# Patient Record
Sex: FEMALE | Race: WHITE | Employment: UNEMPLOYED | URBAN - METROPOLITAN AREA
[De-identification: names, ages, dates, MRNs, and addresses within clinical notes are randomized per-mention and may not be internally consistent; named-entity substitution may affect disease eponyms.]

---

## 2017-01-30 PROBLEM — C73 PRIMARY THYROID PAPILLARY CARCINOMA (HCC): Chronic | Status: RESOLVED | Noted: 2017-01-30 | Resolved: 2017-01-30

## 2018-01-17 PROBLEM — R42 DIZZINESS: Status: ACTIVE | Noted: 2018-01-17

## 2018-01-17 PROBLEM — E03.9 HYPOTHYROIDISM (ACQUIRED): Status: ACTIVE | Noted: 2018-01-17

## 2018-01-17 PROBLEM — R63.5 WEIGHT GAIN: Status: ACTIVE | Noted: 2018-01-17

## 2018-01-17 PROBLEM — C73 THYROID CANCER (HCC): Status: ACTIVE | Noted: 2018-01-17

## 2018-03-13 ENCOUNTER — HOSPITAL ENCOUNTER (OUTPATIENT)
Age: 53
Discharge: HOME | End: 2018-03-13
Payer: COMMERCIAL

## 2018-03-13 ENCOUNTER — OFFICE VISIT (OUTPATIENT)
Dept: ENDOCRINOLOGY | Age: 53
End: 2018-03-13
Payer: COMMERCIAL

## 2018-03-13 VITALS
SYSTOLIC BLOOD PRESSURE: 138 MMHG | OXYGEN SATURATION: 98 % | DIASTOLIC BLOOD PRESSURE: 88 MMHG | TEMPERATURE: 97.9 F | WEIGHT: 252 LBS | BODY MASS INDEX: 40.5 KG/M2 | HEART RATE: 89 BPM | HEIGHT: 66 IN

## 2018-03-13 DIAGNOSIS — C73 THYROID CANCER (HCC): Primary | ICD-10-CM

## 2018-03-13 DIAGNOSIS — C73 THYROID CANCER (HCC): ICD-10-CM

## 2018-03-13 LAB — TSH SERPL DL<=0.05 MIU/L-ACNC: 0.51 UIU/ML (ref 0.27–4.2)

## 2018-03-13 PROCEDURE — 84443 ASSAY THYROID STIM HORMONE: CPT

## 2018-03-13 PROCEDURE — 99213 OFFICE O/P EST LOW 20 MIN: CPT | Performed by: INTERNAL MEDICINE

## 2018-03-13 PROCEDURE — 36415 COLL VENOUS BLD VENIPUNCTURE: CPT

## 2018-03-13 NOTE — PROGRESS NOTES
by mouth daily 30 tablet 3    ALPRAZolam (XANAX) 1 MG tablet Take 1 mg by mouth 2 times daily OK TO TAKE DOS       No current facility-administered medications on file prior to visit. ROS - Gen: no F/C,  Card: no CP,  Pulm: no SOB,  GI: no N/V/D,      PE -  Gen : /88 (Site: Right Arm, Position: Sitting, Cuff Size: Large Adult)   Pulse 89   Temp 97.9 °F (36.6 °C)   Ht 5' 6\" (1.676 m)   Wt 252 lb (114.3 kg)   SpO2 98%   BMI 40.67 kg/m²    WN, WD, NAD  Heart: RRR without MGR   No LE edema  Lung: CTA b/l   Nml resp effort  Psych: Normal mood   Full affect  Neur: Moves all extremities well    Test Results -  Results for Janay Monsalve (MRN [id number]) as of 3/13/2018 16:31   Ref. Range 2/20/2018 07:12   Urine Total Volume Latest Units: mL 800   Cortisol (Ur), Free Latest Ref Range: <=45.0 ug/d 12.1   Cortisol,F,ug/L,U Latest Units: ug/L 15.10   Cortisol,Ur Free Latest Units: ug/g CRT 10.63   Creatinine, 24H Ur Latest Ref Range: 500 - 1400 mg/d 1136   Creatinine, Ur Latest Units: mg/dL 142   Hours Collected Latest Units: hr 24       A/P -   1. Thyroid Cancer - stable, no signs of recurrence       PTC Stage I, [id number],     Excellent response to therapy (total thyroidectomy, 100mCi BERG)       Risk of recurrence is 1-4% for the next 5years. TSH should be maintained at 0.5-2 per the 2015 JOSE guidelines       Pt is doing well. Her TSH needs to be repeated now, b/c her last one was drawn just 3-4 weeks after an LT4 dose adjustment. Therefore, the plan will be as follows: Will check her TSH now       Will need yearly TSH, fT4 , Tg, Tg Ab and Neck US in Aug 2018     2. Fatigue, dizziness and other non-specific symptoms - uncontrolled       Her testing revealed no endocrine cause. 3.  Obesity - uncontrolled       I instructed pt to cont to avoid sweets and to count all calories and limit them to 1500 suman/day     3. HTN - controlled       Cont present medical regimen for now     4.

## 2018-04-16 ENCOUNTER — TELEPHONE (OUTPATIENT)
Dept: ENDOCRINOLOGY | Age: 53
End: 2018-04-16

## 2018-05-04 DIAGNOSIS — C73 THYROID CANCER (HCC): Primary | ICD-10-CM

## 2018-05-15 ENCOUNTER — TELEPHONE (OUTPATIENT)
Dept: ENDOCRINOLOGY | Age: 53
End: 2018-05-15

## 2018-05-16 ENCOUNTER — HOSPITAL ENCOUNTER (OUTPATIENT)
Dept: ULTRASOUND IMAGING | Age: 53
Discharge: HOME | End: 2018-05-18
Payer: COMMERCIAL

## 2018-05-16 ENCOUNTER — HOSPITAL ENCOUNTER (OUTPATIENT)
Age: 53
Discharge: HOME | End: 2018-05-16
Payer: COMMERCIAL

## 2018-05-16 DIAGNOSIS — C73 THYROID CANCER (HCC): ICD-10-CM

## 2018-05-16 LAB
T4 FREE: 1.39 NG/DL (ref 0.93–1.7)
TSH SERPL DL<=0.05 MIU/L-ACNC: 1.17 UIU/ML (ref 0.27–4.2)

## 2018-05-16 PROCEDURE — 86800 THYROGLOBULIN ANTIBODY: CPT

## 2018-05-16 PROCEDURE — 84432 ASSAY OF THYROGLOBULIN: CPT

## 2018-05-16 PROCEDURE — 84443 ASSAY THYROID STIM HORMONE: CPT

## 2018-05-16 PROCEDURE — 76536 US EXAM OF HEAD AND NECK: CPT

## 2018-05-16 PROCEDURE — 84439 ASSAY OF FREE THYROXINE: CPT

## 2018-05-16 PROCEDURE — 36415 COLL VENOUS BLD VENIPUNCTURE: CPT

## 2018-05-19 LAB
THYROGLOBULIN AB: <0.9 IU/ML (ref 0–4)
THYROGLOBULIN BY LC-MS/MS, SERUM/PLASMA: ABNORMAL NG/ML (ref 1.3–31.8)
THYROGLOBULIN, SERUM OR PLASMA: <0.1 NG/ML (ref 1.3–31.8)

## 2018-05-22 ENCOUNTER — TELEPHONE (OUTPATIENT)
Dept: ENDOCRINOLOGY | Age: 53
End: 2018-05-22

## 2018-05-24 ENCOUNTER — APPOINTMENT (OUTPATIENT)
Dept: GENERAL RADIOLOGY | Age: 53
End: 2018-05-24
Payer: COMMERCIAL

## 2018-05-24 ENCOUNTER — HOSPITAL ENCOUNTER (EMERGENCY)
Age: 53
Discharge: HOME | End: 2018-05-24
Attending: FAMILY MEDICINE
Payer: COMMERCIAL

## 2018-05-24 VITALS
HEART RATE: 98 BPM | TEMPERATURE: 98.4 F | OXYGEN SATURATION: 98 % | SYSTOLIC BLOOD PRESSURE: 146 MMHG | RESPIRATION RATE: 18 BRPM | DIASTOLIC BLOOD PRESSURE: 86 MMHG

## 2018-05-24 DIAGNOSIS — M75.52 BURSITIS OF LEFT SHOULDER: ICD-10-CM

## 2018-05-24 DIAGNOSIS — S46.912A STRAIN OF LEFT SHOULDER, INITIAL ENCOUNTER: Primary | ICD-10-CM

## 2018-05-24 PROCEDURE — 73080 X-RAY EXAM OF ELBOW: CPT

## 2018-05-24 PROCEDURE — 99283 EMERGENCY DEPT VISIT LOW MDM: CPT

## 2018-05-24 PROCEDURE — 73030 X-RAY EXAM OF SHOULDER: CPT

## 2018-05-24 RX ORDER — METHYLPREDNISOLONE 4 MG/1
TABLET ORAL
Qty: 1 KIT | Refills: 0 | Status: SHIPPED | OUTPATIENT
Start: 2018-05-24 | End: 2018-05-30

## 2018-05-24 ASSESSMENT — PAIN DESCRIPTION - PROGRESSION: CLINICAL_PROGRESSION: GRADUALLY WORSENING

## 2018-05-24 ASSESSMENT — PAIN DESCRIPTION - FREQUENCY: FREQUENCY: CONTINUOUS

## 2018-05-24 ASSESSMENT — PAIN DESCRIPTION - DESCRIPTORS: DESCRIPTORS: ACHING

## 2018-05-24 ASSESSMENT — PAIN DESCRIPTION - ORIENTATION: ORIENTATION: LEFT

## 2018-05-24 ASSESSMENT — PAIN DESCRIPTION - ONSET: ONSET: ON-GOING

## 2018-05-24 ASSESSMENT — PAIN DESCRIPTION - LOCATION: LOCATION: ARM;SHOULDER

## 2018-05-24 ASSESSMENT — PAIN SCALES - GENERAL: PAINLEVEL_OUTOF10: 8

## 2018-06-04 ENCOUNTER — OFFICE VISIT (OUTPATIENT)
Dept: CARDIOLOGY CLINIC | Age: 53
End: 2018-06-04
Payer: COMMERCIAL

## 2018-06-04 VITALS
DIASTOLIC BLOOD PRESSURE: 70 MMHG | WEIGHT: 253.9 LBS | HEART RATE: 77 BPM | RESPIRATION RATE: 16 BRPM | BODY MASS INDEX: 40.81 KG/M2 | HEIGHT: 66 IN | SYSTOLIC BLOOD PRESSURE: 120 MMHG

## 2018-06-04 DIAGNOSIS — R07.9 CHEST PAIN, UNSPECIFIED TYPE: ICD-10-CM

## 2018-06-04 DIAGNOSIS — R00.2 PALPITATIONS: ICD-10-CM

## 2018-06-04 DIAGNOSIS — M79.602 LEFT ARM PAIN: Primary | ICD-10-CM

## 2018-06-04 PROCEDURE — 93000 ELECTROCARDIOGRAM COMPLETE: CPT | Performed by: INTERNAL MEDICINE

## 2018-06-04 PROCEDURE — 3017F COLORECTAL CA SCREEN DOC REV: CPT | Performed by: INTERNAL MEDICINE

## 2018-06-04 PROCEDURE — 99214 OFFICE O/P EST MOD 30 MIN: CPT | Performed by: INTERNAL MEDICINE

## 2018-06-04 PROCEDURE — G8417 CALC BMI ABV UP PARAM F/U: HCPCS | Performed by: INTERNAL MEDICINE

## 2018-06-04 PROCEDURE — G8427 DOCREV CUR MEDS BY ELIG CLIN: HCPCS | Performed by: INTERNAL MEDICINE

## 2018-06-04 PROCEDURE — 1036F TOBACCO NON-USER: CPT | Performed by: INTERNAL MEDICINE

## 2018-06-04 RX ORDER — METOPROLOL SUCCINATE 50 MG/1
25 TABLET, EXTENDED RELEASE ORAL DAILY
Qty: 90 TABLET | Refills: 3 | Status: SHIPPED | OUTPATIENT
Start: 2018-06-04 | End: 2018-12-11 | Stop reason: SDUPTHER

## 2018-06-06 ENCOUNTER — APPOINTMENT (OUTPATIENT)
Dept: ULTRASOUND IMAGING | Age: 53
End: 2018-06-06
Payer: COMMERCIAL

## 2018-06-06 ENCOUNTER — HOSPITAL ENCOUNTER (EMERGENCY)
Age: 53
Discharge: HOME | End: 2018-06-06
Attending: EMERGENCY MEDICINE
Payer: COMMERCIAL

## 2018-06-06 VITALS
HEIGHT: 66 IN | HEART RATE: 68 BPM | TEMPERATURE: 97.4 F | BODY MASS INDEX: 38.57 KG/M2 | RESPIRATION RATE: 18 BRPM | SYSTOLIC BLOOD PRESSURE: 158 MMHG | WEIGHT: 240 LBS | OXYGEN SATURATION: 97 % | DIASTOLIC BLOOD PRESSURE: 88 MMHG

## 2018-06-06 DIAGNOSIS — M79.602 LEFT ARM PAIN: Primary | ICD-10-CM

## 2018-06-06 PROCEDURE — 6370000000 HC RX 637 (ALT 250 FOR IP): Performed by: EMERGENCY MEDICINE

## 2018-06-06 PROCEDURE — 93971 EXTREMITY STUDY: CPT

## 2018-06-06 PROCEDURE — 99283 EMERGENCY DEPT VISIT LOW MDM: CPT

## 2018-06-06 RX ORDER — IBUPROFEN 800 MG/1
800 TABLET ORAL ONCE
Status: COMPLETED | OUTPATIENT
Start: 2018-06-06 | End: 2018-06-06

## 2018-06-06 RX ADMIN — IBUPROFEN 800 MG: 800 TABLET ORAL at 06:44

## 2018-06-06 ASSESSMENT — PAIN DESCRIPTION - DESCRIPTORS: DESCRIPTORS: ACHING;CONSTANT

## 2018-06-06 ASSESSMENT — PAIN DESCRIPTION - FREQUENCY: FREQUENCY: CONTINUOUS

## 2018-06-06 ASSESSMENT — ENCOUNTER SYMPTOMS
BACK PAIN: 0
NAUSEA: 0
SHORTNESS OF BREATH: 0
VOMITING: 0
COUGH: 0
COLOR CHANGE: 0

## 2018-06-06 ASSESSMENT — PAIN DESCRIPTION - ORIENTATION: ORIENTATION: LEFT;UPPER;INNER

## 2018-06-06 ASSESSMENT — PAIN DESCRIPTION - ONSET: ONSET: ON-GOING

## 2018-06-06 ASSESSMENT — PAIN DESCRIPTION - LOCATION: LOCATION: ARM

## 2018-06-06 ASSESSMENT — PAIN SCALES - GENERAL
PAINLEVEL_OUTOF10: 7
PAINLEVEL_OUTOF10: 8
PAINLEVEL_OUTOF10: 8

## 2018-06-06 ASSESSMENT — PAIN DESCRIPTION - PAIN TYPE: TYPE: ACUTE PAIN

## 2018-06-07 ENCOUNTER — OFFICE VISIT (OUTPATIENT)
Dept: SURGERY | Age: 53
End: 2018-06-07
Payer: COMMERCIAL

## 2018-06-07 VITALS
HEIGHT: 66 IN | DIASTOLIC BLOOD PRESSURE: 94 MMHG | BODY MASS INDEX: 40.5 KG/M2 | HEART RATE: 82 BPM | OXYGEN SATURATION: 98 % | TEMPERATURE: 98.4 F | RESPIRATION RATE: 16 BRPM | SYSTOLIC BLOOD PRESSURE: 138 MMHG | WEIGHT: 252 LBS

## 2018-06-07 DIAGNOSIS — M79.602 PAIN OF LEFT UPPER EXTREMITY: Primary | ICD-10-CM

## 2018-06-07 DIAGNOSIS — R22.32 ARM MASS, LEFT: Primary | ICD-10-CM

## 2018-06-07 PROCEDURE — G8427 DOCREV CUR MEDS BY ELIG CLIN: HCPCS | Performed by: SURGERY

## 2018-06-07 PROCEDURE — 99243 OFF/OP CNSLTJ NEW/EST LOW 30: CPT | Performed by: SURGERY

## 2018-06-07 PROCEDURE — G8417 CALC BMI ABV UP PARAM F/U: HCPCS | Performed by: SURGERY

## 2018-06-07 PROCEDURE — 3017F COLORECTAL CA SCREEN DOC REV: CPT | Performed by: SURGERY

## 2018-06-09 ENCOUNTER — HOSPITAL ENCOUNTER (OUTPATIENT)
Dept: CT IMAGING | Age: 53
Discharge: HOME | End: 2018-06-11
Payer: COMMERCIAL

## 2018-06-09 PROCEDURE — 73200 CT UPPER EXTREMITY W/O DYE: CPT

## 2018-06-13 ENCOUNTER — HOSPITAL ENCOUNTER (OUTPATIENT)
Dept: MRI IMAGING | Age: 53
Discharge: HOME | End: 2018-06-15
Payer: COMMERCIAL

## 2018-06-13 ENCOUNTER — HOSPITAL ENCOUNTER (OUTPATIENT)
Age: 53
Discharge: HOME | End: 2018-06-13
Payer: COMMERCIAL

## 2018-06-13 DIAGNOSIS — M79.602 LEFT ARM PAIN: ICD-10-CM

## 2018-06-13 DIAGNOSIS — R00.2 PALPITATIONS: ICD-10-CM

## 2018-06-13 DIAGNOSIS — R07.9 CHEST PAIN, UNSPECIFIED TYPE: ICD-10-CM

## 2018-06-13 LAB
BUN BLDV-MCNC: 13 MG/DL (ref 6–20)
CREAT SERPL-MCNC: 0.7 MG/DL (ref 0.5–1)
GFR AFRICAN AMERICAN: >60
GFR NON-AFRICAN AMERICAN: >60 ML/MIN/1.73

## 2018-06-13 PROCEDURE — 82565 ASSAY OF CREATININE: CPT

## 2018-06-13 PROCEDURE — 84520 ASSAY OF UREA NITROGEN: CPT

## 2018-06-13 PROCEDURE — 36415 COLL VENOUS BLD VENIPUNCTURE: CPT

## 2018-06-15 RX ORDER — FUROSEMIDE 40 MG/1
TABLET ORAL
Qty: 45 TABLET | Refills: 2 | Status: SHIPPED | OUTPATIENT
Start: 2018-06-15 | End: 2018-12-11

## 2018-07-18 ENCOUNTER — HOSPITAL ENCOUNTER (OUTPATIENT)
Age: 53
Discharge: HOME | End: 2018-07-20

## 2018-07-18 LAB
ABO/RH: NORMAL
ANTIBODY SCREEN: NORMAL
HCT VFR BLD CALC: 41.3 % (ref 34–48)
HEMOGLOBIN: 13.8 G/DL (ref 11.5–15.5)
MCH RBC QN AUTO: 29.9 PG (ref 26–35)
MCHC RBC AUTO-ENTMCNC: 33.4 % (ref 32–34.5)
MCV RBC AUTO: 89.4 FL (ref 80–99.9)
PDW BLD-RTO: 12.3 FL (ref 11.5–15)
PLATELET # BLD: 380 E9/L (ref 130–450)
PMV BLD AUTO: 9.8 FL (ref 7–12)
RBC # BLD: 4.62 E12/L (ref 3.5–5.5)
WBC # BLD: 11.2 E9/L (ref 4.5–11.5)

## 2018-07-18 PROCEDURE — 86850 RBC ANTIBODY SCREEN: CPT

## 2018-07-18 PROCEDURE — 86901 BLOOD TYPING SEROLOGIC RH(D): CPT

## 2018-07-18 PROCEDURE — 85027 COMPLETE CBC AUTOMATED: CPT

## 2018-07-18 PROCEDURE — 86900 BLOOD TYPING SEROLOGIC ABO: CPT

## 2018-07-25 ENCOUNTER — HOSPITAL ENCOUNTER (OUTPATIENT)
Age: 53
Discharge: HOME | End: 2018-07-27

## 2018-07-25 PROCEDURE — 88307 TISSUE EXAM BY PATHOLOGIST: CPT

## 2018-07-26 ENCOUNTER — HOSPITAL ENCOUNTER (OUTPATIENT)
Age: 53
Discharge: HOME | End: 2018-07-28

## 2018-07-26 DIAGNOSIS — I10 ESSENTIAL HYPERTENSION: ICD-10-CM

## 2018-07-26 DIAGNOSIS — C73 THYROID CANCER (HCC): Primary | ICD-10-CM

## 2018-07-26 DIAGNOSIS — R42 DIZZINESS: ICD-10-CM

## 2018-07-26 DIAGNOSIS — E03.9 HYPOTHYROIDISM (ACQUIRED): ICD-10-CM

## 2018-07-26 DIAGNOSIS — R63.5 WEIGHT GAIN: ICD-10-CM

## 2018-07-26 LAB
ANION GAP SERPL CALCULATED.3IONS-SCNC: 14 MMOL/L (ref 7–16)
BUN BLDV-MCNC: 14 MG/DL (ref 6–20)
CALCIUM SERPL-MCNC: 9 MG/DL (ref 8.6–10.2)
CHLORIDE BLD-SCNC: 101 MMOL/L (ref 98–107)
CO2: 26 MMOL/L (ref 22–29)
CREAT SERPL-MCNC: 0.7 MG/DL (ref 0.5–1)
GFR AFRICAN AMERICAN: >60
GFR NON-AFRICAN AMERICAN: >60 ML/MIN/1.73
GLUCOSE BLD-MCNC: 138 MG/DL (ref 74–109)
HCT VFR BLD CALC: 38.2 % (ref 34–48)
HEMOGLOBIN: 12.6 G/DL (ref 11.5–15.5)
MAGNESIUM: 1.9 MG/DL (ref 1.6–2.6)
POTASSIUM SERPL-SCNC: 4.7 MMOL/L (ref 3.5–5)
SODIUM BLD-SCNC: 141 MMOL/L (ref 132–146)

## 2018-07-26 PROCEDURE — 85014 HEMATOCRIT: CPT

## 2018-07-26 PROCEDURE — 85018 HEMOGLOBIN: CPT

## 2018-07-26 PROCEDURE — 80048 BASIC METABOLIC PNL TOTAL CA: CPT

## 2018-07-26 PROCEDURE — 83735 ASSAY OF MAGNESIUM: CPT

## 2018-07-26 RX ORDER — LEVOTHYROXINE SODIUM 112 MCG
112 TABLET ORAL DAILY
Qty: 30 TABLET | Refills: 5 | Status: SHIPPED | OUTPATIENT
Start: 2018-07-26 | End: 2019-01-17 | Stop reason: SDUPTHER

## 2018-09-04 ENCOUNTER — HOSPITAL ENCOUNTER (OUTPATIENT)
Dept: ULTRASOUND IMAGING | Age: 53
Discharge: HOME | End: 2018-09-06
Payer: COMMERCIAL

## 2018-09-04 DIAGNOSIS — R63.5 WEIGHT GAIN: ICD-10-CM

## 2018-09-04 DIAGNOSIS — R42 DIZZINESS: ICD-10-CM

## 2018-09-04 DIAGNOSIS — C73 THYROID CANCER (HCC): ICD-10-CM

## 2018-09-04 DIAGNOSIS — E03.9 HYPOTHYROIDISM (ACQUIRED): ICD-10-CM

## 2018-09-04 DIAGNOSIS — I10 ESSENTIAL HYPERTENSION: ICD-10-CM

## 2018-09-04 PROCEDURE — 76536 US EXAM OF HEAD AND NECK: CPT

## 2018-09-06 ENCOUNTER — HOSPITAL ENCOUNTER (OUTPATIENT)
Dept: SLEEP CENTER | Age: 53
Discharge: HOME | End: 2018-09-06
Payer: COMMERCIAL

## 2018-09-06 PROCEDURE — G0399 HOME SLEEP TEST/TYPE 3 PORTA: HCPCS

## 2018-09-13 ENCOUNTER — TELEPHONE (OUTPATIENT)
Dept: ADMINISTRATIVE | Age: 53
End: 2018-09-13

## 2018-09-13 NOTE — TELEPHONE ENCOUNTER
Having car problems, not able to get there for appt; rescheduled; wants to know if someone can call her back to go over test results.

## 2018-09-14 ENCOUNTER — CLINICAL DOCUMENTATION (OUTPATIENT)
Dept: ENDOCRINOLOGY | Age: 53
End: 2018-09-14

## 2018-09-14 NOTE — PROGRESS NOTES
Spoke with pt by phone. The US showed the LN's to be without significant change compared to the 05/2018 US. Next appt is in two weeks. Will discuss with her at that time a plan for continued surveillance.     Rivera Hidalgo  9/14/18

## 2018-10-21 ENCOUNTER — CLINICAL DOCUMENTATION (OUTPATIENT)
Dept: ENDOCRINOLOGY | Age: 53
End: 2018-10-21

## 2018-10-21 NOTE — PROGRESS NOTES
Pt cancelled her follow up appt on 9/13/18. I asked staff to reschedule her.   Tom Morales  10/21/18

## 2018-12-03 ENCOUNTER — APPOINTMENT (OUTPATIENT)
Dept: CT IMAGING | Age: 53
End: 2018-12-03
Payer: COMMERCIAL

## 2018-12-03 ENCOUNTER — HOSPITAL ENCOUNTER (EMERGENCY)
Age: 53
Discharge: HOME | End: 2018-12-03
Attending: EMERGENCY MEDICINE
Payer: COMMERCIAL

## 2018-12-03 VITALS
WEIGHT: 245 LBS | HEART RATE: 73 BPM | HEIGHT: 66 IN | OXYGEN SATURATION: 97 % | TEMPERATURE: 97.8 F | DIASTOLIC BLOOD PRESSURE: 65 MMHG | SYSTOLIC BLOOD PRESSURE: 156 MMHG | RESPIRATION RATE: 16 BRPM | BODY MASS INDEX: 39.37 KG/M2

## 2018-12-03 DIAGNOSIS — K63.89 APPENDICITIS EPIPLOICA: Primary | ICD-10-CM

## 2018-12-03 DIAGNOSIS — R10.32 LEFT LOWER QUADRANT PAIN: ICD-10-CM

## 2018-12-03 LAB
ALBUMIN SERPL-MCNC: 4.3 G/DL (ref 3.5–5.2)
ALP BLD-CCNC: 68 U/L (ref 35–104)
ALT SERPL-CCNC: 18 U/L (ref 0–32)
ANION GAP SERPL CALCULATED.3IONS-SCNC: 16 MMOL/L (ref 7–16)
AST SERPL-CCNC: 14 U/L (ref 0–31)
BACTERIA: ABNORMAL /HPF
BASOPHILS ABSOLUTE: 0.05 E9/L (ref 0–0.2)
BASOPHILS RELATIVE PERCENT: 0.5 % (ref 0–2)
BILIRUB SERPL-MCNC: 0.9 MG/DL (ref 0–1.2)
BILIRUBIN URINE: NEGATIVE
BLOOD, URINE: ABNORMAL
BUN BLDV-MCNC: 11 MG/DL (ref 6–20)
CALCIUM SERPL-MCNC: 9.3 MG/DL (ref 8.6–10.2)
CHLORIDE BLD-SCNC: 99 MMOL/L (ref 98–107)
CLARITY: CLEAR
CO2: 24 MMOL/L (ref 22–29)
COLOR: YELLOW
CREAT SERPL-MCNC: 0.7 MG/DL (ref 0.5–1)
EOSINOPHILS ABSOLUTE: 0.09 E9/L (ref 0.05–0.5)
EOSINOPHILS RELATIVE PERCENT: 1 % (ref 0–6)
EPITHELIAL CELLS, UA: ABNORMAL /HPF
GFR AFRICAN AMERICAN: >60
GFR NON-AFRICAN AMERICAN: >60 ML/MIN/1.73
GLUCOSE BLD-MCNC: 101 MG/DL (ref 74–99)
GLUCOSE URINE: NEGATIVE MG/DL
HCT VFR BLD CALC: 41 % (ref 34–48)
HEMOGLOBIN: 13.7 G/DL (ref 11.5–15.5)
IMMATURE GRANULOCYTES #: 0.02 E9/L
IMMATURE GRANULOCYTES %: 0.2 % (ref 0–5)
KETONES, URINE: NEGATIVE MG/DL
LACTIC ACID: 1.3 MMOL/L (ref 0.5–2.2)
LEUKOCYTE ESTERASE, URINE: ABNORMAL
LIPASE: 17 U/L (ref 13–60)
LYMPHOCYTES ABSOLUTE: 2.12 E9/L (ref 1.5–4)
LYMPHOCYTES RELATIVE PERCENT: 22.9 % (ref 20–42)
MCH RBC QN AUTO: 30.3 PG (ref 26–35)
MCHC RBC AUTO-ENTMCNC: 33.4 % (ref 32–34.5)
MCV RBC AUTO: 90.7 FL (ref 80–99.9)
MONOCYTES ABSOLUTE: 0.68 E9/L (ref 0.1–0.95)
MONOCYTES RELATIVE PERCENT: 7.3 % (ref 2–12)
NEUTROPHILS ABSOLUTE: 6.31 E9/L (ref 1.8–7.3)
NEUTROPHILS RELATIVE PERCENT: 68.1 % (ref 43–80)
NITRITE, URINE: NEGATIVE
PDW BLD-RTO: 12 FL (ref 11.5–15)
PH UA: 6 (ref 5–9)
PLATELET # BLD: 303 E9/L (ref 130–450)
PMV BLD AUTO: 9.4 FL (ref 7–12)
POTASSIUM SERPL-SCNC: 4 MMOL/L (ref 3.5–5)
PROTEIN UA: NEGATIVE MG/DL
RBC # BLD: 4.52 E12/L (ref 3.5–5.5)
RBC UA: ABNORMAL /HPF (ref 0–2)
SODIUM BLD-SCNC: 139 MMOL/L (ref 132–146)
SPECIFIC GRAVITY UA: 1.01 (ref 1–1.03)
TOTAL PROTEIN: 7.3 G/DL (ref 6.4–8.3)
UROBILINOGEN, URINE: 0.2 E.U./DL
WBC # BLD: 9.3 E9/L (ref 4.5–11.5)
WBC UA: ABNORMAL /HPF (ref 0–5)

## 2018-12-03 PROCEDURE — 74176 CT ABD & PELVIS W/O CONTRAST: CPT

## 2018-12-03 PROCEDURE — 99284 EMERGENCY DEPT VISIT MOD MDM: CPT

## 2018-12-03 PROCEDURE — 6360000002 HC RX W HCPCS: Performed by: EMERGENCY MEDICINE

## 2018-12-03 PROCEDURE — 83690 ASSAY OF LIPASE: CPT

## 2018-12-03 PROCEDURE — 81001 URINALYSIS AUTO W/SCOPE: CPT

## 2018-12-03 PROCEDURE — 2580000003 HC RX 258: Performed by: EMERGENCY MEDICINE

## 2018-12-03 PROCEDURE — 87088 URINE BACTERIA CULTURE: CPT

## 2018-12-03 PROCEDURE — 85025 COMPLETE CBC W/AUTO DIFF WBC: CPT

## 2018-12-03 PROCEDURE — 96374 THER/PROPH/DIAG INJ IV PUSH: CPT

## 2018-12-03 PROCEDURE — 80053 COMPREHEN METABOLIC PANEL: CPT

## 2018-12-03 PROCEDURE — 83605 ASSAY OF LACTIC ACID: CPT

## 2018-12-03 RX ORDER — SODIUM CHLORIDE 9 MG/ML
1000 INJECTION, SOLUTION INTRAVENOUS ONCE
Status: COMPLETED | OUTPATIENT
Start: 2018-12-03 | End: 2018-12-03

## 2018-12-03 RX ORDER — SODIUM CHLORIDE 0.9 % (FLUSH) 0.9 %
10 SYRINGE (ML) INJECTION PRN
Status: DISCONTINUED | OUTPATIENT
Start: 2018-12-03 | End: 2018-12-03 | Stop reason: HOSPADM

## 2018-12-03 RX ORDER — KETOROLAC TROMETHAMINE 30 MG/ML
15 INJECTION, SOLUTION INTRAMUSCULAR; INTRAVENOUS ONCE
Status: COMPLETED | OUTPATIENT
Start: 2018-12-03 | End: 2018-12-03

## 2018-12-03 RX ORDER — NAPROXEN 500 MG/1
500 TABLET ORAL 2 TIMES DAILY
Qty: 28 TABLET | Refills: 0 | Status: SHIPPED | OUTPATIENT
Start: 2018-12-03 | End: 2018-12-11

## 2018-12-03 RX ADMIN — SODIUM CHLORIDE 1000 ML: 9 INJECTION, SOLUTION INTRAVENOUS at 03:26

## 2018-12-03 RX ADMIN — KETOROLAC TROMETHAMINE 15 MG: 30 INJECTION, SOLUTION INTRAMUSCULAR at 03:26

## 2018-12-03 RX ADMIN — Medication 10 ML: at 03:28

## 2018-12-03 ASSESSMENT — ENCOUNTER SYMPTOMS
DIARRHEA: 1
COLOR CHANGE: 0
VOMITING: 0
ABDOMINAL PAIN: 1
CHEST TIGHTNESS: 0
NAUSEA: 0
BLOOD IN STOOL: 0
BACK PAIN: 0
SHORTNESS OF BREATH: 0
COUGH: 0

## 2018-12-03 ASSESSMENT — PAIN DESCRIPTION - ONSET: ONSET: ON-GOING

## 2018-12-03 ASSESSMENT — PAIN DESCRIPTION - PROGRESSION
CLINICAL_PROGRESSION: GRADUALLY IMPROVING
CLINICAL_PROGRESSION: GRADUALLY WORSENING

## 2018-12-03 ASSESSMENT — PAIN SCALES - GENERAL
PAINLEVEL_OUTOF10: 6
PAINLEVEL_OUTOF10: 6
PAINLEVEL_OUTOF10: 5

## 2018-12-03 ASSESSMENT — PAIN DESCRIPTION - FREQUENCY: FREQUENCY: CONTINUOUS

## 2018-12-03 ASSESSMENT — PAIN DESCRIPTION - PAIN TYPE: TYPE: ACUTE PAIN

## 2018-12-03 ASSESSMENT — PAIN DESCRIPTION - LOCATION: LOCATION: ABDOMEN

## 2018-12-03 ASSESSMENT — PAIN DESCRIPTION - DESCRIPTORS: DESCRIPTORS: CONSTANT;ACHING;SORE

## 2018-12-03 ASSESSMENT — PAIN DESCRIPTION - ORIENTATION: ORIENTATION: LEFT;LOWER

## 2018-12-03 NOTE — ED PROVIDER NOTES
---------------------------  Date / Time Roomed:  12/3/2018  3:02 AM  ED Bed Assignment:  26/26    The nursing notes within the ED encounter and vital signs as below have been reviewed. No data found.       Oxygen Saturation Interpretation: Normal    ------------------------------------------ PROGRESS NOTES ------------------------------------------  Re-evaluation(s):  Feeling better          --------------------------------------- ED Clinical Course/MDM --------------------------------------    Impression: Epiploic appendicitis/left lower quadrant pain         Carmine Mejia MD  12/12/18 8870

## 2018-12-04 ENCOUNTER — HOSPITAL ENCOUNTER (EMERGENCY)
Age: 53
Discharge: HOME | End: 2018-12-04
Attending: EMERGENCY MEDICINE
Payer: COMMERCIAL

## 2018-12-04 VITALS
WEIGHT: 245 LBS | BODY MASS INDEX: 39.37 KG/M2 | HEIGHT: 66 IN | OXYGEN SATURATION: 99 % | HEART RATE: 88 BPM | TEMPERATURE: 98.2 F | RESPIRATION RATE: 14 BRPM | DIASTOLIC BLOOD PRESSURE: 66 MMHG | SYSTOLIC BLOOD PRESSURE: 138 MMHG

## 2018-12-04 DIAGNOSIS — I47.1 PAROXYSMAL ATRIAL TACHYCARDIA (HCC): Primary | ICD-10-CM

## 2018-12-04 DIAGNOSIS — K63.89 APPENDICITIS EPIPLOICA: ICD-10-CM

## 2018-12-04 LAB
ALBUMIN SERPL-MCNC: 4.3 G/DL (ref 3.5–5.2)
ALP BLD-CCNC: 66 U/L (ref 35–104)
ALT SERPL-CCNC: 17 U/L (ref 0–32)
ANION GAP SERPL CALCULATED.3IONS-SCNC: 16 MMOL/L (ref 7–16)
AST SERPL-CCNC: 14 U/L (ref 0–31)
BASOPHILS ABSOLUTE: 0.04 E9/L (ref 0–0.2)
BASOPHILS RELATIVE PERCENT: 0.5 % (ref 0–2)
BILIRUB SERPL-MCNC: 0.6 MG/DL (ref 0–1.2)
BUN BLDV-MCNC: 12 MG/DL (ref 6–20)
CALCIUM SERPL-MCNC: 9 MG/DL (ref 8.6–10.2)
CHLORIDE BLD-SCNC: 98 MMOL/L (ref 98–107)
CO2: 25 MMOL/L (ref 22–29)
CREAT SERPL-MCNC: 0.7 MG/DL (ref 0.5–1)
EKG ATRIAL RATE: 120 BPM
EKG P AXIS: 66 DEGREES
EKG P-R INTERVAL: 160 MS
EKG Q-T INTERVAL: 318 MS
EKG QRS DURATION: 78 MS
EKG QTC CALCULATION (BAZETT): 449 MS
EKG R AXIS: 82 DEGREES
EKG T AXIS: 85 DEGREES
EKG VENTRICULAR RATE: 120 BPM
EOSINOPHILS ABSOLUTE: 0.06 E9/L (ref 0.05–0.5)
EOSINOPHILS RELATIVE PERCENT: 0.8 % (ref 0–6)
GFR AFRICAN AMERICAN: >60
GFR NON-AFRICAN AMERICAN: >60 ML/MIN/1.73
GLUCOSE BLD-MCNC: 115 MG/DL (ref 74–99)
HCT VFR BLD CALC: 40 % (ref 34–48)
HEMOGLOBIN: 13.3 G/DL (ref 11.5–15.5)
IMMATURE GRANULOCYTES #: 0.02 E9/L
IMMATURE GRANULOCYTES %: 0.3 % (ref 0–5)
LACTIC ACID: 2.5 MMOL/L (ref 0.5–2.2)
LYMPHOCYTES ABSOLUTE: 1.41 E9/L (ref 1.5–4)
LYMPHOCYTES RELATIVE PERCENT: 19.3 % (ref 20–42)
MAGNESIUM: 1.9 MG/DL (ref 1.6–2.6)
MCH RBC QN AUTO: 30.2 PG (ref 26–35)
MCHC RBC AUTO-ENTMCNC: 33.3 % (ref 32–34.5)
MCV RBC AUTO: 90.7 FL (ref 80–99.9)
MONOCYTES ABSOLUTE: 0.57 E9/L (ref 0.1–0.95)
MONOCYTES RELATIVE PERCENT: 7.8 % (ref 2–12)
NEUTROPHILS ABSOLUTE: 5.21 E9/L (ref 1.8–7.3)
NEUTROPHILS RELATIVE PERCENT: 71.3 % (ref 43–80)
PDW BLD-RTO: 11.9 FL (ref 11.5–15)
PLATELET # BLD: 277 E9/L (ref 130–450)
PMV BLD AUTO: 9.4 FL (ref 7–12)
POTASSIUM SERPL-SCNC: 3.6 MMOL/L (ref 3.5–5)
RBC # BLD: 4.41 E12/L (ref 3.5–5.5)
SODIUM BLD-SCNC: 139 MMOL/L (ref 132–146)
T4 FREE: 1.6 NG/DL (ref 0.93–1.7)
TOTAL PROTEIN: 7.1 G/DL (ref 6.4–8.3)
TROPONIN: <0.01 NG/ML (ref 0–0.03)
TSH SERPL DL<=0.05 MIU/L-ACNC: 1.29 UIU/ML (ref 0.27–4.2)
URINE CULTURE, ROUTINE: NORMAL
WBC # BLD: 7.3 E9/L (ref 4.5–11.5)

## 2018-12-04 PROCEDURE — 93005 ELECTROCARDIOGRAM TRACING: CPT | Performed by: NURSE PRACTITIONER

## 2018-12-04 PROCEDURE — 83735 ASSAY OF MAGNESIUM: CPT

## 2018-12-04 PROCEDURE — 83605 ASSAY OF LACTIC ACID: CPT

## 2018-12-04 PROCEDURE — 84484 ASSAY OF TROPONIN QUANT: CPT

## 2018-12-04 PROCEDURE — 2580000003 HC RX 258: Performed by: NURSE PRACTITIONER

## 2018-12-04 PROCEDURE — 84439 ASSAY OF FREE THYROXINE: CPT

## 2018-12-04 PROCEDURE — 85025 COMPLETE CBC W/AUTO DIFF WBC: CPT

## 2018-12-04 PROCEDURE — 99285 EMERGENCY DEPT VISIT HI MDM: CPT

## 2018-12-04 PROCEDURE — 80053 COMPREHEN METABOLIC PANEL: CPT

## 2018-12-04 PROCEDURE — 84443 ASSAY THYROID STIM HORMONE: CPT

## 2018-12-04 RX ORDER — 0.9 % SODIUM CHLORIDE 0.9 %
1000 INTRAVENOUS SOLUTION INTRAVENOUS ONCE
Status: COMPLETED | OUTPATIENT
Start: 2018-12-04 | End: 2018-12-04

## 2018-12-04 RX ADMIN — SODIUM CHLORIDE 1000 ML: 9 INJECTION, SOLUTION INTRAVENOUS at 20:03

## 2018-12-05 ENCOUNTER — TELEPHONE (OUTPATIENT)
Dept: CARDIOLOGY CLINIC | Age: 53
End: 2018-12-05

## 2018-12-05 DIAGNOSIS — R00.2 PALPITATIONS: ICD-10-CM

## 2018-12-05 NOTE — ED NOTES
Reviewed discharge instructions with patient, discussed medications and addressed all patient and family questions/concerns. Pt verbalizes understanding.        Dieter Amaya RN  12/04/18 2432

## 2018-12-05 NOTE — ED PROVIDER NOTES
masses. · Musculoskeletal: Moves all extremities x 4. Warm and well perfused, no clubbing, cyanosis, or edema. Capillary refill <3 seconds   · Extremities: No calf tenderness or edema, no foot or ankle edema. No cording. No tenderness along the deep venous tract. No clinical findings to suggest DVT. · Integument: skin warm and dry. No rashes.    · Lymphatic: no lymphadenopathy noted  · Neurologic: GCS 15, no focal deficits, symmetric strength 5/5 in the upper and lower extremities bilaterally  · Psychiatric: Normal Affect      Lab / Imaging Results   (All laboratory and radiology results have been personally reviewed by myself)  Labs:  Results for orders placed or performed during the hospital encounter of 12/04/18   CBC Auto Differential   Result Value Ref Range    WBC 7.3 4.5 - 11.5 E9/L    RBC 4.41 3.50 - 5.50 E12/L    Hemoglobin 13.3 11.5 - 15.5 g/dL    Hematocrit 40.0 34.0 - 48.0 %    MCV 90.7 80.0 - 99.9 fL    MCH 30.2 26.0 - 35.0 pg    MCHC 33.3 32.0 - 34.5 %    RDW 11.9 11.5 - 15.0 fL    Platelets 146 377 - 808 E9/L    MPV 9.4 7.0 - 12.0 fL    Neutrophils % 71.3 43.0 - 80.0 %    Immature Granulocytes % 0.3 0.0 - 5.0 %    Lymphocytes % 19.3 (L) 20.0 - 42.0 %    Monocytes % 7.8 2.0 - 12.0 %    Eosinophils % 0.8 0.0 - 6.0 %    Basophils % 0.5 0.0 - 2.0 %    Neutrophils # 5.21 1.80 - 7.30 E9/L    Immature Granulocytes # 0.02 E9/L    Lymphocytes # 1.41 (L) 1.50 - 4.00 E9/L    Monocytes # 0.57 0.10 - 0.95 E9/L    Eosinophils # 0.06 0.05 - 0.50 E9/L    Basophils # 0.04 0.00 - 0.20 E9/L   Comprehensive Metabolic Panel   Result Value Ref Range    Sodium 139 132 - 146 mmol/L    Potassium 3.6 3.5 - 5.0 mmol/L    Chloride 98 98 - 107 mmol/L    CO2 25 22 - 29 mmol/L    Anion Gap 16 7 - 16 mmol/L    Glucose 115 (H) 74 - 99 mg/dL    BUN 12 6 - 20 mg/dL    CREATININE 0.7 0.5 - 1.0 mg/dL    GFR Non-African American >60 >=60 mL/min/1.73    GFR African American >60     Calcium 9.0 8.6 - 10.2 mg/dL    Total Protein 7.1 6.4 - 8.3 g/dL    Alb 4.3 3.5 - 5.2 g/dL    Total Bilirubin 0.6 0.0 - 1.2 mg/dL    Alkaline Phosphatase 66 35 - 104 U/L    ALT 17 0 - 32 U/L    AST 14 0 - 31 U/L   Lactic Acid, Plasma   Result Value Ref Range    Lactic Acid 2.5 (H) 0.5 - 2.2 mmol/L   Magnesium   Result Value Ref Range    Magnesium 1.9 1.6 - 2.6 mg/dL   TSH without Reflex   Result Value Ref Range    TSH 1.290 0.270 - 4.200 uIU/mL   T4, Free   Result Value Ref Range    T4 Free 1.60 0.93 - 1.70 ng/dL   Troponin   Result Value Ref Range    Troponin <0.01 0.00 - 0.03 ng/mL   EKG 12 Lead   Result Value Ref Range    Ventricular Rate 120 BPM    Atrial Rate 120 BPM    P-R Interval 160 ms    QRS Duration 78 ms    Q-T Interval 318 ms    QTc Calculation (Bazett) 449 ms    P Axis 66 degrees    R Axis 82 degrees    T Axis 85 degrees     Imaging: All Radiology results interpreted by Radiologist unless otherwise noted. No orders to display     EKG #1:  Interpreted by emergency department physician unless otherwise noted. Time:  19:26:16    Rate: 120  Rhythm: Sinus. Interpretation: sinus tachycardia, Nonspecific ST/T-wave abnormality without infarction or ischemia. ED Course / Medical Decision Making     Medications   0.9 % sodium chloride bolus (1,000 mLs Intravenous New Bag 12/4/18 2003)        Re-Evaluations:  12/4/18      Time: 9:40 pm    Patient seen and reexamined the bedside. Patients symptoms are improving. Results are explained to patient in plain language, treatment plan reviewed, patient is agreeable with plan for discharge at this point. Understands that will need to call to arrange follow-up. Consultations:             None    Procedures:   none    MDM:  48year Old female presenting to the emergency department for evaluation of palpitations and and a sensation that her heart was beating fast and hard. She has a history of paroxysmal atrial tachycardia, and is on Metoprolol XL, did not miss any doses.  She was recently started on Cipro empirically

## 2018-12-05 NOTE — TELEPHONE ENCOUNTER
Ngajacob No called and states that she was in the ER last night due to HR of 130. She would like to know if you would like to see her or if you can adjust medications. Last time this happened you increased 50 to twice a day.

## 2018-12-11 ENCOUNTER — OFFICE VISIT (OUTPATIENT)
Dept: CARDIOLOGY CLINIC | Age: 53
End: 2018-12-11
Payer: COMMERCIAL

## 2018-12-11 VITALS
WEIGHT: 246.8 LBS | HEART RATE: 74 BPM | DIASTOLIC BLOOD PRESSURE: 78 MMHG | BODY MASS INDEX: 39.66 KG/M2 | SYSTOLIC BLOOD PRESSURE: 112 MMHG | RESPIRATION RATE: 16 BRPM | HEIGHT: 66 IN

## 2018-12-11 DIAGNOSIS — R00.2 PALPITATIONS: ICD-10-CM

## 2018-12-11 DIAGNOSIS — R07.9 CHEST PAIN, UNSPECIFIED TYPE: Primary | ICD-10-CM

## 2018-12-11 PROCEDURE — 3017F COLORECTAL CA SCREEN DOC REV: CPT | Performed by: INTERNAL MEDICINE

## 2018-12-11 PROCEDURE — G8484 FLU IMMUNIZE NO ADMIN: HCPCS | Performed by: INTERNAL MEDICINE

## 2018-12-11 PROCEDURE — 99214 OFFICE O/P EST MOD 30 MIN: CPT | Performed by: INTERNAL MEDICINE

## 2018-12-11 PROCEDURE — G8417 CALC BMI ABV UP PARAM F/U: HCPCS | Performed by: INTERNAL MEDICINE

## 2018-12-11 PROCEDURE — G8427 DOCREV CUR MEDS BY ELIG CLIN: HCPCS | Performed by: INTERNAL MEDICINE

## 2018-12-11 PROCEDURE — 93000 ELECTROCARDIOGRAM COMPLETE: CPT | Performed by: INTERNAL MEDICINE

## 2018-12-11 PROCEDURE — 1036F TOBACCO NON-USER: CPT | Performed by: INTERNAL MEDICINE

## 2018-12-11 RX ORDER — METOPROLOL SUCCINATE 50 MG/1
50 TABLET, EXTENDED RELEASE ORAL 2 TIMES DAILY
Qty: 180 TABLET | Refills: 3 | Status: SHIPPED | OUTPATIENT
Start: 2018-12-11 | End: 2019-01-22 | Stop reason: SDUPTHER

## 2018-12-11 NOTE — PROGRESS NOTES
 homemaker      Social History Main Topics    Smoking status: Never Smoker    Smokeless tobacco: Never Used    Alcohol use No    Drug use: No    Sexual activity: Yes     Partners: Male     Other Topics Concern    Not on file     Social History Narrative    Lives in a house with  who is disabled and one child. Homeschools her daughter. 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       Family History   Problem Relation Age of Onset    High Blood Pressure Mother     High Blood Pressure Father     Cancer Father         leukemia, lung cancer +smoker, brain cancer    Other Sister         nephrectomy due to a benign tumor    Breast Cancer Paternal Grandmother     Ovarian Cancer Paternal Grandmother     Cervical Cancer Paternal Grandmother     Other Daughter         IGG immunodeficiency     Review of Systems:  Heart: as above   Lungs: as above   Eyes: denies changes in vision or discharge. Ears: denies changes in hearing or pain. Nose: denies epistaxis or masses   Throat: denies sore throat or trouble swallowing. Neuro: denies numbness, tingling, tremors. Skin: denies rashes or itching. : denies hematuria, dysuria   GI: denies vomiting, diarrhea   Psych: denies mood changed, anxiety, depression. All other systems negative. Physical Exam   /78   Pulse 74   Resp 16   Ht 5' 6\" (1.676 m)   Wt 246 lb 12.8 oz (111.9 kg)   LMP 01/01/2013   BMI 39.83 kg/m²   Constitutional: Oriented to person, place, and time. Well-developed and well-nourished. No distress. Head: Normocephalic and atraumatic. Eyes: EOM are normal. Pupils are equal, round, and reactive to light. Neck: Normal range of motion. Neck supple. No hepatojugular reflux and no JVD present. Carotid bruit is not present. No tracheal deviation present. No thyromegaly present. Cardiovascular: Normal rate, regular rhythm, normal heart sounds and intact distal pulses.   Exam reveals no gallop and no

## 2018-12-17 ENCOUNTER — TELEPHONE (OUTPATIENT)
Dept: CARDIOLOGY CLINIC | Age: 53
End: 2018-12-17

## 2018-12-21 NOTE — TELEPHONE ENCOUNTER
Increase toprol to 100 in am and 50 mg in evening. Eddie Farah D.O.   Cardiologist  Cardiology, [facility]

## 2018-12-27 ENCOUNTER — TELEPHONE (OUTPATIENT)
Dept: CARDIOLOGY CLINIC | Age: 53
End: 2018-12-27

## 2018-12-27 ENCOUNTER — APPOINTMENT (OUTPATIENT)
Dept: GENERAL RADIOLOGY | Age: 53
End: 2018-12-27
Payer: COMMERCIAL

## 2018-12-27 ENCOUNTER — HOSPITAL ENCOUNTER (EMERGENCY)
Age: 53
Discharge: HOME | End: 2018-12-27
Attending: EMERGENCY MEDICINE
Payer: COMMERCIAL

## 2018-12-27 VITALS
BODY MASS INDEX: 42.52 KG/M2 | WEIGHT: 240 LBS | DIASTOLIC BLOOD PRESSURE: 67 MMHG | HEIGHT: 63 IN | SYSTOLIC BLOOD PRESSURE: 165 MMHG | HEART RATE: 95 BPM | RESPIRATION RATE: 14 BRPM | OXYGEN SATURATION: 96 % | TEMPERATURE: 97.4 F

## 2018-12-27 DIAGNOSIS — R00.2 PALPITATIONS: Primary | ICD-10-CM

## 2018-12-27 LAB
ANION GAP SERPL CALCULATED.3IONS-SCNC: 13 MMOL/L (ref 7–16)
BUN BLDV-MCNC: 18 MG/DL (ref 6–20)
CALCIUM SERPL-MCNC: 8.8 MG/DL (ref 8.6–10.2)
CHLORIDE BLD-SCNC: 102 MMOL/L (ref 98–107)
CO2: 27 MMOL/L (ref 22–29)
CREAT SERPL-MCNC: 0.7 MG/DL (ref 0.5–1)
EKG ATRIAL RATE: 92 BPM
EKG P AXIS: 19 DEGREES
EKG P-R INTERVAL: 190 MS
EKG Q-T INTERVAL: 374 MS
EKG QRS DURATION: 84 MS
EKG QTC CALCULATION (BAZETT): 462 MS
EKG R AXIS: 14 DEGREES
EKG T AXIS: 35 DEGREES
EKG VENTRICULAR RATE: 92 BPM
GFR AFRICAN AMERICAN: >60
GFR NON-AFRICAN AMERICAN: >60 ML/MIN/1.73
GLUCOSE BLD-MCNC: 124 MG/DL (ref 74–99)
HCT VFR BLD CALC: 40.1 % (ref 34–48)
HEMOGLOBIN: 13.4 G/DL (ref 11.5–15.5)
MCH RBC QN AUTO: 30.4 PG (ref 26–35)
MCHC RBC AUTO-ENTMCNC: 33.4 % (ref 32–34.5)
MCV RBC AUTO: 90.9 FL (ref 80–99.9)
PDW BLD-RTO: 12.1 FL (ref 11.5–15)
PLATELET # BLD: 299 E9/L (ref 130–450)
PMV BLD AUTO: 10 FL (ref 7–12)
POTASSIUM SERPL-SCNC: 4.3 MMOL/L (ref 3.5–5)
PRO-BNP: 32 PG/ML (ref 0–125)
RBC # BLD: 4.41 E12/L (ref 3.5–5.5)
SODIUM BLD-SCNC: 142 MMOL/L (ref 132–146)
T4 TOTAL: 8.7 MCG/DL (ref 4.5–11.7)
TROPONIN: <0.01 NG/ML (ref 0–0.03)
TSH SERPL DL<=0.05 MIU/L-ACNC: 0.96 UIU/ML (ref 0.27–4.2)
WBC # BLD: 7.2 E9/L (ref 4.5–11.5)

## 2018-12-27 PROCEDURE — 84443 ASSAY THYROID STIM HORMONE: CPT

## 2018-12-27 PROCEDURE — 85027 COMPLETE CBC AUTOMATED: CPT

## 2018-12-27 PROCEDURE — 99285 EMERGENCY DEPT VISIT HI MDM: CPT

## 2018-12-27 PROCEDURE — 2580000003 HC RX 258: Performed by: PHYSICIAN ASSISTANT

## 2018-12-27 PROCEDURE — 71045 X-RAY EXAM CHEST 1 VIEW: CPT

## 2018-12-27 PROCEDURE — 84484 ASSAY OF TROPONIN QUANT: CPT

## 2018-12-27 PROCEDURE — 84436 ASSAY OF TOTAL THYROXINE: CPT

## 2018-12-27 PROCEDURE — 80048 BASIC METABOLIC PNL TOTAL CA: CPT

## 2018-12-27 PROCEDURE — 83880 ASSAY OF NATRIURETIC PEPTIDE: CPT

## 2018-12-27 RX ORDER — 0.9 % SODIUM CHLORIDE 0.9 %
1000 INTRAVENOUS SOLUTION INTRAVENOUS ONCE
Status: COMPLETED | OUTPATIENT
Start: 2018-12-27 | End: 2018-12-27

## 2018-12-27 RX ADMIN — SODIUM CHLORIDE 1000 ML: 9 INJECTION, SOLUTION INTRAVENOUS at 01:52

## 2018-12-27 NOTE — ED PROVIDER NOTES
Ciprofloxacin    -------------------------------------------------- RESULTS -------------------------------------------------  All laboratory and radiology results have been personally reviewed by myself   LABS:  Results for orders placed or performed during the hospital encounter of 12/27/18   TSH without Reflex   Result Value Ref Range    TSH 0.958 0.270 - 4.200 uIU/mL   T4   Result Value Ref Range    T4, Total 8.7 4.5 - 11.7 mcg/dL   Basic metabolic panel   Result Value Ref Range    Sodium 142 132 - 146 mmol/L    Potassium 4.3 3.5 - 5.0 mmol/L    Chloride 102 98 - 107 mmol/L    CO2 27 22 - 29 mmol/L    Anion Gap 13 7 - 16 mmol/L    Glucose 124 (H) 74 - 99 mg/dL    BUN 18 6 - 20 mg/dL    CREATININE 0.7 0.5 - 1.0 mg/dL    GFR Non-African American >60 >=60 mL/min/1.73    GFR African American >60     Calcium 8.8 8.6 - 10.2 mg/dL   CBC   Result Value Ref Range    WBC 7.2 4.5 - 11.5 E9/L    RBC 4.41 3.50 - 5.50 E12/L    Hemoglobin 13.4 11.5 - 15.5 g/dL    Hematocrit 40.1 34.0 - 48.0 %    MCV 90.9 80.0 - 99.9 fL    MCH 30.4 26.0 - 35.0 pg    MCHC 33.4 32.0 - 34.5 %    RDW 12.1 11.5 - 15.0 fL    Platelets 305 471 - 878 E9/L    MPV 10.0 7.0 - 12.0 fL   Troponin   Result Value Ref Range    Troponin <0.01 0.00 - 0.03 ng/mL   Brain Natriuretic Peptide   Result Value Ref Range    Pro-BNP 32 0 - 125 pg/mL       RADIOLOGY:  Interpreted by Radiologist.  XR CHEST PORTABLE    (Results Pending)       ------------------------- NURSING NOTES AND VITALS REVIEWED ---------------------------   The nursing notes within the ED encounter and vital signs as below have been reviewed.    BP (!) 165/67   Pulse 95   Temp 97.4 °F (36.3 °C) (Oral)   Resp 14   Ht 5' 3\" (1.6 m)   Wt 240 lb (108.9 kg)   LMP 01/01/2013   SpO2 96%   BMI 42.51 kg/m²   Oxygen Saturation Interpretation: Normal      ---------------------------------------------------PHYSICAL EXAM--------------------------------------      Constitutional/General: Alert and oriented

## 2018-12-28 ENCOUNTER — TELEPHONE (OUTPATIENT)
Dept: CARDIOLOGY CLINIC | Age: 53
End: 2018-12-28

## 2018-12-28 DIAGNOSIS — I49.9 IRREGULAR HEART RATE: Primary | ICD-10-CM

## 2019-01-02 ENCOUNTER — NURSE ONLY (OUTPATIENT)
Dept: NON INVASIVE DIAGNOSTICS | Age: 54
End: 2019-01-02

## 2019-01-09 ENCOUNTER — TELEPHONE (OUTPATIENT)
Dept: ENDOCRINOLOGY | Age: 54
End: 2019-01-09

## 2019-01-17 ENCOUNTER — OFFICE VISIT (OUTPATIENT)
Dept: ENDOCRINOLOGY | Age: 54
End: 2019-01-17
Payer: COMMERCIAL

## 2019-01-17 VITALS
WEIGHT: 251.8 LBS | OXYGEN SATURATION: 97 % | HEIGHT: 63 IN | TEMPERATURE: 97.7 F | HEART RATE: 93 BPM | RESPIRATION RATE: 16 BRPM | BODY MASS INDEX: 44.61 KG/M2 | DIASTOLIC BLOOD PRESSURE: 96 MMHG | SYSTOLIC BLOOD PRESSURE: 148 MMHG

## 2019-01-17 DIAGNOSIS — R42 DIZZINESS: ICD-10-CM

## 2019-01-17 DIAGNOSIS — R63.5 WEIGHT GAIN: ICD-10-CM

## 2019-01-17 DIAGNOSIS — I10 ESSENTIAL HYPERTENSION: ICD-10-CM

## 2019-01-17 DIAGNOSIS — E03.9 HYPOTHYROIDISM (ACQUIRED): ICD-10-CM

## 2019-01-17 DIAGNOSIS — C73 THYROID CANCER (HCC): Primary | ICD-10-CM

## 2019-01-17 PROCEDURE — G8484 FLU IMMUNIZE NO ADMIN: HCPCS | Performed by: INTERNAL MEDICINE

## 2019-01-17 PROCEDURE — 99213 OFFICE O/P EST LOW 20 MIN: CPT | Performed by: INTERNAL MEDICINE

## 2019-01-17 PROCEDURE — G8417 CALC BMI ABV UP PARAM F/U: HCPCS | Performed by: INTERNAL MEDICINE

## 2019-01-17 PROCEDURE — 3017F COLORECTAL CA SCREEN DOC REV: CPT | Performed by: INTERNAL MEDICINE

## 2019-01-17 PROCEDURE — 1036F TOBACCO NON-USER: CPT | Performed by: INTERNAL MEDICINE

## 2019-01-17 PROCEDURE — G8427 DOCREV CUR MEDS BY ELIG CLIN: HCPCS | Performed by: INTERNAL MEDICINE

## 2019-01-17 RX ORDER — LEVOTHYROXINE SODIUM 112 MCG
112 TABLET ORAL DAILY
Qty: 30 TABLET | Refills: 5 | Status: SHIPPED | OUTPATIENT
Start: 2019-01-17 | End: 2019-07-16 | Stop reason: SDUPTHER

## 2019-01-22 DIAGNOSIS — R00.2 PALPITATIONS: ICD-10-CM

## 2019-01-22 RX ORDER — METOPROLOL SUCCINATE 50 MG/1
100 TABLET, EXTENDED RELEASE ORAL 2 TIMES DAILY
Qty: 180 TABLET | Refills: 3 | Status: SHIPPED | OUTPATIENT
Start: 2019-01-22 | End: 2019-06-20 | Stop reason: SDUPTHER

## 2019-01-23 ENCOUNTER — HOSPITAL ENCOUNTER (EMERGENCY)
Age: 54
Discharge: HOME | End: 2019-01-23
Attending: EMERGENCY MEDICINE
Payer: COMMERCIAL

## 2019-01-23 ENCOUNTER — APPOINTMENT (OUTPATIENT)
Dept: GENERAL RADIOLOGY | Age: 54
End: 2019-01-23
Payer: COMMERCIAL

## 2019-01-23 VITALS
HEIGHT: 66 IN | TEMPERATURE: 98.2 F | WEIGHT: 242 LBS | BODY MASS INDEX: 38.89 KG/M2 | RESPIRATION RATE: 14 BRPM | OXYGEN SATURATION: 99 % | SYSTOLIC BLOOD PRESSURE: 140 MMHG | HEART RATE: 78 BPM | DIASTOLIC BLOOD PRESSURE: 76 MMHG

## 2019-01-23 DIAGNOSIS — R00.2 PALPITATIONS: Primary | ICD-10-CM

## 2019-01-23 LAB
ANION GAP SERPL CALCULATED.3IONS-SCNC: 12 MMOL/L (ref 7–16)
BASOPHILS ABSOLUTE: 0.05 E9/L (ref 0–0.2)
BASOPHILS RELATIVE PERCENT: 0.8 % (ref 0–2)
BUN BLDV-MCNC: 22 MG/DL (ref 6–20)
CALCIUM SERPL-MCNC: 9 MG/DL (ref 8.6–10.2)
CHLORIDE BLD-SCNC: 104 MMOL/L (ref 98–107)
CO2: 26 MMOL/L (ref 22–29)
CREAT SERPL-MCNC: 0.8 MG/DL (ref 0.5–1)
EKG ATRIAL RATE: 78 BPM
EKG P AXIS: 14 DEGREES
EKG P-R INTERVAL: 184 MS
EKG Q-T INTERVAL: 390 MS
EKG QRS DURATION: 78 MS
EKG QTC CALCULATION (BAZETT): 444 MS
EKG R AXIS: 6 DEGREES
EKG T AXIS: 22 DEGREES
EKG VENTRICULAR RATE: 78 BPM
EOSINOPHILS ABSOLUTE: 0.12 E9/L (ref 0.05–0.5)
EOSINOPHILS RELATIVE PERCENT: 1.8 % (ref 0–6)
GFR AFRICAN AMERICAN: >60
GFR NON-AFRICAN AMERICAN: >60 ML/MIN/1.73
GLUCOSE BLD-MCNC: 108 MG/DL (ref 74–99)
HCT VFR BLD CALC: 39.9 % (ref 34–48)
HEMOGLOBIN: 12.8 G/DL (ref 11.5–15.5)
IMMATURE GRANULOCYTES #: 0.02 E9/L
IMMATURE GRANULOCYTES %: 0.3 % (ref 0–5)
LYMPHOCYTES ABSOLUTE: 1.73 E9/L (ref 1.5–4)
LYMPHOCYTES RELATIVE PERCENT: 26.5 % (ref 20–42)
MCH RBC QN AUTO: 29.6 PG (ref 26–35)
MCHC RBC AUTO-ENTMCNC: 32.1 % (ref 32–34.5)
MCV RBC AUTO: 92.4 FL (ref 80–99.9)
MONOCYTES ABSOLUTE: 0.58 E9/L (ref 0.1–0.95)
MONOCYTES RELATIVE PERCENT: 8.9 % (ref 2–12)
NEUTROPHILS ABSOLUTE: 4.04 E9/L (ref 1.8–7.3)
NEUTROPHILS RELATIVE PERCENT: 61.7 % (ref 43–80)
PDW BLD-RTO: 12.2 FL (ref 11.5–15)
PLATELET # BLD: 290 E9/L (ref 130–450)
PMV BLD AUTO: 9.9 FL (ref 7–12)
POTASSIUM REFLEX MAGNESIUM: 4.1 MMOL/L (ref 3.5–5)
RBC # BLD: 4.32 E12/L (ref 3.5–5.5)
SODIUM BLD-SCNC: 142 MMOL/L (ref 132–146)
TROPONIN: <0.01 NG/ML (ref 0–0.03)
WBC # BLD: 6.5 E9/L (ref 4.5–11.5)

## 2019-01-23 PROCEDURE — 99285 EMERGENCY DEPT VISIT HI MDM: CPT

## 2019-01-23 PROCEDURE — 84484 ASSAY OF TROPONIN QUANT: CPT

## 2019-01-23 PROCEDURE — 80048 BASIC METABOLIC PNL TOTAL CA: CPT

## 2019-01-23 PROCEDURE — 71046 X-RAY EXAM CHEST 2 VIEWS: CPT

## 2019-01-23 PROCEDURE — 36415 COLL VENOUS BLD VENIPUNCTURE: CPT

## 2019-01-23 PROCEDURE — 85025 COMPLETE CBC W/AUTO DIFF WBC: CPT

## 2019-01-23 ASSESSMENT — ENCOUNTER SYMPTOMS
EYE PAIN: 0
SORE THROAT: 0
SINUS PRESSURE: 0
EYE REDNESS: 0
BACK PAIN: 0
EYE DISCHARGE: 0
SHORTNESS OF BREATH: 1
VOMITING: 0
NAUSEA: 0
WHEEZING: 0
DIARRHEA: 0
ABDOMINAL DISTENTION: 0
COUGH: 0

## 2019-02-07 DIAGNOSIS — I49.9 IRREGULAR HEART RATE: ICD-10-CM

## 2019-03-08 ENCOUNTER — TELEPHONE (OUTPATIENT)
Dept: NON INVASIVE DIAGNOSTICS | Age: 54
End: 2019-03-08

## 2019-03-08 RX ORDER — FUROSEMIDE 40 MG/1
TABLET ORAL
Qty: 45 TABLET | Refills: 3 | Status: SHIPPED | OUTPATIENT
Start: 2019-03-08 | End: 2019-03-24

## 2019-03-24 ENCOUNTER — HOSPITAL ENCOUNTER (EMERGENCY)
Age: 54
Discharge: HOME | End: 2019-03-25
Attending: EMERGENCY MEDICINE
Payer: COMMERCIAL

## 2019-03-24 DIAGNOSIS — R00.2 PALPITATIONS: Primary | ICD-10-CM

## 2019-03-24 PROCEDURE — 93005 ELECTROCARDIOGRAM TRACING: CPT

## 2019-03-24 PROCEDURE — 99285 EMERGENCY DEPT VISIT HI MDM: CPT

## 2019-03-25 VITALS
WEIGHT: 240 LBS | RESPIRATION RATE: 16 BRPM | OXYGEN SATURATION: 96 % | TEMPERATURE: 97.6 F | DIASTOLIC BLOOD PRESSURE: 67 MMHG | BODY MASS INDEX: 38.57 KG/M2 | HEIGHT: 66 IN | HEART RATE: 73 BPM | SYSTOLIC BLOOD PRESSURE: 156 MMHG

## 2019-03-25 LAB
ANION GAP SERPL CALCULATED.3IONS-SCNC: 9 MMOL/L (ref 7–16)
BASOPHILS ABSOLUTE: 0.05 E9/L (ref 0–0.2)
BASOPHILS RELATIVE PERCENT: 0.7 % (ref 0–2)
BUN BLDV-MCNC: 19 MG/DL (ref 6–20)
CALCIUM SERPL-MCNC: 9.3 MG/DL (ref 8.6–10.2)
CHLORIDE BLD-SCNC: 105 MMOL/L (ref 98–107)
CO2: 28 MMOL/L (ref 22–29)
CREAT SERPL-MCNC: 0.7 MG/DL (ref 0.5–1)
EOSINOPHILS ABSOLUTE: 0.13 E9/L (ref 0.05–0.5)
EOSINOPHILS RELATIVE PERCENT: 1.9 % (ref 0–6)
GFR AFRICAN AMERICAN: >60
GFR NON-AFRICAN AMERICAN: >60 ML/MIN/1.73
GLUCOSE BLD-MCNC: 114 MG/DL (ref 74–99)
HCT VFR BLD CALC: 39.7 % (ref 34–48)
HEMOGLOBIN: 13.1 G/DL (ref 11.5–15.5)
IMMATURE GRANULOCYTES #: 0.01 E9/L
IMMATURE GRANULOCYTES %: 0.1 % (ref 0–5)
LYMPHOCYTES ABSOLUTE: 1.76 E9/L (ref 1.5–4)
LYMPHOCYTES RELATIVE PERCENT: 25.1 % (ref 20–42)
MAGNESIUM: 2.1 MG/DL (ref 1.6–2.6)
MCH RBC QN AUTO: 29.6 PG (ref 26–35)
MCHC RBC AUTO-ENTMCNC: 33 % (ref 32–34.5)
MCV RBC AUTO: 89.6 FL (ref 80–99.9)
MONOCYTES ABSOLUTE: 0.7 E9/L (ref 0.1–0.95)
MONOCYTES RELATIVE PERCENT: 10 % (ref 2–12)
NEUTROPHILS ABSOLUTE: 4.35 E9/L (ref 1.8–7.3)
NEUTROPHILS RELATIVE PERCENT: 62.2 % (ref 43–80)
PDW BLD-RTO: 12.1 FL (ref 11.5–15)
PLATELET # BLD: 267 E9/L (ref 130–450)
PMV BLD AUTO: 9.8 FL (ref 7–12)
POTASSIUM SERPL-SCNC: 4.8 MMOL/L (ref 3.5–5)
RBC # BLD: 4.43 E12/L (ref 3.5–5.5)
SODIUM BLD-SCNC: 142 MMOL/L (ref 132–146)
WBC # BLD: 7 E9/L (ref 4.5–11.5)

## 2019-03-25 PROCEDURE — 80048 BASIC METABOLIC PNL TOTAL CA: CPT

## 2019-03-25 PROCEDURE — 83735 ASSAY OF MAGNESIUM: CPT

## 2019-03-25 PROCEDURE — 85025 COMPLETE CBC W/AUTO DIFF WBC: CPT

## 2019-03-27 ASSESSMENT — ENCOUNTER SYMPTOMS
WHEEZING: 0
VOMITING: 0
ORTHOPNEA: 0
BACK PAIN: 0
SHORTNESS OF BREATH: 1
ABDOMINAL PAIN: 0
NAUSEA: 0
BLOOD IN STOOL: 0
HEMOPTYSIS: 0
COUGH: 0

## 2019-03-28 LAB
EKG ATRIAL RATE: 83 BPM
EKG P AXIS: 24 DEGREES
EKG P-R INTERVAL: 168 MS
EKG Q-T INTERVAL: 404 MS
EKG QRS DURATION: 82 MS
EKG QTC CALCULATION (BAZETT): 474 MS
EKG R AXIS: 22 DEGREES
EKG T AXIS: 39 DEGREES
EKG VENTRICULAR RATE: 83 BPM

## 2019-05-15 ENCOUNTER — TELEPHONE (OUTPATIENT)
Dept: ENDOCRINOLOGY | Age: 54
End: 2019-05-15

## 2019-05-28 ENCOUNTER — HOSPITAL ENCOUNTER (OUTPATIENT)
Age: 54
Discharge: HOME | End: 2019-05-28
Payer: COMMERCIAL

## 2019-05-28 DIAGNOSIS — C73 THYROID CANCER (HCC): ICD-10-CM

## 2019-05-28 LAB — TSH SERPL DL<=0.05 MIU/L-ACNC: 0.87 UIU/ML (ref 0.27–4.2)

## 2019-05-28 PROCEDURE — 36415 COLL VENOUS BLD VENIPUNCTURE: CPT

## 2019-05-28 PROCEDURE — 86376 MICROSOMAL ANTIBODY EACH: CPT

## 2019-05-28 PROCEDURE — 84432 ASSAY OF THYROGLOBULIN: CPT

## 2019-05-28 PROCEDURE — 84443 ASSAY THYROID STIM HORMONE: CPT

## 2019-05-28 PROCEDURE — 86800 THYROGLOBULIN ANTIBODY: CPT

## 2019-05-30 LAB
THYROGLOBULIN AB: <0.9 IU/ML (ref 0–4)
THYROGLOBULIN BY LC-MS/MS, SERUM/PLASMA: ABNORMAL NG/ML (ref 1.3–31.8)
THYROGLOBULIN, SERUM OR PLASMA: <0.1 NG/ML (ref 1.3–31.8)
THYROID PEROXIDASE (TPO) ABS: 0.4 IU/ML (ref 0–9)

## 2019-06-06 ENCOUNTER — TELEPHONE (OUTPATIENT)
Dept: ENDOCRINOLOGY | Age: 54
End: 2019-06-06

## 2019-06-06 NOTE — TELEPHONE ENCOUNTER
The patient was wondering if you could call her. She had very specific questions about her lab results.

## 2019-06-14 ENCOUNTER — OFFICE VISIT (OUTPATIENT)
Dept: ENDOCRINOLOGY | Age: 54
End: 2019-06-14
Payer: COMMERCIAL

## 2019-06-14 VITALS
BODY MASS INDEX: 40.98 KG/M2 | WEIGHT: 255 LBS | HEART RATE: 72 BPM | HEIGHT: 66 IN | DIASTOLIC BLOOD PRESSURE: 82 MMHG | SYSTOLIC BLOOD PRESSURE: 136 MMHG | RESPIRATION RATE: 16 BRPM | OXYGEN SATURATION: 96 %

## 2019-06-14 DIAGNOSIS — C73 THYROID CANCER (HCC): Primary | ICD-10-CM

## 2019-06-14 DIAGNOSIS — E03.9 HYPOTHYROIDISM (ACQUIRED): ICD-10-CM

## 2019-06-14 PROCEDURE — G8417 CALC BMI ABV UP PARAM F/U: HCPCS | Performed by: INTERNAL MEDICINE

## 2019-06-14 PROCEDURE — 3017F COLORECTAL CA SCREEN DOC REV: CPT | Performed by: INTERNAL MEDICINE

## 2019-06-14 PROCEDURE — 1036F TOBACCO NON-USER: CPT | Performed by: INTERNAL MEDICINE

## 2019-06-14 PROCEDURE — G8427 DOCREV CUR MEDS BY ELIG CLIN: HCPCS | Performed by: INTERNAL MEDICINE

## 2019-06-14 PROCEDURE — 99214 OFFICE O/P EST MOD 30 MIN: CPT | Performed by: INTERNAL MEDICINE

## 2019-06-14 NOTE — PROGRESS NOTES
CC - PTC    HPI -       Pt returns for follow up of last visit with me on 01/17/2019. Instructions from last visit were:  1. Thyroid Cancer - stable, no signs of recurrence       PTC Stage I, [id number],     Excellent response to therapy (total thyroidectomy, 100mCi BERG)       Risk of recurrence is 1-4% for the next 5years. TSH should be maintained at 0.5-2 per the 2015 JOSE guidelines       Pt is doing well. US last performed 9/4/18 and showed no change in the adenopathy that was seen on the 5/17/18 US. Tg <0.1,  Tg Ab <0.9 5/16/18       Therefore, the plan will be as follows:       Cont Synthroid 112 mcg daily       Needs TSH, Tg, and Tg Ab in May 2018     2. Cough - uncontrolled       There is no indication that this is thyroid related. However, pt is concerned that she may be having a recurrence of her thyroid cancer. I instructed her to discuss her symptoms with her oncologist tomorrow and get his recommendations about any imaging that should be done     3. HTN - elevated BP today       Review of past readings revealed that pt's HTN is typically controlled       No intervention required today    4. Follow up       RTC in May 2019          Update from today's encounter:  Cough has resolved. Oncology did not want further work up  Occ constipation, + tachycardia (card workup in progress), no diarrhea, no tremors, + insomnia, vasomotor symptoms are now controlled with zoloft  Taking LT4 112 mcg daily.  Cardio would like to increase the TSH b/c of her arrthymias    PFSH - no changes    Meds -  Current Outpatient Medications on File Prior to Visit   Medication Sig Dispense Refill    sertraline (ZOLOFT) 25 MG tablet Take 1 tablet by mouth daily 30 tablet 3    metoprolol succinate (TOPROL XL) 50 MG extended release tablet Take 2 tablets by mouth 2 times daily 180 tablet 3    SYNTHROID 112 MCG tablet Take 1 tablet by mouth Daily 30 tablet 5    Cholecalciferol (VITAMIN D-3) 1000 units CAPS

## 2019-06-20 DIAGNOSIS — R00.2 PALPITATIONS: ICD-10-CM

## 2019-06-20 RX ORDER — METOPROLOL SUCCINATE 100 MG/1
100 TABLET, EXTENDED RELEASE ORAL 2 TIMES DAILY
Qty: 180 TABLET | Refills: 3 | Status: SHIPPED | OUTPATIENT
Start: 2019-06-20 | End: 2019-10-17 | Stop reason: SDUPTHER

## 2019-07-01 ENCOUNTER — APPOINTMENT (OUTPATIENT)
Dept: GENERAL RADIOLOGY | Age: 54
End: 2019-07-01
Payer: COMMERCIAL

## 2019-07-01 ENCOUNTER — HOSPITAL ENCOUNTER (EMERGENCY)
Age: 54
Discharge: HOME | End: 2019-07-01
Attending: EMERGENCY MEDICINE
Payer: COMMERCIAL

## 2019-07-01 VITALS
HEIGHT: 66 IN | RESPIRATION RATE: 16 BRPM | SYSTOLIC BLOOD PRESSURE: 121 MMHG | HEART RATE: 67 BPM | BODY MASS INDEX: 38.57 KG/M2 | OXYGEN SATURATION: 99 % | WEIGHT: 240 LBS | DIASTOLIC BLOOD PRESSURE: 69 MMHG | TEMPERATURE: 98 F

## 2019-07-01 DIAGNOSIS — J18.9 PNEUMONIA DUE TO ORGANISM: ICD-10-CM

## 2019-07-01 DIAGNOSIS — R07.89 ATYPICAL CHEST PAIN: Primary | ICD-10-CM

## 2019-07-01 DIAGNOSIS — M53.3 COCCYDYNIA: ICD-10-CM

## 2019-07-01 LAB
ALBUMIN SERPL-MCNC: 4.2 G/DL (ref 3.5–5.2)
ALP BLD-CCNC: 72 U/L (ref 35–104)
ALT SERPL-CCNC: 20 U/L (ref 0–32)
ANION GAP SERPL CALCULATED.3IONS-SCNC: 13 MMOL/L (ref 7–16)
AST SERPL-CCNC: 20 U/L (ref 0–31)
BACTERIA: ABNORMAL /HPF
BILIRUB SERPL-MCNC: 0.6 MG/DL (ref 0–1.2)
BILIRUBIN URINE: NEGATIVE
BLOOD, URINE: ABNORMAL
BUN BLDV-MCNC: 14 MG/DL (ref 6–20)
CALCIUM SERPL-MCNC: 9 MG/DL (ref 8.6–10.2)
CHLORIDE BLD-SCNC: 103 MMOL/L (ref 98–107)
CLARITY: CLEAR
CO2: 26 MMOL/L (ref 22–29)
COLOR: YELLOW
CREAT SERPL-MCNC: 0.7 MG/DL (ref 0.5–1)
EKG ATRIAL RATE: 71 BPM
EKG P AXIS: 23 DEGREES
EKG P-R INTERVAL: 184 MS
EKG Q-T INTERVAL: 410 MS
EKG QRS DURATION: 84 MS
EKG QTC CALCULATION (BAZETT): 445 MS
EKG R AXIS: 9 DEGREES
EKG T AXIS: 14 DEGREES
EKG VENTRICULAR RATE: 71 BPM
GFR AFRICAN AMERICAN: >60
GFR NON-AFRICAN AMERICAN: >60 ML/MIN/1.73
GLUCOSE BLD-MCNC: 104 MG/DL (ref 74–99)
GLUCOSE URINE: NEGATIVE MG/DL
HCT VFR BLD CALC: 41 % (ref 34–48)
HEMOGLOBIN: 13.6 G/DL (ref 11.5–15.5)
KETONES, URINE: NEGATIVE MG/DL
LEUKOCYTE ESTERASE, URINE: ABNORMAL
MCH RBC QN AUTO: 29.2 PG (ref 26–35)
MCHC RBC AUTO-ENTMCNC: 33.2 % (ref 32–34.5)
MCV RBC AUTO: 88.2 FL (ref 80–99.9)
NITRITE, URINE: NEGATIVE
PDW BLD-RTO: 12.5 FL (ref 11.5–15)
PH UA: 5.5 (ref 5–9)
PLATELET # BLD: 278 E9/L (ref 130–450)
PMV BLD AUTO: 9.8 FL (ref 7–12)
POTASSIUM SERPL-SCNC: 4.2 MMOL/L (ref 3.5–5)
PROTEIN UA: NEGATIVE MG/DL
RBC # BLD: 4.65 E12/L (ref 3.5–5.5)
RBC UA: ABNORMAL /HPF (ref 0–2)
SODIUM BLD-SCNC: 142 MMOL/L (ref 132–146)
SPECIFIC GRAVITY UA: 1.02 (ref 1–1.03)
TOTAL PROTEIN: 7.2 G/DL (ref 6.4–8.3)
TROPONIN: <0.01 NG/ML (ref 0–0.03)
UROBILINOGEN, URINE: 0.2 E.U./DL
WBC # BLD: 5.3 E9/L (ref 4.5–11.5)
WBC UA: ABNORMAL /HPF (ref 0–5)

## 2019-07-01 PROCEDURE — 6370000000 HC RX 637 (ALT 250 FOR IP): Performed by: EMERGENCY MEDICINE

## 2019-07-01 PROCEDURE — 93005 ELECTROCARDIOGRAM TRACING: CPT | Performed by: EMERGENCY MEDICINE

## 2019-07-01 PROCEDURE — 84484 ASSAY OF TROPONIN QUANT: CPT

## 2019-07-01 PROCEDURE — 72220 X-RAY EXAM SACRUM TAILBONE: CPT

## 2019-07-01 PROCEDURE — 85027 COMPLETE CBC AUTOMATED: CPT

## 2019-07-01 PROCEDURE — 99285 EMERGENCY DEPT VISIT HI MDM: CPT

## 2019-07-01 PROCEDURE — 93010 ELECTROCARDIOGRAM REPORT: CPT | Performed by: INTERNAL MEDICINE

## 2019-07-01 PROCEDURE — 81001 URINALYSIS AUTO W/SCOPE: CPT

## 2019-07-01 PROCEDURE — 80053 COMPREHEN METABOLIC PANEL: CPT

## 2019-07-01 PROCEDURE — 36415 COLL VENOUS BLD VENIPUNCTURE: CPT

## 2019-07-01 PROCEDURE — 71046 X-RAY EXAM CHEST 2 VIEWS: CPT

## 2019-07-01 RX ORDER — METHYLPREDNISOLONE 4 MG/1
TABLET ORAL
Qty: 1 KIT | Refills: 0 | Status: SHIPPED | OUTPATIENT
Start: 2019-07-01 | End: 2020-04-07 | Stop reason: SDUPTHER

## 2019-07-01 RX ORDER — CEFDINIR 300 MG/1
300 CAPSULE ORAL 2 TIMES DAILY
Qty: 20 CAPSULE | Refills: 0 | Status: SHIPPED | OUTPATIENT
Start: 2019-07-01 | End: 2019-07-11

## 2019-07-01 RX ORDER — CEFDINIR 300 MG/1
300 CAPSULE ORAL EVERY 12 HOURS SCHEDULED
Status: DISCONTINUED | OUTPATIENT
Start: 2019-07-01 | End: 2019-07-01 | Stop reason: HOSPADM

## 2019-07-01 RX ADMIN — CEFDINIR 300 MG: 300 CAPSULE ORAL at 11:02

## 2019-07-01 ASSESSMENT — PAIN DESCRIPTION - PROGRESSION: CLINICAL_PROGRESSION: NOT CHANGED

## 2019-07-01 ASSESSMENT — PAIN DESCRIPTION - LOCATION: LOCATION: CHEST

## 2019-07-01 ASSESSMENT — PAIN SCALES - GENERAL: PAINLEVEL_OUTOF10: 7

## 2019-07-01 ASSESSMENT — PAIN DESCRIPTION - FREQUENCY: FREQUENCY: CONTINUOUS

## 2019-07-01 ASSESSMENT — PAIN DESCRIPTION - ONSET: ONSET: SUDDEN

## 2019-07-01 ASSESSMENT — PAIN DESCRIPTION - DESCRIPTORS: DESCRIPTORS: ACHING

## 2019-07-01 ASSESSMENT — PAIN DESCRIPTION - PAIN TYPE: TYPE: ACUTE PAIN

## 2019-07-01 ASSESSMENT — PAIN DESCRIPTION - ORIENTATION: ORIENTATION: RIGHT;LEFT;MID

## 2019-07-01 NOTE — ED PROVIDER NOTES
HPI:  7/1/19,   Time: 9:43 AM         Tony Ugarte is a 48 y.o. female presenting to the ED for right sided chest pressure and discomfort, beginning when she woke up this morning 1.5 hours ago ago. The complaint has been constant, moderate in severity, and worsened by nothing. She has also noticed some shortness of breath but there has been no diaphoresis nausea or vomiting. She has a history of a cardiac arrhythmia and has had an ablation. She has a history of hypertension but no other risk factors for coronary disease. The patient also states that she has had a cough for the last few days and she has also been complaining of pain in her tailbone. She denies any injury or falling    ROS:   Pertinent positives and negatives are stated within HPI, all other systems reviewed and are negative.  --------------------------------------------- PAST HISTORY ---------------------------------------------  Past Medical History:  has a past medical history of Cancer [facility]), Chest pain, Hypertension, Palpitations, Sleep apnea, and Thyroid disease. Past Surgical History:  has a past surgical history that includes ablation of dysrhythmic focus (1990); hysteroscopy (8/15/12); Colonoscopy; Endometrial ablation (2012); Thyroidectomy (Bilateral, 01/19/2017); Hysterectomy (07/25/2018); and Breast surgery. Social History:  reports that she has never smoked. She has never used smokeless tobacco. She reports that she does not drink alcohol or use drugs. Family History: family history includes Breast Cancer in her paternal grandmother; Cancer in her father; Cervical Cancer in her paternal grandmother; High Blood Pressure in her father and mother; Other in her daughter and sister; Ovarian Cancer in her paternal grandmother. The patients home medications have been reviewed. Allergies: Patient has no active allergies.     -------------------------------------------------- RESULTS by me.    Rate: 71  Rhythm: Sinus  Interpretation: non-specific EKG  Comparison: stable as compared to patient's most recent EKG  Chest x-ray read by the radiologist shows bibasilar infiltrates it seems to be worse on the right base    Time: 11:35a  Re-evaluation. Patients symptoms show no change  Repeat physical examination is not changed; the patient is in stable condition in the ER with stable vital signs in no acute distress    Counseling: The emergency provider has spoken with the patient and spouse and discussed todays results, in addition to providing specific details for the plan of care and counseling regarding the diagnosis and prognosis. Questions are answered at this time and they are agreeable with the plan.      --------------------------------- IMPRESSION AND DISPOSITION ---------------------------------    IMPRESSION  1. Atypical chest pain    2. Pneumonia due to organism    3.  Coccydynia        DISPOSITION  Disposition: Discharge to home  Patient condition is stable                  Gokul Delong MD  07/01/19 5629

## 2019-07-13 ENCOUNTER — APPOINTMENT (OUTPATIENT)
Dept: GENERAL RADIOLOGY | Age: 54
End: 2019-07-13
Payer: COMMERCIAL

## 2019-07-13 ENCOUNTER — APPOINTMENT (OUTPATIENT)
Dept: CT IMAGING | Age: 54
End: 2019-07-13
Payer: COMMERCIAL

## 2019-07-13 ENCOUNTER — HOSPITAL ENCOUNTER (EMERGENCY)
Age: 54
Discharge: HOME | End: 2019-07-13
Attending: FAMILY MEDICINE
Payer: COMMERCIAL

## 2019-07-13 VITALS
DIASTOLIC BLOOD PRESSURE: 78 MMHG | SYSTOLIC BLOOD PRESSURE: 130 MMHG | HEART RATE: 72 BPM | HEIGHT: 66 IN | TEMPERATURE: 97.9 F | RESPIRATION RATE: 16 BRPM | OXYGEN SATURATION: 98 % | BODY MASS INDEX: 38.57 KG/M2 | WEIGHT: 240 LBS

## 2019-07-13 DIAGNOSIS — N39.0 URINARY TRACT INFECTION WITH HEMATURIA, SITE UNSPECIFIED: Primary | ICD-10-CM

## 2019-07-13 DIAGNOSIS — R31.9 URINARY TRACT INFECTION WITH HEMATURIA, SITE UNSPECIFIED: Primary | ICD-10-CM

## 2019-07-13 LAB
BACTERIA: ABNORMAL /HPF
BILIRUBIN URINE: NEGATIVE
BLOOD, URINE: ABNORMAL
CLARITY: CLEAR
COLOR: YELLOW
GLUCOSE URINE: NEGATIVE MG/DL
KETONES, URINE: NEGATIVE MG/DL
LEUKOCYTE ESTERASE, URINE: ABNORMAL
NITRITE, URINE: NEGATIVE
PH UA: 5 (ref 5–9)
PROTEIN UA: NEGATIVE MG/DL
RBC UA: ABNORMAL /HPF (ref 0–2)
SPECIFIC GRAVITY UA: 1.02 (ref 1–1.03)
UROBILINOGEN, URINE: 0.2 E.U./DL
WBC UA: ABNORMAL /HPF (ref 0–5)

## 2019-07-13 PROCEDURE — 74176 CT ABD & PELVIS W/O CONTRAST: CPT

## 2019-07-13 PROCEDURE — 71046 X-RAY EXAM CHEST 2 VIEWS: CPT

## 2019-07-13 PROCEDURE — 99284 EMERGENCY DEPT VISIT MOD MDM: CPT

## 2019-07-13 PROCEDURE — 87088 URINE BACTERIA CULTURE: CPT

## 2019-07-13 PROCEDURE — 81001 URINALYSIS AUTO W/SCOPE: CPT

## 2019-07-13 RX ORDER — SULFAMETHOXAZOLE AND TRIMETHOPRIM 800; 160 MG/1; MG/1
1 TABLET ORAL 2 TIMES DAILY
Qty: 14 TABLET | Refills: 0 | Status: SHIPPED | OUTPATIENT
Start: 2019-07-13 | End: 2019-07-20

## 2019-07-13 ASSESSMENT — PAIN DESCRIPTION - LOCATION: LOCATION: FLANK

## 2019-07-13 ASSESSMENT — PAIN DESCRIPTION - ORIENTATION: ORIENTATION: LEFT

## 2019-07-13 ASSESSMENT — PAIN DESCRIPTION - DESCRIPTORS: DESCRIPTORS: SORE

## 2019-07-13 ASSESSMENT — PAIN DESCRIPTION - FREQUENCY: FREQUENCY: CONTINUOUS

## 2019-07-13 ASSESSMENT — PAIN DESCRIPTION - PAIN TYPE: TYPE: ACUTE PAIN

## 2019-07-13 ASSESSMENT — PAIN SCALES - GENERAL: PAINLEVEL_OUTOF10: 3

## 2019-07-13 NOTE — ED PROVIDER NOTES
Urine Negative Negative    Leukocyte Esterase, Urine TRACE (A) Negative   Microscopic Urinalysis   Result Value Ref Range    WBC, UA 5-10 0 - 5 /HPF    RBC, UA 2-5 0 - 2 /HPF    Bacteria, UA FEW (A) /HPF       Imaging: All Radiology results interpreted by Radiologist unless otherwise noted. CT ABDOMEN PELVIS WO CONTRAST Additional Contrast? None   Final Result   No acute process. There is moderate fatty infiltration in the liver         XR CHEST STANDARD (2 VW)   Final Result   1. Stable, enlarged cardiomediastinal silhouette with underlying   pulmonary edema. 2. Nonspecific bibasilar airspace disease, findings can be seen in   infiltrate/pneumonia and/or atelectasis. .        ED Course / Medical Decision Making   Medications - No data to display     Re-examination:  7/13/19       Time: 3:10      Patients symptoms has lower abdominal pain now there is some suprapubic tenderness no guarding or rebound will obtain a CT abd and pelvis possible KS . Consults:   None    Procedures:   none    Medical Decision Making:    Patient is well appearing, non toxic and appropriate for outpatient management. Plan is for symptom management and PCP follow up. Counseling: The emergency provider has spoken with the patient and spouse/SO and discussed todays results, in addition to providing specific details for the plan of care and counseling regarding the diagnosis and prognosis. Questions are answered at this time and they are agreeable with the plan. Assessment      1. Urinary tract infection with hematuria, site unspecified      Plan   Disposition: Discharge to home  Patient condition is good    New Medications     New Prescriptions    SULFAMETHOXAZOLE-TRIMETHOPRIM (BACTRIM DS) 800-160 MG PER TABLET    Take 1 tablet by mouth 2 times daily for 7 days     Electronically signed by Fatou Mcneill MD   DD: 7/13/19  **This report was transcribed using voice recognition software.  Every effort was made to ensure

## 2019-07-15 DIAGNOSIS — C73 THYROID CANCER (HCC): ICD-10-CM

## 2019-07-15 DIAGNOSIS — E03.9 HYPOTHYROIDISM (ACQUIRED): ICD-10-CM

## 2019-07-15 DIAGNOSIS — I10 ESSENTIAL HYPERTENSION: ICD-10-CM

## 2019-07-15 DIAGNOSIS — R63.5 WEIGHT GAIN: ICD-10-CM

## 2019-07-15 DIAGNOSIS — R42 DIZZINESS: ICD-10-CM

## 2019-07-15 LAB — URINE CULTURE, ROUTINE: NORMAL

## 2019-07-16 RX ORDER — LEVOTHYROXINE SODIUM 112 MCG
TABLET ORAL
Qty: 30 TABLET | Refills: 5 | Status: SHIPPED | OUTPATIENT
Start: 2019-07-16 | End: 2020-01-28 | Stop reason: DRUGHIGH

## 2019-08-09 ENCOUNTER — HOSPITAL ENCOUNTER (OUTPATIENT)
Dept: CT IMAGING | Age: 54
Discharge: HOME | End: 2019-08-11
Payer: COMMERCIAL

## 2019-08-09 DIAGNOSIS — M54.5 LOW BACK PAIN, UNSPECIFIED BACK PAIN LATERALITY, UNSPECIFIED CHRONICITY, WITH SCIATICA PRESENCE UNSPECIFIED: ICD-10-CM

## 2019-08-09 DIAGNOSIS — J47.9 BRONCHIECTASIS WITHOUT ACUTE EXACERBATION (HCC): ICD-10-CM

## 2019-08-09 PROCEDURE — 2580000003 HC RX 258: Performed by: RADIOLOGY

## 2019-08-09 PROCEDURE — 6360000004 HC RX CONTRAST MEDICATION: Performed by: RADIOLOGY

## 2019-08-09 PROCEDURE — 72131 CT LUMBAR SPINE W/O DYE: CPT

## 2019-08-09 PROCEDURE — 71270 CT THORAX DX C-/C+: CPT

## 2019-08-09 RX ORDER — SODIUM CHLORIDE 0.9 % (FLUSH) 0.9 %
10 SYRINGE (ML) INJECTION PRN
Status: COMPLETED | OUTPATIENT
Start: 2019-08-09 | End: 2019-08-09

## 2019-08-09 RX ADMIN — IOPAMIDOL 80 ML: 755 INJECTION, SOLUTION INTRAVENOUS at 14:31

## 2019-08-09 RX ADMIN — Medication 10 ML: at 14:23

## 2019-10-17 DIAGNOSIS — R00.2 PALPITATIONS: ICD-10-CM

## 2019-10-17 RX ORDER — METOPROLOL SUCCINATE 50 MG/1
50 TABLET, EXTENDED RELEASE ORAL 2 TIMES DAILY
Qty: 60 TABLET | Refills: 5 | Status: SHIPPED | OUTPATIENT
Start: 2019-10-17 | End: 2019-12-12 | Stop reason: SDUPTHER

## 2019-10-29 ENCOUNTER — HOSPITAL ENCOUNTER (OUTPATIENT)
Age: 54
Discharge: HOME | End: 2019-10-31
Payer: COMMERCIAL

## 2019-10-29 ENCOUNTER — OFFICE VISIT (OUTPATIENT)
Dept: FAMILY MEDICINE CLINIC | Age: 54
End: 2019-10-29
Payer: COMMERCIAL

## 2019-10-29 VITALS
HEIGHT: 66 IN | BODY MASS INDEX: 41.33 KG/M2 | OXYGEN SATURATION: 99 % | RESPIRATION RATE: 16 BRPM | HEART RATE: 75 BPM | TEMPERATURE: 99 F | DIASTOLIC BLOOD PRESSURE: 82 MMHG | SYSTOLIC BLOOD PRESSURE: 136 MMHG | WEIGHT: 257.2 LBS

## 2019-10-29 DIAGNOSIS — Z11.59 NEED FOR HEPATITIS C SCREENING TEST: ICD-10-CM

## 2019-10-29 DIAGNOSIS — Z12.31 ENCOUNTER FOR SCREENING MAMMOGRAM FOR MALIGNANT NEOPLASM OF BREAST: ICD-10-CM

## 2019-10-29 DIAGNOSIS — N60.19 FIBROCYSTIC BREAST CHANGES, UNSPECIFIED LATERALITY: ICD-10-CM

## 2019-10-29 DIAGNOSIS — Z11.4 SCREENING FOR HIV (HUMAN IMMUNODEFICIENCY VIRUS): ICD-10-CM

## 2019-10-29 DIAGNOSIS — E89.0 POSTOPERATIVE HYPOTHYROIDISM: ICD-10-CM

## 2019-10-29 DIAGNOSIS — E66.01 MORBID OBESITY DUE TO EXCESS CALORIES (HCC): ICD-10-CM

## 2019-10-29 DIAGNOSIS — Z00.00 ANNUAL PHYSICAL EXAM: Primary | ICD-10-CM

## 2019-10-29 DIAGNOSIS — Z00.00 ANNUAL PHYSICAL EXAM: ICD-10-CM

## 2019-10-29 LAB
ALBUMIN SERPL-MCNC: 4.4 G/DL (ref 3.5–5.2)
ALP BLD-CCNC: 72 U/L (ref 35–104)
ALT SERPL-CCNC: 27 U/L (ref 0–32)
ANION GAP SERPL CALCULATED.3IONS-SCNC: 12 MMOL/L (ref 7–16)
AST SERPL-CCNC: 30 U/L (ref 0–31)
BASOPHILS ABSOLUTE: 0.05 E9/L (ref 0–0.2)
BASOPHILS RELATIVE PERCENT: 0.9 % (ref 0–2)
BILIRUB SERPL-MCNC: 0.7 MG/DL (ref 0–1.2)
BUN BLDV-MCNC: 17 MG/DL (ref 6–20)
CALCIUM SERPL-MCNC: 9.3 MG/DL (ref 8.6–10.2)
CHLORIDE BLD-SCNC: 106 MMOL/L (ref 98–107)
CHOLESTEROL, TOTAL: 177 MG/DL (ref 0–199)
CO2: 26 MMOL/L (ref 22–29)
CREAT SERPL-MCNC: 0.7 MG/DL (ref 0.5–1)
EOSINOPHILS ABSOLUTE: 0.11 E9/L (ref 0.05–0.5)
EOSINOPHILS RELATIVE PERCENT: 1.9 % (ref 0–6)
GFR AFRICAN AMERICAN: >60
GFR NON-AFRICAN AMERICAN: >60 ML/MIN/1.73
GLUCOSE BLD-MCNC: 99 MG/DL (ref 74–99)
HBA1C MFR BLD: 5.7 % (ref 4–5.6)
HCT VFR BLD CALC: 43 % (ref 34–48)
HDLC SERPL-MCNC: 41 MG/DL
HEMOGLOBIN: 13.2 G/DL (ref 11.5–15.5)
IMMATURE GRANULOCYTES #: 0.02 E9/L
IMMATURE GRANULOCYTES %: 0.3 % (ref 0–5)
LDL CHOLESTEROL CALCULATED: 114 MG/DL (ref 0–99)
LYMPHOCYTES ABSOLUTE: 1.66 E9/L (ref 1.5–4)
LYMPHOCYTES RELATIVE PERCENT: 29 % (ref 20–42)
MCH RBC QN AUTO: 28.7 PG (ref 26–35)
MCHC RBC AUTO-ENTMCNC: 30.7 % (ref 32–34.5)
MCV RBC AUTO: 93.5 FL (ref 80–99.9)
MONOCYTES ABSOLUTE: 0.46 E9/L (ref 0.1–0.95)
MONOCYTES RELATIVE PERCENT: 8 % (ref 2–12)
NEUTROPHILS ABSOLUTE: 3.43 E9/L (ref 1.8–7.3)
NEUTROPHILS RELATIVE PERCENT: 59.9 % (ref 43–80)
PDW BLD-RTO: 13.1 FL (ref 11.5–15)
PLATELET # BLD: 297 E9/L (ref 130–450)
PMV BLD AUTO: 10.4 FL (ref 7–12)
POTASSIUM SERPL-SCNC: 5.4 MMOL/L (ref 3.5–5)
RBC # BLD: 4.6 E12/L (ref 3.5–5.5)
SODIUM BLD-SCNC: 144 MMOL/L (ref 132–146)
T4 FREE: 1.54 NG/DL (ref 0.93–1.7)
TOTAL PROTEIN: 7.7 G/DL (ref 6.4–8.3)
TRIGL SERPL-MCNC: 108 MG/DL (ref 0–149)
TSH SERPL DL<=0.05 MIU/L-ACNC: 0.66 UIU/ML (ref 0.27–4.2)
VITAMIN D 25-HYDROXY: 12 NG/ML (ref 30–100)
VLDLC SERPL CALC-MCNC: 22 MG/DL
WBC # BLD: 5.7 E9/L (ref 4.5–11.5)

## 2019-10-29 PROCEDURE — 82306 VITAMIN D 25 HYDROXY: CPT

## 2019-10-29 PROCEDURE — 84443 ASSAY THYROID STIM HORMONE: CPT

## 2019-10-29 PROCEDURE — 83036 HEMOGLOBIN GLYCOSYLATED A1C: CPT

## 2019-10-29 PROCEDURE — 86803 HEPATITIS C AB TEST: CPT

## 2019-10-29 PROCEDURE — 80053 COMPREHEN METABOLIC PANEL: CPT

## 2019-10-29 PROCEDURE — 80061 LIPID PANEL: CPT

## 2019-10-29 PROCEDURE — 99386 PREV VISIT NEW AGE 40-64: CPT | Performed by: FAMILY MEDICINE

## 2019-10-29 PROCEDURE — 86703 HIV-1/HIV-2 1 RESULT ANTBDY: CPT

## 2019-10-29 PROCEDURE — G8484 FLU IMMUNIZE NO ADMIN: HCPCS | Performed by: FAMILY MEDICINE

## 2019-10-29 PROCEDURE — 85025 COMPLETE CBC W/AUTO DIFF WBC: CPT

## 2019-10-29 PROCEDURE — 84439 ASSAY OF FREE THYROXINE: CPT

## 2019-10-29 ASSESSMENT — PATIENT HEALTH QUESTIONNAIRE - PHQ9
SUM OF ALL RESPONSES TO PHQ QUESTIONS 1-9: 2
SUM OF ALL RESPONSES TO PHQ9 QUESTIONS 1 & 2: 2
SUM OF ALL RESPONSES TO PHQ QUESTIONS 1-9: 2
1. LITTLE INTEREST OR PLEASURE IN DOING THINGS: 1
2. FEELING DOWN, DEPRESSED OR HOPELESS: 1

## 2019-10-30 LAB
HEPATITIS C ANTIBODY INTERPRETATION: NORMAL
HIV-1 AND HIV-2 ANTIBODIES: NORMAL

## 2019-10-31 RX ORDER — ERGOCALCIFEROL (VITAMIN D2) 1250 MCG
50000 CAPSULE ORAL WEEKLY
Qty: 12 CAPSULE | Refills: 3 | Status: SHIPPED | OUTPATIENT
Start: 2019-10-31 | End: 2019-12-23

## 2019-10-31 RX ORDER — ERGOCALCIFEROL (VITAMIN D2) 1250 MCG
50000 CAPSULE ORAL WEEKLY
COMMUNITY
End: 2019-10-31 | Stop reason: SDUPTHER

## 2019-11-04 DIAGNOSIS — N60.19 FIBROCYSTIC BREAST CHANGES, UNSPECIFIED LATERALITY: ICD-10-CM

## 2019-11-05 ENCOUNTER — OFFICE VISIT (OUTPATIENT)
Dept: CARDIOLOGY CLINIC | Age: 54
End: 2019-11-05
Payer: COMMERCIAL

## 2019-11-05 VITALS
BODY MASS INDEX: 41.75 KG/M2 | RESPIRATION RATE: 18 BRPM | DIASTOLIC BLOOD PRESSURE: 80 MMHG | HEART RATE: 70 BPM | HEIGHT: 66 IN | SYSTOLIC BLOOD PRESSURE: 122 MMHG | WEIGHT: 259.8 LBS

## 2019-11-05 DIAGNOSIS — R07.9 CHEST PAIN, UNSPECIFIED TYPE: Primary | ICD-10-CM

## 2019-11-05 DIAGNOSIS — R00.2 PALPITATIONS: ICD-10-CM

## 2019-11-05 DIAGNOSIS — F41.9 ANXIETY: Primary | ICD-10-CM

## 2019-11-05 PROCEDURE — G8427 DOCREV CUR MEDS BY ELIG CLIN: HCPCS | Performed by: INTERNAL MEDICINE

## 2019-11-05 PROCEDURE — 99214 OFFICE O/P EST MOD 30 MIN: CPT | Performed by: INTERNAL MEDICINE

## 2019-11-05 PROCEDURE — 1036F TOBACCO NON-USER: CPT | Performed by: INTERNAL MEDICINE

## 2019-11-05 PROCEDURE — 93000 ELECTROCARDIOGRAM COMPLETE: CPT | Performed by: INTERNAL MEDICINE

## 2019-11-05 PROCEDURE — G8417 CALC BMI ABV UP PARAM F/U: HCPCS | Performed by: INTERNAL MEDICINE

## 2019-11-05 PROCEDURE — 3017F COLORECTAL CA SCREEN DOC REV: CPT | Performed by: INTERNAL MEDICINE

## 2019-11-05 PROCEDURE — G8484 FLU IMMUNIZE NO ADMIN: HCPCS | Performed by: INTERNAL MEDICINE

## 2019-11-05 RX ORDER — ALPRAZOLAM 1 MG/1
1.5 TABLET ORAL 2 TIMES DAILY
Qty: 90 TABLET | Refills: 3 | Status: SHIPPED | OUTPATIENT
Start: 2019-11-05 | End: 2020-01-03 | Stop reason: SDUPTHER

## 2019-12-10 ENCOUNTER — OFFICE VISIT (OUTPATIENT)
Dept: NON INVASIVE DIAGNOSTICS | Age: 54
End: 2019-12-10
Payer: COMMERCIAL

## 2019-12-10 VITALS
HEART RATE: 79 BPM | WEIGHT: 260 LBS | RESPIRATION RATE: 18 BRPM | HEIGHT: 66 IN | BODY MASS INDEX: 41.78 KG/M2 | SYSTOLIC BLOOD PRESSURE: 136 MMHG | DIASTOLIC BLOOD PRESSURE: 88 MMHG

## 2019-12-10 DIAGNOSIS — R00.2 PALPITATIONS: Primary | ICD-10-CM

## 2019-12-10 PROCEDURE — G8417 CALC BMI ABV UP PARAM F/U: HCPCS | Performed by: INTERNAL MEDICINE

## 2019-12-10 PROCEDURE — G8427 DOCREV CUR MEDS BY ELIG CLIN: HCPCS | Performed by: INTERNAL MEDICINE

## 2019-12-10 PROCEDURE — 93000 ELECTROCARDIOGRAM COMPLETE: CPT | Performed by: INTERNAL MEDICINE

## 2019-12-10 PROCEDURE — 99245 OFF/OP CONSLTJ NEW/EST HI 55: CPT | Performed by: INTERNAL MEDICINE

## 2019-12-10 PROCEDURE — G8484 FLU IMMUNIZE NO ADMIN: HCPCS | Performed by: INTERNAL MEDICINE

## 2019-12-12 DIAGNOSIS — R00.2 PALPITATIONS: ICD-10-CM

## 2019-12-12 RX ORDER — METOPROLOL SUCCINATE 50 MG/1
50 TABLET, EXTENDED RELEASE ORAL 3 TIMES DAILY
Qty: 60 TABLET | Refills: 5 | Status: SHIPPED
Start: 2019-12-12 | End: 2020-05-20

## 2019-12-20 ENCOUNTER — TELEPHONE (OUTPATIENT)
Dept: CARDIAC CATH/INVASIVE PROCEDURES | Age: 54
End: 2019-12-20

## 2019-12-23 ENCOUNTER — HOSPITAL ENCOUNTER (OUTPATIENT)
Dept: CARDIAC CATH/INVASIVE PROCEDURES | Age: 54
Discharge: HOME | End: 2019-12-23
Payer: COMMERCIAL

## 2019-12-23 VITALS
TEMPERATURE: 98.8 F | HEART RATE: 58 BPM | HEIGHT: 66 IN | BODY MASS INDEX: 41.78 KG/M2 | SYSTOLIC BLOOD PRESSURE: 132 MMHG | RESPIRATION RATE: 18 BRPM | DIASTOLIC BLOOD PRESSURE: 80 MMHG | WEIGHT: 260 LBS

## 2019-12-23 PROCEDURE — 33285 INSJ SUBQ CAR RHYTHM MNTR: CPT | Performed by: INTERNAL MEDICINE

## 2019-12-23 PROCEDURE — C1764 EVENT RECORDER, CARDIAC: HCPCS

## 2019-12-31 ENCOUNTER — TELEPHONE (OUTPATIENT)
Dept: CARDIOLOGY | Age: 54
End: 2019-12-31

## 2020-01-03 ENCOUNTER — OFFICE VISIT (OUTPATIENT)
Dept: FAMILY MEDICINE CLINIC | Age: 55
End: 2020-01-03
Payer: COMMERCIAL

## 2020-01-03 VITALS
HEART RATE: 82 BPM | BODY MASS INDEX: 41.3 KG/M2 | DIASTOLIC BLOOD PRESSURE: 81 MMHG | TEMPERATURE: 96.9 F | RESPIRATION RATE: 12 BRPM | WEIGHT: 257 LBS | SYSTOLIC BLOOD PRESSURE: 122 MMHG | OXYGEN SATURATION: 97 % | HEIGHT: 66 IN

## 2020-01-03 PROCEDURE — G8417 CALC BMI ABV UP PARAM F/U: HCPCS | Performed by: FAMILY MEDICINE

## 2020-01-03 PROCEDURE — 99214 OFFICE O/P EST MOD 30 MIN: CPT | Performed by: FAMILY MEDICINE

## 2020-01-03 PROCEDURE — G8484 FLU IMMUNIZE NO ADMIN: HCPCS | Performed by: FAMILY MEDICINE

## 2020-01-03 PROCEDURE — 3017F COLORECTAL CA SCREEN DOC REV: CPT | Performed by: FAMILY MEDICINE

## 2020-01-03 PROCEDURE — 1036F TOBACCO NON-USER: CPT | Performed by: FAMILY MEDICINE

## 2020-01-03 PROCEDURE — G8427 DOCREV CUR MEDS BY ELIG CLIN: HCPCS | Performed by: FAMILY MEDICINE

## 2020-01-03 RX ORDER — ALPRAZOLAM 1 MG/1
1.5 TABLET ORAL 2 TIMES DAILY
Qty: 90 TABLET | Refills: 3 | Status: SHIPPED | OUTPATIENT
Start: 2020-01-03 | End: 2020-05-02

## 2020-01-03 RX ORDER — ENALAPRIL MALEATE 5 MG/1
5 TABLET ORAL DAILY
Qty: 90 TABLET | Refills: 3 | Status: SHIPPED
Start: 2020-01-03 | End: 2020-08-17

## 2020-01-03 RX ORDER — LANOLIN ALCOHOL/MO/W.PET/CERES
400 CREAM (GRAM) TOPICAL DAILY
Qty: 90 TABLET | Refills: 3 | Status: ON HOLD
Start: 2020-01-03 | End: 2020-06-28 | Stop reason: HOSPADM

## 2020-01-03 RX ORDER — LISINOPRIL 10 MG/1
TABLET ORAL
COMMUNITY
Start: 2019-12-28 | End: 2020-05-08

## 2020-01-03 RX ORDER — ERGOCALCIFEROL 1.25 MG/1
CAPSULE ORAL
COMMUNITY
Start: 2019-12-26 | End: 2020-01-03

## 2020-01-03 RX ORDER — ENALAPRIL MALEATE 5 MG/1
5 TABLET ORAL DAILY
COMMUNITY
End: 2020-01-03 | Stop reason: SDUPTHER

## 2020-01-03 ASSESSMENT — PATIENT HEALTH QUESTIONNAIRE - PHQ9
SUM OF ALL RESPONSES TO PHQ9 QUESTIONS 1 & 2: 0
2. FEELING DOWN, DEPRESSED OR HOPELESS: 0
1. LITTLE INTEREST OR PLEASURE IN DOING THINGS: 0
SUM OF ALL RESPONSES TO PHQ QUESTIONS 1-9: 0
SUM OF ALL RESPONSES TO PHQ QUESTIONS 1-9: 0

## 2020-01-03 NOTE — PROGRESS NOTES
lymphadenopathy  Lungs:  CTA B  Heart:  RRR, no murmurs, gallops or rubs  Abdomen:  Soft/nt/nd, + bowel sounds  Extremities:  No clubbing, cyanosis or edema  Skin: unremarkable    Assessment/Plan:      Logan Chappell was seen today for medication refill and sinus problem. Diagnoses and all orders for this visit:    Anxiety  -     ALPRAZolam (XANAX) 1 MG tablet; Take 1.5 tablets by mouth 2 times daily for 120 days. Special screening for malignant neoplasms, colon    Hypothyroidism (acquired)    Morbid obesity due to excess calories ([address]    Other orders  -     magnesium oxide (MAG-OX) 400 (240 Mg) MG tablet; Take 1 tablet by mouth daily  -     enalapril (VASOTEC) 5 MG tablet; Take 1 tablet by mouth daily      As above. Call or go to ED immediately if symptoms worsen or persist.  No follow-ups on file. , or sooner if necessary. Educational materials and/or home exercises printed for patient's review and were included in patient instructions on his/her After Visit Summary and given to patient at the end of visit. Counseled regarding above diagnosis, including possible risks and complications,  especially if left uncontrolled. Counseled regarding the possible side effects, risks, benefits and alternatives to treatment; patient and/or guardian verbalizes understanding, agrees, feels comfortable with and wishes to proceed with above treatment plan. Advised patient to call with any new medication issues, and read all Rx info from pharmacy to assure aware of all possible risks and side effects of medication before taking. Reviewed age and gender appropriate health screening exams and vaccinations. Advised patient regarding importance of keeping up with recommended health maintenance and to schedule as soon as possible if overdue, as this is important in assessing for undiagnosed pathology, especially cancer, as well as protecting against potentially harmful/life threatening disease.         Patient and/or guardian verbalizes understanding and agrees with above counseling, assessment and plan. All questions answered. Julian Yadav MD  1/3/2020    I have personally reviewed and updated the chief complaint, HPI, Past Medical, Family and Social History, as well as the above Review of Systems.

## 2020-01-06 ENCOUNTER — NURSE ONLY (OUTPATIENT)
Dept: NON INVASIVE DIAGNOSTICS | Age: 55
End: 2020-01-06
Payer: COMMERCIAL

## 2020-01-06 PROCEDURE — 93285 PRGRMG DEV EVAL SCRMS IP: CPT | Performed by: INTERNAL MEDICINE

## 2020-01-14 ENCOUNTER — HOSPITAL ENCOUNTER (EMERGENCY)
Age: 55
Discharge: HOME | End: 2020-01-14
Attending: EMERGENCY MEDICINE
Payer: COMMERCIAL

## 2020-01-14 ENCOUNTER — APPOINTMENT (OUTPATIENT)
Dept: CT IMAGING | Age: 55
End: 2020-01-14
Payer: COMMERCIAL

## 2020-01-14 VITALS
OXYGEN SATURATION: 98 % | RESPIRATION RATE: 16 BRPM | BODY MASS INDEX: 40.18 KG/M2 | DIASTOLIC BLOOD PRESSURE: 72 MMHG | WEIGHT: 250 LBS | SYSTOLIC BLOOD PRESSURE: 124 MMHG | HEIGHT: 66 IN | TEMPERATURE: 97.9 F | HEART RATE: 73 BPM

## 2020-01-14 LAB
BACTERIA: ABNORMAL /HPF
BILIRUBIN URINE: NEGATIVE
BLOOD, URINE: ABNORMAL
CLARITY: CLEAR
COLOR: YELLOW
EPITHELIAL CELLS, UA: ABNORMAL /HPF
GLUCOSE URINE: NEGATIVE MG/DL
KETONES, URINE: NEGATIVE MG/DL
LEUKOCYTE ESTERASE, URINE: ABNORMAL
NITRITE, URINE: NEGATIVE
PH UA: 5.5 (ref 5–9)
PROTEIN UA: NEGATIVE MG/DL
RBC UA: ABNORMAL /HPF (ref 0–2)
SPECIFIC GRAVITY UA: 1.02 (ref 1–1.03)
UROBILINOGEN, URINE: 0.2 E.U./DL
WBC UA: ABNORMAL /HPF (ref 0–5)

## 2020-01-14 PROCEDURE — 99284 EMERGENCY DEPT VISIT MOD MDM: CPT

## 2020-01-14 PROCEDURE — 6370000000 HC RX 637 (ALT 250 FOR IP)

## 2020-01-14 PROCEDURE — 81001 URINALYSIS AUTO W/SCOPE: CPT

## 2020-01-14 PROCEDURE — 87088 URINE BACTERIA CULTURE: CPT

## 2020-01-14 PROCEDURE — 74176 CT ABD & PELVIS W/O CONTRAST: CPT

## 2020-01-14 RX ORDER — CEFDINIR 300 MG/1
CAPSULE ORAL
Status: COMPLETED
Start: 2020-01-14 | End: 2020-01-14

## 2020-01-14 RX ORDER — CEFDINIR 300 MG/1
300 CAPSULE ORAL 2 TIMES DAILY
Qty: 6 CAPSULE | Refills: 0 | Status: SHIPPED | OUTPATIENT
Start: 2020-01-14 | End: 2020-01-17

## 2020-01-14 RX ORDER — CEFDINIR 300 MG/1
300 CAPSULE ORAL EVERY 12 HOURS SCHEDULED
Status: DISCONTINUED | OUTPATIENT
Start: 2020-01-14 | End: 2020-01-14 | Stop reason: HOSPADM

## 2020-01-14 RX ADMIN — CEFDINIR 300 MG: 300 CAPSULE ORAL at 17:11

## 2020-01-14 ASSESSMENT — PAIN DESCRIPTION - LOCATION: LOCATION: BACK

## 2020-01-14 ASSESSMENT — PAIN DESCRIPTION - DESCRIPTORS: DESCRIPTORS: DISCOMFORT

## 2020-01-14 ASSESSMENT — PAIN SCALES - GENERAL: PAINLEVEL_OUTOF10: 6

## 2020-01-14 NOTE — ED PROVIDER NOTES
been personally reviewed by myself   LABS:  Results for orders placed or performed during the hospital encounter of 01/14/20   Urinalysis with Microscopic   Result Value Ref Range    Color, UA Yellow Straw/Yellow    Clarity, UA Clear Clear    Glucose, Ur Negative Negative mg/dL    Bilirubin Urine Negative Negative    Ketones, Urine Negative Negative mg/dL    Specific Gravity, UA 1.025 1.005 - 1.030    Blood, Urine SMALL (A) Negative    pH, UA 5.5 5.0 - 9.0    Protein, UA Negative Negative mg/dL    Urobilinogen, Urine 0.2 <2.0 E.U./dL    Nitrite, Urine Negative Negative    Leukocyte Esterase, Urine SMALL (A) Negative    WBC, UA 5-10 0 - 5 /HPF    RBC, UA 2-5 0 - 2 /HPF    Epi Cells RARE /HPF    Bacteria, UA NONE /HPF       RADIOLOGY:  Interpreted by Radiologist.  CT ABDOMEN PELVIS WO CONTRAST   Final Result      Calcified posterior disc and osteophyte complex at L1-2 results in   severe central spinal canal stenosis at that level. Hepatic steatosis. Minimal diverticular disease of the distal colon. No evidence of   diverticulitis.                ------------------------- NURSING NOTES AND VITALS REVIEWED ---------------------------   The nursing notes within the ED encounter and vital signs as below have been reviewed. BP (!) 140/94   Pulse 76   Temp 97.9 °F (36.6 °C) (Oral)   Resp 16   Ht 5' 6\" (1.676 m)   Wt 250 lb (113.4 kg)   LMP 01/01/2013   SpO2 98%   BMI 40.35 kg/m²   Oxygen Saturation Interpretation: Normal      ---------------------------------------------------PHYSICAL EXAM--------------------------------------      Constitutional/General: Alert and oriented x3, well appearing, non toxic in NAD  Head: NC/AT  Eyes: PERRL, EOMI    Neck: Supple, full ROM, no meningeal signs  Pulmonary: Lungs clear to auscultation bilaterally, no wheezes, rales, or rhonchi. Not in respiratory distress  Cardiovascular:  Regular rate and rhythm, no murmurs, gallops, or rubs.  2+ distal pulses  Abdomen: Soft, non tender, non distended, there is right CVA tenderness noted  Extremities: Moves all extremities x 4. Warm and well perfused  Skin: warm and dry without rash  Neurologic: GCS 15,  Psych: Normal Affect      ------------------------------ ED COURSE/MEDICAL DECISION MAKING----------------------  Medications   cefdinir (OMNICEF) capsule 300 mg (has no administration in time range)         Medical Decision Making:          Counseling: The emergency provider has spoken with the patient and spouse/SO and discussed todays results, in addition to providing specific details for the plan of care and counseling regarding the diagnosis and prognosis. Questions are answered at this time and they are agreeable with the plan.      --------------------------------- IMPRESSION AND DISPOSITION ---------------------------------    IMPRESSION  1.  Acute cystitis without hematuria        DISPOSITION  Disposition:     Patient condition is stable                  Tae Meyer MD  01/14/20 6842

## 2020-01-15 LAB — URINE CULTURE, ROUTINE: NORMAL

## 2020-01-24 ENCOUNTER — TELEPHONE (OUTPATIENT)
Dept: NON INVASIVE DIAGNOSTICS | Age: 55
End: 2020-01-24

## 2020-01-24 NOTE — TELEPHONE ENCOUNTER
Call from patient complaining of soreness at the incision site of her Confirm. The pain started today. She states when she came in for her wound check on 1.6.2020 the site was flat and no pain. Now the device site is painful and seems to be swollen. She denies any fevers/Chills. She denies any drainage, erosion or redness. Patient is fearful that it can be infected. Of note: Confirm implanted on 12.23.2019. Forwarding to Dr. Matt Melgar for recommendations.      Alfonso Mejia

## 2020-01-27 ENCOUNTER — NURSE ONLY (OUTPATIENT)
Dept: NON INVASIVE DIAGNOSTICS | Age: 55
End: 2020-01-27

## 2020-01-28 ENCOUNTER — OFFICE VISIT (OUTPATIENT)
Dept: ENDOCRINOLOGY | Age: 55
End: 2020-01-28
Payer: COMMERCIAL

## 2020-01-28 VITALS
HEIGHT: 66 IN | DIASTOLIC BLOOD PRESSURE: 82 MMHG | HEART RATE: 68 BPM | RESPIRATION RATE: 16 BRPM | WEIGHT: 257 LBS | BODY MASS INDEX: 41.3 KG/M2 | OXYGEN SATURATION: 96 % | SYSTOLIC BLOOD PRESSURE: 138 MMHG

## 2020-01-28 PROCEDURE — G8484 FLU IMMUNIZE NO ADMIN: HCPCS | Performed by: INTERNAL MEDICINE

## 2020-01-28 PROCEDURE — G8427 DOCREV CUR MEDS BY ELIG CLIN: HCPCS | Performed by: INTERNAL MEDICINE

## 2020-01-28 PROCEDURE — 1036F TOBACCO NON-USER: CPT | Performed by: INTERNAL MEDICINE

## 2020-01-28 PROCEDURE — 99214 OFFICE O/P EST MOD 30 MIN: CPT | Performed by: INTERNAL MEDICINE

## 2020-01-28 PROCEDURE — G8417 CALC BMI ABV UP PARAM F/U: HCPCS | Performed by: INTERNAL MEDICINE

## 2020-01-28 PROCEDURE — 3017F COLORECTAL CA SCREEN DOC REV: CPT | Performed by: INTERNAL MEDICINE

## 2020-01-28 RX ORDER — LEVOTHYROXINE SODIUM 112 MCG
TABLET ORAL
Qty: 30 TABLET | Refills: 5 | Status: CANCELLED | OUTPATIENT
Start: 2020-01-28

## 2020-01-28 RX ORDER — LEVOTHYROXINE SODIUM 100 MCG
TABLET ORAL
Qty: 90 TABLET | Refills: 2 | Status: SHIPPED
Start: 2020-01-28 | End: 2020-03-25 | Stop reason: SDUPTHER

## 2020-01-28 NOTE — PROGRESS NOTES
CC - PTC    HPI -       Pt returns for follow up of last visit with me on 01/17/2019. Instructions from last visit were:  1. Thyroid Cancer - stable, no signs of recurrence       PTC Stage I, [id number],     Excellent response to therapy (total thyroidectomy, 100mCi BERG)       Risk of recurrence is 1-4% for the next 5years. TSH should be maintained at 0.5-2 per the 2015 JOSE guidelines       TSH 0.663 10/29/19       Pt is doing well. US last performed 9/4/18 and showed no change in the adenopathy that was seen on the 5/17/18 US. See HPI for discussion       Therefore, the plan will be as follows:       Decrease Synthroid 112 mcg to one tablet daily Mon-Sat and one-half tablet Sun       Repeat TSH in six weeks       Also needs TSH, Tg, and Tg Ab in November 2019    2. Hypothyroidism - controlled   Card is working up her tachycardic episodes and would like her TSH higher   Will decrease the LT4 according to the above plan     3. HTN - controlled       Cont Metoprolol and Vasotec (recently added - only once daily)     4. Follow up       RTC in Nov 2019          Update from today's encounter:  Cough present at last visit has resolved. Oncology did not want further work up  No longer having constipation  Other symptoms: + tachycardia, no diarrhea, + occasional tremors, + insomnia, + vasomotor symptoms (continues despite being off of Zoloft). TSH 0.633 10/29/19 on LT4 112 mcg Mon-Sat and 1/2 tab on Sunday. Tg <0.1, Tg Ab <0.9 5/28/19. She did not get the repeat Tg and Tg Ab performed yet. Cardio (Dr. Aguila Guy) has inserted a long-term monitor that will last for 3 years. Continues to have tachycardia at times. May need another ablation. Currently on Metoprolol.   (Hx of afib with ablation in 1990)  She is troubled by choking on water and has been referred by her PCP for GI eval    PFS -   Medical problems: Anxiety, Thyroid cancer, hypothyroidism (acquired) HTN, palpitations, BPPV, Afib with hx of

## 2020-01-31 ENCOUNTER — TELEPHONE (OUTPATIENT)
Dept: ENDOCRINOLOGY | Age: 55
End: 2020-01-31

## 2020-02-09 ENCOUNTER — TELEPHONE (OUTPATIENT)
Dept: ENDOCRINOLOGY | Age: 55
End: 2020-02-09

## 2020-02-10 PROCEDURE — 93298 REM INTERROG DEV EVAL SCRMS: CPT | Performed by: INTERNAL MEDICINE

## 2020-02-11 ENCOUNTER — NURSE ONLY (OUTPATIENT)
Dept: NON INVASIVE DIAGNOSTICS | Age: 55
End: 2020-02-11

## 2020-02-11 ENCOUNTER — HOSPITAL ENCOUNTER (OUTPATIENT)
Dept: ULTRASOUND IMAGING | Age: 55
Discharge: HOME | End: 2020-02-13
Payer: COMMERCIAL

## 2020-02-11 PROCEDURE — 76536 US EXAM OF HEAD AND NECK: CPT

## 2020-02-12 ENCOUNTER — OFFICE VISIT (OUTPATIENT)
Dept: FAMILY MEDICINE CLINIC | Age: 55
End: 2020-02-12
Payer: COMMERCIAL

## 2020-02-12 VITALS
WEIGHT: 260.8 LBS | HEIGHT: 66 IN | OXYGEN SATURATION: 99 % | RESPIRATION RATE: 16 BRPM | HEART RATE: 74 BPM | BODY MASS INDEX: 41.91 KG/M2 | DIASTOLIC BLOOD PRESSURE: 85 MMHG | TEMPERATURE: 98.2 F | SYSTOLIC BLOOD PRESSURE: 136 MMHG

## 2020-02-12 PROCEDURE — G8427 DOCREV CUR MEDS BY ELIG CLIN: HCPCS | Performed by: FAMILY MEDICINE

## 2020-02-12 PROCEDURE — 99213 OFFICE O/P EST LOW 20 MIN: CPT | Performed by: FAMILY MEDICINE

## 2020-02-12 PROCEDURE — 1036F TOBACCO NON-USER: CPT | Performed by: FAMILY MEDICINE

## 2020-02-12 PROCEDURE — G8484 FLU IMMUNIZE NO ADMIN: HCPCS | Performed by: FAMILY MEDICINE

## 2020-02-12 PROCEDURE — G8417 CALC BMI ABV UP PARAM F/U: HCPCS | Performed by: FAMILY MEDICINE

## 2020-02-12 PROCEDURE — 3017F COLORECTAL CA SCREEN DOC REV: CPT | Performed by: FAMILY MEDICINE

## 2020-02-12 NOTE — PROGRESS NOTES
Chief Complaint   Patient presents with    Dysphagia       HPI:  Patient is here for complaints of difficulty swallowing and choking while drinking and eating. She also states she feels like there is a lump in her throat. Patient's past medical, surgical, social and/or family history reviewed, updated in chart, and are non-contributory (unless otherwise stated). Medications and allergies also reviewed and updated in chart. Review of Systems:  Constitutional:  No fever, no fatigue, no chills, no headaches, no weight change  Dermatology:  No rash, no mole, no dry or sensitive skin  ENT:  No cough, no sore throat, no sinus pain, no runny nose, no ear pain  Cardiology:  No chest pain, no palpitations, no leg edema, no shortness of breath, no PND  Gastroenterology:  No dysphagia, no abdominal pain, no nausea, no vomiting, no constipation, no diarrhea, no heartburn  Musculoskeletal:  No joint pain, no leg cramps, no back pain, no muscle aches  Respiratory:  No shortness of breath, no orthopnea, no wheezing, no ARAYA, no hemoptysis  Urology:  No blood in the urine, no urinary frequency, no urinary incontinence, no urinary urgency, no nocturia, no dysuria  Vitals:    02/12/20 1042   BP: 136/85   Pulse: 74   Resp: 16   Temp: 98.2 °F (36.8 °C)   TempSrc: Oral   SpO2: 99%   Weight: 260 lb 12.8 oz (118.3 kg)   Height: 5' 6\" (1.676 m)       General:  Patient alert and oriented x 3, NAD, pleasant  HEENT:  Atraumatic, normocephalic, PERRLA, EOMI, clear conjunctiva, TMs clear, nose-clear, throat - no erythema  Neck:  Supple, no goiter, no carotid bruits, no lymphadenopathy  Lungs:  CTA B  Heart:  RRR, no murmurs, gallops or rubs  Abdomen:  Soft/nt/nd, + bowel sounds  Extremities:  No clubbing, cyanosis or edema  Skin: unremarkable    Assessment/Plan:      Tam Hickman was seen today for dysphagia.     Diagnoses and all orders for this visit:    Dysphagia, unspecified type  -     SLP videofluoroscopic swallow study; Future  -     Gwen Rodriguez MD, Gastroenterology, [place] ([facility])      As above. Call or go to ED immediately if symptoms worsen or persist.  No follow-ups on file. , or sooner if necessary. Educational materials and/or home exercises printed for patient's review and were included in patient instructions on his/her After Visit Summary and given to patient at the end of visit. Counseled regarding above diagnosis, including possible risks and complications,  especially if left uncontrolled. Counseled regarding the possible side effects, risks, benefits and alternatives to treatment; patient and/or guardian verbalizes understanding, agrees, feels comfortable with and wishes to proceed with above treatment plan. Advised patient to call with any new medication issues, and read all Rx info from pharmacy to assure aware of all possible risks and side effects of medication before taking. Reviewed age and gender appropriate health screening exams and vaccinations. Advised patient regarding importance of keeping up with recommended health maintenance and to schedule as soon as possible if overdue, as this is important in assessing for undiagnosed pathology, especially cancer, as well as protecting against potentially harmful/life threatening disease. Patient and/or guardian verbalizes understanding and agrees with above counseling, assessment and plan. All questions answered. Ranjan Meyer MD  2/12/2020    I have personally reviewed and updated the chief complaint, HPI, Past Medical, Family and Social History, as well as the above Review of Systems.

## 2020-03-13 ENCOUNTER — NURSE ONLY (OUTPATIENT)
Dept: NON INVASIVE DIAGNOSTICS | Age: 55
End: 2020-03-13
Payer: COMMERCIAL

## 2020-03-13 PROCEDURE — 93298 REM INTERROG DEV EVAL SCRMS: CPT | Performed by: INTERNAL MEDICINE

## 2020-03-13 PROCEDURE — G2066 INTER DEVC REMOTE 30D: HCPCS | Performed by: INTERNAL MEDICINE

## 2020-03-25 NOTE — TELEPHONE ENCOUNTER
Ms Dileep Eastman said she increased her levothyroxine to 112mcq because she didn't feel better when on the 100 mcq. Script pending below.

## 2020-03-29 RX ORDER — LEVOTHYROXINE SODIUM 112 UG/1
TABLET ORAL
Qty: 90 TABLET | Refills: 1 | Status: SHIPPED
Start: 2020-03-29 | End: 2020-03-30 | Stop reason: SDUPTHER

## 2020-03-29 NOTE — TELEPHONE ENCOUNTER
Spoke with pt by phone  I explained again the increased risks of tachyarrhythmias from keeping her TSH so low. She understands and would like to remain at 100 mcg daily. Plan:  Refill sent to pharmacy.   Marbella Fernandes  03/29/20

## 2020-03-30 RX ORDER — LEVOTHYROXINE SODIUM 0.1 MG/1
TABLET ORAL
Qty: 90 TABLET | Refills: 2 | Status: SHIPPED
Start: 2020-03-30 | End: 2020-10-27 | Stop reason: SDUPTHER

## 2020-04-07 ENCOUNTER — TELEPHONE (OUTPATIENT)
Dept: ADMINISTRATIVE | Age: 55
End: 2020-04-07

## 2020-04-07 ENCOUNTER — NURSE TRIAGE (OUTPATIENT)
Dept: OTHER | Facility: CLINIC | Age: 55
End: 2020-04-07

## 2020-04-07 ENCOUNTER — TELEPHONE (OUTPATIENT)
Dept: PRIMARY CARE CLINIC | Age: 55
End: 2020-04-07

## 2020-04-07 RX ORDER — METHYLPREDNISOLONE 4 MG/1
TABLET ORAL
Qty: 1 KIT | Refills: 0 | Status: SHIPPED | OUTPATIENT
Start: 2020-04-07 | End: 2020-04-13

## 2020-04-07 RX ORDER — AMOXICILLIN AND CLAVULANATE POTASSIUM 875; 125 MG/1; MG/1
1 TABLET, FILM COATED ORAL 2 TIMES DAILY
Qty: 20 TABLET | Refills: 0 | Status: SHIPPED | OUTPATIENT
Start: 2020-04-07 | End: 2020-04-17

## 2020-04-07 NOTE — TELEPHONE ENCOUNTER
Pt calling in to say that she wants an appointment for the symptoms that she is experiencing, Allergies, runny nose, really bad cough, dripping into her throat makes her cough. transferring her to nurse triage , Jae RN. Also forwarding encounter to office just to make them aware of their patient status.

## 2020-04-15 ENCOUNTER — NURSE ONLY (OUTPATIENT)
Dept: NON INVASIVE DIAGNOSTICS | Age: 55
End: 2020-04-15
Payer: COMMERCIAL

## 2020-04-15 PROCEDURE — 93298 REM INTERROG DEV EVAL SCRMS: CPT | Performed by: INTERNAL MEDICINE

## 2020-04-15 PROCEDURE — G2066 INTER DEVC REMOTE 30D: HCPCS | Performed by: INTERNAL MEDICINE

## 2020-04-15 NOTE — PROGRESS NOTES
See [facility] [place] report. Remote monitoring reviewed over a 30 day period.   End of 30 day monitoring period date of service 04/15/2020

## 2020-04-20 ENCOUNTER — TELEPHONE (OUTPATIENT)
Dept: ADMINISTRATIVE | Age: 55
End: 2020-04-20

## 2020-04-20 NOTE — TELEPHONE ENCOUNTER
Pt called c/o a cough stating she has had for two weeks, no other upper respiratory symptoms. Unable to schedule a VV with Dr. Maria Teresa Harper in the next couple days. Made Pt aware of the flu clinic if she needed  to be seen face to face.

## 2020-04-21 ENCOUNTER — VIRTUAL VISIT (OUTPATIENT)
Dept: FAMILY MEDICINE CLINIC | Age: 55
End: 2020-04-21
Payer: COMMERCIAL

## 2020-04-21 PROCEDURE — 1036F TOBACCO NON-USER: CPT | Performed by: FAMILY MEDICINE

## 2020-04-21 PROCEDURE — 3017F COLORECTAL CA SCREEN DOC REV: CPT | Performed by: FAMILY MEDICINE

## 2020-04-21 PROCEDURE — G8417 CALC BMI ABV UP PARAM F/U: HCPCS | Performed by: FAMILY MEDICINE

## 2020-04-21 PROCEDURE — G8427 DOCREV CUR MEDS BY ELIG CLIN: HCPCS | Performed by: FAMILY MEDICINE

## 2020-04-21 PROCEDURE — 99214 OFFICE O/P EST MOD 30 MIN: CPT | Performed by: FAMILY MEDICINE

## 2020-04-21 RX ORDER — PANTOPRAZOLE SODIUM 40 MG/1
40 TABLET, DELAYED RELEASE ORAL DAILY
Qty: 30 TABLET | Refills: 2 | Status: SHIPPED
Start: 2020-04-21 | End: 2020-06-06

## 2020-05-07 ENCOUNTER — TELEPHONE (OUTPATIENT)
Dept: FAMILY MEDICINE CLINIC | Age: 55
End: 2020-05-07

## 2020-05-08 ENCOUNTER — HOSPITAL ENCOUNTER (EMERGENCY)
Age: 55
Discharge: HOME | End: 2020-05-08
Attending: EMERGENCY MEDICINE
Payer: COMMERCIAL

## 2020-05-08 VITALS
BODY MASS INDEX: 39.86 KG/M2 | RESPIRATION RATE: 16 BRPM | OXYGEN SATURATION: 97 % | SYSTOLIC BLOOD PRESSURE: 132 MMHG | HEIGHT: 66 IN | DIASTOLIC BLOOD PRESSURE: 84 MMHG | WEIGHT: 248 LBS | TEMPERATURE: 98 F | HEART RATE: 84 BPM

## 2020-05-08 PROCEDURE — 6370000000 HC RX 637 (ALT 250 FOR IP): Performed by: EMERGENCY MEDICINE

## 2020-05-08 PROCEDURE — 99283 EMERGENCY DEPT VISIT LOW MDM: CPT

## 2020-05-08 RX ORDER — SUCRALFATE 1 G/1
1 TABLET ORAL 4 TIMES DAILY
Qty: 120 TABLET | Refills: 0 | Status: ON HOLD | OUTPATIENT
Start: 2020-05-08 | End: 2020-06-27 | Stop reason: ALTCHOICE

## 2020-05-08 RX ORDER — PANTOPRAZOLE SODIUM 40 MG/1
40 TABLET, DELAYED RELEASE ORAL DAILY
Qty: 10 TABLET | Refills: 0 | Status: SHIPPED | OUTPATIENT
Start: 2020-05-08 | End: 2020-05-19 | Stop reason: SDUPTHER

## 2020-05-08 RX ADMIN — LIDOCAINE HYDROCHLORIDE: 20 SOLUTION ORAL; TOPICAL at 13:17

## 2020-05-08 ASSESSMENT — ENCOUNTER SYMPTOMS
SHORTNESS OF BREATH: 0
ABDOMINAL PAIN: 0
CHEST TIGHTNESS: 0

## 2020-05-11 ENCOUNTER — TELEPHONE (OUTPATIENT)
Dept: PRIMARY CARE CLINIC | Age: 55
End: 2020-05-11

## 2020-05-11 ENCOUNTER — TELEPHONE (OUTPATIENT)
Dept: ADMINISTRATIVE | Age: 55
End: 2020-05-11

## 2020-05-15 ENCOUNTER — NURSE ONLY (OUTPATIENT)
Dept: NON INVASIVE DIAGNOSTICS | Age: 55
End: 2020-05-15

## 2020-05-15 PROCEDURE — 93298 REM INTERROG DEV EVAL SCRMS: CPT | Performed by: INTERNAL MEDICINE

## 2020-05-15 PROCEDURE — G2066 INTER DEVC REMOTE 30D: HCPCS | Performed by: INTERNAL MEDICINE

## 2020-05-18 ENCOUNTER — TELEPHONE (OUTPATIENT)
Dept: ADMINISTRATIVE | Age: 55
End: 2020-05-18

## 2020-05-18 NOTE — TELEPHONE ENCOUNTER
Pt states she has a cough, area under neck swollen, drinking a lot of water and having hard time urinating. I was looking for a time to schedule her and didn't see a VV until Wednesday and she asked if I could send a message to the Dr instead. Thank you.

## 2020-05-19 ENCOUNTER — VIRTUAL VISIT (OUTPATIENT)
Dept: FAMILY MEDICINE CLINIC | Age: 55
End: 2020-05-19
Payer: COMMERCIAL

## 2020-05-19 PROCEDURE — 3017F COLORECTAL CA SCREEN DOC REV: CPT | Performed by: FAMILY MEDICINE

## 2020-05-19 PROCEDURE — G8417 CALC BMI ABV UP PARAM F/U: HCPCS | Performed by: FAMILY MEDICINE

## 2020-05-19 PROCEDURE — 1036F TOBACCO NON-USER: CPT | Performed by: FAMILY MEDICINE

## 2020-05-19 PROCEDURE — G8427 DOCREV CUR MEDS BY ELIG CLIN: HCPCS | Performed by: FAMILY MEDICINE

## 2020-05-19 PROCEDURE — 99213 OFFICE O/P EST LOW 20 MIN: CPT | Performed by: FAMILY MEDICINE

## 2020-05-19 RX ORDER — ALPRAZOLAM 1 MG/1
TABLET ORAL
Qty: 90 TABLET | Refills: 3 | Status: ON HOLD
Start: 2020-05-19 | End: 2020-06-28 | Stop reason: HOSPADM

## 2020-05-19 RX ORDER — ALPRAZOLAM 1 MG/1
TABLET ORAL
COMMUNITY
Start: 2020-05-05 | End: 2020-05-19 | Stop reason: SDUPTHER

## 2020-05-19 NOTE — PROGRESS NOTES
 Other Paternal Grandmother         bowel    Other Daughter         IGG immunodeficiency    Cancer Maternal Grandmother         lung, cervical    Heart Disease Maternal Grandfather     Atrial Fibrillation Paternal Grandfather    ,   Immunization History   Administered Date(s) Administered    Influenza Virus Vaccine 10/03/2019   ,   Health Maintenance   Topic Date Due    DTaP/Tdap/Td vaccine (1 - Tdap) 09/17/1984    Shingles Vaccine (1 of 2) 09/17/2015    A1C test (Diabetic or Prediabetic)  10/29/2020    TSH testing  10/29/2020    Potassium monitoring  10/29/2020    Creatinine monitoring  10/29/2020    Breast cancer screen  11/01/2020    Lipid screen  10/29/2024    Colon cancer screen colonoscopy  11/10/2029    Flu vaccine  Completed    Hepatitis C screen  Completed    HIV screen  Completed    Hepatitis A vaccine  Aged Out    Hepatitis B vaccine  Aged Out    Hib vaccine  Aged Out    Meningococcal (ACWY) vaccine  Aged Out    Pneumococcal 0-64 years Vaccine  Aged Out       PHYSICAL EXAMINATION:  [ INSTRUCTIONS:  \"[x]\" Indicates a positive item  \"[]\" Indicates a negative item  -- DELETE ALL ITEMS NOT EXAMINED]  Vital Signs: (As obtained by patient/caregiver or practitioner observation)    Blood pressure-  Heart rate-    Respiratory rate-    Temperature-  Pulse oximetry-     Constitutional: [x] Appears well-developed and well-nourished [] No apparent distress      [x] Abnormal-   Mental status  [x] Alert and awake  [x] Oriented to person/place/time [x]Able to follow commands      Eyes:  EOM    [x]  Normal  [] Abnormal-  Sclera  [x]  Normal  [] Abnormal -         Discharge [x]  None visible  [] Abnormal -    HENT:   [x] Normocephalic, atraumatic.   [] Abnormal   [x] Mouth/Throat: Mucous membranes are moist.     External Ears [x] Normal  [] Abnormal-     Neck: [x] No visualized mass     Pulmonary/Chest: [x] Respiratory effort normal.  [x] No visualized signs of difficulty breathing or respiratory distress        [] Abnormal-      Musculoskeletal:   [] Normal gait with no signs of ataxia         [x] Normal range of motion of neck        [] Abnormal-       Neurological:        [x] No Facial Asymmetry (Cranial nerve 7 motor function) (limited exam to video visit)          [] No gaze palsy        [] Abnormal-         Skin:        [x] No significant exanthematous lesions or discoloration noted on facial skin         [] Abnormal-            Psychiatric:       [x] Normal Affect [x] No Hallucinations        [] Abnormal-       Other pertinent observable physical exam findings-     Due to this being a TeleHealth encounter, evaluation of the following organ systems is limited: Vitals/Constitutional/EENT/Resp/CV/GI//MS/Neuro/Skin/Heme-Lymph-Imm. ASSESSMENT/PLAN:  1. Anxiety    - ALPRAZolam (XANAX) 1 MG tablet; TAKE 1 AND 1/2 TABLETS BY MOUTH TWICE DAILY  Dispense: 90 tablet; Refill: 3    2. Gastroesophageal reflux disease with esophagitis  Pt seeing GI and meds per Dr. Argelia Restrepo. No follow-ups on file. An  electronic signature was used to authenticate this note. --Karyn Plascencia MD on 5/19/2020 at 11:06 }    Pursuant to the emergency declaration under the [facility], [facility] waiver authority and the [employer] Act, this Virtual  Visit was conducted, with patient's consent, to reduce the patient's risk of exposure to COVID-19 and provide continuity of care for an established patient. Services were provided through a video synchronous discussion virtually to substitute for in-person clinic visit.

## 2020-05-21 RX ORDER — METOPROLOL SUCCINATE 50 MG/1
TABLET, EXTENDED RELEASE ORAL
Qty: 90 TABLET | Refills: 5 | Status: SHIPPED
Start: 2020-05-21 | End: 2020-08-10 | Stop reason: ALTCHOICE

## 2020-05-27 ENCOUNTER — TELEPHONE (OUTPATIENT)
Dept: FAMILY MEDICINE CLINIC | Age: 55
End: 2020-05-27

## 2020-05-27 NOTE — TELEPHONE ENCOUNTER
Pt called she stated if she did not feel any better to give you a call she is not feeling any better and wondering if she should be getting any imaging done. Please advise.

## 2020-06-02 ENCOUNTER — TELEPHONE (OUTPATIENT)
Dept: FAMILY MEDICINE CLINIC | Age: 55
End: 2020-06-02

## 2020-06-02 NOTE — TELEPHONE ENCOUNTER
PT WANTING TO KNOW IF YOU CAN ORDER A CT OF HER ESOPHAGUS REGARDING HER CHRONIC COUGH AND THORACIC SPINE PAIN AND PRESSURE. SHE ALSO WOULD FELIPE A CXR ND THORACIC SPINE XRAYS. SEND ORDERS TO [facility] AND CALL PT ONCE ORDERS ARE IN.

## 2020-06-06 ENCOUNTER — HOSPITAL ENCOUNTER (EMERGENCY)
Age: 55
Discharge: HOME | End: 2020-06-06
Attending: EMERGENCY MEDICINE
Payer: COMMERCIAL

## 2020-06-06 ENCOUNTER — APPOINTMENT (OUTPATIENT)
Dept: CT IMAGING | Age: 55
End: 2020-06-06
Payer: COMMERCIAL

## 2020-06-06 VITALS
SYSTOLIC BLOOD PRESSURE: 135 MMHG | TEMPERATURE: 97.1 F | HEIGHT: 66 IN | DIASTOLIC BLOOD PRESSURE: 94 MMHG | WEIGHT: 245 LBS | OXYGEN SATURATION: 99 % | RESPIRATION RATE: 14 BRPM | HEART RATE: 96 BPM | BODY MASS INDEX: 39.37 KG/M2

## 2020-06-06 LAB
ALBUMIN SERPL-MCNC: 4.1 G/DL (ref 3.5–5.2)
ALP BLD-CCNC: 72 U/L (ref 35–104)
ALT SERPL-CCNC: 24 U/L (ref 0–32)
ANION GAP SERPL CALCULATED.3IONS-SCNC: 9 MMOL/L (ref 7–16)
AST SERPL-CCNC: 19 U/L (ref 0–31)
BACTERIA: ABNORMAL /HPF
BASOPHILS ABSOLUTE: 0.03 E9/L (ref 0–0.2)
BASOPHILS RELATIVE PERCENT: 0.4 % (ref 0–2)
BILIRUB SERPL-MCNC: 0.4 MG/DL (ref 0–1.2)
BILIRUBIN URINE: NEGATIVE
BLOOD, URINE: ABNORMAL
BUN BLDV-MCNC: 18 MG/DL (ref 6–20)
CALCIUM SERPL-MCNC: 9 MG/DL (ref 8.6–10.2)
CHLORIDE BLD-SCNC: 107 MMOL/L (ref 98–107)
CLARITY: CLEAR
CO2: 26 MMOL/L (ref 22–29)
COLOR: YELLOW
CREAT SERPL-MCNC: 0.8 MG/DL (ref 0.5–1)
EOSINOPHILS ABSOLUTE: 0.08 E9/L (ref 0.05–0.5)
EOSINOPHILS RELATIVE PERCENT: 1.1 % (ref 0–6)
GFR AFRICAN AMERICAN: >60
GFR NON-AFRICAN AMERICAN: >60 ML/MIN/1.73
GLUCOSE BLD-MCNC: 112 MG/DL (ref 74–99)
GLUCOSE URINE: NEGATIVE MG/DL
HCT VFR BLD CALC: 40.9 % (ref 34–48)
HEMOGLOBIN: 13.3 G/DL (ref 11.5–15.5)
IMMATURE GRANULOCYTES #: 0.03 E9/L
IMMATURE GRANULOCYTES %: 0.4 % (ref 0–5)
KETONES, URINE: NEGATIVE MG/DL
LACTIC ACID: 1.7 MMOL/L (ref 0.5–2.2)
LEUKOCYTE ESTERASE, URINE: ABNORMAL
LYMPHOCYTES ABSOLUTE: 1.5 E9/L (ref 1.5–4)
LYMPHOCYTES RELATIVE PERCENT: 20.4 % (ref 20–42)
MCH RBC QN AUTO: 29.9 PG (ref 26–35)
MCHC RBC AUTO-ENTMCNC: 32.5 % (ref 32–34.5)
MCV RBC AUTO: 91.9 FL (ref 80–99.9)
MONOCYTES ABSOLUTE: 0.59 E9/L (ref 0.1–0.95)
MONOCYTES RELATIVE PERCENT: 8 % (ref 2–12)
NEUTROPHILS ABSOLUTE: 5.12 E9/L (ref 1.8–7.3)
NEUTROPHILS RELATIVE PERCENT: 69.7 % (ref 43–80)
NITRITE, URINE: NEGATIVE
PDW BLD-RTO: 12.7 FL (ref 11.5–15)
PH UA: 5.5 (ref 5–9)
PLATELET # BLD: 286 E9/L (ref 130–450)
PMV BLD AUTO: 9.9 FL (ref 7–12)
POTASSIUM REFLEX MAGNESIUM: 3.7 MMOL/L (ref 3.5–5)
PROTEIN UA: NEGATIVE MG/DL
RBC # BLD: 4.45 E12/L (ref 3.5–5.5)
RBC UA: ABNORMAL /HPF (ref 0–2)
SODIUM BLD-SCNC: 142 MMOL/L (ref 132–146)
SPECIFIC GRAVITY UA: 1.02 (ref 1–1.03)
TOTAL PROTEIN: 7.1 G/DL (ref 6.4–8.3)
UROBILINOGEN, URINE: 0.2 E.U./DL
WBC # BLD: 7.4 E9/L (ref 4.5–11.5)
WBC UA: >20 /HPF (ref 0–5)

## 2020-06-06 PROCEDURE — 80053 COMPREHEN METABOLIC PANEL: CPT

## 2020-06-06 PROCEDURE — 6360000002 HC RX W HCPCS: Performed by: EMERGENCY MEDICINE

## 2020-06-06 PROCEDURE — 2580000003 HC RX 258: Performed by: EMERGENCY MEDICINE

## 2020-06-06 PROCEDURE — 87088 URINE BACTERIA CULTURE: CPT

## 2020-06-06 PROCEDURE — 96375 TX/PRO/DX INJ NEW DRUG ADDON: CPT

## 2020-06-06 PROCEDURE — 85025 COMPLETE CBC W/AUTO DIFF WBC: CPT

## 2020-06-06 PROCEDURE — 74177 CT ABD & PELVIS W/CONTRAST: CPT

## 2020-06-06 PROCEDURE — 81001 URINALYSIS AUTO W/SCOPE: CPT

## 2020-06-06 PROCEDURE — 99284 EMERGENCY DEPT VISIT MOD MDM: CPT

## 2020-06-06 PROCEDURE — 6370000000 HC RX 637 (ALT 250 FOR IP): Performed by: EMERGENCY MEDICINE

## 2020-06-06 PROCEDURE — 83605 ASSAY OF LACTIC ACID: CPT

## 2020-06-06 PROCEDURE — 6360000004 HC RX CONTRAST MEDICATION: Performed by: RADIOLOGY

## 2020-06-06 PROCEDURE — 96374 THER/PROPH/DIAG INJ IV PUSH: CPT

## 2020-06-06 RX ORDER — ONDANSETRON 2 MG/ML
4 INJECTION INTRAMUSCULAR; INTRAVENOUS ONCE
Status: COMPLETED | OUTPATIENT
Start: 2020-06-06 | End: 2020-06-06

## 2020-06-06 RX ORDER — CEFDINIR 300 MG/1
300 CAPSULE ORAL 2 TIMES DAILY
Qty: 14 CAPSULE | Refills: 0 | Status: SHIPPED | OUTPATIENT
Start: 2020-06-06 | End: 2020-06-13

## 2020-06-06 RX ORDER — CEFDINIR 300 MG/1
300 CAPSULE ORAL ONCE
Status: COMPLETED | OUTPATIENT
Start: 2020-06-06 | End: 2020-06-06

## 2020-06-06 RX ORDER — MORPHINE SULFATE 4 MG/ML
6 INJECTION, SOLUTION INTRAMUSCULAR; INTRAVENOUS ONCE
Status: COMPLETED | OUTPATIENT
Start: 2020-06-06 | End: 2020-06-06

## 2020-06-06 RX ORDER — PHENAZOPYRIDINE HYDROCHLORIDE 100 MG/1
200 TABLET, FILM COATED ORAL 3 TIMES DAILY PRN
Qty: 9 TABLET | Refills: 0 | Status: SHIPPED | OUTPATIENT
Start: 2020-06-06 | End: 2020-06-09

## 2020-06-06 RX ORDER — DIPHENHYDRAMINE HYDROCHLORIDE 50 MG/ML
25 INJECTION INTRAMUSCULAR; INTRAVENOUS ONCE
Status: COMPLETED | OUTPATIENT
Start: 2020-06-06 | End: 2020-06-06

## 2020-06-06 RX ORDER — 0.9 % SODIUM CHLORIDE 0.9 %
1000 INTRAVENOUS SOLUTION INTRAVENOUS ONCE
Status: COMPLETED | OUTPATIENT
Start: 2020-06-06 | End: 2020-06-06

## 2020-06-06 RX ADMIN — MORPHINE SULFATE 6 MG: 4 INJECTION, SOLUTION INTRAMUSCULAR; INTRAVENOUS at 09:49

## 2020-06-06 RX ADMIN — IOPAMIDOL 110 ML: 755 INJECTION, SOLUTION INTRAVENOUS at 10:47

## 2020-06-06 RX ADMIN — SODIUM CHLORIDE 1000 ML: 9 INJECTION, SOLUTION INTRAVENOUS at 09:49

## 2020-06-06 RX ADMIN — CEFDINIR 300 MG: 300 CAPSULE ORAL at 11:56

## 2020-06-06 RX ADMIN — ONDANSETRON 4 MG: 2 INJECTION INTRAMUSCULAR; INTRAVENOUS at 09:44

## 2020-06-06 RX ADMIN — DIPHENHYDRAMINE HYDROCHLORIDE 25 MG: 50 INJECTION, SOLUTION INTRAMUSCULAR; INTRAVENOUS at 10:12

## 2020-06-06 ASSESSMENT — ENCOUNTER SYMPTOMS
VOMITING: 0
WHEEZING: 0
ABDOMINAL PAIN: 1
BACK PAIN: 0
ABDOMINAL DISTENTION: 0
SINUS PRESSURE: 0
DIARRHEA: 0
EYE PAIN: 0
NAUSEA: 1
COUGH: 0
EYE DISCHARGE: 0
SORE THROAT: 0
EYE REDNESS: 0
SHORTNESS OF BREATH: 0

## 2020-06-06 ASSESSMENT — PAIN SCALES - GENERAL
PAINLEVEL_OUTOF10: 6
PAINLEVEL_OUTOF10: 3

## 2020-06-06 ASSESSMENT — PAIN DESCRIPTION - ORIENTATION: ORIENTATION: RIGHT;LOWER

## 2020-06-06 ASSESSMENT — PAIN DESCRIPTION - LOCATION
LOCATION: ABDOMEN
LOCATION: ABDOMEN

## 2020-06-06 ASSESSMENT — PAIN DESCRIPTION - PROGRESSION: CLINICAL_PROGRESSION: GRADUALLY WORSENING

## 2020-06-06 ASSESSMENT — PAIN DESCRIPTION - PAIN TYPE
TYPE: ACUTE PAIN
TYPE: ACUTE PAIN

## 2020-06-06 ASSESSMENT — PAIN DESCRIPTION - DESCRIPTORS: DESCRIPTORS: STABBING

## 2020-06-06 ASSESSMENT — PAIN DESCRIPTION - FREQUENCY: FREQUENCY: CONTINUOUS

## 2020-06-06 NOTE — ED PROVIDER NOTES
Breast Cancer in her paternal grandmother; Cancer in her father and maternal grandmother; Heart Disease in her maternal grandfather; High Blood Pressure in her father; High Cholesterol in her mother; Other in her daughter, paternal grandmother, and sister. The patients home medications have been reviewed. Allergies: Patient has no known allergies.     -------------------------------------------------- RESULTS -------------------------------------------------  Labs:  Results for orders placed or performed during the hospital encounter of 06/06/20   CBC Auto Differential   Result Value Ref Range    WBC 7.4 4.5 - 11.5 E9/L    RBC 4.45 3.50 - 5.50 E12/L    Hemoglobin 13.3 11.5 - 15.5 g/dL    Hematocrit 40.9 34.0 - 48.0 %    MCV 91.9 80.0 - 99.9 fL    MCH 29.9 26.0 - 35.0 pg    MCHC 32.5 32.0 - 34.5 %    RDW 12.7 11.5 - 15.0 fL    Platelets 437 970 - 106 E9/L    MPV 9.9 7.0 - 12.0 fL    Neutrophils % 69.7 43.0 - 80.0 %    Immature Granulocytes % 0.4 0.0 - 5.0 %    Lymphocytes % 20.4 20.0 - 42.0 %    Monocytes % 8.0 2.0 - 12.0 %    Eosinophils % 1.1 0.0 - 6.0 %    Basophils % 0.4 0.0 - 2.0 %    Neutrophils Absolute 5.12 1.80 - 7.30 E9/L    Immature Granulocytes # 0.03 E9/L    Lymphocytes Absolute 1.50 1.50 - 4.00 E9/L    Monocytes Absolute 0.59 0.10 - 0.95 E9/L    Eosinophils Absolute 0.08 0.05 - 0.50 E9/L    Basophils Absolute 0.03 0.00 - 0.20 E9/L   Comprehensive Metabolic Panel w/ Reflex to MG   Result Value Ref Range    Sodium 142 132 - 146 mmol/L    Potassium reflex Magnesium 3.7 3.5 - 5.0 mmol/L    Chloride 107 98 - 107 mmol/L    CO2 26 22 - 29 mmol/L    Anion Gap 9 7 - 16 mmol/L    Glucose 112 (H) 74 - 99 mg/dL    BUN 18 6 - 20 mg/dL    CREATININE 0.8 0.5 - 1.0 mg/dL    GFR Non-African American >60 >=60 mL/min/1.73    GFR African American >60     Calcium 9.0 8.6 - 10.2 mg/dL    Total Protein 7.1 6.4 - 8.3 g/dL    Alb 4.1 3.5 - 5.2 g/dL    Total Bilirubin 0.4 0.0 - 1.2 mg/dL    Alkaline Phosphatase 72 35 -

## 2020-06-06 NOTE — ED NOTES
Bed: 25  Expected date:   Expected time:   Means of arrival:   Comments:  [facility] @ [address], [address] [facility]  06/06/20 3838

## 2020-06-07 LAB — URINE CULTURE, ROUTINE: NORMAL

## 2020-06-08 ENCOUNTER — CARE COORDINATION (OUTPATIENT)
Dept: CARE COORDINATION | Age: 55
End: 2020-06-08

## 2020-06-08 NOTE — CARE COORDINATION
condition or if you are sick. For more information on steps you can take to protect yourself, see CDC's How to [id number] [place] [address] for follow-up call in 7-14 days based on severity of symptoms and risk factors.     PLAN    10 day ED f/u call

## 2020-06-09 ENCOUNTER — TELEPHONE (OUTPATIENT)
Dept: FAMILY MEDICINE CLINIC | Age: 55
End: 2020-06-09

## 2020-06-11 ENCOUNTER — HOSPITAL ENCOUNTER (EMERGENCY)
Age: 55
Discharge: HOME | End: 2020-06-11
Attending: EMERGENCY MEDICINE
Payer: COMMERCIAL

## 2020-06-11 ENCOUNTER — APPOINTMENT (OUTPATIENT)
Dept: CT IMAGING | Age: 55
End: 2020-06-11
Payer: COMMERCIAL

## 2020-06-11 ENCOUNTER — TELEPHONE (OUTPATIENT)
Dept: FAMILY MEDICINE CLINIC | Age: 55
End: 2020-06-11

## 2020-06-11 VITALS
BODY MASS INDEX: 39.37 KG/M2 | SYSTOLIC BLOOD PRESSURE: 156 MMHG | RESPIRATION RATE: 14 BRPM | HEART RATE: 67 BPM | WEIGHT: 245 LBS | OXYGEN SATURATION: 97 % | TEMPERATURE: 97.8 F | HEIGHT: 66 IN | DIASTOLIC BLOOD PRESSURE: 63 MMHG

## 2020-06-11 LAB
ALBUMIN SERPL-MCNC: 4.1 G/DL (ref 3.5–5.2)
ALP BLD-CCNC: 62 U/L (ref 35–104)
ALT SERPL-CCNC: 19 U/L (ref 0–32)
ANION GAP SERPL CALCULATED.3IONS-SCNC: 9 MMOL/L (ref 7–16)
AST SERPL-CCNC: 15 U/L (ref 0–31)
BASOPHILS ABSOLUTE: 0.03 E9/L (ref 0–0.2)
BASOPHILS RELATIVE PERCENT: 0.5 % (ref 0–2)
BILIRUB SERPL-MCNC: 0.5 MG/DL (ref 0–1.2)
BILIRUBIN DIRECT: <0.2 MG/DL (ref 0–0.3)
BILIRUBIN, INDIRECT: NORMAL MG/DL (ref 0–1)
BUN BLDV-MCNC: 16 MG/DL (ref 6–20)
CALCIUM SERPL-MCNC: 9.2 MG/DL (ref 8.6–10.2)
CHLORIDE BLD-SCNC: 105 MMOL/L (ref 98–107)
CO2: 28 MMOL/L (ref 22–29)
CREAT SERPL-MCNC: 0.7 MG/DL (ref 0.5–1)
EOSINOPHILS ABSOLUTE: 0.09 E9/L (ref 0.05–0.5)
EOSINOPHILS RELATIVE PERCENT: 1.5 % (ref 0–6)
GFR AFRICAN AMERICAN: >60
GFR NON-AFRICAN AMERICAN: >60 ML/MIN/1.73
GLUCOSE BLD-MCNC: 101 MG/DL (ref 74–99)
HCT VFR BLD CALC: 39.5 % (ref 34–48)
HEMOGLOBIN: 12.9 G/DL (ref 11.5–15.5)
IMMATURE GRANULOCYTES #: 0.02 E9/L
IMMATURE GRANULOCYTES %: 0.3 % (ref 0–5)
LIPASE: 16 U/L (ref 13–60)
LYMPHOCYTES ABSOLUTE: 1.56 E9/L (ref 1.5–4)
LYMPHOCYTES RELATIVE PERCENT: 25.3 % (ref 20–42)
MCH RBC QN AUTO: 29.8 PG (ref 26–35)
MCHC RBC AUTO-ENTMCNC: 32.7 % (ref 32–34.5)
MCV RBC AUTO: 91.2 FL (ref 80–99.9)
MONOCYTES ABSOLUTE: 0.57 E9/L (ref 0.1–0.95)
MONOCYTES RELATIVE PERCENT: 9.2 % (ref 2–12)
NEUTROPHILS ABSOLUTE: 3.9 E9/L (ref 1.8–7.3)
NEUTROPHILS RELATIVE PERCENT: 63.2 % (ref 43–80)
PDW BLD-RTO: 12.8 FL (ref 11.5–15)
PLATELET # BLD: 274 E9/L (ref 130–450)
PMV BLD AUTO: 9.8 FL (ref 7–12)
POTASSIUM REFLEX MAGNESIUM: 4.4 MMOL/L (ref 3.5–5)
RBC # BLD: 4.33 E12/L (ref 3.5–5.5)
SODIUM BLD-SCNC: 142 MMOL/L (ref 132–146)
TOTAL PROTEIN: 7 G/DL (ref 6.4–8.3)
WBC # BLD: 6.2 E9/L (ref 4.5–11.5)

## 2020-06-11 PROCEDURE — 96372 THER/PROPH/DIAG INJ SC/IM: CPT

## 2020-06-11 PROCEDURE — 80048 BASIC METABOLIC PNL TOTAL CA: CPT

## 2020-06-11 PROCEDURE — 74177 CT ABD & PELVIS W/CONTRAST: CPT

## 2020-06-11 PROCEDURE — 6360000004 HC RX CONTRAST MEDICATION: Performed by: RADIOLOGY

## 2020-06-11 PROCEDURE — 96374 THER/PROPH/DIAG INJ IV PUSH: CPT

## 2020-06-11 PROCEDURE — 99284 EMERGENCY DEPT VISIT MOD MDM: CPT

## 2020-06-11 PROCEDURE — 6360000002 HC RX W HCPCS: Performed by: EMERGENCY MEDICINE

## 2020-06-11 PROCEDURE — 83690 ASSAY OF LIPASE: CPT

## 2020-06-11 PROCEDURE — 85025 COMPLETE CBC W/AUTO DIFF WBC: CPT

## 2020-06-11 PROCEDURE — 80076 HEPATIC FUNCTION PANEL: CPT

## 2020-06-11 RX ORDER — ONDANSETRON 2 MG/ML
4 INJECTION INTRAMUSCULAR; INTRAVENOUS ONCE
Status: COMPLETED | OUTPATIENT
Start: 2020-06-11 | End: 2020-06-11

## 2020-06-11 RX ORDER — PROMETHAZINE HYDROCHLORIDE 25 MG/1
25 TABLET ORAL EVERY 6 HOURS PRN
Qty: 30 TABLET | Refills: 1 | Status: SHIPPED | OUTPATIENT
Start: 2020-06-11 | End: 2020-06-26

## 2020-06-11 RX ORDER — DICYCLOMINE HCL 20 MG
20 TABLET ORAL EVERY 6 HOURS
Qty: 40 TABLET | Refills: 0 | Status: SHIPPED | OUTPATIENT
Start: 2020-06-11 | End: 2020-06-22 | Stop reason: SDUPTHER

## 2020-06-11 RX ORDER — PROMETHAZINE HYDROCHLORIDE 25 MG/ML
25 INJECTION, SOLUTION INTRAMUSCULAR; INTRAVENOUS ONCE
Status: COMPLETED | OUTPATIENT
Start: 2020-06-11 | End: 2020-06-11

## 2020-06-11 RX ADMIN — ONDANSETRON 4 MG: 2 INJECTION INTRAMUSCULAR; INTRAVENOUS at 07:06

## 2020-06-11 RX ADMIN — IOPAMIDOL 110 ML: 755 INJECTION, SOLUTION INTRAVENOUS at 07:54

## 2020-06-11 RX ADMIN — PROMETHAZINE HYDROCHLORIDE 25 MG: 25 INJECTION INTRAMUSCULAR; INTRAVENOUS at 08:49

## 2020-06-11 ASSESSMENT — PAIN SCALES - GENERAL: PAINLEVEL_OUTOF10: 6

## 2020-06-11 ASSESSMENT — PAIN DESCRIPTION - PAIN TYPE: TYPE: ACUTE PAIN

## 2020-06-11 ASSESSMENT — PAIN DESCRIPTION - LOCATION: LOCATION: ABDOMEN

## 2020-06-11 NOTE — ED PROVIDER NOTES
Department of Emergency Medicine   ED  Provider Note  Admit Date/RoomTime: 6/11/2020  6:30 AM  ED Room: 22/22          History of Present Illness:  6/11/20, Time: 6:33 AM EDT  Chief Complaint   Patient presents with    Abdominal Pain     goes tot he back    Nausea    Bloated                Tyler Nieves is a 47 y.o. female presenting to the ED for right lower/mid abdominal pain, beginning approximately 5 days ago. The complaint has been persistent, moderate in severity, and worsened by nothing. Patient states that she was evaluated on 6/6/2020 for lower abdominal pain and diagnosed with UTI, CT imaging is negative. Patient states that she continues to have the pain describing a bloated cramping sensation to the right lower and mid abdomen with no exacerbating or remitting factors. Patient states that she is very nauseous and has had little to no oral intake over the past 2 days. She states she is having chills but does not feel that she has a fever. She states that her stools are now loose. She denies any dysuria, had no dysuria at onset of pain either. Review of Systems:   Pertinent positives and negatives are stated within HPI, all other systems reviewed and are negative.        --------------------------------------------- PAST HISTORY ---------------------------------------------  Past Medical History:  has a past medical history of Allergic rhinitis, Anxiety, Atrial fibrillation ([facility] [address] Cancer ([facility] [address] Chest pain, Chronic back pain, Depression, Headache, Hypertension, Osteoarthritis, Palpitations, Palpitations, Sleep apnea, and Thyroid disease. Past Surgical History:  has a past surgical history that includes ablation of dysrhythmic focus (1990); hysteroscopy (8/15/12); Colonoscopy; Endometrial ablation (2012); Thyroidectomy (Bilateral, 01/19/2017); Hysterectomy (07/25/2018); Breast surgery;  Hysterectomy, vaginal; other surgical history (12/23/2019); and Insertable Cardiac Monitor (12/23/2019). Social History:  reports that she has never smoked. She has never used smokeless tobacco. She reports that she does not drink alcohol or use drugs. Family History: family history includes Atrial Fibrillation in her paternal grandfather; Breast Cancer in her paternal grandmother; Cancer in her father and maternal grandmother; Heart Disease in her maternal grandfather; High Blood Pressure in her father; High Cholesterol in her mother; Other in her daughter, paternal grandmother, and sister. . Unless otherwise noted, family history is non contributory    The patients home medications have been reviewed. Allergies: Patient has no known allergies. ---------------------------------------------------PHYSICAL EXAM--------------------------------------    Constitutional/General: Alert and oriented x3  Head: Normocephalic and atraumatic  Eyes: PERRL, EOMI, sclera non icteric  Mouth: Oropharynx clear, handling secretions, no trismus, no asymmetry of the posterior oropharynx or uvular edema  Neck: Supple, full ROM, no stridor, no meningeal signs  Respiratory: Lungs clear to auscultation bilaterally, no wheezes, rales, or rhonchi. Not in respiratory distress  Cardiovascular:  Regular rate. Regular rhythm. No murmurs, no aortic murmurs, no gallops, or rubs. 2+ distal pulses. Equal extremity pulses. Chest: No chest wall tenderness  GI:  Abdomen Soft, tender right lower quadrant at McBurney's point with positive obturator and psoas sign. Also tender in the right upper quadrant, Non distended. No rebound, guarding, or rigidity. No pulsatile masses. Musculoskeletal: Moves all extremities x 4. Warm and well perfused, no clubbing, cyanosis, or edema. Capillary refill <3 seconds  Integument: skin warm and dry. No rashes.    Neurologic: GCS 15, no focal deficits, symmetric strength 5/5 in the upper and lower extremities bilaterally  Psychiatric: Normal suspicion for cholecystitis as on CT scan previously she has had no gallstones, but this is possible. Diverticulitis is also possible. Lower suspicion for obstruction. She arrives hemodynamically stable. Patient was given Zofran for nausea. Metabolic panel is within acceptable limits, LFTs unremarkable, lipase negative. No leukocytosis or anemia. CT imaging shows no acute abnormalities, no changes when compared to previous. Patient continued to have some nausea, given Phenergan with improvement. Unclear cause for patient's abdominal pain but reassuring reevaluation. Patient is comfortable with discharge home, prescription for Phenergan and Bentyl with follow-up to her gastroenterologist.  Instruction to return for any changes in condition or new symptoms. Re-Evaluations: This patient's ED course included: a personal history and physicial examination, re-evaluation prior to disposition and IV medications    This patient has remained hemodynamically stable, remained unchanged and been closely monitored during their ED course. Counseling: The emergency provider has spoken with the patient and spouse/SO and discussed todays results, in addition to providing specific details for the plan of care and counseling regarding the diagnosis and prognosis. Questions are answered at this time and they are agreeable with the plan.       --------------------------------- IMPRESSION AND DISPOSITION ---------------------------------    IMPRESSION  1. Lower abdominal pain        DISPOSITION  Disposition: Discharge to home  Patient condition is stable        NOTE: This report was transcribed using voice recognition software.  Every effort was made to ensure accuracy; however, inadvertent computerized transcription errors may be present        Nirmal Yang DO  06/11/20 [id number]

## 2020-06-12 ENCOUNTER — CARE COORDINATION (OUTPATIENT)
Dept: CARE COORDINATION | Age: 55
End: 2020-06-12

## 2020-06-16 ENCOUNTER — NURSE ONLY (OUTPATIENT)
Dept: NON INVASIVE DIAGNOSTICS | Age: 55
End: 2020-06-16
Payer: COMMERCIAL

## 2020-06-16 ENCOUNTER — TELEPHONE (OUTPATIENT)
Dept: FAMILY MEDICINE CLINIC | Age: 55
End: 2020-06-16

## 2020-06-16 PROCEDURE — G2066 INTER DEVC REMOTE 30D: HCPCS | Performed by: INTERNAL MEDICINE

## 2020-06-16 PROCEDURE — 93298 REM INTERROG DEV EVAL SCRMS: CPT | Performed by: INTERNAL MEDICINE

## 2020-06-17 NOTE — TELEPHONE ENCOUNTER
Patient is having a nuclear test done today, but she is worried that if she feels really bad what should she do?

## 2020-06-19 ENCOUNTER — OFFICE VISIT (OUTPATIENT)
Dept: FAMILY MEDICINE CLINIC | Age: 55
End: 2020-06-19
Payer: COMMERCIAL

## 2020-06-19 VITALS
TEMPERATURE: 97.9 F | HEART RATE: 97 BPM | DIASTOLIC BLOOD PRESSURE: 79 MMHG | WEIGHT: 242 LBS | OXYGEN SATURATION: 97 % | SYSTOLIC BLOOD PRESSURE: 107 MMHG | BODY MASS INDEX: 39.06 KG/M2

## 2020-06-19 PROCEDURE — 1036F TOBACCO NON-USER: CPT | Performed by: FAMILY MEDICINE

## 2020-06-19 PROCEDURE — 99214 OFFICE O/P EST MOD 30 MIN: CPT | Performed by: FAMILY MEDICINE

## 2020-06-19 PROCEDURE — G8427 DOCREV CUR MEDS BY ELIG CLIN: HCPCS | Performed by: FAMILY MEDICINE

## 2020-06-19 PROCEDURE — G8417 CALC BMI ABV UP PARAM F/U: HCPCS | Performed by: FAMILY MEDICINE

## 2020-06-19 PROCEDURE — 3017F COLORECTAL CA SCREEN DOC REV: CPT | Performed by: FAMILY MEDICINE

## 2020-06-19 RX ORDER — CEFDINIR 300 MG/1
300 CAPSULE ORAL 2 TIMES DAILY
COMMUNITY
End: 2020-06-22 | Stop reason: ALTCHOICE

## 2020-06-19 RX ORDER — HYDROCODONE BITARTRATE AND ACETAMINOPHEN 5; 325 MG/1; MG/1
1 TABLET ORAL EVERY 6 HOURS PRN
COMMUNITY
End: 2020-07-13

## 2020-06-19 SDOH — ECONOMIC STABILITY: FOOD INSECURITY: WITHIN THE PAST 12 MONTHS, THE FOOD YOU BOUGHT JUST DIDN'T LAST AND YOU DIDN'T HAVE MONEY TO GET MORE.: NEVER TRUE

## 2020-06-19 SDOH — ECONOMIC STABILITY: INCOME INSECURITY: HOW HARD IS IT FOR YOU TO PAY FOR THE VERY BASICS LIKE FOOD, HOUSING, MEDICAL CARE, AND HEATING?: NOT HARD AT ALL

## 2020-06-19 SDOH — ECONOMIC STABILITY: FOOD INSECURITY: WITHIN THE PAST 12 MONTHS, YOU WORRIED THAT YOUR FOOD WOULD RUN OUT BEFORE YOU GOT MONEY TO BUY MORE.: NEVER TRUE

## 2020-06-19 NOTE — PROGRESS NOTES
lymphadenopathy  Lungs:  CTA B  Heart:  RRR, no murmurs, gallops or rubs  Abdomen:  Soft/nt/nd, + bowel sounds  Extremities:  No clubbing, cyanosis or edema  Skin: unremarkable    Assessment/Plan:      Renu Levin was seen today for other. Diagnoses and all orders for this visit:    Flank pain  -     US Retroperitoneal Limited; Future  -     External Referral To Urology    Nausea and vomiting, intractability of vomiting not specified, unspecified vomiting type    Over 25 mins spent with the patient and > 50% was spent on counseling and care coordination. As above. Call or go to ED immediately if symptoms worsen or persist.  No follow-ups on file. , or sooner if necessary. Educational materials and/or home exercises printed for patient's review and were included in patient instructions on his/her After Visit Summary and given to patient at the end of visit. Counseled regarding above diagnosis, including possible risks and complications,  especially if left uncontrolled. Counseled regarding the possible side effects, risks, benefits and alternatives to treatment; patient and/or guardian verbalizes understanding, agrees, feels comfortable with and wishes to proceed with above treatment plan. Advised patient to call with any new medication issues, and read all Rx info from pharmacy to assure aware of all possible risks and side effects of medication before taking. Reviewed age and gender appropriate health screening exams and vaccinations. Advised patient regarding importance of keeping up with recommended health maintenance and to schedule as soon as possible if overdue, as this is important in assessing for undiagnosed pathology, especially cancer, as well as protecting against potentially harmful/life threatening disease. Patient and/or guardian verbalizes understanding and agrees with above counseling, assessment and plan. All questions answered.     Erasmo Sullivan,

## 2020-06-22 ENCOUNTER — HOSPITAL ENCOUNTER (EMERGENCY)
Age: 55
Discharge: HOME | End: 2020-06-23
Attending: EMERGENCY MEDICINE
Payer: COMMERCIAL

## 2020-06-22 VITALS
OXYGEN SATURATION: 97 % | BODY MASS INDEX: 38.89 KG/M2 | HEIGHT: 66 IN | TEMPERATURE: 98.9 F | SYSTOLIC BLOOD PRESSURE: 153 MMHG | HEART RATE: 73 BPM | DIASTOLIC BLOOD PRESSURE: 78 MMHG | WEIGHT: 242 LBS | RESPIRATION RATE: 19 BRPM

## 2020-06-22 LAB
ALBUMIN SERPL-MCNC: 4.2 G/DL (ref 3.5–5.2)
ALP BLD-CCNC: 61 U/L (ref 35–104)
ALT SERPL-CCNC: 21 U/L (ref 0–32)
ANION GAP SERPL CALCULATED.3IONS-SCNC: 11 MMOL/L (ref 7–16)
AST SERPL-CCNC: 22 U/L (ref 0–31)
BASOPHILS ABSOLUTE: 0.06 E9/L (ref 0–0.2)
BASOPHILS RELATIVE PERCENT: 1 % (ref 0–2)
BILIRUB SERPL-MCNC: 0.6 MG/DL (ref 0–1.2)
BUN BLDV-MCNC: 13 MG/DL (ref 6–20)
CALCIUM SERPL-MCNC: 9.3 MG/DL (ref 8.6–10.2)
CHLORIDE BLD-SCNC: 104 MMOL/L (ref 98–107)
CO2: 26 MMOL/L (ref 22–29)
CREAT SERPL-MCNC: 0.8 MG/DL (ref 0.5–1)
EOSINOPHILS ABSOLUTE: 0.1 E9/L (ref 0.05–0.5)
EOSINOPHILS RELATIVE PERCENT: 1.7 % (ref 0–6)
GFR AFRICAN AMERICAN: >60
GFR NON-AFRICAN AMERICAN: >60 ML/MIN/1.73
GLUCOSE BLD-MCNC: 105 MG/DL (ref 74–99)
HCT VFR BLD CALC: 40.5 % (ref 34–48)
HEMOGLOBIN: 13.9 G/DL (ref 11.5–15.5)
IMMATURE GRANULOCYTES #: 0.02 E9/L
IMMATURE GRANULOCYTES %: 0.3 % (ref 0–5)
LIPASE: 19 U/L (ref 13–60)
LYMPHOCYTES ABSOLUTE: 2.01 E9/L (ref 1.5–4)
LYMPHOCYTES RELATIVE PERCENT: 34.2 % (ref 20–42)
MCH RBC QN AUTO: 30.6 PG (ref 26–35)
MCHC RBC AUTO-ENTMCNC: 34.3 % (ref 32–34.5)
MCV RBC AUTO: 89.2 FL (ref 80–99.9)
MONOCYTES ABSOLUTE: 0.7 E9/L (ref 0.1–0.95)
MONOCYTES RELATIVE PERCENT: 11.9 % (ref 2–12)
NEUTROPHILS ABSOLUTE: 2.98 E9/L (ref 1.8–7.3)
NEUTROPHILS RELATIVE PERCENT: 50.9 % (ref 43–80)
PDW BLD-RTO: 12.4 FL (ref 11.5–15)
PLATELET # BLD: 282 E9/L (ref 130–450)
PMV BLD AUTO: 10 FL (ref 7–12)
POTASSIUM SERPL-SCNC: 4 MMOL/L (ref 3.5–5)
RBC # BLD: 4.54 E12/L (ref 3.5–5.5)
SODIUM BLD-SCNC: 141 MMOL/L (ref 132–146)
TOTAL PROTEIN: 7.1 G/DL (ref 6.4–8.3)
TROPONIN: <0.01 NG/ML (ref 0–0.03)
WBC # BLD: 5.9 E9/L (ref 4.5–11.5)

## 2020-06-22 PROCEDURE — 36415 COLL VENOUS BLD VENIPUNCTURE: CPT

## 2020-06-22 PROCEDURE — 96374 THER/PROPH/DIAG INJ IV PUSH: CPT

## 2020-06-22 PROCEDURE — 6360000002 HC RX W HCPCS: Performed by: EMERGENCY MEDICINE

## 2020-06-22 PROCEDURE — 93005 ELECTROCARDIOGRAM TRACING: CPT | Performed by: EMERGENCY MEDICINE

## 2020-06-22 PROCEDURE — 99284 EMERGENCY DEPT VISIT MOD MDM: CPT

## 2020-06-22 PROCEDURE — 84484 ASSAY OF TROPONIN QUANT: CPT

## 2020-06-22 PROCEDURE — 83690 ASSAY OF LIPASE: CPT

## 2020-06-22 PROCEDURE — 96361 HYDRATE IV INFUSION ADD-ON: CPT

## 2020-06-22 PROCEDURE — 2580000003 HC RX 258: Performed by: EMERGENCY MEDICINE

## 2020-06-22 PROCEDURE — 85025 COMPLETE CBC W/AUTO DIFF WBC: CPT

## 2020-06-22 PROCEDURE — 80053 COMPREHEN METABOLIC PANEL: CPT

## 2020-06-22 RX ORDER — ONDANSETRON 4 MG/1
8 TABLET, ORALLY DISINTEGRATING ORAL EVERY 8 HOURS PRN
Qty: 20 TABLET | Refills: 1 | Status: ON HOLD
Start: 2020-06-22 | End: 2020-06-28 | Stop reason: HOSPADM

## 2020-06-22 RX ORDER — 0.9 % SODIUM CHLORIDE 0.9 %
2000 INTRAVENOUS SOLUTION INTRAVENOUS ONCE
Status: COMPLETED | OUTPATIENT
Start: 2020-06-22 | End: 2020-06-23

## 2020-06-22 RX ORDER — LORAZEPAM 2 MG/ML
1 INJECTION INTRAMUSCULAR ONCE
Status: COMPLETED | OUTPATIENT
Start: 2020-06-22 | End: 2020-06-22

## 2020-06-22 RX ORDER — FAMOTIDINE 20 MG/1
20 TABLET, FILM COATED ORAL 2 TIMES DAILY
Qty: 20 TABLET | Refills: 1 | Status: ON HOLD
Start: 2020-06-22 | End: 2020-06-28 | Stop reason: HOSPADM

## 2020-06-22 RX ORDER — DICYCLOMINE HCL 20 MG
20 TABLET ORAL EVERY 6 HOURS
Qty: 40 TABLET | Refills: 0 | Status: ON HOLD | OUTPATIENT
Start: 2020-06-22 | End: 2020-06-27 | Stop reason: ALTCHOICE

## 2020-06-22 RX ORDER — ONDANSETRON 2 MG/ML
4 INJECTION INTRAMUSCULAR; INTRAVENOUS ONCE
Status: COMPLETED | OUTPATIENT
Start: 2020-06-22 | End: 2020-06-22

## 2020-06-22 RX ADMIN — SODIUM CHLORIDE 2000 ML: 9 INJECTION, SOLUTION INTRAVENOUS at 23:05

## 2020-06-22 RX ADMIN — ONDANSETRON 4 MG: 2 INJECTION INTRAMUSCULAR; INTRAVENOUS at 23:06

## 2020-06-22 RX ADMIN — LORAZEPAM 1 MG: 2 INJECTION INTRAMUSCULAR; INTRAVENOUS at 23:06

## 2020-06-23 LAB
EKG ATRIAL RATE: 73 BPM
EKG P AXIS: 12 DEGREES
EKG P-R INTERVAL: 196 MS
EKG Q-T INTERVAL: 416 MS
EKG QRS DURATION: 92 MS
EKG QTC CALCULATION (BAZETT): 458 MS
EKG R AXIS: 8 DEGREES
EKG T AXIS: -25 DEGREES
EKG VENTRICULAR RATE: 73 BPM

## 2020-06-23 PROCEDURE — 93010 ELECTROCARDIOGRAM REPORT: CPT | Performed by: INTERNAL MEDICINE

## 2020-06-23 NOTE — ED PROVIDER NOTES
HPI:  6/22/20, Time: 10:54 PM EDT        Brian Aguilar is a 47 y.o. female presenting to the ED for palpitations, nausea and vomiting and epigastric abdominal pain, beginning several weeks ago. The complaint has been intermittent, moderate in severity, and worsened by eating. Pt. Has had two successive CT scans over the past several weeks and is scheduled for a HIDA scan to evaluate her Gallbladder tomorrow. She is worried about possible dehydration. No complaints of chest heaviness/pressure/tightness, no fever/chills, no change in bladder habit patterns, no other complaints. Review of Systems:   Pertinent positives and negatives are stated within HPI, all other systems reviewed and are negative.    --------------------------------------------- PAST HISTORY ---------------------------------------------  Past Medical History:  has a past medical history of Allergic rhinitis, Anxiety, Atrial fibrillation ([facility] [address] Cancer ([facility] [address] Chest pain, Chronic back pain, Depression, Headache, Hypertension, Osteoarthritis, Palpitations, Palpitations, Sleep apnea, and Thyroid disease. Past Surgical History:  has a past surgical history that includes ablation of dysrhythmic focus (1990); hysteroscopy (8/15/12); Colonoscopy; Endometrial ablation (2012); Thyroidectomy (Bilateral, 01/19/2017); Hysterectomy (07/25/2018); Breast surgery; Hysterectomy, vaginal; other surgical history (12/23/2019); and Insertable Cardiac Monitor (12/23/2019). Social History:  reports that she has never smoked. She has never used smokeless tobacco. She reports that she does not drink alcohol or use drugs. Family History: family history includes Atrial Fibrillation in her paternal grandfather; Breast Cancer in her paternal grandmother; Cancer in her father and maternal grandmother; Heart Disease in her maternal grandfather; High Blood Pressure in her father; High Cholesterol in her mother;  Other in her daughter, paternal grandmother, and 73  Rhythm: Sinus. Interpretation: nonspecific ST and T waves findings, 1st degree AV block. No ischemic changes vs 12/10/2019 EKG    Counseling: The emergency provider has spoken with the patient and discussed todays results, in addition to providing specific details for the plan of care and counseling regarding the diagnosis and prognosis. Questions are answered at this time and they are agreeable with the plan.    --------------------------------- IMPRESSION AND DISPOSITION ---------------------------------    IMPRESSION  1. Abdominal pain, epigastric    2. Dehydration    3. Palpitations        DISPOSITION  Disposition: Discharge to home  Patient condition is stable      NOTE: This report was transcribed using voice recognition software.  Every effort was made to ensure accuracy; however, inadvertent computerized transcription errors may be present        Radha Rogers MD  06/22/20 [id number]

## 2020-06-24 ENCOUNTER — PATIENT MESSAGE (OUTPATIENT)
Dept: FAMILY MEDICINE CLINIC | Age: 55
End: 2020-06-24

## 2020-06-24 ENCOUNTER — CARE COORDINATION (OUTPATIENT)
Dept: CARE COORDINATION | Age: 55
End: 2020-06-24

## 2020-06-24 NOTE — TELEPHONE ENCOUNTER
Patient called for results and they were given to her. Patient also notified that results were faxed to Dr Ap Frecnh.

## 2020-06-24 NOTE — CARE COORDINATION
Patient contacted regarding recent discharge and COVID-19 risk. Discussed COVID-19 related testing which was not done at this time. Test results were not done. Patient informed of results, if available? N/A     Ambulatory Care Manager contacted the patient by telephone to perform post discharge assessment. Verified name and  with patient as identifiers. Patient has following risk factors of: no known risk factors. ACM reviewed discharge instructions, medical action plan and red flags related to discharge diagnosis. Reviewed and educated them on any new and changed medications related to discharge diagnosis. Advised obtaining a 90-day supply of all daily and as-needed medications. Spoke with Torsten Barreto, stated she is still unable to eat a lot. Continues with abdominal pain and has had multiple appointments with Dr. Wilmer Cohn. Encouraged Torsten Barreto to outreach to Dr. Raghavendra Encinas to consider a second opinion at [facility], verbalized understanding. No other concerns at this time. Encouraged continued social distancing, good handwashing, and mask wearing. Education provided regarding infection prevention, and signs and symptoms of COVID-19 and when to seek medical attention with patient who verbalized understanding. Discussed exposure protocols and quarantine from [address] you at higher risk for severe illness 2019 and given an opportunity for questions and concerns. The patient agrees to contact the COVID-19 hotline [phone] or PCP office for questions related to their healthcare. ACM provided contact information for future reference. From CDC: Are you at higher risk for severe illness?  Wash your hands often.  Avoid close contact (6 feet, which is about two arm lengths) with people who are sick.  Put distance between yourself and other people if COVID-19 is spreading in your community.  Clean and disinfect frequently touched surfaces.    Avoid all cruise travel and non-essential air

## 2020-06-25 ENCOUNTER — APPOINTMENT (OUTPATIENT)
Dept: ULTRASOUND IMAGING | Age: 55
DRG: 284 | End: 2020-06-25
Payer: COMMERCIAL

## 2020-06-25 ENCOUNTER — APPOINTMENT (OUTPATIENT)
Dept: GENERAL RADIOLOGY | Age: 55
DRG: 284 | End: 2020-06-25
Payer: COMMERCIAL

## 2020-06-25 ENCOUNTER — TELEPHONE (OUTPATIENT)
Dept: FAMILY MEDICINE CLINIC | Age: 55
End: 2020-06-25

## 2020-06-25 ENCOUNTER — APPOINTMENT (OUTPATIENT)
Dept: CT IMAGING | Age: 55
DRG: 284 | End: 2020-06-25
Payer: COMMERCIAL

## 2020-06-25 ENCOUNTER — HOSPITAL ENCOUNTER (EMERGENCY)
Age: 55
Discharge: HOME | DRG: 284 | End: 2020-06-25
Attending: EMERGENCY MEDICINE
Payer: COMMERCIAL

## 2020-06-25 VITALS
HEIGHT: 66 IN | OXYGEN SATURATION: 98 % | SYSTOLIC BLOOD PRESSURE: 122 MMHG | DIASTOLIC BLOOD PRESSURE: 68 MMHG | BODY MASS INDEX: 37.77 KG/M2 | WEIGHT: 235 LBS | HEART RATE: 95 BPM | RESPIRATION RATE: 16 BRPM | TEMPERATURE: 98.2 F

## 2020-06-25 LAB
ALBUMIN SERPL-MCNC: 4.3 G/DL (ref 3.5–5.2)
ALP BLD-CCNC: 65 U/L (ref 35–104)
ALT SERPL-CCNC: 26 U/L (ref 0–32)
AMORPHOUS: ABNORMAL
ANION GAP SERPL CALCULATED.3IONS-SCNC: 15 MMOL/L (ref 7–16)
AST SERPL-CCNC: 22 U/L (ref 0–31)
BACTERIA: ABNORMAL /HPF
BASOPHILS ABSOLUTE: 0.06 E9/L (ref 0–0.2)
BASOPHILS RELATIVE PERCENT: 1 % (ref 0–2)
BILIRUB SERPL-MCNC: 0.8 MG/DL (ref 0–1.2)
BILIRUBIN URINE: ABNORMAL
BLOOD, URINE: ABNORMAL
BUN BLDV-MCNC: 12 MG/DL (ref 6–20)
CALCIUM SERPL-MCNC: 9.4 MG/DL (ref 8.6–10.2)
CHLORIDE BLD-SCNC: 101 MMOL/L (ref 98–107)
CLARITY: CLEAR
CO2: 24 MMOL/L (ref 22–29)
COLOR: ABNORMAL
CREAT SERPL-MCNC: 0.7 MG/DL (ref 0.5–1)
EOSINOPHILS ABSOLUTE: 0.06 E9/L (ref 0.05–0.5)
EOSINOPHILS RELATIVE PERCENT: 1 % (ref 0–6)
GFR AFRICAN AMERICAN: >60
GFR NON-AFRICAN AMERICAN: >60 ML/MIN/1.73
GLUCOSE BLD-MCNC: 106 MG/DL (ref 74–99)
GLUCOSE URINE: NEGATIVE MG/DL
HCT VFR BLD CALC: 42 % (ref 34–48)
HEMOGLOBIN: 14.1 G/DL (ref 11.5–15.5)
IMMATURE GRANULOCYTES #: 0.01 E9/L
IMMATURE GRANULOCYTES %: 0.2 % (ref 0–5)
KETONES, URINE: 15 MG/DL
LACTIC ACID, SEPSIS: 1.6 MMOL/L (ref 0.5–1.9)
LEUKOCYTE ESTERASE, URINE: ABNORMAL
LIPASE: 15 U/L (ref 13–60)
LYMPHOCYTES ABSOLUTE: 1.83 E9/L (ref 1.5–4)
LYMPHOCYTES RELATIVE PERCENT: 30.7 % (ref 20–42)
MCH RBC QN AUTO: 29.7 PG (ref 26–35)
MCHC RBC AUTO-ENTMCNC: 33.6 % (ref 32–34.5)
MCV RBC AUTO: 88.4 FL (ref 80–99.9)
MONOCYTES ABSOLUTE: 0.57 E9/L (ref 0.1–0.95)
MONOCYTES RELATIVE PERCENT: 9.5 % (ref 2–12)
MUCUS: PRESENT /LPF
NEUTROPHILS ABSOLUTE: 3.44 E9/L (ref 1.8–7.3)
NEUTROPHILS RELATIVE PERCENT: 57.6 % (ref 43–80)
NITRITE, URINE: NEGATIVE
PDW BLD-RTO: 12.5 FL (ref 11.5–15)
PH UA: 6 (ref 5–9)
PLATELET # BLD: 311 E9/L (ref 130–450)
PMV BLD AUTO: 10.1 FL (ref 7–12)
POTASSIUM SERPL-SCNC: 3.6 MMOL/L (ref 3.5–5)
PROTEIN UA: ABNORMAL MG/DL
RBC # BLD: 4.75 E12/L (ref 3.5–5.5)
RBC UA: ABNORMAL /HPF (ref 0–2)
SODIUM BLD-SCNC: 140 MMOL/L (ref 132–146)
SPECIFIC GRAVITY UA: 1.02 (ref 1–1.03)
TOTAL PROTEIN: 7.5 G/DL (ref 6.4–8.3)
TROPONIN: <0.01 NG/ML (ref 0–0.03)
UROBILINOGEN, URINE: 0.2 E.U./DL
WBC # BLD: 6 E9/L (ref 4.5–11.5)
WBC UA: >20 /HPF (ref 0–5)

## 2020-06-25 PROCEDURE — 71046 X-RAY EXAM CHEST 2 VIEWS: CPT

## 2020-06-25 PROCEDURE — 85025 COMPLETE CBC W/AUTO DIFF WBC: CPT

## 2020-06-25 PROCEDURE — 83605 ASSAY OF LACTIC ACID: CPT

## 2020-06-25 PROCEDURE — 6360000002 HC RX W HCPCS: Performed by: NURSE PRACTITIONER

## 2020-06-25 PROCEDURE — 83690 ASSAY OF LIPASE: CPT

## 2020-06-25 PROCEDURE — 81001 URINALYSIS AUTO W/SCOPE: CPT

## 2020-06-25 PROCEDURE — 76705 ECHO EXAM OF ABDOMEN: CPT

## 2020-06-25 PROCEDURE — 96366 THER/PROPH/DIAG IV INF ADDON: CPT

## 2020-06-25 PROCEDURE — 6370000000 HC RX 637 (ALT 250 FOR IP)

## 2020-06-25 PROCEDURE — 80053 COMPREHEN METABOLIC PANEL: CPT

## 2020-06-25 PROCEDURE — 74176 CT ABD & PELVIS W/O CONTRAST: CPT

## 2020-06-25 PROCEDURE — 96375 TX/PRO/DX INJ NEW DRUG ADDON: CPT

## 2020-06-25 PROCEDURE — 84484 ASSAY OF TROPONIN QUANT: CPT

## 2020-06-25 PROCEDURE — 87088 URINE BACTERIA CULTURE: CPT

## 2020-06-25 PROCEDURE — 96365 THER/PROPH/DIAG IV INF INIT: CPT

## 2020-06-25 PROCEDURE — 99284 EMERGENCY DEPT VISIT MOD MDM: CPT

## 2020-06-25 PROCEDURE — 2580000003 HC RX 258: Performed by: NURSE PRACTITIONER

## 2020-06-25 RX ORDER — MORPHINE SULFATE 4 MG/ML
4 INJECTION, SOLUTION INTRAMUSCULAR; INTRAVENOUS ONCE
Status: DISCONTINUED | OUTPATIENT
Start: 2020-06-25 | End: 2020-06-25 | Stop reason: HOSPADM

## 2020-06-25 RX ORDER — METRONIDAZOLE 500 MG/1
500 TABLET ORAL 2 TIMES DAILY
Qty: 20 TABLET | Refills: 0 | Status: ON HOLD | OUTPATIENT
Start: 2020-06-25 | End: 2020-06-28 | Stop reason: HOSPADM

## 2020-06-25 RX ORDER — KETOROLAC TROMETHAMINE 30 MG/ML
15 INJECTION, SOLUTION INTRAMUSCULAR; INTRAVENOUS ONCE
Status: COMPLETED | OUTPATIENT
Start: 2020-06-25 | End: 2020-06-25

## 2020-06-25 RX ORDER — PROMETHAZINE HYDROCHLORIDE 25 MG/ML
25 INJECTION, SOLUTION INTRAMUSCULAR; INTRAVENOUS ONCE
Status: COMPLETED | OUTPATIENT
Start: 2020-06-25 | End: 2020-06-25

## 2020-06-25 RX ORDER — ONDANSETRON 4 MG/1
TABLET, ORALLY DISINTEGRATING ORAL
Status: COMPLETED
Start: 2020-06-25 | End: 2020-06-25

## 2020-06-25 RX ORDER — 0.9 % SODIUM CHLORIDE 0.9 %
1000 INTRAVENOUS SOLUTION INTRAVENOUS ONCE
Status: COMPLETED | OUTPATIENT
Start: 2020-06-25 | End: 2020-06-25

## 2020-06-25 RX ORDER — CEFDINIR 300 MG/1
300 CAPSULE ORAL 2 TIMES DAILY
Qty: 20 CAPSULE | Refills: 0 | Status: ON HOLD | OUTPATIENT
Start: 2020-06-25 | End: 2020-06-27 | Stop reason: ALTCHOICE

## 2020-06-25 RX ORDER — ONDANSETRON 4 MG/1
4 TABLET, ORALLY DISINTEGRATING ORAL ONCE
Status: COMPLETED | OUTPATIENT
Start: 2020-06-25 | End: 2020-06-25

## 2020-06-25 RX ADMIN — SODIUM CHLORIDE 1000 ML: 9 INJECTION, SOLUTION INTRAVENOUS at 16:38

## 2020-06-25 RX ADMIN — ONDANSETRON 4 MG: 4 TABLET, ORALLY DISINTEGRATING ORAL at 18:48

## 2020-06-25 RX ADMIN — PROMETHAZINE HYDROCHLORIDE 25 MG: 25 INJECTION INTRAMUSCULAR; INTRAVENOUS at 15:26

## 2020-06-25 RX ADMIN — KETOROLAC TROMETHAMINE 15 MG: 30 INJECTION, SOLUTION INTRAMUSCULAR at 18:14

## 2020-06-25 RX ADMIN — WATER 1 G: 1 INJECTION INTRAMUSCULAR; INTRAVENOUS; SUBCUTANEOUS at 18:14

## 2020-06-25 ASSESSMENT — PAIN DESCRIPTION - LOCATION: LOCATION: ABDOMEN

## 2020-06-25 ASSESSMENT — PAIN SCALES - GENERAL
PAINLEVEL_OUTOF10: 7
PAINLEVEL_OUTOF10: 10

## 2020-06-25 ASSESSMENT — PAIN DESCRIPTION - PAIN TYPE: TYPE: ACUTE PAIN

## 2020-06-25 ASSESSMENT — PAIN DESCRIPTION - ORIENTATION: ORIENTATION: RIGHT;UPPER

## 2020-06-25 ASSESSMENT — PAIN DESCRIPTION - PROGRESSION: CLINICAL_PROGRESSION: GRADUALLY WORSENING

## 2020-06-25 NOTE — ED PROVIDER NOTES
ED Attending  CC: Arely        Department of Emergency Medicine   ED  Provider Note  Admit Date/RoomTime: 6/25/2020  2:34 PM  ED Room: 35/35  MRN: [id number]  Chief Complaint       Abdominal Pain (mid to RUQ )    History of Present Illness   Source of history provided by:  patient. History/Exam Limitations: none. Sancho Mendoza is a 47 y.o. 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  Fevers but complains of chills. Reports that he has had similar pain and this has been ongoing since June 5, 2020. Patient states she has not been able to eat she feels abdomen is distended with air if she lays down or on her back the pain is worse and makes it hard to breathe. He denies any chest pain, dyspnea with exertion, palpitations, hemoptysis, leg pain, leg swelling, recent travel or any other symptoms at this time. States she was on [place] on June 6 for UTI and she had ultrasound of the kidneys which were normal. She states that her last BM was loose and tan in color and denies any bloody or tarry stools. Denies any hematemesis or any alcohol use. She did have a HIDA scan 2 weeks ago she was unable to tolerate and her physicians think she has a dysfunctioning gallbladder. Since onset the symptoms have been persistent and worsening.   The pain is associated with nausea and vomiting and states she lost 24 pounds since eating a June. The pain is aggravated by eating and relieved by nothing. There has been NO cloudy urine, constipation, diarrhea, dysuria, headache, hematuria, sweating, urinary frequency, urinary incontinence, urinary urgency, vaginal discharge or vaginal itching. States she has been seen by Dr. Niyah Laurent and she has a colonoscopy scheduled and she is also been seen by Dr. Quinton Crews had an upper endoscopy which was normal.        ROS   Pertinent positives and negatives are stated within HPI, all other systems reviewed and are negative. Past Surgical History:   Procedure Laterality Date    ABLATION OF DYSRHYTHMIC FOCUS  1990    BREAST SURGERY      cysts removed    COLONOSCOPY      ENDOMETRIAL ABLATION  2012    HYSTERECTOMY  07/25/2018    HYSTERECTOMY, VAGINAL      HYSTEROSCOPY  8/15/12    dilatation & curettage, novasure ablation    INSERTABLE CARDIAC MONITOR  12/23/2019    [facility] Confirm Linq     (Dr. Ashtyn Casillas)      OTHER SURGICAL HISTORY  12/23/2019    Dr. Ashtyn Casillas- Loop Recorder implant, Confirm    THYROIDECTOMY Bilateral 01/19/2017    Dr. Luna Lemus   Social History:  reports that she has never smoked. She has never used smokeless tobacco. She reports that she does not drink alcohol or use drugs. Family History: family history includes Atrial Fibrillation in her paternal grandfather; Breast Cancer in her paternal grandmother; Cancer in her father and maternal grandmother; Heart Disease in her maternal grandfather; High Blood Pressure in her father; High Cholesterol in her mother; Other in her daughter, paternal grandmother, and sister. Allergies: Patient has no known allergies.     Physical Exam           ED Triage Vitals   BP Temp Temp Source Pulse Resp SpO2 Height Weight   06/25/20 1405 06/25/20 1356 06/25/20 1405 06/25/20 1356 06/25/20 1356 06/25/20 1356 06/25/20 1405 06/25/20 1405   (!) 142/103 98.2 °F (36.8 °C) Temporal 95 16 98 % 5' 6\" (1.676 m) 235 lb (106.6 kg)      Oxygen Saturation Interpretation: Normal.    · General Appearance/Constitutional:  Alert, development consistent with age. · HEENT:  NC/NT. PERRLA. Airway patent. · Neck:  Supple. No lymphadenopathy. · Respiratory:  No retractions. Lungs Clear to auscultation and breath sounds equal.  · CV:  Regular rate and rhythm. · GI:  General Appearance: normal.         Bowel sounds: normal bowel sounds. Distension:  None. Tenderness: moderate tenderness is present in the RUQ, no rebound tenderness, no guarding, abdominal rigidity is absent. Liver: non-palpable and non-tender. Spleen:  non-palpable and non-tender. Pulsatile Mass: absent. Hernia:  no inguinal or femoral hernias noted. · Back: CVA Tenderness: No.  · Integument:  Normal turgor. Warm, dry, without visible rash, unless noted elsewhere. · Lymphatics: No edema, cap.refill <3sec. · Neurological:  Orientation age-appropriate. Motor functions intact.     Lab / Imaging Results   (All laboratory and radiology results have been personally reviewed by myself)  Labs:  Results for orders placed or performed during the hospital encounter of 06/25/20   CBC auto differential   Result Value Ref Range    WBC 6.0 4.5 - 11.5 E9/L    RBC 4.75 3.50 - 5.50 E12/L    Hemoglobin 14.1 11.5 - 15.5 g/dL    Hematocrit 42.0 34.0 - 48.0 %    MCV 88.4 80.0 - 99.9 fL    MCH 29.7 26.0 - 35.0 pg    MCHC 33.6 32.0 - 34.5 %    RDW 12.5 11.5 - 15.0 fL    Platelets 898 780 - 986 E9/L    MPV 10.1 7.0 - 12.0 fL    Neutrophils % 57.6 43.0 - 80.0 %    Immature Granulocytes % 0.2 0.0 - 5.0 %    Lymphocytes % 30.7 20.0 - 42.0 %    Monocytes % 9.5 2.0 - 12.0 %    Eosinophils % 1.0 0.0 - 6.0 %    Basophils % 1.0 0.0 - 2.0 %    Neutrophils Absolute 3.44 1.80 - 7.30 E9/L    Immature Granulocytes # 0.01 E9/L    Lymphocytes Absolute 1.83 1.50 - 4.00 E9/L    Monocytes Absolute 0.57 0.10 - 0.95 E9/L    Eosinophils Absolute 0.06 0.05 - 0.50 a degree of fatty change of liver. CT ABDOMEN PELVIS WO CONTRAST Additional Contrast? None   Final Result   Findings compatible fatty infiltration of the liver    There are bilateral infiltrates at the lung bases, likely related to   atelectasis                                     XR CHEST STANDARD (2 VW)   Final Result   NO ACUTE CARDIOPULMONARY PROCESS            ED Course / Medical Decision Making     Medications   morphine sulfate (PF) injection 4 mg (4 mg Intravenous Not Given 6/25/20 1638)   0.9 % sodium chloride bolus (1,000 mLs Intravenous New Bag 6/25/20 1638)   promethazine (PHENERGAN) injection 25 mg (25 mg Intravenous Given 6/25/20 1526)   cefTRIAXone (ROCEPHIN) 1 g in sterile water 10 mL IV syringe (1 g Intravenous New Bag 6/25/20 1814)   ketorolac (TORADOL) injection 15 mg (15 mg Intravenous Given 6/25/20 1814)   ondansetron (ZOFRAN-ODT) disintegrating tablet 4 mg (4 mg Oral Given 6/25/20 1848)     ED Course as of Jun 25 2038   Thu Jun 25, 2020 2037 Dr. Edyta Schultz into examine patient and advised to avoid fatty foods. [SE]      ED Course User Index  [SE] Will Ziegler, APRN - CNP       EKG Interpretation  Time: [facility] [facility]  Interpreted by emergency department physician  Rhythm: normal sinus   Rate: 89  Axis: normal  Ectopy: none  Conduction: normal  ST Segments: no acute change  T Waves: non specific changes  Q Waves: none  Clinical Impression: no acute changes; normal sinus rhythm; inferior infarct age undetermined; cannot rule out anterior infarct age undetermined; T wave abnormality consider lateral ischemia; abnormal ECG. Stable when compared to most recent ECG on 6/22/2020. Will Ziegler  Re-examination:  6/25/20 1805 patient continuing to have pain but does not want anything drowsy and will give Toradol and advised of UTI and does not have symptoms at this time. [id number] patient states she is tired of having this pain and not being able to eat since June and will consult with surgeon. Advised patient that most likely her labs were within normal limits and if her pain is controlled she can follow-up as an outpatient with Dr. Talita Triplett  Time: 1900  Patients symptoms are improving. Consults:   IP CONSULT TO GENERAL SURGERY 1913 Discussed patient with Patrick Gomez RN and he will call back when he is done with surgery. 2020 Discussed patient with Dr. Talita Triplett and will follow up with her in the office. .    Procedures:   none    MDM:   Us of GB ordered and will medicated with Phenergan and morphine reassess. WBCs reviewed and normal with no elevation of LFTs. Lipase normal.  Urinalysis reveals small amount of blood with leukocytes and moderate bacteria with greater than 20 WBCs we will give Rocephin and send culture. Will obtain CT of the abdomen without contrast to rule out any obstructing stones. Remedicated with Toradol did not want anything sedating. States she does have Phenergan and Norco's left at home she did not want any further medications. She will be discharged with Omnicef and Flagyl. He was instructed not to drink alcohol with the Flagyl and on a low fat diet. She is afebrile, nontoxic in appearance, hemodynamically stable her pain is proved and she was instructed to follow-up with Dr. Talita Triplett in his office. Given specific signs and symptoms warranting immediate return to the ED such as fever, chills, worsening pain, jaundice. Counseling: The emergency provider has spoken with the patient and spouse/SO and discussed todays results, in addition to providing specific details for the plan of care and counseling regarding the diagnosis and prognosis. Questions are answered at this time and they are agreeable with the plan. Assessment      1. Pain of upper abdomen    2. Calculus of gallbladder without cholecystitis without obstruction    3. Sludge in gallbladder    4. Urinary tract infection without hematuria, site unspecified    5. Liver cyst    6.  Nausea and vomiting,

## 2020-06-26 ENCOUNTER — TELEPHONE (OUTPATIENT)
Dept: FAMILY MEDICINE CLINIC | Age: 55
End: 2020-06-26

## 2020-06-26 ENCOUNTER — CARE COORDINATION (OUTPATIENT)
Dept: CARE COORDINATION | Age: 55
End: 2020-06-26

## 2020-06-26 RX ORDER — SULFAMETHOXAZOLE AND TRIMETHOPRIM 800; 160 MG/1; MG/1
1 TABLET ORAL 2 TIMES DAILY
Qty: 20 TABLET | Refills: 0 | Status: SHIPPED | OUTPATIENT
Start: 2020-06-26 | End: 2020-07-06

## 2020-06-26 NOTE — TELEPHONE ENCOUNTER
Patient wanted to know if she should take the flagyl?  Per doctor,informed patient not to take, patient notified

## 2020-06-27 ENCOUNTER — HOSPITAL ENCOUNTER (INPATIENT)
Age: 55
LOS: 1 days | Discharge: HOME | DRG: 284 | End: 2020-06-28
Attending: EMERGENCY MEDICINE | Admitting: SURGERY
Payer: COMMERCIAL

## 2020-06-27 ENCOUNTER — APPOINTMENT (OUTPATIENT)
Dept: ULTRASOUND IMAGING | Age: 55
DRG: 284 | End: 2020-06-27
Payer: COMMERCIAL

## 2020-06-27 PROBLEM — K80.50 BILIARY COLIC: Status: ACTIVE | Noted: 2020-06-27

## 2020-06-27 LAB
ALBUMIN SERPL-MCNC: 3.8 G/DL (ref 3.5–5.2)
ALP BLD-CCNC: 57 U/L (ref 35–104)
ALT SERPL-CCNC: 27 U/L (ref 0–32)
ANION GAP SERPL CALCULATED.3IONS-SCNC: 11 MMOL/L (ref 7–16)
AST SERPL-CCNC: 30 U/L (ref 0–31)
BACTERIA: ABNORMAL /HPF
BASOPHILS ABSOLUTE: 0.05 E9/L (ref 0–0.2)
BASOPHILS RELATIVE PERCENT: 0.9 % (ref 0–2)
BILIRUB SERPL-MCNC: 0.7 MG/DL (ref 0–1.2)
BILIRUBIN URINE: NEGATIVE
BLOOD, URINE: ABNORMAL
BUN BLDV-MCNC: 10 MG/DL (ref 6–20)
CALCIUM SERPL-MCNC: 8.7 MG/DL (ref 8.6–10.2)
CHLORIDE BLD-SCNC: 107 MMOL/L (ref 98–107)
CLARITY: CLEAR
CO2: 21 MMOL/L (ref 22–29)
COLOR: YELLOW
CREAT SERPL-MCNC: 0.7 MG/DL (ref 0.5–1)
EOSINOPHILS ABSOLUTE: 0.07 E9/L (ref 0.05–0.5)
EOSINOPHILS RELATIVE PERCENT: 1.3 % (ref 0–6)
GFR AFRICAN AMERICAN: >60
GFR NON-AFRICAN AMERICAN: >60 ML/MIN/1.73
GLUCOSE BLD-MCNC: 96 MG/DL (ref 74–99)
GLUCOSE URINE: NEGATIVE MG/DL
HCT VFR BLD CALC: 41.1 % (ref 34–48)
HEMOGLOBIN: 13.5 G/DL (ref 11.5–15.5)
IMMATURE GRANULOCYTES #: 0.01 E9/L
IMMATURE GRANULOCYTES %: 0.2 % (ref 0–5)
KETONES, URINE: NEGATIVE MG/DL
LACTIC ACID, SEPSIS: 1.4 MMOL/L (ref 0.5–1.9)
LEUKOCYTE ESTERASE, URINE: ABNORMAL
LIPASE: 14 U/L (ref 13–60)
LYMPHOCYTES ABSOLUTE: 1.14 E9/L (ref 1.5–4)
LYMPHOCYTES RELATIVE PERCENT: 21.6 % (ref 20–42)
MAGNESIUM: 1.9 MG/DL (ref 1.6–2.6)
MCH RBC QN AUTO: 29.3 PG (ref 26–35)
MCHC RBC AUTO-ENTMCNC: 32.8 % (ref 32–34.5)
MCV RBC AUTO: 89.3 FL (ref 80–99.9)
MONOCYTES ABSOLUTE: 0.45 E9/L (ref 0.1–0.95)
MONOCYTES RELATIVE PERCENT: 8.5 % (ref 2–12)
MUCUS: PRESENT /LPF
NEUTROPHILS ABSOLUTE: 3.55 E9/L (ref 1.8–7.3)
NEUTROPHILS RELATIVE PERCENT: 67.5 % (ref 43–80)
NITRITE, URINE: NEGATIVE
PDW BLD-RTO: 12.8 FL (ref 11.5–15)
PH UA: 6 (ref 5–9)
PLATELET # BLD: 252 E9/L (ref 130–450)
PMV BLD AUTO: 10.2 FL (ref 7–12)
POTASSIUM REFLEX MAGNESIUM: 3.3 MMOL/L (ref 3.5–5)
PROTEIN UA: NEGATIVE MG/DL
RBC # BLD: 4.6 E12/L (ref 3.5–5.5)
RBC UA: ABNORMAL /HPF (ref 0–2)
SODIUM BLD-SCNC: 139 MMOL/L (ref 132–146)
SPECIFIC GRAVITY UA: <=1.005 (ref 1–1.03)
TOTAL PROTEIN: 6.9 G/DL (ref 6.4–8.3)
URINE CULTURE, ROUTINE: NORMAL
UROBILINOGEN, URINE: 0.2 E.U./DL
WBC # BLD: 5.3 E9/L (ref 4.5–11.5)
WBC UA: ABNORMAL /HPF (ref 0–5)

## 2020-06-27 PROCEDURE — 96367 TX/PROPH/DG ADDL SEQ IV INF: CPT

## 2020-06-27 PROCEDURE — 87040 BLOOD CULTURE FOR BACTERIA: CPT

## 2020-06-27 PROCEDURE — 83690 ASSAY OF LIPASE: CPT

## 2020-06-27 PROCEDURE — 96375 TX/PRO/DX INJ NEW DRUG ADDON: CPT

## 2020-06-27 PROCEDURE — 2580000003 HC RX 258: Performed by: STUDENT IN AN ORGANIZED HEALTH CARE EDUCATION/TRAINING PROGRAM

## 2020-06-27 PROCEDURE — 96365 THER/PROPH/DIAG IV INF INIT: CPT

## 2020-06-27 PROCEDURE — 83735 ASSAY OF MAGNESIUM: CPT

## 2020-06-27 PROCEDURE — 2500000003 HC RX 250 WO HCPCS: Performed by: SURGERY

## 2020-06-27 PROCEDURE — 6360000002 HC RX W HCPCS: Performed by: INTERNAL MEDICINE

## 2020-06-27 PROCEDURE — 76705 ECHO EXAM OF ABDOMEN: CPT

## 2020-06-27 PROCEDURE — 99285 EMERGENCY DEPT VISIT HI MDM: CPT

## 2020-06-27 PROCEDURE — 81001 URINALYSIS AUTO W/SCOPE: CPT

## 2020-06-27 PROCEDURE — 87088 URINE BACTERIA CULTURE: CPT

## 2020-06-27 PROCEDURE — 80053 COMPREHEN METABOLIC PANEL: CPT

## 2020-06-27 PROCEDURE — G0378 HOSPITAL OBSERVATION PER HR: HCPCS

## 2020-06-27 PROCEDURE — 85025 COMPLETE CBC W/AUTO DIFF WBC: CPT

## 2020-06-27 PROCEDURE — C9113 INJ PANTOPRAZOLE SODIUM, VIA: HCPCS | Performed by: INTERNAL MEDICINE

## 2020-06-27 PROCEDURE — 6360000002 HC RX W HCPCS: Performed by: STUDENT IN AN ORGANIZED HEALTH CARE EDUCATION/TRAINING PROGRAM

## 2020-06-27 PROCEDURE — 2580000003 HC RX 258: Performed by: INTERNAL MEDICINE

## 2020-06-27 PROCEDURE — 96372 THER/PROPH/DIAG INJ SC/IM: CPT

## 2020-06-27 PROCEDURE — 1200000000 HC SEMI PRIVATE

## 2020-06-27 PROCEDURE — 96366 THER/PROPH/DIAG IV INF ADDON: CPT

## 2020-06-27 PROCEDURE — 83605 ASSAY OF LACTIC ACID: CPT

## 2020-06-27 RX ORDER — LORAZEPAM 2 MG/ML
0.5 INJECTION INTRAMUSCULAR EVERY 6 HOURS PRN
Status: DISCONTINUED | OUTPATIENT
Start: 2020-06-27 | End: 2020-06-28 | Stop reason: HOSPADM

## 2020-06-27 RX ORDER — PROMETHAZINE HYDROCHLORIDE 25 MG/ML
25 INJECTION, SOLUTION INTRAMUSCULAR; INTRAVENOUS ONCE
Status: COMPLETED | OUTPATIENT
Start: 2020-06-27 | End: 2020-06-27

## 2020-06-27 RX ORDER — 0.9 % SODIUM CHLORIDE 0.9 %
1000 INTRAVENOUS SOLUTION INTRAVENOUS ONCE
Status: COMPLETED | OUTPATIENT
Start: 2020-06-27 | End: 2020-06-27

## 2020-06-27 RX ORDER — ONDANSETRON 2 MG/ML
4 INJECTION INTRAMUSCULAR; INTRAVENOUS ONCE
Status: COMPLETED | OUTPATIENT
Start: 2020-06-27 | End: 2020-06-27

## 2020-06-27 RX ORDER — DEXTROSE, SODIUM CHLORIDE, AND POTASSIUM CHLORIDE 5; .45; .15 G/100ML; G/100ML; G/100ML
INJECTION INTRAVENOUS CONTINUOUS
Status: DISCONTINUED | OUTPATIENT
Start: 2020-06-27 | End: 2020-06-28 | Stop reason: HOSPADM

## 2020-06-27 RX ORDER — POTASSIUM CHLORIDE 7.45 MG/ML
10 INJECTION INTRAVENOUS ONCE
Status: COMPLETED | OUTPATIENT
Start: 2020-06-27 | End: 2020-06-27

## 2020-06-27 RX ORDER — PANTOPRAZOLE SODIUM 40 MG/10ML
40 INJECTION, POWDER, LYOPHILIZED, FOR SOLUTION INTRAVENOUS DAILY
Status: DISCONTINUED | OUTPATIENT
Start: 2020-06-27 | End: 2020-06-28

## 2020-06-27 RX ORDER — FENTANYL CITRATE 50 UG/ML
50 INJECTION, SOLUTION INTRAMUSCULAR; INTRAVENOUS ONCE
Status: COMPLETED | OUTPATIENT
Start: 2020-06-27 | End: 2020-06-27

## 2020-06-27 RX ORDER — ONDANSETRON 2 MG/ML
4 INJECTION INTRAMUSCULAR; INTRAVENOUS EVERY 6 HOURS PRN
Status: DISCONTINUED | OUTPATIENT
Start: 2020-06-27 | End: 2020-06-28 | Stop reason: HOSPADM

## 2020-06-27 RX ORDER — METOPROLOL TARTRATE 5 MG/5ML
5 INJECTION INTRAVENOUS EVERY 8 HOURS PRN
Status: DISCONTINUED | OUTPATIENT
Start: 2020-06-27 | End: 2020-06-28

## 2020-06-27 RX ORDER — SODIUM CHLORIDE 9 MG/ML
10 INJECTION INTRAVENOUS DAILY
Status: DISCONTINUED | OUTPATIENT
Start: 2020-06-27 | End: 2020-06-28

## 2020-06-27 RX ADMIN — PANTOPRAZOLE SODIUM 40 MG: 40 INJECTION, POWDER, FOR SOLUTION INTRAVENOUS at 18:41

## 2020-06-27 RX ADMIN — ONDANSETRON 4 MG: 2 INJECTION INTRAMUSCULAR; INTRAVENOUS at 12:01

## 2020-06-27 RX ADMIN — POTASSIUM CHLORIDE, DEXTROSE MONOHYDRATE AND SODIUM CHLORIDE: 150; 5; 450 INJECTION, SOLUTION INTRAVENOUS at 18:41

## 2020-06-27 RX ADMIN — POTASSIUM CHLORIDE 10 MEQ: 7.46 INJECTION, SOLUTION INTRAVENOUS at 13:52

## 2020-06-27 RX ADMIN — PROMETHAZINE HYDROCHLORIDE 25 MG: 25 INJECTION INTRAMUSCULAR; INTRAVENOUS at 15:01

## 2020-06-27 RX ADMIN — WATER 1 G: 1 INJECTION INTRAMUSCULAR; INTRAVENOUS; SUBCUTANEOUS at 15:10

## 2020-06-27 RX ADMIN — FENTANYL CITRATE 50 MCG: 50 INJECTION INTRAMUSCULAR; INTRAVENOUS at 15:05

## 2020-06-27 RX ADMIN — SODIUM CHLORIDE 1000 ML: 9 INJECTION, SOLUTION INTRAVENOUS at 13:52

## 2020-06-27 RX ADMIN — SODIUM CHLORIDE, PRESERVATIVE FREE 10 ML: 5 INJECTION INTRAVENOUS at 18:42

## 2020-06-27 ASSESSMENT — ENCOUNTER SYMPTOMS
VOMITING: 0
COUGH: 0
CONSTIPATION: 0
DIARRHEA: 1
BLOOD IN STOOL: 0
COLOR CHANGE: 0
ABDOMINAL PAIN: 1
SHORTNESS OF BREATH: 0
BACK PAIN: 0
NAUSEA: 1
WHEEZING: 0
CHEST TIGHTNESS: 0

## 2020-06-27 ASSESSMENT — PAIN DESCRIPTION - LOCATION: LOCATION: HEAD

## 2020-06-27 ASSESSMENT — PAIN - FUNCTIONAL ASSESSMENT: PAIN_FUNCTIONAL_ASSESSMENT: ACTIVITIES ARE NOT PREVENTED

## 2020-06-27 ASSESSMENT — PAIN SCALES - GENERAL
PAINLEVEL_OUTOF10: 7
PAINLEVEL_OUTOF10: 0
PAINLEVEL_OUTOF10: 10
PAINLEVEL_OUTOF10: 0

## 2020-06-27 ASSESSMENT — PAIN DESCRIPTION - ORIENTATION: ORIENTATION: RIGHT;LEFT

## 2020-06-27 ASSESSMENT — PAIN DESCRIPTION - FREQUENCY: FREQUENCY: CONTINUOUS

## 2020-06-27 ASSESSMENT — PAIN DESCRIPTION - DESCRIPTORS: DESCRIPTORS: HEADACHE

## 2020-06-27 ASSESSMENT — PAIN DESCRIPTION - PAIN TYPE: TYPE: ACUTE PAIN

## 2020-06-27 NOTE — PROGRESS NOTES
Dr. Yelitza Krishnamurthy returned call. New orders rec'd. Pt states she saw Dr. Laura Chang when she was here before. Will consult him for medical management.

## 2020-06-27 NOTE — PROGRESS NOTES
Dr. Milligan Friday returned call. New orders rec'd to hold all home meds for now. New orders rec'd for IV medications.

## 2020-06-27 NOTE — ED PROVIDER NOTES
Patient is a 80-year-old female presents the ER today with chief complaint of nausea, right upper quadrant pain. Patient states that she was seen here recently and told that she has stones and sludge in her gallbladder and that she was supposed to follow-up with Dr. Samy Knight for further management and eventual cholecystectomy. Patient states that she has not had a chance to follow yet but now she is having right upper quadrant pain once again, becoming nauseous and vomiting approximately 30 minutes after she eats and also she has been lightheaded. She denies any headache, dizziness, fever, constipation, urinary symptoms. She does admit to diarrhea as well. The history is provided by the patient. Review of Systems   Constitutional: Negative for appetite change, fatigue and fever. HENT: Negative for drooling, postnasal drip and tinnitus. Eyes: Negative for visual disturbance. Respiratory: Negative for cough, chest tightness, shortness of breath and wheezing. Cardiovascular: Negative for chest pain and palpitations. Gastrointestinal: Positive for abdominal pain, diarrhea and nausea. Negative for blood in stool, constipation and vomiting. Genitourinary: Negative for dysuria and frequency. Musculoskeletal: Negative for back pain. Skin: Negative for color change and wound. Neurological: Positive for light-headedness. Negative for facial asymmetry and headaches. Psychiatric/Behavioral: Negative for confusion. Physical Exam  Constitutional:       General: She is awake. Appearance: Normal appearance. She is well-developed. She is obese. HENT:      Head: Normocephalic and atraumatic. Right Ear: Hearing and external ear normal.      Left Ear: Hearing and external ear normal.      Nose: Nose normal.      Mouth/Throat:      Lips: Pink. Mouth: Mucous membranes are moist.      Pharynx: Oropharynx is clear. Uvula midline.    Eyes:      General: Lids are normal. Extraocular Movements: Extraocular movements intact. Conjunctiva/sclera: Conjunctivae normal.   Neck:      Musculoskeletal: Normal range of motion and neck supple. Trachea: Phonation normal.   Cardiovascular:      Rate and Rhythm: Normal rate and regular rhythm. Heart sounds: Normal heart sounds. Pulmonary:      Effort: Pulmonary effort is normal.      Breath sounds: Normal breath sounds and air entry. Abdominal:      General: Abdomen is protuberant. There is no distension. Palpations: Abdomen is soft. Tenderness: There is abdominal tenderness in the right upper quadrant. There is no right CVA tenderness, left CVA tenderness, guarding or rebound. Negative signs include Kenyon's sign and Rovsing's sign. Skin:     General: Skin is warm and dry. Neurological:      General: No focal deficit present. Mental Status: She is alert and oriented to person, place, and time. GCS: GCS eye subscore is 4. GCS verbal subscore is 5. GCS motor subscore is 6. Psychiatric:         Attention and Perception: Attention and perception normal.         Mood and Affect: Mood and affect normal.         Speech: Speech normal.         Behavior: Behavior normal. Behavior is cooperative. Thought Content: Thought content normal.         Cognition and Memory: Cognition and memory normal.         Judgment: Judgment normal.          Procedures     MDM  Number of Diagnoses or Management Options  Diagnosis management comments: Infection, hypokalemia with potassium 3.3, ultrasound demonstrates gallbladder sludge and stones once again. Spoke to Dr. Valeriy Joel who states that he will accept the patient to likely take her for cholecystectomy.   Patient is agreeable to this plan.                  --------------------------------------------- PAST HISTORY ---------------------------------------------  Past Medical History:  has a past medical history of Allergic rhinitis, Anxiety, [facility] ([facility] Cancer [facility]), Chest pain, Chronic back pain, Depression, Headache, Hypertension, Osteoarthritis, Palpitations, Palpitations, Sleep apnea, and Thyroid disease. Past Surgical History:  has a past surgical history that includes ablation of dysrhythmic focus (1990); hysteroscopy (8/15/12); Colonoscopy; Endometrial ablation (2012); Thyroidectomy (Bilateral, 01/19/2017); Hysterectomy (07/25/2018); Breast surgery; Hysterectomy, vaginal; other surgical history (12/23/2019); and Insertable Cardiac Monitor (12/23/2019). Social History:  reports that she has never smoked. She has never used smokeless tobacco. She reports that she does not drink alcohol or use drugs. Family History: family history includes Atrial Fibrillation in her paternal grandfather; Breast Cancer in her paternal grandmother; Cancer in her father and maternal grandmother; Heart Disease in her maternal grandfather; High Blood Pressure in her father; High Cholesterol in her mother; Other in her daughter, paternal grandmother, and sister. The patients home medications have been reviewed. Allergies: Patient has no known allergies.     -------------------------------------------------- RESULTS -------------------------------------------------    LABS:  Results for orders placed or performed during the hospital encounter of 06/27/20   CBC auto differential   Result Value Ref Range    WBC 5.3 4.5 - 11.5 E9/L    RBC 4.60 3.50 - 5.50 E12/L    Hemoglobin 13.5 11.5 - 15.5 g/dL    Hematocrit 41.1 34.0 - 48.0 %    MCV 89.3 80.0 - 99.9 fL    MCH 29.3 26.0 - 35.0 pg    MCHC 32.8 32.0 - 34.5 %    RDW 12.8 11.5 - 15.0 fL    Platelets 891 416 - 867 E9/L    MPV 10.2 7.0 - 12.0 fL    Neutrophils % 67.5 43.0 - 80.0 %    Immature Granulocytes % 0.2 0.0 - 5.0 %    Lymphocytes % 21.6 20.0 - 42.0 %    Monocytes % 8.5 2.0 - 12.0 %    Eosinophils % 1.3 0.0 - 6.0 %    Basophils % 0.9 0.0 - 2.0 %    Neutrophils Absolute 3.55 1.80 - 7.30 E9/L    Immature Granulocytes # 0.01 E9/L    Lymphocytes Absolute 1.14 (L) 1.50 - 4.00 E9/L    Monocytes Absolute 0.45 0.10 - 0.95 E9/L    Eosinophils Absolute 0.07 0.05 - 0.50 E9/L    Basophils Absolute 0.05 0.00 - 0.20 E9/L   Comprehensive Metabolic Panel w/ Reflex to MG   Result Value Ref Range    Sodium 139 132 - 146 mmol/L    Potassium reflex Magnesium 3.3 (L) 3.5 - 5.0 mmol/L    Chloride 107 98 - 107 mmol/L    CO2 21 (L) 22 - 29 mmol/L    Anion Gap 11 7 - 16 mmol/L    Glucose 96 74 - 99 mg/dL    BUN 10 6 - 20 mg/dL    CREATININE 0.7 0.5 - 1.0 mg/dL    GFR Non-African American >60 >=60 mL/min/1.73    GFR African American >60     Calcium 8.7 8.6 - 10.2 mg/dL    Total Protein 6.9 6.4 - 8.3 g/dL    Alb 3.8 3.5 - 5.2 g/dL    Total Bilirubin 0.7 0.0 - 1.2 mg/dL    Alkaline Phosphatase 57 35 - 104 U/L    ALT 27 0 - 32 U/L    AST 30 0 - 31 U/L   Lipase   Result Value Ref Range    Lipase 14 13 - 60 U/L   Lactate, Sepsis   Result Value Ref Range    Lactic Acid, Sepsis 1.4 0.5 - 1.9 mmol/L   Urinalysis with Microscopic   Result Value Ref Range    Color, UA Yellow Straw/Yellow    Clarity, UA Clear Clear    Glucose, Ur Negative Negative mg/dL    Bilirubin Urine Negative Negative    Ketones, Urine Negative Negative mg/dL    Specific Gravity, UA <=1.005 1.005 - 1.030    Blood, Urine TRACE-INTACT Negative    pH, UA 6.0 5.0 - 9.0    Protein, UA Negative Negative mg/dL    Urobilinogen, Urine 0.2 <2.0 E.U./dL    Nitrite, Urine Negative Negative    Leukocyte Esterase, Urine MODERATE (A) Negative    Mucus, UA Present (A) None Seen /LPF    WBC, UA 10-20 (A) 0 - 5 /HPF    RBC, UA 0-1 0 - 2 /HPF    Bacteria, UA RARE (A) None Seen /HPF   Magnesium   Result Value Ref Range    Magnesium 1.9 1.6 - 2.6 mg/dL       RADIOLOGY:  US ABDOMEN LIMITED   Final Result      Stones and sludge within the lumen of the otherwise unremarkable   gallbladder. No pathologic dilation of the hepatobiliary tree.       2.2 cm lesion within the liver is likely the same lesion that has been described on prior ultrasounds and CTs dating back to 2015.                      ------------------------- NURSING NOTES AND VITALS REVIEWED ---------------------------  Date / Time Roomed:  6/27/2020 10:56 AM  ED Bed Assignment:  28/28    The nursing notes within the ED encounter and vital signs as below have been reviewed. Patient Vitals for the past 24 hrs:   BP Temp Temp src Pulse Resp SpO2 Height Weight   06/27/20 1118 (!) 147/70 97.9 °F (36.6 °C) Oral 68 16 96 % 5' 6\" (1.676 m) 240 lb (108.9 kg)       Oxygen Saturation Interpretation: Normal    ------------------------------------------ PROGRESS NOTES ------------------------------------------  Re-evaluation(s):  Time: 1500  Patients symptoms are improving  Repeat physical examination is improved    Counseling:  I have spoken with the patient and discussed todays results, in addition to providing specific details for the plan of care and counseling regarding the diagnosis and prognosis. Their questions are answered at this time and they are agreeable with the plan of admission.    --------------------------------- ADDITIONAL PROVIDER NOTES ---------------------------------  Consultations:  Time: 1503. Spoke with Dr. Thai Chandler. Discussed case. They will admit the patient. This patient's ED course included: a personal history and physicial examination    This patient has remained hemodynamically stable during their ED course. Diagnosis:  1. Biliary colic    2. Intractable vomiting with nausea, unspecified vomiting type        Disposition:  Patient's disposition: Admit to telemetry  Patient's condition is stable.          Kavitha Jo DO  Resident  06/27/20 7494

## 2020-06-28 ENCOUNTER — APPOINTMENT (OUTPATIENT)
Dept: NUCLEAR MEDICINE | Age: 55
DRG: 284 | End: 2020-06-28
Payer: COMMERCIAL

## 2020-06-28 VITALS
HEIGHT: 66 IN | HEART RATE: 66 BPM | BODY MASS INDEX: 39.41 KG/M2 | RESPIRATION RATE: 16 BRPM | SYSTOLIC BLOOD PRESSURE: 144 MMHG | WEIGHT: 245.2 LBS | OXYGEN SATURATION: 98 % | DIASTOLIC BLOOD PRESSURE: 91 MMHG | TEMPERATURE: 97.7 F

## 2020-06-28 PROBLEM — M48.061 SPINAL STENOSIS, LUMBAR: Status: ACTIVE | Noted: 2020-01-14

## 2020-06-28 PROBLEM — E66.9 OBESITY WITH BODY MASS INDEX (BMI) OF 30.0 TO 39.9: Status: ACTIVE | Noted: 2020-06-28

## 2020-06-28 LAB
ALBUMIN SERPL-MCNC: 3.6 G/DL (ref 3.5–5.2)
ALP BLD-CCNC: 52 U/L (ref 35–104)
ALT SERPL-CCNC: 22 U/L (ref 0–32)
ANION GAP SERPL CALCULATED.3IONS-SCNC: 7 MMOL/L (ref 7–16)
AST SERPL-CCNC: 22 U/L (ref 0–31)
BASOPHILS ABSOLUTE: 0.04 E9/L (ref 0–0.2)
BASOPHILS RELATIVE PERCENT: 1 % (ref 0–2)
BILIRUB SERPL-MCNC: 0.6 MG/DL (ref 0–1.2)
BILIRUBIN DIRECT: <0.2 MG/DL (ref 0–0.3)
BILIRUBIN, INDIRECT: ABNORMAL MG/DL (ref 0–1)
BUN BLDV-MCNC: 5 MG/DL (ref 6–20)
C-REACTIVE PROTEIN: 0.1 MG/DL (ref 0–0.4)
CALCIUM SERPL-MCNC: 8.1 MG/DL (ref 8.6–10.2)
CHLORIDE BLD-SCNC: 108 MMOL/L (ref 98–107)
CHOLESTEROL, TOTAL: 132 MG/DL (ref 0–199)
CO2: 25 MMOL/L (ref 22–29)
CREAT SERPL-MCNC: 0.8 MG/DL (ref 0.5–1)
EOSINOPHILS ABSOLUTE: 0.06 E9/L (ref 0.05–0.5)
EOSINOPHILS RELATIVE PERCENT: 1.4 % (ref 0–6)
FOLATE: 15.2 NG/ML (ref 4.8–24.2)
GFR AFRICAN AMERICAN: >60
GFR NON-AFRICAN AMERICAN: >60 ML/MIN/1.73
GLUCOSE BLD-MCNC: 101 MG/DL (ref 74–99)
HBA1C MFR BLD: 5.5 % (ref 4–5.6)
HCT VFR BLD CALC: 38.1 % (ref 34–48)
HDLC SERPL-MCNC: 36 MG/DL
HEMOGLOBIN: 12.3 G/DL (ref 11.5–15.5)
IMMATURE GRANULOCYTES #: 0.01 E9/L
IMMATURE GRANULOCYTES %: 0.2 % (ref 0–5)
LDL CHOLESTEROL CALCULATED: 80 MG/DL (ref 0–99)
LYMPHOCYTES ABSOLUTE: 1.43 E9/L (ref 1.5–4)
LYMPHOCYTES RELATIVE PERCENT: 34.3 % (ref 20–42)
MAGNESIUM: 1.7 MG/DL (ref 1.6–2.6)
MCH RBC QN AUTO: 29.2 PG (ref 26–35)
MCHC RBC AUTO-ENTMCNC: 32.3 % (ref 32–34.5)
MCV RBC AUTO: 90.5 FL (ref 80–99.9)
MONOCYTES ABSOLUTE: 0.45 E9/L (ref 0.1–0.95)
MONOCYTES RELATIVE PERCENT: 10.8 % (ref 2–12)
NEUTROPHILS ABSOLUTE: 2.18 E9/L (ref 1.8–7.3)
NEUTROPHILS RELATIVE PERCENT: 52.3 % (ref 43–80)
PDW BLD-RTO: 12.8 FL (ref 11.5–15)
PHOSPHORUS: 2.9 MG/DL (ref 2.5–4.5)
PLATELET # BLD: 249 E9/L (ref 130–450)
PMV BLD AUTO: 10.2 FL (ref 7–12)
POTASSIUM SERPL-SCNC: 3 MMOL/L (ref 3.5–5)
PRO-BNP: 175 PG/ML (ref 0–125)
PROCALCITONIN: 0.05 NG/ML (ref 0–0.08)
RBC # BLD: 4.21 E12/L (ref 3.5–5.5)
SODIUM BLD-SCNC: 140 MMOL/L (ref 132–146)
TOTAL PROTEIN: 6.3 G/DL (ref 6.4–8.3)
TRIGL SERPL-MCNC: 80 MG/DL (ref 0–149)
TSH SERPL DL<=0.05 MIU/L-ACNC: 0.64 UIU/ML (ref 0.27–4.2)
URIC ACID, SERUM: 6.1 MG/DL (ref 2.4–5.7)
VITAMIN B-12: 329 PG/ML (ref 211–946)
VLDLC SERPL CALC-MCNC: 16 MG/DL
WBC # BLD: 4.2 E9/L (ref 4.5–11.5)

## 2020-06-28 PROCEDURE — 84100 ASSAY OF PHOSPHORUS: CPT

## 2020-06-28 PROCEDURE — 78226 HEPATOBILIARY SYSTEM IMAGING: CPT

## 2020-06-28 PROCEDURE — 83880 ASSAY OF NATRIURETIC PEPTIDE: CPT

## 2020-06-28 PROCEDURE — 83036 HEMOGLOBIN GLYCOSYLATED A1C: CPT

## 2020-06-28 PROCEDURE — 83735 ASSAY OF MAGNESIUM: CPT

## 2020-06-28 PROCEDURE — 80076 HEPATIC FUNCTION PANEL: CPT

## 2020-06-28 PROCEDURE — 3430000000 HC RX DIAGNOSTIC RADIOPHARMACEUTICAL: Performed by: RADIOLOGY

## 2020-06-28 PROCEDURE — 2580000003 HC RX 258: Performed by: SURGERY

## 2020-06-28 PROCEDURE — 6360000002 HC RX W HCPCS: Performed by: INTERNAL MEDICINE

## 2020-06-28 PROCEDURE — 96367 TX/PROPH/DG ADDL SEQ IV INF: CPT

## 2020-06-28 PROCEDURE — 96376 TX/PRO/DX INJ SAME DRUG ADON: CPT

## 2020-06-28 PROCEDURE — 85025 COMPLETE CBC W/AUTO DIFF WBC: CPT

## 2020-06-28 PROCEDURE — 82607 VITAMIN B-12: CPT

## 2020-06-28 PROCEDURE — 36415 COLL VENOUS BLD VENIPUNCTURE: CPT

## 2020-06-28 PROCEDURE — 2580000003 HC RX 258: Performed by: INTERNAL MEDICINE

## 2020-06-28 PROCEDURE — G0378 HOSPITAL OBSERVATION PER HR: HCPCS

## 2020-06-28 PROCEDURE — 84550 ASSAY OF BLOOD/URIC ACID: CPT

## 2020-06-28 PROCEDURE — A9537 TC99M MEBROFENIN: HCPCS | Performed by: RADIOLOGY

## 2020-06-28 PROCEDURE — 84443 ASSAY THYROID STIM HORMONE: CPT

## 2020-06-28 PROCEDURE — 2500000003 HC RX 250 WO HCPCS: Performed by: SURGERY

## 2020-06-28 PROCEDURE — 6360000002 HC RX W HCPCS: Performed by: SURGERY

## 2020-06-28 PROCEDURE — 82746 ASSAY OF FOLIC ACID SERUM: CPT

## 2020-06-28 PROCEDURE — 86140 C-REACTIVE PROTEIN: CPT

## 2020-06-28 PROCEDURE — 84145 PROCALCITONIN (PCT): CPT

## 2020-06-28 PROCEDURE — 6370000000 HC RX 637 (ALT 250 FOR IP): Performed by: INTERNAL MEDICINE

## 2020-06-28 PROCEDURE — C9113 INJ PANTOPRAZOLE SODIUM, VIA: HCPCS | Performed by: INTERNAL MEDICINE

## 2020-06-28 PROCEDURE — 80061 LIPID PANEL: CPT

## 2020-06-28 PROCEDURE — 80048 BASIC METABOLIC PNL TOTAL CA: CPT

## 2020-06-28 RX ORDER — PANTOPRAZOLE SODIUM 40 MG/1
40 TABLET, DELAYED RELEASE ORAL
Status: DISCONTINUED | OUTPATIENT
Start: 2020-06-28 | End: 2020-06-28 | Stop reason: HOSPADM

## 2020-06-28 RX ORDER — POTASSIUM CHLORIDE 20 MEQ/1
20 TABLET, EXTENDED RELEASE ORAL 2 TIMES DAILY WITH MEALS
Status: DISCONTINUED | OUTPATIENT
Start: 2020-06-28 | End: 2020-06-28 | Stop reason: HOSPADM

## 2020-06-28 RX ORDER — LEVOTHYROXINE SODIUM 0.1 MG/1
100 TABLET ORAL DAILY
Status: DISCONTINUED | OUTPATIENT
Start: 2020-06-28 | End: 2020-06-28 | Stop reason: HOSPADM

## 2020-06-28 RX ORDER — METOPROLOL TARTRATE 5 MG/5ML
5 INJECTION INTRAVENOUS EVERY 8 HOURS
Status: DISCONTINUED | OUTPATIENT
Start: 2020-06-28 | End: 2020-06-28

## 2020-06-28 RX ORDER — ENALAPRIL MALEATE 5 MG/1
5 TABLET ORAL DAILY
Status: DISCONTINUED | OUTPATIENT
Start: 2020-06-28 | End: 2020-06-28 | Stop reason: HOSPADM

## 2020-06-28 RX ORDER — METOCLOPRAMIDE 10 MG/1
5 TABLET ORAL
Status: DISCONTINUED | OUTPATIENT
Start: 2020-06-28 | End: 2020-06-28 | Stop reason: HOSPADM

## 2020-06-28 RX ORDER — HYDROCODONE BITARTRATE AND ACETAMINOPHEN 5; 325 MG/1; MG/1
1 TABLET ORAL EVERY 6 HOURS PRN
Status: DISCONTINUED | OUTPATIENT
Start: 2020-06-28 | End: 2020-06-28 | Stop reason: HOSPADM

## 2020-06-28 RX ORDER — METOPROLOL SUCCINATE 50 MG/1
50 TABLET, EXTENDED RELEASE ORAL 3 TIMES DAILY
Status: DISCONTINUED | OUTPATIENT
Start: 2020-06-28 | End: 2020-06-28 | Stop reason: HOSPADM

## 2020-06-28 RX ADMIN — POTASSIUM CHLORIDE 20 MEQ: 20 TABLET, EXTENDED RELEASE ORAL at 16:37

## 2020-06-28 RX ADMIN — SODIUM CHLORIDE, PRESERVATIVE FREE 10 ML: 5 INJECTION INTRAVENOUS at 09:41

## 2020-06-28 RX ADMIN — METRONIDAZOLE 500 MG: 500 INJECTION, SOLUTION INTRAVENOUS at 09:41

## 2020-06-28 RX ADMIN — POTASSIUM CHLORIDE, DEXTROSE MONOHYDRATE AND SODIUM CHLORIDE: 150; 5; 450 INJECTION, SOLUTION INTRAVENOUS at 04:46

## 2020-06-28 RX ADMIN — ENALAPRIL MALEATE 5 MG: 5 TABLET ORAL at 14:42

## 2020-06-28 RX ADMIN — METOCLOPRAMIDE 5 MG: 10 TABLET ORAL at 16:37

## 2020-06-28 RX ADMIN — Medication 6 MILLICURIE: at 11:38

## 2020-06-28 RX ADMIN — METOCLOPRAMIDE 5 MG: 10 TABLET ORAL at 13:53

## 2020-06-28 RX ADMIN — PANTOPRAZOLE SODIUM 40 MG: 40 TABLET, DELAYED RELEASE ORAL at 16:37

## 2020-06-28 RX ADMIN — POTASSIUM CHLORIDE, DEXTROSE MONOHYDRATE AND SODIUM CHLORIDE: 150; 5; 450 INJECTION, SOLUTION INTRAVENOUS at 16:39

## 2020-06-28 RX ADMIN — WATER 1 G: 1 INJECTION INTRAMUSCULAR; INTRAVENOUS; SUBCUTANEOUS at 14:51

## 2020-06-28 RX ADMIN — PANTOPRAZOLE SODIUM 40 MG: 40 INJECTION, POWDER, FOR SOLUTION INTRAVENOUS at 09:41

## 2020-06-28 RX ADMIN — METOPROLOL SUCCINATE 50 MG: 50 TABLET, EXTENDED RELEASE ORAL at 13:53

## 2020-06-28 ASSESSMENT — PAIN SCALES - GENERAL: PAINLEVEL_OUTOF10: 0

## 2020-06-28 NOTE — PROGRESS NOTES
New order for low fat diet rec'd from Dr. Alondra Moreno. He would like called after she eats. If she tolerates the diet, she will be discharged.

## 2020-06-28 NOTE — CONSULTS
thyroidectomy and BERG 100 mci           Past Surgical History:   Procedure Laterality Date    ABLATION OF DYSRHYTHMIC FOCUS  1990    BREAST SURGERY      cysts removed    COLONOSCOPY      ENDOMETRIAL ABLATION  2012    HYSTERECTOMY  07/25/2018    HYSTERECTOMY, VAGINAL      HYSTEROSCOPY  8/15/12    dilatation & curettage, novasure ablation    INSERTABLE CARDIAC MONITOR  12/23/2019    [facility] Confirm Linq     (Dr. Levar Velázquez)      OTHER SURGICAL HISTORY  12/23/2019    Dr. Levar Velázquez- Loop Recorder implant, Confirm    THYROIDECTOMY Bilateral 01/19/2017    Dr. Eun Wood       Medications Prior to Admission:    Medications Prior to Admission: sulfamethoxazole-trimethoprim (BACTRIM DS) 800-160 MG per tablet, Take 1 tablet by mouth 2 times daily for 10 days  metroNIDAZOLE (FLAGYL) 500 MG tablet, Take 1 tablet by mouth 2 times daily for 10 days Do not drink alcohol with this medication. famotidine (PEPCID) 20 MG tablet, Take 1 tablet by mouth 2 times daily  ondansetron (ZOFRAN ODT) 4 MG disintegrating tablet, Place 2 tablets under the tongue every 8 hours as needed for Nausea or Vomiting  HYDROcodone-acetaminophen (NORCO) 5-325 MG per tablet, Take 1 tablet by mouth every 6 hours as needed for Pain.  metoprolol succinate (TOPROL XL) 50 MG extended release tablet, TAKE ONE TABLET THREE TIMES DAILY  ALPRAZolam (XANAX) 1 MG tablet, TAKE 1 AND 1/2 TABLETS BY MOUTH TWICE DAILY  levothyroxine (SYNTHROID) 100 MCG tablet, Take one tablet every day  magnesium oxide (MAG-OX) 400 (240 Mg) MG tablet, Take 1 tablet by mouth daily  enalapril (VASOTEC) 5 MG tablet, Take 1 tablet by mouth daily  Cholecalciferol (VITAMIN D-3) 1000 units CAPS, Take 1,000 Units by mouth daily    Allergies:    Patient has no known allergies. Social History:    reports that she has never smoked. She has never used smokeless tobacco. She reports that she does not drink alcohol or use drugs.     Family History:   family history includes Atrial Fibrillation in her paternal grandfather; Breast Cancer in her paternal grandmother; Cancer in her father and maternal grandmother; Heart Disease in her maternal grandfather; High Blood Pressure in her father; High Cholesterol in her mother; Other in her daughter, paternal grandmother, and sister. REVIEW OF SYSTEMS:  As above in the HPI, otherwise negative    PHYSICAL EXAM:    VS: BP (!) 141/67   Pulse 66   Temp 97.7 °F (36.5 °C) (Oral)   Resp 16   Ht 5' 6\" (1.676 m)   Wt 245 lb 3.2 oz (111.2 kg)   LMP 01/01/2013   SpO2 98%   BMI 39.58 kg/m²     General appearance: Alert, Awake, Oriented times 3, no distress  Skin: Warm and dry ; no rashes  Head: Normocephalic. No masses, lesions or tenderness noted  Eyes: Conjunctivae pink, sclera white. PERRL,EOM-I  Ears: External ears normal  Nose/Sinuses: Nares normal. Septum midline. Mucosa normal. No drainage  Oropharynx: Oropharynx clear with no exudate seen  Neck: Supple. No jugular venous distension, lymphadenopathy or thyromegaly Trachea midline  Lungs: Clear to auscultation bilaterally. No rhonchi, crackles or wheezes  Heart: S1 S2  Regular rate and rhythm. No rub, murmur or gallop  Abdomen: Soft, most tenderness is right upper quadrant with palpation; there is also some discomfort palpation right lower quadrant. BS normal. No masses, organomegaly; no rebound or guarding  Extremities: Trace edema, Peripheral pulses palpable  Musculoskeletal: Muscular strength appears intact. Neuro:  No focal motor defects ; II-XII grossly intact .  MENDOZA equally  Breast: deferred  Rectal: deferred  Genitalia:  deferred      LABS:    CBC:   Lab Results   Component Value Date    WBC 4.2 06/28/2020    RBC 4.21 06/28/2020    HGB 12.3 06/28/2020    HCT 38.1 06/28/2020    MCV 90.5 06/28/2020    MCH 29.2 06/28/2020    MCHC 32.3 06/28/2020    RDW 12.8 06/28/2020     06/28/2020    MPV 10.2 06/28/2020     CBC with Differential:    Lab Results   Component Value Date    WBC 4.2 06/28/2020    RBC 4.21 06/28/2020    HGB 12.3 06/28/2020    HCT 38.1 06/28/2020     06/28/2020    MCV 90.5 06/28/2020    MCH 29.2 06/28/2020    MCHC 32.3 06/28/2020    RDW 12.8 06/28/2020    LYMPHOPCT 34.3 06/28/2020    MONOPCT 10.8 06/28/2020    BASOPCT 1.0 06/28/2020    MONOSABS 0.45 06/28/2020    LYMPHSABS 1.43 06/28/2020    EOSABS 0.06 06/28/2020    BASOSABS 0.04 06/28/2020     Hemoglobin/Hematocrit:    Lab Results   Component Value Date    HGB 12.3 06/28/2020    HCT 38.1 06/28/2020     CMP:    Lab Results   Component Value Date     06/28/2020    K 3.0 06/28/2020    K 3.3 06/27/2020     06/28/2020    CO2 25 06/28/2020    BUN 5 06/28/2020    CREATININE 0.8 06/28/2020    GFRAA >60 06/28/2020    LABGLOM >60 06/28/2020    GLUCOSE 101 06/28/2020    GLUCOSE 93 06/13/2011    PROT 6.3 06/28/2020    LABALBU 3.6 06/28/2020    LABALBU 4.6 06/13/2011    CALCIUM 8.1 06/28/2020    BILITOT 0.6 06/28/2020    ALKPHOS 52 06/28/2020    AST 22 06/28/2020    ALT 22 06/28/2020     BMP:    Lab Results   Component Value Date     06/28/2020    K 3.0 06/28/2020    K 3.3 06/27/2020     06/28/2020    CO2 25 06/28/2020    BUN 5 06/28/2020    LABALBU 3.6 06/28/2020    LABALBU 4.6 06/13/2011    CREATININE 0.8 06/28/2020    CALCIUM 8.1 06/28/2020    GFRAA >60 06/28/2020    LABGLOM >60 06/28/2020    GLUCOSE 101 06/28/2020    GLUCOSE 93 06/13/2011     Hepatic Function Panel:    Lab Results   Component Value Date    ALKPHOS 52 06/28/2020    ALT 22 06/28/2020    AST 22 06/28/2020    PROT 6.3 06/28/2020    BILITOT 0.6 06/28/2020    BILIDIR <0.2 06/28/2020    IBILI see below 06/28/2020    LABALBU 3.6 06/28/2020    LABALBU 4.6 06/13/2011     Ionized Calcium:  No results found for: IONCA  Magnesium:    Lab Results   Component Value Date    MG 1.7 06/28/2020     Phosphorus:    Lab Results   Component Value Date    PHOS 2.9 06/28/2020     Uric Acid:    Lab Results   Component Value Date    LABURIC 6.1 06/28/2020     PT/INR:    Lab Results Component Value Date    PROTIME 11.3 10/31/2016    INR 1.0 10/31/2016     Warfarin PT/INR:  No components found for: Vick Blum  PTT:    Lab Results   Component Value Date    APTT 32.4 10/31/2016   [APTT}  Troponin:    Lab Results   Component Value Date    TROPONINI <0.01 06/25/2020     Last 3 Troponin:    Lab Results   Component Value Date    TROPONINI <0.01 06/25/2020    TROPONINI <0.01 06/22/2020    TROPONINI <0.01 07/01/2019     U/A:    Lab Results   Component Value Date    NITRITE neg 06/16/2020    COLORU Yellow 06/27/2020    PROTEINU Negative 06/27/2020    PHUR 6.0 06/27/2020    LABCAST RARE 06/27/2016    WBCUA 10-20 06/27/2020    RBCUA 0-1 06/27/2020    MUCUS Present 06/27/2020    BACTERIA RARE 06/27/2020    CLARITYU Clear 06/27/2020    SPECGRAV <=1.005 06/27/2020    LEUKOCYTESUR MODERATE 06/27/2020    UROBILINOGEN 0.2 06/27/2020    BILIRUBINUR Negative 06/27/2020    BILIRUBINUR neg 06/16/2020    BLOODU TRACE-INTACT 06/27/2020    GLUCOSEU Negative 06/27/2020    AMORPHOUS RARE 06/25/2020     HgBA1c:    Lab Results   Component Value Date    LABA1C 5.5 06/28/2020     FLP:    Lab Results   Component Value Date    TRIG 80 06/28/2020    HDL 36 06/28/2020    [address] 80 06/28/2020    LABVLDL 16 06/28/2020     TSH:    Lab Results   Component Value Date    TSH 0.636 06/28/2020     VITAMIN B12: No components found for: B12  FOLATE:    Lab Results   Component Value Date    FOLATE 15.2 06/28/2020     IRON:    Lab Results   Component Value Date    IRON 69 07/14/2017     Iron Saturation:  No components found for: PERCENTFE  TIBC:    Lab Results   Component Value Date    TIBC 352 07/14/2017     FERRITIN:  No results found for: FERRITIN    ASSESSMENT:      Patient Active Problem List   Diagnosis    BPPV (benign paroxysmal positional vertigo)    SVT (supraventricular tachycardia) (HCC)    Anxiety    Dyspnea on exertion    Intermittent palpitations    Essential hypertension    Morbid obesity due to excess

## 2020-06-28 NOTE — PROGRESS NOTES
Dr. Welton Schilder returned call. Notified that pt able to tolerate diet with no problem. New order to discharge pt & have her call his office tomorrow & follow up with him in the office Tues.

## 2020-06-28 NOTE — H&P
General Surgery Consult    Patient's Name/Date of Birth: Jann Yao / 1965    Date: June 28, 2020     Consulting Surgeon: Dion Otoole M.D.    PCP: Marguerita Landau, MD     Chief Complaint:     HPI:   Jann Yao is a 47 y.o. female who presents for evaluation of ruq pain. Present for some time. Related to food. US shows gallstones and sludge.         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 Ryan Sanabria)      OTHER SURGICAL HISTORY  12/23/2019    Dr. Ryan Sanabria- Loop Recorder implant, Confirm    THYROIDECTOMY Bilateral 01/19/2017    Dr. Scott Velasquez       Current Facility-Administered Medications   Medication Dose Route Frequency Provider Last Rate Last Dose    dextrose 5 % and 0.45 % NaCl with KCl 20 mEq infusion   Intravenous Continuous Mark Ortega  mL/hr at 06/28/20 0446      HYDROmorphone (DILAUDID) injection 1 mg  1 mg Intravenous Q4H PRN Mark Ortega MD        [facility]ron [facility]) injection 4 mg  4 mg Intravenous Q6H PRN Mark Ortega MD        LORazepam (ATIVAN) injection 0.5 mg  0.5 mg Intravenous Q6H PRN Argelia Westbrook DO        metoprolol (LOPRESSOR) injection 5 mg  5 mg Intravenous Q8H PRN file     Emotionally abused: Not on file     Physically abused: Not on file     Forced sexual activity: Not on file   Other Topics Concern    Not on file   Social History Narrative    Lives in a house with  who is disabled and one child. Homeschools her daughter. 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           Review of Systems  Negative times ten except what was stated in HPI and PMH    Physical exam:   BP (!) 146/70   Pulse 78   Temp 98 °F (36.7 °C) (Oral)   Resp 18   Ht 5' 6\" (1.676 m)   Wt 245 lb 3.2 oz (111.2 kg)   LMP 01/01/2013   SpO2 96%   BMI 39.58 kg/m²   General appearance: AAOx3, NAD  Head: NCAT, PERRLA, EOMI, red conjunctiva  Neck: supple, no masses  Lungs: CTAB, equal chest rise bilateral  Heart: Reg rate  Abdomen: soft, minimally tender, non distended, no masses or organomegaly, no palpable hernias or defects, +bowel sounds, surgical scars were not present. Skin; no lesions  Gu: no cva tenderness  Extremities: extremities normal, atraumatic, no cyanosis or edema      Radiology:  CT abdomen/pelvis:     Assessment:  47 y.o. female with symptomatic cholelithiasis vs acute jose    Plan:   Iv ab  HIDA today       Jennifer Devi MD  6/28/2020  7:36 AM

## 2020-06-29 ENCOUNTER — TELEPHONE (OUTPATIENT)
Dept: FAMILY MEDICINE CLINIC | Age: 55
End: 2020-06-29

## 2020-06-29 ENCOUNTER — CARE COORDINATION (OUTPATIENT)
Dept: CARE COORDINATION | Age: 55
End: 2020-06-29

## 2020-06-29 LAB — URINE CULTURE, ROUTINE: NORMAL

## 2020-06-29 RX ORDER — PANTOPRAZOLE SODIUM 40 MG/1
40 TABLET, DELAYED RELEASE ORAL DAILY
Qty: 30 TABLET | Refills: 3 | Status: SHIPPED
Start: 2020-06-29 | End: 2020-09-03

## 2020-06-29 NOTE — TELEPHONE ENCOUNTER
Pt notified but now she wants to know if you can order potassium and vit d.  She wants redrawn before her surgery thurs or fri

## 2020-06-30 ENCOUNTER — HOSPITAL ENCOUNTER (OUTPATIENT)
Age: 55
Discharge: HOME | End: 2020-06-30
Payer: COMMERCIAL

## 2020-06-30 LAB
EKG ATRIAL RATE: 89 BPM
EKG P AXIS: 23 DEGREES
EKG P-R INTERVAL: 186 MS
EKG Q-T INTERVAL: 360 MS
EKG QRS DURATION: 78 MS
EKG QTC CALCULATION (BAZETT): 438 MS
EKG R AXIS: 8 DEGREES
EKG T AXIS: -33 DEGREES
EKG VENTRICULAR RATE: 89 BPM
POTASSIUM SERPL-SCNC: 3.5 MMOL/L (ref 3.5–5)

## 2020-06-30 PROCEDURE — 84132 ASSAY OF SERUM POTASSIUM: CPT

## 2020-06-30 PROCEDURE — 36415 COLL VENOUS BLD VENIPUNCTURE: CPT

## 2020-07-01 ENCOUNTER — TELEPHONE (OUTPATIENT)
Dept: FAMILY MEDICINE CLINIC | Age: 55
End: 2020-07-01

## 2020-07-01 ENCOUNTER — TELEPHONE (OUTPATIENT)
Dept: CARDIOLOGY CLINIC | Age: 55
End: 2020-07-01

## 2020-07-01 NOTE — TELEPHONE ENCOUNTER
Pt. Was notified and appt. Was scheduled for 7/13, clearance note was faxed to [facility] at [phone].

## 2020-07-01 NOTE — TELEPHONE ENCOUNTER
Proceed with surgery. OV week of 7/13 and we will straighten her out. Para JOY Alejandre.   Cardiologist  Cardiology, [address]

## 2020-07-01 NOTE — TELEPHONE ENCOUNTER
Patient states she has had swelling in hands and feet for 4 days with some numbness and tingling in hands and feet. Patient has not been drinking a lot of water. Please advise.

## 2020-07-01 NOTE — TELEPHONE ENCOUNTER
Pt will need a postop follow up and can address at that time. She has been to the ER four times in the last two weeks.

## 2020-07-01 NOTE — TELEPHONE ENCOUNTER
Pt. Is scheduled for a gallbladder removal surgery tomorrow, labs in chart were abnormal, potassium low and bnp elevated, pt. Says she has been having swelling in her face, feet and hands for 4days, she has been on a liquid diet since 6/1 to prep for surgery and she thinks that caused her issues.  She needs a cardiac clearance for tomorrow, please advise

## 2020-07-02 ENCOUNTER — HOSPITAL ENCOUNTER (OUTPATIENT)
Age: 55
Discharge: HOME | End: 2020-07-04

## 2020-07-02 LAB — CULTURE, BLOOD 2: NORMAL

## 2020-07-02 PROCEDURE — 88304 TISSUE EXAM BY PATHOLOGIST: CPT

## 2020-07-13 ENCOUNTER — OFFICE VISIT (OUTPATIENT)
Dept: CARDIOLOGY CLINIC | Age: 55
End: 2020-07-13
Payer: COMMERCIAL

## 2020-07-13 VITALS
HEART RATE: 77 BPM | HEIGHT: 66 IN | BODY MASS INDEX: 37.77 KG/M2 | RESPIRATION RATE: 16 BRPM | DIASTOLIC BLOOD PRESSURE: 96 MMHG | SYSTOLIC BLOOD PRESSURE: 140 MMHG | WEIGHT: 235 LBS

## 2020-07-13 PROCEDURE — G8417 CALC BMI ABV UP PARAM F/U: HCPCS | Performed by: INTERNAL MEDICINE

## 2020-07-13 PROCEDURE — 93000 ELECTROCARDIOGRAM COMPLETE: CPT | Performed by: INTERNAL MEDICINE

## 2020-07-13 PROCEDURE — 1036F TOBACCO NON-USER: CPT | Performed by: INTERNAL MEDICINE

## 2020-07-13 PROCEDURE — G8427 DOCREV CUR MEDS BY ELIG CLIN: HCPCS | Performed by: INTERNAL MEDICINE

## 2020-07-13 PROCEDURE — 1111F DSCHRG MED/CURRENT MED MERGE: CPT | Performed by: INTERNAL MEDICINE

## 2020-07-13 PROCEDURE — 3017F COLORECTAL CA SCREEN DOC REV: CPT | Performed by: INTERNAL MEDICINE

## 2020-07-13 PROCEDURE — 99214 OFFICE O/P EST MOD 30 MIN: CPT | Performed by: INTERNAL MEDICINE

## 2020-07-13 RX ORDER — FUROSEMIDE 20 MG/1
20 TABLET ORAL DAILY PRN
Qty: 90 TABLET | Refills: 3 | Status: SHIPPED
Start: 2020-07-13 | End: 2021-05-07

## 2020-07-13 RX ORDER — ALPRAZOLAM 1 MG/1
TABLET ORAL
COMMUNITY
Start: 2020-06-30 | End: 2020-08-07 | Stop reason: ALTCHOICE

## 2020-07-13 RX ORDER — POTASSIUM CHLORIDE 750 MG/1
10 TABLET, EXTENDED RELEASE ORAL DAILY PRN
Qty: 90 TABLET | Refills: 3 | Status: SHIPPED
Start: 2020-07-13 | End: 2020-08-17

## 2020-07-13 NOTE — PROGRESS NOTES
CHIEF COMPLAINT: Palpitations/HTN    HISTORY OF PRESENT ILLNESS: Patient is a 47 y.o. female seen at the request of Princess Poly MD    Patient seen in follow up. Hx of palpitations. Palpitations stable. No CP or SOB.      Past Medical History:   Diagnosis Date    Allergic rhinitis     Anxiety     Atrial fibrillation (HCC)     Cancer (HCC)     thyroid    Chest pain     Chronic back pain     Depression     Headache     Hypertension     NAFLD (nonalcoholic fatty liver disease)     By CT    Obesity with body mass index (BMI) of 30.0 to 39.9 6/28/2020    Osteoarthritis     Palpitations     Palpitations 12/2019    Sleep apnea     Spinal stenosis, lumbar 01/14/2020    Severe central stenosis by CT of abdomen, L1-L2    Thyroid disease     PTC microcarcinoma (0.5 cm) treated by thyroidectomy and BERG 100 mci       Patient Active Problem List   Diagnosis    BPPV (benign paroxysmal positional vertigo)    SVT (supraventricular tachycardia) (HCC)    Anxiety    Dyspnea on exertion    Intermittent palpitations    Essential hypertension    Morbid obesity due to excess calories (HCC)    Palpitations    Chest pain    Thyroid cancer ([facility] [address]    Hypothyroidism (acquired)    Weight gain    Dizziness    Biliary colic    Obesity with body mass index (BMI) of 30.0 to 39.9    Spinal stenosis, lumbar    NAFLD (nonalcoholic fatty liver disease)     No Known Allergies    Current Outpatient Medications   Medication Sig Dispense Refill    ALPRAZolam (XANAX) 1 MG tablet TAKE 1 AND 1/2 TABLETS BY MOUTH TWICE DAILY      pantoprazole (PROTONIX) 40 MG tablet Take 1 tablet by mouth daily 30 tablet 3    metoprolol succinate (TOPROL XL) 50 MG extended release tablet TAKE ONE TABLET THREE TIMES DAILY 90 tablet 5    levothyroxine (SYNTHROID) 100 MCG tablet Take one tablet every day 90 tablet 2    enalapril (VASOTEC) 5 MG tablet Take 1 tablet by mouth daily 90 tablet 3     No current facility-administered medications for this visit. Social History     Socioeconomic History    Marital status:      Spouse name: Danilo Mayen Number of children: 3    Years of education: Not on file    Highest education level: Not on file   Occupational History    Occupation: homemaker   Social Needs    Financial resource strain: Not hard at all   [employer] insecurity     Worry: Never true     Inability: Never true   [employer] needs     Medical: Not on file     Non-medical: Not on file   Tobacco Use    Smoking status: Never Smoker    Smokeless tobacco: Never Used   Substance and Sexual Activity    Alcohol use: No    Drug use: No    Sexual activity: Yes     Partners: Male   Lifestyle    Physical activity     Days per week: Not on file     Minutes per session: Not on file    Stress: Not on file   Relationships    Social connections     Talks on phone: Not on file     Gets together: Not on file     Attends Hoahaoism service: Not on file     Active member of club or organization: Not on file     Attends meetings of clubs or organizations: Not on file     Relationship status: Not on file    Intimate partner violence     Fear of current or ex partner: Not on file     Emotionally abused: Not on file     Physically abused: Not on file     Forced sexual activity: Not on file   Other Topics Concern    Not on file   Social History Narrative    Lives in a house with  who is disabled and one child. Homeschools her daughter.     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       Family History   Problem Relation Age of Onset    High Cholesterol Mother     High Blood Pressure Father     Cancer Father         leukemia, lung cancer +smoker, brain cancer    Other Sister         nephrectomy due to a benign tumor    Breast Cancer Paternal Grandmother     Other Paternal Grandmother         bowel    Other Daughter         IGG immunodeficiency    Cancer Maternal Grandmother lung, cervical    Heart Disease Maternal Grandfather     Atrial Fibrillation Paternal Grandfather      Review of Systems:  Heart: as above   Lungs: as above   Eyes: denies changes in vision or discharge. Ears: denies changes in hearing or pain. Nose: denies epistaxis or masses   Throat: denies sore throat or trouble swallowing. Neuro: denies numbness, tingling, tremors. Skin: denies rashes or itching. : denies hematuria, dysuria   GI: denies vomiting, diarrhea   Psych: denies mood changed, anxiety, depression. All other systems negative. Physical Exam   BP (!) 140/96   Pulse 77   Resp 16   Ht 5' 6\" (1.676 m)   Wt 235 lb (106.6 kg)   LMP 01/01/2013   BMI 37.93 kg/m²   Constitutional: Oriented to person, place, and time. Well-developed and well-nourished. No distress. Head: Normocephalic and atraumatic. Eyes: EOM are normal. Pupils are equal, round, and reactive to light. Neck: Normal range of motion. Neck supple. No hepatojugular reflux and no JVD present. Carotid bruit is not present. No tracheal deviation present. No thyromegaly present. Cardiovascular: Normal rate, regular rhythm, normal heart sounds and intact distal pulses. Exam reveals no gallop and no friction rub. No murmur heard. Pulmonary/Chest: Effort normal and breath sounds normal. No respiratory distress. No wheezes. No rales. No tenderness. Abdominal: Soft. Bowel sounds are normal. No distension and no mass. No tenderness. No rebound and no guarding. Musculoskeletal: Normal range of motion. Tender lump left lateral arm. Lymphadenopathy:   No cervical adenopathy. No groin adenopathy. Neurological: Alert and oriented to person, place, and time. Skin: Skin is warm and dry. No rash noted. Not diaphoretic. No erythema. Psychiatric: Normal mood and affect. Behavior is normal.     EKG:  normal sinus rhythm, nonspecific ST and T waves changes.     ASSESSMENT AND PLAN:  Patient Active Problem List   Diagnosis

## 2020-07-17 ENCOUNTER — NURSE ONLY (OUTPATIENT)
Dept: NON INVASIVE DIAGNOSTICS | Age: 55
End: 2020-07-17
Payer: COMMERCIAL

## 2020-07-17 PROCEDURE — G2066 INTER DEVC REMOTE 30D: HCPCS | Performed by: INTERNAL MEDICINE

## 2020-07-17 PROCEDURE — 93298 REM INTERROG DEV EVAL SCRMS: CPT | Performed by: INTERNAL MEDICINE

## 2020-07-19 ENCOUNTER — APPOINTMENT (OUTPATIENT)
Dept: CT IMAGING | Age: 55
End: 2020-07-19
Payer: COMMERCIAL

## 2020-07-19 ENCOUNTER — HOSPITAL ENCOUNTER (EMERGENCY)
Age: 55
Discharge: HOME | End: 2020-07-20
Attending: EMERGENCY MEDICINE
Payer: COMMERCIAL

## 2020-07-19 LAB
ALBUMIN SERPL-MCNC: 4.2 G/DL (ref 3.5–5.2)
ALP BLD-CCNC: 73 U/L (ref 35–104)
ALT SERPL-CCNC: 46 U/L (ref 0–32)
ANION GAP SERPL CALCULATED.3IONS-SCNC: 16 MMOL/L (ref 7–16)
AST SERPL-CCNC: 36 U/L (ref 0–31)
BASOPHILS ABSOLUTE: 0.08 E9/L (ref 0–0.2)
BASOPHILS RELATIVE PERCENT: 1.1 % (ref 0–2)
BILIRUB SERPL-MCNC: 0.7 MG/DL (ref 0–1.2)
BUN BLDV-MCNC: 14 MG/DL (ref 6–20)
CALCIUM SERPL-MCNC: 9.4 MG/DL (ref 8.6–10.2)
CHLORIDE BLD-SCNC: 102 MMOL/L (ref 98–107)
CO2: 25 MMOL/L (ref 22–29)
CREAT SERPL-MCNC: 0.7 MG/DL (ref 0.5–1)
EOSINOPHILS ABSOLUTE: 0.25 E9/L (ref 0.05–0.5)
EOSINOPHILS RELATIVE PERCENT: 3.5 % (ref 0–6)
GFR AFRICAN AMERICAN: >60
GFR NON-AFRICAN AMERICAN: >60 ML/MIN/1.73
GLUCOSE BLD-MCNC: 98 MG/DL (ref 74–99)
HCT VFR BLD CALC: 41 % (ref 34–48)
HEMOGLOBIN: 13.1 G/DL (ref 11.5–15.5)
IMMATURE GRANULOCYTES #: 0.02 E9/L
IMMATURE GRANULOCYTES %: 0.3 % (ref 0–5)
LYMPHOCYTES ABSOLUTE: 1.9 E9/L (ref 1.5–4)
LYMPHOCYTES RELATIVE PERCENT: 26.7 % (ref 20–42)
MCH RBC QN AUTO: 28.9 PG (ref 26–35)
MCHC RBC AUTO-ENTMCNC: 32 % (ref 32–34.5)
MCV RBC AUTO: 90.5 FL (ref 80–99.9)
MONOCYTES ABSOLUTE: 0.57 E9/L (ref 0.1–0.95)
MONOCYTES RELATIVE PERCENT: 8 % (ref 2–12)
NEUTROPHILS ABSOLUTE: 4.29 E9/L (ref 1.8–7.3)
NEUTROPHILS RELATIVE PERCENT: 60.4 % (ref 43–80)
PDW BLD-RTO: 12.9 FL (ref 11.5–15)
PLATELET # BLD: 319 E9/L (ref 130–450)
PMV BLD AUTO: 10 FL (ref 7–12)
POTASSIUM REFLEX MAGNESIUM: 3.9 MMOL/L (ref 3.5–5)
PRO-BNP: 39 PG/ML (ref 0–125)
RBC # BLD: 4.53 E12/L (ref 3.5–5.5)
SODIUM BLD-SCNC: 143 MMOL/L (ref 132–146)
TOTAL PROTEIN: 7.1 G/DL (ref 6.4–8.3)
TROPONIN: <0.01 NG/ML (ref 0–0.03)
TSH SERPL DL<=0.05 MIU/L-ACNC: 6.95 UIU/ML (ref 0.27–4.2)
WBC # BLD: 7.1 E9/L (ref 4.5–11.5)

## 2020-07-19 PROCEDURE — 99285 EMERGENCY DEPT VISIT HI MDM: CPT

## 2020-07-19 PROCEDURE — 85025 COMPLETE CBC W/AUTO DIFF WBC: CPT

## 2020-07-19 PROCEDURE — 84484 ASSAY OF TROPONIN QUANT: CPT

## 2020-07-19 PROCEDURE — 84443 ASSAY THYROID STIM HORMONE: CPT

## 2020-07-19 PROCEDURE — 6360000004 HC RX CONTRAST MEDICATION: Performed by: RADIOLOGY

## 2020-07-19 PROCEDURE — 96374 THER/PROPH/DIAG INJ IV PUSH: CPT

## 2020-07-19 PROCEDURE — 6360000002 HC RX W HCPCS: Performed by: STUDENT IN AN ORGANIZED HEALTH CARE EDUCATION/TRAINING PROGRAM

## 2020-07-19 PROCEDURE — 83880 ASSAY OF NATRIURETIC PEPTIDE: CPT

## 2020-07-19 PROCEDURE — 93005 ELECTROCARDIOGRAM TRACING: CPT | Performed by: STUDENT IN AN ORGANIZED HEALTH CARE EDUCATION/TRAINING PROGRAM

## 2020-07-19 PROCEDURE — 71275 CT ANGIOGRAPHY CHEST: CPT

## 2020-07-19 PROCEDURE — 80053 COMPREHEN METABOLIC PANEL: CPT

## 2020-07-19 RX ORDER — LORAZEPAM 2 MG/ML
0.5 INJECTION INTRAMUSCULAR ONCE
Status: COMPLETED | OUTPATIENT
Start: 2020-07-20 | End: 2020-07-20

## 2020-07-19 RX ORDER — ONDANSETRON 2 MG/ML
4 INJECTION INTRAMUSCULAR; INTRAVENOUS ONCE
Status: COMPLETED | OUTPATIENT
Start: 2020-07-19 | End: 2020-07-19

## 2020-07-19 RX ADMIN — IOPAMIDOL 80 ML: 755 INJECTION, SOLUTION INTRAVENOUS at 23:20

## 2020-07-19 RX ADMIN — ONDANSETRON 4 MG: 2 INJECTION INTRAMUSCULAR; INTRAVENOUS at 22:15

## 2020-07-19 ASSESSMENT — ENCOUNTER SYMPTOMS
SORE THROAT: 0
VOMITING: 0
COUGH: 0
WHEEZING: 0
SHORTNESS OF BREATH: 1
NAUSEA: 1
DIARRHEA: 0
ABDOMINAL PAIN: 0

## 2020-07-19 ASSESSMENT — PAIN DESCRIPTION - PAIN TYPE: TYPE: ACUTE PAIN

## 2020-07-19 ASSESSMENT — PAIN DESCRIPTION - DESCRIPTORS: DESCRIPTORS: TIGHTNESS

## 2020-07-19 ASSESSMENT — PAIN DESCRIPTION - FREQUENCY: FREQUENCY: CONTINUOUS

## 2020-07-19 ASSESSMENT — PAIN DESCRIPTION - LOCATION: LOCATION: CHEST

## 2020-07-19 ASSESSMENT — PAIN DESCRIPTION - ORIENTATION: ORIENTATION: MID

## 2020-07-19 ASSESSMENT — PAIN SCALES - GENERAL: PAINLEVEL_OUTOF10: 4

## 2020-07-20 ENCOUNTER — TELEPHONE (OUTPATIENT)
Dept: FAMILY MEDICINE CLINIC | Age: 55
End: 2020-07-20

## 2020-07-20 VITALS
WEIGHT: 235 LBS | BODY MASS INDEX: 37.77 KG/M2 | DIASTOLIC BLOOD PRESSURE: 80 MMHG | OXYGEN SATURATION: 100 % | TEMPERATURE: 98 F | HEIGHT: 66 IN | SYSTOLIC BLOOD PRESSURE: 150 MMHG | RESPIRATION RATE: 18 BRPM | HEART RATE: 70 BPM

## 2020-07-20 LAB
EKG ATRIAL RATE: 78 BPM
EKG P AXIS: 26 DEGREES
EKG P-R INTERVAL: 196 MS
EKG Q-T INTERVAL: 376 MS
EKG QRS DURATION: 82 MS
EKG QTC CALCULATION (BAZETT): 428 MS
EKG R AXIS: 7 DEGREES
EKG T AXIS: -36 DEGREES
EKG VENTRICULAR RATE: 78 BPM

## 2020-07-20 PROCEDURE — U0003 INFECTIOUS AGENT DETECTION BY NUCLEIC ACID (DNA OR RNA); SEVERE ACUTE RESPIRATORY SYNDROME CORONAVIRUS 2 (SARS-COV-2) (CORONAVIRUS DISEASE [COVID-19]), AMPLIFIED PROBE TECHNIQUE, MAKING USE OF HIGH THROUGHPUT TECHNOLOGIES AS DESCRIBED BY CMS-2020-01-R: HCPCS

## 2020-07-20 PROCEDURE — 6360000002 HC RX W HCPCS: Performed by: STUDENT IN AN ORGANIZED HEALTH CARE EDUCATION/TRAINING PROGRAM

## 2020-07-20 PROCEDURE — 93010 ELECTROCARDIOGRAM REPORT: CPT | Performed by: INTERNAL MEDICINE

## 2020-07-20 PROCEDURE — 96375 TX/PRO/DX INJ NEW DRUG ADDON: CPT

## 2020-07-20 RX ADMIN — LORAZEPAM 0.5 MG: 2 INJECTION INTRAMUSCULAR; INTRAVENOUS at 00:10

## 2020-07-20 NOTE — ED PROVIDER NOTES
The patient is a 80-year-old female who presents with chief complaint of shortness of breath, nausea, dizziness. Patient states her shortness of breath has been ongoing for 3 days. She had a lap jose performed on 7/2. Should there were complications from the surgery for which she received Narcan. She states that today her pulse ox was 82% at home on room air at rest.  She is significant past medical history of SVT, anxiety, hypertension, status post thyroid cancer. She has not taken anything for symptoms. She admits to her heart rate being upwards of 130 bpm for which she called her cardiologist and took metoprolol for. She admits to chest pain. She describes as heaviness. Her shortness of breath is worse when she lies flat. Review of Systems   Constitutional: Negative for chills and fever. HENT: Negative for congestion and sore throat. Eyes: Negative for visual disturbance. Respiratory: Positive for shortness of breath. Negative for cough and wheezing. Cardiovascular: Positive for chest pain and palpitations. Negative for leg swelling. Gastrointestinal: Positive for nausea. Negative for abdominal pain, diarrhea and vomiting. Genitourinary: Negative for dysuria, frequency and hematuria. Skin: Negative. Neurological: Positive for dizziness. Negative for weakness, light-headedness and headaches. Hematological: Negative. Psychiatric/Behavioral: Negative. Physical Exam  Vitals signs and nursing note reviewed. Constitutional:       Appearance: Normal appearance. She is well-developed. HENT:      Head: Normocephalic and atraumatic. Nose: Nose normal.      Mouth/Throat:      Pharynx: Oropharynx is clear. Eyes:      Conjunctiva/sclera: Conjunctivae normal.      Pupils: Pupils are equal, round, and reactive to light. Neck:      Musculoskeletal: Normal range of motion. Vascular: No JVD.    Cardiovascular:      Rate and Rhythm: Normal rate and regular rhythm. Pulses: Normal pulses. Radial pulses are 2+ on the right side and 2+ on the left side. Heart sounds: Normal heart sounds. Pulmonary:      Effort: Pulmonary effort is normal. No respiratory distress. Breath sounds: Normal breath sounds. No wheezing, rhonchi or rales. Abdominal:      General: Abdomen is flat. There is no distension. Palpations: Abdomen is soft. There is no mass. Tenderness: There is no abdominal tenderness. There is no guarding or rebound. Musculoskeletal:         General: No swelling or deformity. Right lower leg: No edema. Left lower leg: No edema. Skin:     General: Skin is warm and dry. Neurological:      Mental Status: She is alert. Procedures     MDM     ED Course as of Jul 20 0144   Sun Jul 19, 2020   2342 TSH(!): 6.950 [WL]   8940 1. No pulmonary embolism. 2. Streaky opacities along the lung bases may represent atelectasis  and/or scarring. No consolidation is seen. 3. Hepatic steatosis.      CTA CHEST W CONTRAST [WL]   Mon Jul 20, 2020   1467 Patient was reevaluated at bedside she states she still felt shaky so was given Ativan. After few minutes I reevaluated her and she said she had improvement of her symptoms and felt comfortable to go home.    [WL]   0124 Spoke with patient at bedside, she states she is willing to get COVID swabbed.    [WL]   0141 Patient presents to the ED for evaluation. Work-up was performed with concerns for but not limited to PE, electrolyte abnormality, pneumonia, ACS. Patient was initially high risk for PE due to the recent surgery, tachycardic and hypoxic prior to arrival.  CTA chest was performed and was unremarkable. Her TSH was elevated and this was told to the patient which she will follow-up with her primary care physician. She was given Ativan here with good improvement of her symptoms. Her symptoms may be secondary to stress.   She states she also follow-up with her family doctor tomorrow. Patient continues to be non-toxic on re-evaluation. Findings were discussed with the patient and reasons to immediately return to the ED were articulated to them. They will follow-up with their PMD.        [WL]      ED Course User Index  [WL] Delores Newman, DO            --------------------------------------------- PAST HISTORY ---------------------------------------------  Past Medical History:  has a past medical history of Allergic rhinitis, Anxiety, Atrial fibrillation ([facility] [address] Cancer ([facility] Chest pain, Chronic back pain, Depression, Headache, Hypertension, NAFLD (nonalcoholic fatty liver disease), Obesity with body mass index (BMI) of 30.0 to 39.9, Osteoarthritis, Palpitations, Palpitations, Sleep apnea, Spinal stenosis, lumbar, and Thyroid disease. Past Surgical History:  has a past surgical history that includes ablation of dysrhythmic focus (1990); hysteroscopy (8/15/12); Colonoscopy; Endometrial ablation (2012); Thyroidectomy (Bilateral, 01/19/2017); Hysterectomy (07/25/2018); Breast surgery; Hysterectomy, vaginal; other surgical history (12/23/2019); and Insertable Cardiac Monitor (12/23/2019). Social History:  reports that she has never smoked. She has never used smokeless tobacco. She reports that she does not drink alcohol or use drugs. Family History: family history includes Atrial Fibrillation in her paternal grandfather; Breast Cancer in her paternal grandmother; Cancer in her father and maternal grandmother; Heart Disease in her maternal grandfather; High Blood Pressure in her father; High Cholesterol in her mother; Other in her daughter, paternal grandmother, and sister. The patients home medications have been reviewed.     Allergies: Narcan [naloxone hcl]    -------------------------------------------------- RESULTS -------------------------------------------------  Labs:  Results for orders placed or performed during the hospital encounter of 07/19/20   CBC Auto Differential   Result Value Ref Range    WBC 7.1 4.5 - 11.5 E9/L    RBC 4.53 3.50 - 5.50 E12/L    Hemoglobin 13.1 11.5 - 15.5 g/dL    Hematocrit 41.0 34.0 - 48.0 %    MCV 90.5 80.0 - 99.9 fL    MCH 28.9 26.0 - 35.0 pg    MCHC 32.0 32.0 - 34.5 %    RDW 12.9 11.5 - 15.0 fL    Platelets 222 029 - 845 E9/L    MPV 10.0 7.0 - 12.0 fL    Neutrophils % 60.4 43.0 - 80.0 %    Immature Granulocytes % 0.3 0.0 - 5.0 %    Lymphocytes % 26.7 20.0 - 42.0 %    Monocytes % 8.0 2.0 - 12.0 %    Eosinophils % 3.5 0.0 - 6.0 %    Basophils % 1.1 0.0 - 2.0 %    Neutrophils Absolute 4.29 1.80 - 7.30 E9/L    Immature Granulocytes # 0.02 E9/L    Lymphocytes Absolute 1.90 1.50 - 4.00 E9/L    Monocytes Absolute 0.57 0.10 - 0.95 E9/L    Eosinophils Absolute 0.25 0.05 - 0.50 E9/L    Basophils Absolute 0.08 0.00 - 0.20 E9/L   Troponin   Result Value Ref Range    Troponin <0.01 0.00 - 0.03 ng/mL   Brain Natriuretic Peptide   Result Value Ref Range    Pro-BNP 39 0 - 125 pg/mL   Comprehensive Metabolic Panel w/ Reflex to MG   Result Value Ref Range    Sodium 143 132 - 146 mmol/L    Potassium reflex Magnesium 3.9 3.5 - 5.0 mmol/L    Chloride 102 98 - 107 mmol/L    CO2 25 22 - 29 mmol/L    Anion Gap 16 7 - 16 mmol/L    Glucose 98 74 - 99 mg/dL    BUN 14 6 - 20 mg/dL    CREATININE 0.7 0.5 - 1.0 mg/dL    GFR Non-African American >60 >=60 mL/min/1.73    GFR African American >60     Calcium 9.4 8.6 - 10.2 mg/dL    Total Protein 7.1 6.4 - 8.3 g/dL    Alb 4.2 3.5 - 5.2 g/dL    Total Bilirubin 0.7 0.0 - 1.2 mg/dL    Alkaline Phosphatase 73 35 - 104 U/L    ALT 46 (H) 0 - 32 U/L    AST 36 (H) 0 - 31 U/L   TSH WITHOUT REFLEX   Result Value Ref Range    TSH 6.950 (H) 0.270 - 4.200 uIU/mL       Radiology:  CTA CHEST W CONTRAST   Final Result      1. No pulmonary embolism. 2. Streaky opacities along the lung bases may represent atelectasis   and/or scarring. No consolidation is seen.    3. Hepatic steatosis.             ------------------------- NURSING NOTES AND VITALS REVIEWED ---------------------------  Date / Time Roomed:  7/19/2020  9:27 PM  ED Bed Assignment:  03/03    The nursing notes within the ED encounter and vital signs as below have been reviewed. BP (!) 162/75   Pulse 77   Temp 98.2 °F (36.8 °C) (Oral)   Resp 20   Ht 5' 6\" (1.676 m)   Wt 235 lb (106.6 kg)   LMP 01/01/2013   SpO2 100%   BMI 37.93 kg/m²           --------------------------------- ADDITIONAL PROVIDER NOTES ---------------------------------  At this time the patient is without objective evidence of an acute process requiring hospitalization or inpatient management. They have remained hemodynamically stable throughout their entire ED visit and are stable for discharge with outpatient follow-up. The plan has been discussed in detail and they are aware of the specific conditions for emergent return, as well as the importance of follow-up. New Prescriptions    No medications on file       Diagnosis:  1. Dyspnea, unspecified type    2. Status post cholecystectomy    3. Hepatic steatosis    4. Nausea    5. Anxiety state    6. Elevated TSH        Disposition:  Patient's disposition: Discharge  Patient's condition is stable. NOTE:  This report was transcribed using voice recognition software. Efforts were made to ensure accuracy; however, inadvertent computerized transcription errors may be present.        Aleida Lunsford DO  Resident  07/20/20 [id number]

## 2020-07-21 ENCOUNTER — HOSPITAL ENCOUNTER (EMERGENCY)
Age: 55
Discharge: HOME | End: 2020-07-21
Payer: COMMERCIAL

## 2020-07-21 VITALS
DIASTOLIC BLOOD PRESSURE: 75 MMHG | BODY MASS INDEX: 36.96 KG/M2 | HEIGHT: 66 IN | WEIGHT: 230 LBS | OXYGEN SATURATION: 98 % | SYSTOLIC BLOOD PRESSURE: 172 MMHG | RESPIRATION RATE: 18 BRPM | HEART RATE: 87 BPM | TEMPERATURE: 98 F

## 2020-07-21 LAB
ALBUMIN SERPL-MCNC: 4.1 G/DL (ref 3.5–5.2)
ALP BLD-CCNC: 70 U/L (ref 35–104)
ALT SERPL-CCNC: 48 U/L (ref 0–32)
AMPHETAMINE SCREEN, URINE: NOT DETECTED
ANION GAP SERPL CALCULATED.3IONS-SCNC: 13 MMOL/L (ref 7–16)
AST SERPL-CCNC: 33 U/L (ref 0–31)
BARBITURATE SCREEN URINE: NOT DETECTED
BASOPHILS ABSOLUTE: 0.06 E9/L (ref 0–0.2)
BASOPHILS RELATIVE PERCENT: 0.9 % (ref 0–2)
BENZODIAZEPINE SCREEN, URINE: POSITIVE
BILIRUB SERPL-MCNC: 0.7 MG/DL (ref 0–1.2)
BUN BLDV-MCNC: 11 MG/DL (ref 6–20)
CALCIUM SERPL-MCNC: 9.3 MG/DL (ref 8.6–10.2)
CANNABINOID SCREEN URINE: NOT DETECTED
CHLORIDE BLD-SCNC: 104 MMOL/L (ref 98–107)
CO2: 24 MMOL/L (ref 22–29)
COCAINE METABOLITE SCREEN URINE: NOT DETECTED
CREAT SERPL-MCNC: 0.7 MG/DL (ref 0.5–1)
EOSINOPHILS ABSOLUTE: 0.04 E9/L (ref 0.05–0.5)
EOSINOPHILS RELATIVE PERCENT: 0.6 % (ref 0–6)
FENTANYL SCREEN, URINE: NOT DETECTED
GFR AFRICAN AMERICAN: >60
GFR NON-AFRICAN AMERICAN: >60 ML/MIN/1.73
GLUCOSE BLD-MCNC: 101 MG/DL (ref 74–99)
HCT VFR BLD CALC: 42.1 % (ref 34–48)
HEMOGLOBIN: 13.4 G/DL (ref 11.5–15.5)
IMMATURE GRANULOCYTES #: 0.03 E9/L
IMMATURE GRANULOCYTES %: 0.4 % (ref 0–5)
LYMPHOCYTES ABSOLUTE: 1.03 E9/L (ref 1.5–4)
LYMPHOCYTES RELATIVE PERCENT: 15 % (ref 20–42)
Lab: ABNORMAL
MCH RBC QN AUTO: 29 PG (ref 26–35)
MCHC RBC AUTO-ENTMCNC: 31.8 % (ref 32–34.5)
MCV RBC AUTO: 91.1 FL (ref 80–99.9)
METHADONE SCREEN, URINE: NOT DETECTED
MONOCYTES ABSOLUTE: 0.37 E9/L (ref 0.1–0.95)
MONOCYTES RELATIVE PERCENT: 5.4 % (ref 2–12)
NEUTROPHILS ABSOLUTE: 5.33 E9/L (ref 1.8–7.3)
NEUTROPHILS RELATIVE PERCENT: 77.7 % (ref 43–80)
OPIATE SCREEN URINE: NOT DETECTED
OXYCODONE URINE: NOT DETECTED
PDW BLD-RTO: 13.2 FL (ref 11.5–15)
PHENCYCLIDINE SCREEN URINE: NOT DETECTED
PLATELET # BLD: 323 E9/L (ref 130–450)
PMV BLD AUTO: 10 FL (ref 7–12)
POTASSIUM SERPL-SCNC: 3.9 MMOL/L (ref 3.5–5)
RBC # BLD: 4.62 E12/L (ref 3.5–5.5)
SARS-COV-2: NOT DETECTED
SODIUM BLD-SCNC: 141 MMOL/L (ref 132–146)
SOURCE: NORMAL
TOTAL PROTEIN: 7.3 G/DL (ref 6.4–8.3)
WBC # BLD: 6.9 E9/L (ref 4.5–11.5)

## 2020-07-21 PROCEDURE — 80307 DRUG TEST PRSMV CHEM ANLYZR: CPT

## 2020-07-21 PROCEDURE — 2580000003 HC RX 258: Performed by: PHYSICIAN ASSISTANT

## 2020-07-21 PROCEDURE — 6370000000 HC RX 637 (ALT 250 FOR IP): Performed by: PHYSICIAN ASSISTANT

## 2020-07-21 PROCEDURE — 99284 EMERGENCY DEPT VISIT MOD MDM: CPT

## 2020-07-21 PROCEDURE — 85025 COMPLETE CBC W/AUTO DIFF WBC: CPT

## 2020-07-21 PROCEDURE — 80053 COMPREHEN METABOLIC PANEL: CPT

## 2020-07-21 PROCEDURE — 93005 ELECTROCARDIOGRAM TRACING: CPT | Performed by: PHYSICIAN ASSISTANT

## 2020-07-21 RX ORDER — ONDANSETRON 4 MG/1
4 TABLET, ORALLY DISINTEGRATING ORAL EVERY 8 HOURS PRN
Qty: 10 TABLET | Refills: 0 | Status: SHIPPED | OUTPATIENT
Start: 2020-07-21 | End: 2020-07-29 | Stop reason: SDUPTHER

## 2020-07-21 RX ORDER — ONDANSETRON 4 MG/1
4 TABLET, ORALLY DISINTEGRATING ORAL ONCE
Status: COMPLETED | OUTPATIENT
Start: 2020-07-21 | End: 2020-07-21

## 2020-07-21 RX ORDER — 0.9 % SODIUM CHLORIDE 0.9 %
1000 INTRAVENOUS SOLUTION INTRAVENOUS ONCE
Status: COMPLETED | OUTPATIENT
Start: 2020-07-21 | End: 2020-07-21

## 2020-07-21 RX ADMIN — SODIUM CHLORIDE 1000 ML: 9 INJECTION, SOLUTION INTRAVENOUS at 11:31

## 2020-07-21 RX ADMIN — ONDANSETRON 4 MG: 4 TABLET, ORALLY DISINTEGRATING ORAL at 12:56

## 2020-07-21 NOTE — CARE COORDINATION
Social Work/Transition of Care:    Pt presented to the ED due to Anxiety. Pt currently treats with [facility] with Cale Adkins NP next appointment is scheduled for 7/24/20. Pt reports she has been taking Xanax for twenty years and recently was taken off the medication after a gallbladder surgery on 7/2/20. Pt reported she needs to be placed back on her Xanax and requested assistance from this worker obtaining Prescription. SW explained role and offered a referral for [facility]) [facility]. Pt declined due to wanting immediate assistance.   ED PCP updated    Electronically signed by JORGE LUIS Hilliard on 2/63/1247 at 3:36 PM

## 2020-07-21 NOTE — ED PROVIDER NOTES
[facility]         Department of Emergency Medicine   ED  Provider Note  Admit Date/RoomTime: 7/21/2020  9:54 AM  ED Room: SAIGE/SAIGE  MRN: [id number]  Chief Complaint:   Anxiety       History of Present Illness   Source of history provided by:  patient. History/Exam Limitations: none. Juanpablo Vizcaino is a 47 y.o. female who has a past medical history of:   Past Medical History:   Diagnosis Date    Allergic rhinitis     Anxiety     Atrial fibrillation ([address]     Cancer ([facility] [address]     thyroid    Chest pain     Chronic back pain     Depression     Headache     Hypertension     NAFLD (nonalcoholic fatty liver disease)     By CT    Obesity with body mass index (BMI) of 30.0 to 39.9 6/28/2020    Osteoarthritis     Palpitations     Palpitations 12/2019    Sleep apnea     Spinal stenosis, lumbar 01/14/2020    Severe central stenosis by CT of abdomen, L1-L2    Thyroid disease     PTC 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 Pt states she is very anxious and waits in the parking lot outside until she starts feeling worse and then comes in. Patient states her symptoms are mild in severity. Patient was changed from Xanax to Klonopin and hydroxyzine 2 days ago because of sxs she was having following cholecystectomy. Pt wants to go back to normal dose of Xanax. Denies HI or SI. Denies fever/chills, headache, vision change, cough, hemoptysis, chest pain, dyspnea, abdominal pain, NVD, numbness/weakness. ROS   Pertinent positives and negatives are stated within HPI, all other systems reviewed and are negative.        Past Surgical History:   Procedure Laterality Date    ABLATION OF DYSRHYTHMIC FOCUS  1990    BREAST SURGERY      cysts removed    COLONOSCOPY      ENDOMETRIAL ABLATION  2012    HYSTERECTOMY  07/25/2018    HYSTERECTOMY, VAGINAL      HYSTEROSCOPY  8/15/12    dilatation & curettage, novasure ablation    INSERTABLE CARDIAC MONITOR  12/23/2019    [facility] Confirm Linq     (Dr. Ashtyn Casillas)      OTHER SURGICAL HISTORY  12/23/2019    Dr. Ashtyn Casillas- Loop Recorder implant, Confirm    THYROIDECTOMY Bilateral 01/19/2017    Dr. Luna Lemus   Social History:  reports that she has never smoked. She has never used smokeless tobacco. She reports that she does not drink alcohol or use drugs. Family History: family history includes Atrial Fibrillation in her paternal grandfather; Breast Cancer in her paternal grandmother; Cancer in her father and maternal grandmother; Heart Disease in her maternal grandfather; High Blood Pressure in her father; High Cholesterol in her mother; Other in her daughter, paternal grandmother, and sister. Allergies: Narcan [naloxone hcl]    Physical Exam Section   Oxygen Saturation Interpretation: Normal.   ED Triage Vitals [07/21/20 1015]   BP Temp Temp Source Pulse Resp SpO2 Height Weight   (!) 143/74 98.3 °F (36.8 °C) Oral 81 20 99 % 5' 6\" (1.676 m) 230 lb (104.3 kg)       Physical Exam  · Constitutional/General: Alert and oriented x3, well appearing, non toxic. · HEENT:  NC/NT. PERRLA,  Airway patent. · Neck: Supple, full ROM, non tender to palpation in the midline, no stridor, no crepitus, no meningeal signs  · Respiratory: Lungs clear to auscultation bilaterally, no wheezes, rales, or rhonchi. Not in respiratory distress  · CV:  Regular rate. Regular rhythm. No murmurs, gallops, or rubs. 2+ distal pulses  · Chest: No chest wall tenderness  · GI:  Abdomen Soft, Non tender, Non distended. +BS. No rebound, guarding, or rigidity. No pulsatile masses. · Musculoskeletal: Moves all extremities x 4. Warm and well perfused, no clubbing, cyanosis, or edema. Capillary refill <3 seconds  · Integument: skin warm and dry. No rashes.    · Lymphatic: no lymphadenopathy noted  · Neurologic: GCS 15, no focal deficits, symmetric strength 5/5 in the upper and lower extremities bilaterally  · Psychiatric: anxious, Normal Affect      Lab / Imaging Results   (All laboratory and radiology results have been personally reviewed by myself)  Labs:  Results for orders placed or performed during the hospital encounter of 07/21/20   CBC auto differential   Result Value Ref Range    WBC 6.9 4.5 - 11.5 E9/L    RBC 4.62 3.50 - 5.50 E12/L    Hemoglobin 13.4 11.5 - 15.5 g/dL    Hematocrit 42.1 34.0 - 48.0 %    MCV 91.1 80.0 - 99.9 fL    MCH 29.0 26.0 - 35.0 pg    MCHC 31.8 (L) 32.0 - 34.5 %    RDW 13.2 11.5 - 15.0 fL    Platelets 258 393 - 981 E9/L    MPV 10.0 7.0 - 12.0 fL    Neutrophils % 77.7 43.0 - 80.0 %    Immature Granulocytes % 0.4 0.0 - 5.0 %    Lymphocytes % 15.0 (L) 20.0 - 42.0 %    Monocytes % 5.4 2.0 - 12.0 %    Eosinophils % 0.6 0.0 - 6.0 %    Basophils % 0.9 0.0 - 2.0 %    Neutrophils Absolute 5.33 1.80 - 7.30 E9/L    Immature Granulocytes # 0.03 E9/L    Lymphocytes Absolute 1.03 (L) 1.50 - 4.00 E9/L    Monocytes Absolute 0.37 0.10 - 0.95 E9/L    Eosinophils Absolute 0.04 (L) 0.05 - 0.50 E9/L    Basophils Absolute 0.06 0.00 - 0.20 E9/L   Comprehensive Metabolic Panel   Result Value Ref Range    Sodium 141 132 - 146 mmol/L    Potassium 3.9 3.5 - 5.0 mmol/L    Chloride 104 98 - 107 mmol/L    CO2 24 22 - 29 mmol/L    Anion Gap 13 7 - 16 mmol/L    Glucose 101 (H) 74 - 99 mg/dL    BUN 11 6 - 20 mg/dL    CREATININE 0.7 0.5 - 1.0 mg/dL    GFR Non-African American >60 >=60 mL/min/1.73    GFR African American >60     Calcium 9.3 8.6 - 10.2 mg/dL    Total Protein 7.3 6.4 - 8.3 g/dL    Alb 4.1 3.5 - 5.2 g/dL    Total Bilirubin 0.7 0.0 - 1.2 mg/dL    Alkaline Phosphatase 70 35 - 104 U/L    ALT 48 (H) 0 - 32 U/L    AST 33 (H) 0 - 31 U/L   URINE DRUG SCREEN   Result Value Ref Range    Amphetamine Screen, Urine NOT DETECTED Negative <1000 ng/mL    Barbiturate Screen, Ur NOT DETECTED Negative < 200 ng/mL    Benzodiazepine Screen, Urine POSITIVE (A) Negative < 200 ng/mL    Cannabinoid Scrn, Ur NOT DETECTED Negative < 50ng/mL    Cocaine Metabolite Screen, Urine NOT DETECTED Negative < 300 ng/mL    Opiate Scrn, Ur NOT DETECTED Negative < 300ng/mL    PCP Screen, Urine NOT DETECTED Negative < 25 ng/mL    Methadone Screen, Urine NOT DETECTED Negative <300 ng/mL    Oxycodone Urine NOT DETECTED Negative <100 ng/mL    FENTANYL SCREEN, URINE NOT DETECTED Negative <1 ng/mL    Drug Screen Comment: see below    EKG 12 Lead   Result Value Ref Range    Ventricular Rate 84 BPM    Atrial Rate 84 BPM    P-R Interval 180 ms    QRS Duration 74 ms    Q-T Interval 376 ms    QTc Calculation (Bazett) 444 ms    P Axis 17 degrees    R Axis 3 degrees    T Axis -22 degrees     Imaging: All Radiology results interpreted by Radiologist unless otherwise noted. No orders to display     EKG #1:  Interpreted by emergency department physician unless otherwise noted. Time:  1113    Rate: 84  Rhythm: Sinus. Interpretation: no acute changes      ED Course / Medical Decision Making     Medications   0.9 % sodium chloride bolus (0 mLs Intravenous Stopped 7/21/20 1212)   ondansetron (ZOFRAN-ODT) disintegrating tablet 4 mg (4 mg Oral Given 7/21/20 1256)     ED Course as of Jul 21 1702   Tue Jul 21, 2020   1101 Patient refusing xray     [KL]      ED Course User Index  [KL] Fermín Flanagan PA-C         Consultations:             IP CONSULT TO FAMILY MEDICINE  IP CONSULT TO SOCIAL WORK     Spoke with Dr. Lisa Boyce who suggested patient keep her televideo appt with her at 1500 and call [facility] who rx the klonopin and hydroxyzine. Procedures:   none    MDM: Patient presenting with anxiety and medication side effects x2 days. Patient denies SI or HI. Patient is in no acute distress, afebrile, nontoxic in appearance. Patient's labs and EKG are stable. Patient refused chest x-ray.   Spoke with patient PCP who recommended she follow-up with her at 1500 today via telemedicine call and to call [facility] to let them know about her side effects and possibly starting Xanax again. Discussed this with patient and agreed to discharge. However, when [place] tried discharging her she refused. At this time, I consulted social work. Patient refused inpatient admission for medication change but she did get a hold of [facility] and PCP. Patient's doctor from [facility] will have her stop the Klonopin and hydroxyzine. Patient's PCP will start her on Xanax again. Discussed discharge plans with patient who was agreeable to discharge at this point accompanied by \[facility] [place] [facility]\"". Recommended patient follow-up with [facility] and PCP. Recommended patient return to the ED with any worsening of symptoms. Counseling:   I have spoken with the patient and  and discussed todays results, in addition to providing specific details for the plan of care and counseling regarding the diagnosis and prognosis and are agreeable with the plan. Assessment      1. Anxiety state    2. Adverse effect of drug, initial encounter      This patient's ED course included: a personal history and physicial examination, re-evaluation prior to disposition and multiple bedside re-evaluations  This patient has remained hemodynamically stable during their ED course. Plan   Discharge to home. Patient condition is stable. New Medications     Discharge Medication List as of 7/21/2020  1:21 PM      START taking these medications    Details   ondansetron (ZOFRAN ODT) 4 MG disintegrating tablet Take 1 tablet by mouth every 8 hours as needed for Nausea or Vomiting, Disp-10 tablet,R-0Print           Electronically signed by Renu Finnegan PA-C   DD: 7/21/20  **This report was transcribed using voice recognition software. Every effort was made to ensure accuracy; however, inadvertent computerized transcription errors may be present.   END OF PROVIDER NOTE     Renu Finnegan PA-C  07/21/20 1794

## 2020-07-22 ENCOUNTER — TELEPHONE (OUTPATIENT)
Dept: ADMINISTRATIVE | Age: 55
End: 2020-07-22

## 2020-07-22 ENCOUNTER — HOSPITAL ENCOUNTER (OUTPATIENT)
Age: 55
Discharge: HOME | End: 2020-07-24
Payer: COMMERCIAL

## 2020-07-22 LAB
EKG ATRIAL RATE: 84 BPM
EKG P AXIS: 17 DEGREES
EKG P-R INTERVAL: 180 MS
EKG Q-T INTERVAL: 376 MS
EKG QRS DURATION: 74 MS
EKG QTC CALCULATION (BAZETT): 444 MS
EKG R AXIS: 3 DEGREES
EKG T AXIS: -22 DEGREES
EKG VENTRICULAR RATE: 84 BPM
TSH SERPL DL<=0.05 MIU/L-ACNC: 7.64 UIU/ML (ref 0.27–4.2)

## 2020-07-22 PROCEDURE — 84443 ASSAY THYROID STIM HORMONE: CPT

## 2020-07-22 PROCEDURE — 36415 COLL VENOUS BLD VENIPUNCTURE: CPT

## 2020-07-22 PROCEDURE — 84432 ASSAY OF THYROGLOBULIN: CPT

## 2020-07-22 PROCEDURE — 80048 BASIC METABOLIC PNL TOTAL CA: CPT

## 2020-07-22 PROCEDURE — 93010 ELECTROCARDIOGRAM REPORT: CPT | Performed by: INTERNAL MEDICINE

## 2020-07-22 PROCEDURE — 86800 THYROGLOBULIN ANTIBODY: CPT

## 2020-07-22 PROCEDURE — 82306 VITAMIN D 25 HYDROXY: CPT

## 2020-07-23 ENCOUNTER — TELEPHONE (OUTPATIENT)
Dept: CARDIOLOGY CLINIC | Age: 55
End: 2020-07-23

## 2020-07-23 LAB
ANION GAP SERPL CALCULATED.3IONS-SCNC: 20 MMOL/L (ref 7–16)
BUN BLDV-MCNC: 8 MG/DL (ref 6–20)
CALCIUM SERPL-MCNC: 9.2 MG/DL (ref 8.6–10.2)
CHLORIDE BLD-SCNC: 103 MMOL/L (ref 98–107)
CO2: 20 MMOL/L (ref 22–29)
CREAT SERPL-MCNC: 0.6 MG/DL (ref 0.5–1)
GFR AFRICAN AMERICAN: >60
GFR NON-AFRICAN AMERICAN: >60 ML/MIN/1.73
GLUCOSE BLD-MCNC: 81 MG/DL (ref 74–99)
POTASSIUM SERPL-SCNC: 4.5 MMOL/L (ref 3.5–5)
SODIUM BLD-SCNC: 143 MMOL/L (ref 132–146)
VITAMIN D 25-HYDROXY: 15 NG/ML (ref 30–100)

## 2020-07-23 NOTE — TELEPHONE ENCOUNTER
Pt. Called stating that she has been having SOB whenever she takes the Toprol, she has been on it for a while but the SOB started about a week ago,please advise

## 2020-07-24 NOTE — TELEPHONE ENCOUNTER
Can you clarify how she is taking it? Kevin Jones D.O.   Cardiologist  Cardiology, [address] [facility]

## 2020-07-28 ENCOUNTER — VIRTUAL VISIT (OUTPATIENT)
Dept: ENDOCRINOLOGY | Age: 55
End: 2020-07-28
Payer: COMMERCIAL

## 2020-07-28 PROCEDURE — 99443 PR PHYS/QHP TELEPHONE EVALUATION 21-30 MIN: CPT | Performed by: INTERNAL MEDICINE

## 2020-07-28 NOTE — PATIENT INSTRUCTIONS
Cont Synthroid to 100 mcg, one tablet every day       Repeat TSH with T3, ft4, T4 in two weeks         See me back in two weeks

## 2020-07-28 NOTE — PROGRESS NOTES
Tom Hernández is a 47 y.o. female evaluated via telephone on 7/28/2020. Consent:  She and/or health care decision maker is aware that that she may receive a bill for this telephone service, depending on her insurance coverage, and has provided verbal consent to proceed: Yes      Documentation:  I communicated with the patient and/or health care decision maker about PTC and hypothyroidism. Details of this discussion including any medical advice provided are as follows: Fawn Bailey Thyroid Cancer - stable, no signs of recurrence       PTC Stage I, [id number],     Excellent response to therapy (total thyroidectomy, 100mCi BERG)       Risk of recurrence is 1-4% for the next 5years. 09/04/18 US thyroid: no change in the adenopathy that was seen on the 5/17/18 US.       02/11/20 US thyroid: no change in the appearance of the lymph nodes           05/28/19 Tg <0.1, Tg Ab <0.9       07/22/20 Tg <0.1, Tg Ab <0.9         TSH should be maintained at 0.5-2 per the 2015 JOSE guidelines. B/c of her arrhythmias, the goal is a level just below 2.0.       10/29/19 TSH 0.633 on LT4 112 mcg Mon-Sat and 1/2 tab on Sunday. 06/28/20 TSH 0.636 on LT4 100 mcg daily       07/19/20 TSH 6.950 on LT4 100 mcg daily (had gallbladder out 7/2/20)       07/22/20 TSH 7.640 on LT4 100 mcg daily    PFSH -   Medical problems: Anxiety, Thyroid cancer, hypothyroidism (acquired) HTN, palpitations, BPPV, Afib with hx of ablation  Medications: alprazolam, mag-ox, enalapril, metoprolol succinate, synthroid, Vit D-3 1000, furosemide    ROS -   Gen: no fever  Pulm: no cough    PE -   Gen:    WN, WD, NAD  Lung: Nml resp effort  Psych: Normal mood   Full affect      A/P -   1. Thyroid Cancer - stable, no signs of recurrence       PTC Stage I, [id number] (0.5cm),     Excellent response to therapy (total thyroidectomy, 100mCi BERG)       Risk of recurrence is 1-4% for the next 5years.        TSH should be maintained at 0.5-2 per the 2015 JOSE guidelines; because of her recurrent tachyarrhythmias, need to keep the TSH just below 2        Presently TSH 7.64 7/22/20 on LT4 100 mcg daily. However, it was 0.636 6/28/20 prior to the episode of cholecystitis that she had requiring a jose on 7/2/20        Symptoms: both hot and cold, + diarrhea, + insomnia, + tremors, + fatigue       Therefore the rise in her TSH is may be due a recovery period after non-thyroidal illness syndrome       Also, she has been started on Protonix which may decrease her LT4 absorption       Therefore, the plan will be as follows:       Cont Synthroid to 100 mcg, one tablet every day       Repeat TSH with T3, ft4, T4 in two weeks    2. Hypothyroidism - status unknown        Presently TSH 7.64 7/22/20 on LT4 100 mcg daily. However, it was 0.636 6/28/20 prior to the episode of cholecystitis that she had requiring a jose on 7/2/20        Symptoms: both hot and cold, + diarrhea, + insomnia, + tremors, + fatigue       Therefore the rise in her TSH is may be due a recovery period after non-thyroidal illness syndrome       Also, she has been started on Protonix which may decrease her LT4 absorption      I instructed her to watch for symptoms of hypothyroidism and to have the labs repeated before two weeks if they occur           3. Follow up       See me in two weeks      I affirm this is a Patient Initiated Episode with a Patient who has not had a related appointment within my department in the past 7 days or scheduled within the next 24 hours.     Patient identification was verified at the start of the visit: Yes    Start time: 11:36 am  End time:  12:07 pm    Total Time: minutes: 21-30 minutes (31 min)    Note: not billable if this call serves to triage the patient into an appointment for the relevant concern      Divina Knutson MD  Endocrinology/obesity  7/28/20

## 2020-07-29 ENCOUNTER — HOSPITAL ENCOUNTER (EMERGENCY)
Age: 55
Discharge: HOME | End: 2020-07-29
Attending: EMERGENCY MEDICINE
Payer: COMMERCIAL

## 2020-07-29 ENCOUNTER — APPOINTMENT (OUTPATIENT)
Dept: CT IMAGING | Age: 55
End: 2020-07-29
Payer: COMMERCIAL

## 2020-07-29 VITALS
SYSTOLIC BLOOD PRESSURE: 183 MMHG | TEMPERATURE: 98.3 F | DIASTOLIC BLOOD PRESSURE: 79 MMHG | BODY MASS INDEX: 37.12 KG/M2 | HEART RATE: 84 BPM | RESPIRATION RATE: 16 BRPM | HEIGHT: 66 IN | OXYGEN SATURATION: 99 %

## 2020-07-29 LAB
ALBUMIN SERPL-MCNC: 4.6 G/DL (ref 3.5–5.2)
ALP BLD-CCNC: 59 U/L (ref 35–104)
ALT SERPL-CCNC: 25 U/L (ref 0–32)
ANION GAP SERPL CALCULATED.3IONS-SCNC: 16 MMOL/L (ref 7–16)
AST SERPL-CCNC: 22 U/L (ref 0–31)
BACTERIA: ABNORMAL /HPF
BASOPHILS ABSOLUTE: 0.05 E9/L (ref 0–0.2)
BASOPHILS RELATIVE PERCENT: 0.6 % (ref 0–2)
BILIRUB SERPL-MCNC: 0.9 MG/DL (ref 0–1.2)
BILIRUBIN URINE: ABNORMAL
BLOOD, URINE: ABNORMAL
BUN BLDV-MCNC: 15 MG/DL (ref 6–20)
CALCIUM SERPL-MCNC: 9.5 MG/DL (ref 8.6–10.2)
CHLORIDE BLD-SCNC: 101 MMOL/L (ref 98–107)
CLARITY: ABNORMAL
CO2: 24 MMOL/L (ref 22–29)
COLOR: ABNORMAL
CREAT SERPL-MCNC: 0.8 MG/DL (ref 0.5–1)
EOSINOPHILS ABSOLUTE: 0.03 E9/L (ref 0.05–0.5)
EOSINOPHILS RELATIVE PERCENT: 0.3 % (ref 0–6)
EPITHELIAL CELLS, UA: ABNORMAL /HPF
GFR AFRICAN AMERICAN: >60
GFR NON-AFRICAN AMERICAN: >60 ML/MIN/1.73
GLUCOSE BLD-MCNC: 103 MG/DL (ref 74–99)
GLUCOSE URINE: NEGATIVE MG/DL
HCT VFR BLD CALC: 42.1 % (ref 34–48)
HEMOGLOBIN: 14 G/DL (ref 11.5–15.5)
IMMATURE GRANULOCYTES #: 0.03 E9/L
IMMATURE GRANULOCYTES %: 0.3 % (ref 0–5)
KETONES, URINE: >=80 MG/DL
LEUKOCYTE ESTERASE, URINE: ABNORMAL
LIPASE: 15 U/L (ref 13–60)
LYMPHOCYTES ABSOLUTE: 1.39 E9/L (ref 1.5–4)
LYMPHOCYTES RELATIVE PERCENT: 16.1 % (ref 20–42)
MAGNESIUM: 1.9 MG/DL (ref 1.6–2.6)
MCH RBC QN AUTO: 29.5 PG (ref 26–35)
MCHC RBC AUTO-ENTMCNC: 33.3 % (ref 32–34.5)
MCV RBC AUTO: 88.8 FL (ref 80–99.9)
MONOCYTES ABSOLUTE: 0.61 E9/L (ref 0.1–0.95)
MONOCYTES RELATIVE PERCENT: 7 % (ref 2–12)
NEUTROPHILS ABSOLUTE: 6.55 E9/L (ref 1.8–7.3)
NEUTROPHILS RELATIVE PERCENT: 75.7 % (ref 43–80)
NITRITE, URINE: NEGATIVE
PDW BLD-RTO: 13.1 FL (ref 11.5–15)
PH UA: 6 (ref 5–9)
PLATELET # BLD: 333 E9/L (ref 130–450)
PMV BLD AUTO: 10.5 FL (ref 7–12)
POTASSIUM REFLEX MAGNESIUM: 3.2 MMOL/L (ref 3.5–5)
PROTEIN UA: ABNORMAL MG/DL
RBC # BLD: 4.74 E12/L (ref 3.5–5.5)
RBC UA: ABNORMAL /HPF (ref 0–2)
RENAL EPITHELIAL, UA: ABNORMAL /HPF
SODIUM BLD-SCNC: 141 MMOL/L (ref 132–146)
SPECIFIC GRAVITY UA: 1.02 (ref 1–1.03)
TOTAL PROTEIN: 7.5 G/DL (ref 6.4–8.3)
UROBILINOGEN, URINE: 1 E.U./DL
WBC # BLD: 8.7 E9/L (ref 4.5–11.5)
WBC UA: >20 /HPF (ref 0–5)

## 2020-07-29 PROCEDURE — 85025 COMPLETE CBC W/AUTO DIFF WBC: CPT

## 2020-07-29 PROCEDURE — 81001 URINALYSIS AUTO W/SCOPE: CPT

## 2020-07-29 PROCEDURE — 80053 COMPREHEN METABOLIC PANEL: CPT

## 2020-07-29 PROCEDURE — 96372 THER/PROPH/DIAG INJ SC/IM: CPT

## 2020-07-29 PROCEDURE — 6360000002 HC RX W HCPCS: Performed by: STUDENT IN AN ORGANIZED HEALTH CARE EDUCATION/TRAINING PROGRAM

## 2020-07-29 PROCEDURE — 99284 EMERGENCY DEPT VISIT MOD MDM: CPT

## 2020-07-29 PROCEDURE — 6370000000 HC RX 637 (ALT 250 FOR IP): Performed by: EMERGENCY MEDICINE

## 2020-07-29 PROCEDURE — 83690 ASSAY OF LIPASE: CPT

## 2020-07-29 PROCEDURE — 2580000003 HC RX 258: Performed by: STUDENT IN AN ORGANIZED HEALTH CARE EDUCATION/TRAINING PROGRAM

## 2020-07-29 PROCEDURE — 74176 CT ABD & PELVIS W/O CONTRAST: CPT

## 2020-07-29 PROCEDURE — 83735 ASSAY OF MAGNESIUM: CPT

## 2020-07-29 RX ORDER — ONDANSETRON 4 MG/1
4 TABLET, ORALLY DISINTEGRATING ORAL EVERY 8 HOURS PRN
Qty: 10 TABLET | Refills: 0 | Status: SHIPPED | OUTPATIENT
Start: 2020-07-29 | End: 2020-08-17

## 2020-07-29 RX ORDER — CEFDINIR 300 MG/1
300 CAPSULE ORAL ONCE
Status: COMPLETED | OUTPATIENT
Start: 2020-07-29 | End: 2020-07-29

## 2020-07-29 RX ORDER — KETOROLAC TROMETHAMINE 30 MG/ML
15 INJECTION, SOLUTION INTRAMUSCULAR; INTRAVENOUS ONCE
Status: DISCONTINUED | OUTPATIENT
Start: 2020-07-29 | End: 2020-07-29 | Stop reason: HOSPADM

## 2020-07-29 RX ORDER — PROMETHAZINE HYDROCHLORIDE 25 MG/1
25 TABLET ORAL EVERY 6 HOURS PRN
Qty: 10 TABLET | Refills: 0 | Status: SHIPPED | OUTPATIENT
Start: 2020-07-29 | End: 2020-08-01

## 2020-07-29 RX ORDER — PROMETHAZINE HYDROCHLORIDE 25 MG/ML
12.5 INJECTION, SOLUTION INTRAMUSCULAR; INTRAVENOUS ONCE
Status: COMPLETED | OUTPATIENT
Start: 2020-07-29 | End: 2020-07-29

## 2020-07-29 RX ORDER — CEFDINIR 300 MG/1
300 CAPSULE ORAL 2 TIMES DAILY
Qty: 20 CAPSULE | Refills: 0 | Status: SHIPPED | OUTPATIENT
Start: 2020-07-29 | End: 2020-08-07 | Stop reason: ALTCHOICE

## 2020-07-29 RX ORDER — 0.9 % SODIUM CHLORIDE 0.9 %
1000 INTRAVENOUS SOLUTION INTRAVENOUS ONCE
Status: COMPLETED | OUTPATIENT
Start: 2020-07-29 | End: 2020-07-29

## 2020-07-29 RX ADMIN — PROMETHAZINE HYDROCHLORIDE 12.5 MG: 25 INJECTION INTRAMUSCULAR; INTRAVENOUS at 15:58

## 2020-07-29 RX ADMIN — CEFDINIR 300 MG: 300 CAPSULE ORAL at 16:43

## 2020-07-29 RX ADMIN — SODIUM CHLORIDE 1000 ML: 9 INJECTION, SOLUTION INTRAVENOUS at 15:58

## 2020-07-29 ASSESSMENT — PAIN SCALES - GENERAL: PAINLEVEL_OUTOF10: 3

## 2020-07-29 ASSESSMENT — ENCOUNTER SYMPTOMS
VOMITING: 0
PHOTOPHOBIA: 0
SHORTNESS OF BREATH: 0
ABDOMINAL DISTENTION: 0
ABDOMINAL PAIN: 0
DIARRHEA: 0
CHEST TIGHTNESS: 0
NAUSEA: 0

## 2020-07-29 NOTE — ED PROVIDER NOTES
Conchita Vasquez is a 47year old female who presented to ED with right flank pain that started last night and is worsening. Patient notice hematuria today. Patient called her PCP today and was advised to present to ED. patient denies fever, chills. Had a recent cholecystectomy beginning of July. Patient denies hx of kidney stones. Patient also had recent sacral injections at [facility] two days ago. Patient has had injections previously. Patient denies chest pain or shortness of breath. Patient has not tried anything for symptoms nothing makes symptoms better or worse. Patient arrived from home. Patient has tried zofran without improvement of symptoms. The history is provided by the patient. Abdominal Pain   Pain location:  Generalized  Pain quality: throbbing    Pain radiates to:  RLQ  Pain severity:  Mild  Onset quality:  Gradual  Timing:  Constant  Progression:  Worsening  Context: not recent illness, not sick contacts and not trauma    Relieved by:  Nothing  Worsened by:  Nothing  Ineffective treatments:  None tried  Associated symptoms: no chest pain, no chills, no diarrhea, no dysuria, no fatigue, no fever, no nausea, no shortness of breath and no vomiting    Risk factors: multiple surgeries    Risk factors: no NSAID use         Review of Systems   Constitutional: Negative for chills, diaphoresis, fatigue and fever. Eyes: Negative for photophobia and visual disturbance. Respiratory: Negative for chest tightness and shortness of breath. Cardiovascular: Negative for chest pain. Gastrointestinal: Negative for abdominal distention, abdominal pain, diarrhea, nausea and vomiting. Genitourinary: Negative for difficulty urinating and dysuria. Musculoskeletal: Negative for neck pain and neck stiffness. Skin: Negative for pallor and rash. Allergic/Immunologic: Negative for immunocompromised state. Neurological: Negative for dizziness and headaches.    Psychiatric/Behavioral: Negative for confusion. Physical Exam  Vitals signs and nursing note reviewed. Constitutional:       General: She is not in acute distress. Appearance: Normal appearance. She is not ill-appearing. HENT:      Head: Normocephalic and atraumatic. Nose: Nose normal.      Mouth/Throat:      Mouth: Mucous membranes are moist.      Pharynx: Oropharynx is clear. Eyes:      Conjunctiva/sclera: Conjunctivae normal.      Pupils: Pupils are equal, round, and reactive to light. Neck:      Musculoskeletal: Normal range of motion and neck supple. No neck rigidity or muscular tenderness. Cardiovascular:      Rate and Rhythm: Normal rate and regular rhythm. Pulmonary:      Effort: Pulmonary effort is normal.      Breath sounds: Normal breath sounds. Abdominal:      General: Bowel sounds are normal. There is no distension. Palpations: Abdomen is soft. Tenderness: There is no abdominal tenderness. There is no guarding or rebound. Musculoskeletal:      Right lower leg: No edema. Left lower leg: No edema. Skin:     General: Skin is warm and dry. Capillary Refill: Capillary refill takes less than 2 seconds. Coloration: Skin is not pale. Findings: No erythema or rash. Neurological:      Mental Status: She is alert and oriented to person, place, and time. Psychiatric:         Mood and Affect: Mood normal.          Procedures     MDM  Number of Diagnoses or Management Options  Complicated UTI (urinary tract infection):   Flank pain:   Diagnosis management comments: Elías Warren is a 58-year-old female presented to emergency department with right-sided flank pain and hematuria. Patient was evaluated for kidney stone versus pyelonephritis. Patient had a CT scan that was unremarkable for kidney stones. Patient's UA was remarkable for UTI with patient's flank pain likely that patient does have a complicated urinary tract infection. Patient was discharged home on [address] [facility] neema to call her PCP in 2 days to arrange follow-up. Discussed results with patient indications to return to ED patient is agreeable to plan. Patient's abdomen is soft nontender at time of reevaluation. Patient's nausea resolved with Phenergan and pain controlled with Toradol in the ED. Patient well appearing non-toxic with normal vitals at time of re-evaluation.             --------------------------------------------- PAST HISTORY ---------------------------------------------  Past Medical History:  has a past medical history of Allergic rhinitis, Anxiety, Atrial fibrillation ([facility] [address] Cancer ([facility] [address] Chest pain, Chronic back pain, Depression, Headache, Hypertension, NAFLD (nonalcoholic fatty liver disease), Obesity with body mass index (BMI) of 30.0 to 39.9, Osteoarthritis, Palpitations, Palpitations, Sleep apnea, Spinal stenosis, lumbar, and Thyroid disease. Past Surgical History:  has a past surgical history that includes ablation of dysrhythmic focus (1990); hysteroscopy (8/15/12); Colonoscopy; Endometrial ablation (2012); Thyroidectomy (Bilateral, 01/19/2017); Hysterectomy (07/25/2018); Breast surgery; Hysterectomy, vaginal; other surgical history (12/23/2019); and Insertable Cardiac Monitor (12/23/2019). Social History:  reports that she has never smoked. She has never used smokeless tobacco. She reports that she does not drink alcohol or use drugs. Family History: family history includes Atrial Fibrillation in her paternal grandfather; Breast Cancer in her paternal grandmother; Cancer in her father and maternal grandmother; Heart Disease in her maternal grandfather; High Blood Pressure in her father; High Cholesterol in her mother; Other in her daughter, paternal grandmother, and sister. The patients home medications have been reviewed.     Allergies: Narcan [naloxone hcl] and Fentanyl    -------------------------------------------------- RESULTS -------------------------------------------------  Labs:  Results for orders placed or performed during the hospital encounter of 07/29/20   Urinalysis with Microscopic   Result Value Ref Range    Color, UA DARK YELLOW (A) Straw/Yellow    Clarity, UA SL CLOUDY Clear    Glucose, Ur Negative Negative mg/dL    Bilirubin Urine MODERATE (A) Negative    Ketones, Urine >=80 (A) Negative mg/dL    Specific Gravity, UA 1.020 1.005 - 1.030    Blood, Urine SMALL (A) Negative    pH, UA 6.0 5.0 - 9.0    Protein, UA TRACE Negative mg/dL    Urobilinogen, Urine 1.0 <2.0 E.U./dL    Nitrite, Urine Negative Negative    Leukocyte Esterase, Urine MODERATE (A) Negative    WBC, UA >20 (A) 0 - 5 /HPF    RBC, UA 2-5 0 - 2 /HPF    Epithelial Cells, UA MODERATE /HPF    Renal Epithelial, UA FEW /HPF    Bacteria, UA FEW (A) None Seen /HPF   CBC auto differential   Result Value Ref Range    WBC 8.7 4.5 - 11.5 E9/L    RBC 4.74 3.50 - 5.50 E12/L    Hemoglobin 14.0 11.5 - 15.5 g/dL    Hematocrit 42.1 34.0 - 48.0 %    MCV 88.8 80.0 - 99.9 fL    MCH 29.5 26.0 - 35.0 pg    MCHC 33.3 32.0 - 34.5 %    RDW 13.1 11.5 - 15.0 fL    Platelets 075 197 - 673 E9/L    MPV 10.5 7.0 - 12.0 fL    Neutrophils % 75.7 43.0 - 80.0 %    Immature Granulocytes % 0.3 0.0 - 5.0 %    Lymphocytes % 16.1 (L) 20.0 - 42.0 %    Monocytes % 7.0 2.0 - 12.0 %    Eosinophils % 0.3 0.0 - 6.0 %    Basophils % 0.6 0.0 - 2.0 %    Neutrophils Absolute 6.55 1.80 - 7.30 E9/L    Immature Granulocytes # 0.03 E9/L    Lymphocytes Absolute 1.39 (L) 1.50 - 4.00 E9/L    Monocytes Absolute 0.61 0.10 - 0.95 E9/L    Eosinophils Absolute 0.03 (L) 0.05 - 0.50 E9/L    Basophils Absolute 0.05 0.00 - 0.20 E9/L   Comprehensive Metabolic Panel w/ Reflex to MG   Result Value Ref Range    Sodium 141 132 - 146 mmol/L    Potassium reflex Magnesium 3.2 (L) 3.5 - 5.0 mmol/L    Chloride 101 98 - 107 mmol/L    CO2 24 22 - 29 mmol/L    Anion Gap 16 7 - 16 mmol/L    Glucose 103 (H) 74 - 99 mg/dL    BUN 15 6 - 20 mg/dL CREATININE 0.8 0.5 - 1.0 mg/dL    GFR Non-African American >60 >=60 mL/min/1.73    GFR African American >60     Calcium 9.5 8.6 - 10.2 mg/dL    Total Protein 7.5 6.4 - 8.3 g/dL    Alb 4.6 3.5 - 5.2 g/dL    Total Bilirubin 0.9 0.0 - 1.2 mg/dL    Alkaline Phosphatase 59 35 - 104 U/L    ALT 25 0 - 32 U/L    AST 22 0 - 31 U/L   Lipase   Result Value Ref Range    Lipase 15 13 - 60 U/L   Magnesium   Result Value Ref Range    Magnesium 1.9 1.6 - 2.6 mg/dL       Radiology:  CT ABDOMEN PELVIS WO CONTRAST   Final Result   No acute inflammation, bowel obstruction or obstructing uropathy. Pyelonephritis cannot be evaluated without contrast. If pyelonephritis   is a consideration, consider repeat CT scan with IV contrast if   feasible.                ------------------------- NURSING NOTES AND VITALS REVIEWED ---------------------------  Date / Time Roomed:  7/29/2020  3:04 PM  ED Bed Assignment:  20/20    The nursing notes within the ED encounter and vital signs as below have been reviewed. BP (!) 183/79   Pulse 84   Temp 98.3 °F (36.8 °C)   Resp 16   Ht 5' 6\" (1.676 m)   LMP 01/01/2013   SpO2 99%   BMI 37.12 kg/m²   Oxygen Saturation Interpretation: Normal      ------------------------------------------ PROGRESS NOTES ------------------------------------------  5:10 PM EDT  I have spoken with the patient and discussed todays results, in addition to providing specific details for the plan of care and counseling regarding the diagnosis and prognosis. Their questions are answered at this time and they are agreeable with the plan. I discussed at length with them reasons for immediate return here for re evaluation. They will followup with their primary care physician by calling their office tomorrow.       --------------------------------- ADDITIONAL PROVIDER NOTES ---------------------------------  At this time the patient is without objective evidence of an acute process requiring hospitalization or inpatient

## 2020-07-30 ENCOUNTER — CARE COORDINATION (OUTPATIENT)
Dept: CASE MANAGEMENT | Age: 55
End: 2020-07-30

## 2020-08-02 ENCOUNTER — HOSPITAL ENCOUNTER (EMERGENCY)
Age: 55
Discharge: HOME | End: 2020-08-03
Attending: EMERGENCY MEDICINE
Payer: COMMERCIAL

## 2020-08-02 PROCEDURE — 99284 EMERGENCY DEPT VISIT MOD MDM: CPT

## 2020-08-02 RX ORDER — KETOROLAC TROMETHAMINE 30 MG/ML
15 INJECTION, SOLUTION INTRAMUSCULAR; INTRAVENOUS ONCE
Status: COMPLETED | OUTPATIENT
Start: 2020-08-03 | End: 2020-08-03

## 2020-08-02 RX ORDER — 0.9 % SODIUM CHLORIDE 0.9 %
1000 INTRAVENOUS SOLUTION INTRAVENOUS ONCE
Status: COMPLETED | OUTPATIENT
Start: 2020-08-03 | End: 2020-08-03

## 2020-08-03 ENCOUNTER — TELEPHONE (OUTPATIENT)
Dept: ENDOCRINOLOGY | Age: 55
End: 2020-08-03

## 2020-08-03 ENCOUNTER — TELEPHONE (OUTPATIENT)
Dept: CARDIOLOGY CLINIC | Age: 55
End: 2020-08-03

## 2020-08-03 VITALS
BODY MASS INDEX: 36.15 KG/M2 | HEART RATE: 72 BPM | TEMPERATURE: 98 F | SYSTOLIC BLOOD PRESSURE: 162 MMHG | DIASTOLIC BLOOD PRESSURE: 83 MMHG | OXYGEN SATURATION: 100 % | WEIGHT: 224 LBS | RESPIRATION RATE: 18 BRPM

## 2020-08-03 LAB
ALBUMIN SERPL-MCNC: 4.1 G/DL (ref 3.5–5.2)
ALP BLD-CCNC: 59 U/L (ref 35–104)
ALT SERPL-CCNC: 28 U/L (ref 0–32)
ANION GAP SERPL CALCULATED.3IONS-SCNC: 14 MMOL/L (ref 7–16)
AST SERPL-CCNC: 20 U/L (ref 0–31)
BACTERIA: ABNORMAL /HPF
BASOPHILS ABSOLUTE: 0.04 E9/L (ref 0–0.2)
BASOPHILS RELATIVE PERCENT: 0.5 % (ref 0–2)
BILIRUB SERPL-MCNC: 0.7 MG/DL (ref 0–1.2)
BILIRUBIN URINE: NEGATIVE
BLOOD, URINE: ABNORMAL
BUN BLDV-MCNC: 12 MG/DL (ref 6–20)
CALCIUM SERPL-MCNC: 9.3 MG/DL (ref 8.6–10.2)
CHLORIDE BLD-SCNC: 101 MMOL/L (ref 98–107)
CLARITY: CLEAR
CO2: 26 MMOL/L (ref 22–29)
COLOR: YELLOW
CREAT SERPL-MCNC: 0.8 MG/DL (ref 0.5–1)
EKG ATRIAL RATE: 65 BPM
EKG P AXIS: -11 DEGREES
EKG P-R INTERVAL: 204 MS
EKG Q-T INTERVAL: 430 MS
EKG QRS DURATION: 88 MS
EKG QTC CALCULATION (BAZETT): 447 MS
EKG R AXIS: 9 DEGREES
EKG T AXIS: -28 DEGREES
EKG VENTRICULAR RATE: 65 BPM
EOSINOPHILS ABSOLUTE: 0.08 E9/L (ref 0.05–0.5)
EOSINOPHILS RELATIVE PERCENT: 0.9 % (ref 0–6)
EPITHELIAL CELLS, UA: ABNORMAL /HPF
GFR AFRICAN AMERICAN: >60
GFR NON-AFRICAN AMERICAN: >60 ML/MIN/1.73
GLUCOSE BLD-MCNC: 97 MG/DL (ref 74–99)
GLUCOSE URINE: NEGATIVE MG/DL
HCT VFR BLD CALC: 40.1 % (ref 34–48)
HEMOGLOBIN: 13.3 G/DL (ref 11.5–15.5)
IMMATURE GRANULOCYTES #: 0.04 E9/L
IMMATURE GRANULOCYTES %: 0.5 % (ref 0–5)
KETONES, URINE: 40 MG/DL
LACTIC ACID: 1 MMOL/L (ref 0.5–2.2)
LEUKOCYTE ESTERASE, URINE: ABNORMAL
LYMPHOCYTES ABSOLUTE: 1.59 E9/L (ref 1.5–4)
LYMPHOCYTES RELATIVE PERCENT: 18.6 % (ref 20–42)
MAGNESIUM: 2 MG/DL (ref 1.6–2.6)
MCH RBC QN AUTO: 29.5 PG (ref 26–35)
MCHC RBC AUTO-ENTMCNC: 33.2 % (ref 32–34.5)
MCV RBC AUTO: 88.9 FL (ref 80–99.9)
MONOCYTES ABSOLUTE: 0.73 E9/L (ref 0.1–0.95)
MONOCYTES RELATIVE PERCENT: 8.5 % (ref 2–12)
NEUTROPHILS ABSOLUTE: 6.07 E9/L (ref 1.8–7.3)
NEUTROPHILS RELATIVE PERCENT: 71 % (ref 43–80)
NITRITE, URINE: NEGATIVE
PDW BLD-RTO: 13.1 FL (ref 11.5–15)
PH UA: 6.5 (ref 5–9)
PLATELET # BLD: 286 E9/L (ref 130–450)
PMV BLD AUTO: 10.5 FL (ref 7–12)
POTASSIUM REFLEX MAGNESIUM: 3 MMOL/L (ref 3.5–5)
PROTEIN UA: NEGATIVE MG/DL
RBC # BLD: 4.51 E12/L (ref 3.5–5.5)
RBC UA: ABNORMAL /HPF (ref 0–2)
RENAL EPITHELIAL, UA: ABNORMAL /HPF
SODIUM BLD-SCNC: 141 MMOL/L (ref 132–146)
SPECIFIC GRAVITY UA: <=1.005 (ref 1–1.03)
TOTAL PROTEIN: 7.1 G/DL (ref 6.4–8.3)
UROBILINOGEN, URINE: 0.2 E.U./DL
WBC # BLD: 8.6 E9/L (ref 4.5–11.5)
WBC UA: ABNORMAL /HPF (ref 0–5)

## 2020-08-03 PROCEDURE — 6360000002 HC RX W HCPCS

## 2020-08-03 PROCEDURE — 6370000000 HC RX 637 (ALT 250 FOR IP): Performed by: EMERGENCY MEDICINE

## 2020-08-03 PROCEDURE — 6360000002 HC RX W HCPCS: Performed by: EMERGENCY MEDICINE

## 2020-08-03 PROCEDURE — 96374 THER/PROPH/DIAG INJ IV PUSH: CPT

## 2020-08-03 PROCEDURE — 81001 URINALYSIS AUTO W/SCOPE: CPT

## 2020-08-03 PROCEDURE — 85025 COMPLETE CBC W/AUTO DIFF WBC: CPT

## 2020-08-03 PROCEDURE — 83605 ASSAY OF LACTIC ACID: CPT

## 2020-08-03 PROCEDURE — 87040 BLOOD CULTURE FOR BACTERIA: CPT

## 2020-08-03 PROCEDURE — 93010 ELECTROCARDIOGRAM REPORT: CPT | Performed by: INTERNAL MEDICINE

## 2020-08-03 PROCEDURE — 2580000003 HC RX 258: Performed by: EMERGENCY MEDICINE

## 2020-08-03 PROCEDURE — 80053 COMPREHEN METABOLIC PANEL: CPT

## 2020-08-03 PROCEDURE — 87088 URINE BACTERIA CULTURE: CPT

## 2020-08-03 PROCEDURE — 93005 ELECTROCARDIOGRAM TRACING: CPT | Performed by: EMERGENCY MEDICINE

## 2020-08-03 PROCEDURE — 96375 TX/PRO/DX INJ NEW DRUG ADDON: CPT

## 2020-08-03 PROCEDURE — 96361 HYDRATE IV INFUSION ADD-ON: CPT

## 2020-08-03 PROCEDURE — 83735 ASSAY OF MAGNESIUM: CPT

## 2020-08-03 RX ORDER — ONDANSETRON 2 MG/ML
INJECTION INTRAMUSCULAR; INTRAVENOUS
Status: COMPLETED
Start: 2020-08-03 | End: 2020-08-03

## 2020-08-03 RX ORDER — POTASSIUM CHLORIDE 20 MEQ/1
40 TABLET, EXTENDED RELEASE ORAL DAILY
Qty: 10 TABLET | Refills: 0 | Status: ON HOLD | OUTPATIENT
Start: 2020-08-03 | End: 2020-09-05

## 2020-08-03 RX ORDER — POTASSIUM CHLORIDE 20 MEQ/1
80 TABLET, EXTENDED RELEASE ORAL ONCE
Status: COMPLETED | OUTPATIENT
Start: 2020-08-03 | End: 2020-08-03

## 2020-08-03 RX ORDER — ONDANSETRON 2 MG/ML
4 INJECTION INTRAMUSCULAR; INTRAVENOUS ONCE
Status: COMPLETED | OUTPATIENT
Start: 2020-08-03 | End: 2020-08-03

## 2020-08-03 RX ADMIN — POTASSIUM CHLORIDE 80 MEQ: 20 TABLET, EXTENDED RELEASE ORAL at 03:57

## 2020-08-03 RX ADMIN — ONDANSETRON 4 MG: 2 INJECTION INTRAMUSCULAR; INTRAVENOUS at 02:03

## 2020-08-03 RX ADMIN — KETOROLAC TROMETHAMINE 15 MG: 30 INJECTION, SOLUTION INTRAMUSCULAR at 01:27

## 2020-08-03 RX ADMIN — SODIUM CHLORIDE 1000 ML: 9 INJECTION, SOLUTION INTRAVENOUS at 01:28

## 2020-08-03 ASSESSMENT — PAIN SCALES - GENERAL: PAINLEVEL_OUTOF10: 7

## 2020-08-03 NOTE — ED PROVIDER NOTES
HPI:  8/2/20,   Time: 11:55 PM EDT         Chris Sparks is a 47 y.o. female presenting to the ED for states she is being treated for a kidney infection and was worried because her temperature was 94 °F at home but is 97.5 °F here in the ER also she complained of lateral hand trembling, beginning last few days ago. The complaint has been intermittent, moderate in severity, and worsened by nothing. Also complains of persistent right flank pain and is taking Omnicef but also complains of nausea and has trouble keeping anything down    ROS:   Pertinent positives and negatives are stated within HPI, all other systems reviewed and are negative.  --------------------------------------------- PAST HISTORY ---------------------------------------------  Past Medical History:  has a past medical history of Allergic rhinitis, Anxiety, Atrial fibrillation ([facility] [address] Cancer ([facility] [address] Chest pain, Chronic back pain, Depression, Headache, Hypertension, NAFLD (nonalcoholic fatty liver disease), Obesity with body mass index (BMI) of 30.0 to 39.9, Osteoarthritis, Palpitations, Palpitations, Sleep apnea, Spinal stenosis, lumbar, and Thyroid disease. Past Surgical History:  has a past surgical history that includes ablation of dysrhythmic focus (1990); hysteroscopy (8/15/12); Colonoscopy; Endometrial ablation (2012); Thyroidectomy (Bilateral, 01/19/2017); Hysterectomy (07/25/2018); Breast surgery; Hysterectomy, vaginal; other surgical history (12/23/2019); and Insertable Cardiac Monitor (12/23/2019). Social History:  reports that she has never smoked. She has never used smokeless tobacco. She reports that she does not drink alcohol or use drugs.     Family History: family history includes Atrial Fibrillation in her paternal grandfather; Breast Cancer in her paternal grandmother; Cancer in her father and maternal grandmother; Heart Disease in her maternal grandfather; High Blood Pressure in her father; High Cholesterol in her mother; Other in her daughter, paternal grandmother, and sister. The patients home medications have been reviewed.     Allergies: Narcan [naloxone hcl] and Fentanyl    -------------------------------------------------- RESULTS -------------------------------------------------  All laboratory and radiology results have been personally reviewed by myself   LABS:  Results for orders placed or performed during the hospital encounter of 08/02/20   CBC Auto Differential   Result Value Ref Range    WBC 8.6 4.5 - 11.5 E9/L    RBC 4.51 3.50 - 5.50 E12/L    Hemoglobin 13.3 11.5 - 15.5 g/dL    Hematocrit 40.1 34.0 - 48.0 %    MCV 88.9 80.0 - 99.9 fL    MCH 29.5 26.0 - 35.0 pg    MCHC 33.2 32.0 - 34.5 %    RDW 13.1 11.5 - 15.0 fL    Platelets 929 816 - 975 E9/L    MPV 10.5 7.0 - 12.0 fL    Neutrophils % 71.0 43.0 - 80.0 %    Immature Granulocytes % 0.5 0.0 - 5.0 %    Lymphocytes % 18.6 (L) 20.0 - 42.0 %    Monocytes % 8.5 2.0 - 12.0 %    Eosinophils % 0.9 0.0 - 6.0 %    Basophils % 0.5 0.0 - 2.0 %    Neutrophils Absolute 6.07 1.80 - 7.30 E9/L    Immature Granulocytes # 0.04 E9/L    Lymphocytes Absolute 1.59 1.50 - 4.00 E9/L    Monocytes Absolute 0.73 0.10 - 0.95 E9/L    Eosinophils Absolute 0.08 0.05 - 0.50 E9/L    Basophils Absolute 0.04 0.00 - 0.20 E9/L   Comprehensive Metabolic Panel w/ Reflex to MG   Result Value Ref Range    Sodium 141 132 - 146 mmol/L    Potassium reflex Magnesium 3.0 (L) 3.5 - 5.0 mmol/L    Chloride 101 98 - 107 mmol/L    CO2 26 22 - 29 mmol/L    Anion Gap 14 7 - 16 mmol/L    Glucose 97 74 - 99 mg/dL    BUN 12 6 - 20 mg/dL    CREATININE 0.8 0.5 - 1.0 mg/dL    GFR Non-African American >60 >=60 mL/min/1.73    GFR African American >60     Calcium 9.3 8.6 - 10.2 mg/dL    Total Protein 7.1 6.4 - 8.3 g/dL    Alb 4.1 3.5 - 5.2 g/dL    Total Bilirubin 0.7 0.0 - 1.2 mg/dL    Alkaline Phosphatase 59 35 - 104 U/L    ALT 28 0 - 32 U/L    AST 20 0 - 31 U/L   Urinalysis, reflex to microscopic   Result Value Ref Range    Color, UA Yellow Straw/Yellow    Clarity, UA Clear Clear    Glucose, Ur Negative Negative mg/dL    Bilirubin Urine Negative Negative    Ketones, Urine 40 (A) Negative mg/dL    Specific Gravity, UA <=1.005 1.005 - 1.030    Blood, Urine TRACE-INTACT Negative    pH, UA 6.5 5.0 - 9.0    Protein, UA Negative Negative mg/dL    Urobilinogen, Urine 0.2 <2.0 E.U./dL    Nitrite, Urine Negative Negative    Leukocyte Esterase, Urine SMALL (A) Negative   Lactic Acid, Plasma   Result Value Ref Range    Lactic Acid 1.0 0.5 - 2.2 mmol/L   Magnesium   Result Value Ref Range    Magnesium 2.0 1.6 - 2.6 mg/dL   Microscopic Urinalysis   Result Value Ref Range    WBC, UA 5-10 (A) 0 - 5 /HPF    RBC, UA 1-3 0 - 2 /HPF    Epithelial Cells, UA FEW /HPF    Renal Epithelial, UA FEW /HPF    Bacteria, UA RARE (A) None Seen /HPF   EKG 12 Lead   Result Value Ref Range    Ventricular Rate 65 BPM    Atrial Rate 65 BPM    P-R Interval 204 ms    QRS Duration 88 ms    Q-T Interval 430 ms    QTc Calculation (Bazett) 447 ms    P Axis -11 degrees    R Axis 9 degrees    T Axis -28 degrees       RADIOLOGY:  Interpreted by Radiologist.  No orders to display       ------------------------- NURSING NOTES AND VITALS REVIEWED ---------------------------   The nursing notes within the ED encounter and vital signs as below have been reviewed. BP (!) 163/74   Pulse 81   Temp 97.5 °F (36.4 °C) (Oral)   Resp 18   Wt 224 lb (101.6 kg)   LMP 01/01/2013   SpO2 99%   BMI 36.15 kg/m²   Oxygen Saturation Interpretation: Normal      ---------------------------------------------------PHYSICAL EXAM--------------------------------------      Constitutional/General: Alert and oriented x3, well appearing, non toxic in NAD  Head: NC/AT  Eyes: PERRL, EOMI  Mouth: Oropharynx clear, handling secretions, no trismus  Neck: Supple, full ROM, no meningeal signs  Pulmonary: Lungs clear to auscultation bilaterally, no wheezes, rales, or rhonchi.  Not in respiratory

## 2020-08-03 NOTE — TELEPHONE ENCOUNTER
Okay to take toprol 25 mg BID and okay to do the banana. BMP 1 wk to recheck potassium. Tamara Childers D.O.   Cardiologist  Cardiology, [facility]

## 2020-08-03 NOTE — TELEPHONE ENCOUNTER
Ms Yovanny Mccloud said she was in the ER yesterday. She stated she was having trouble regulating her body temp (94 yesterday) and her heart rate (unsure what issue with the HR). She is saying the ER Dr advised her to notify you. She was asking if the levothyoxine could be increased d/t the issues she is having. Please advise.  thanks

## 2020-08-04 ENCOUNTER — CARE COORDINATION (OUTPATIENT)
Dept: CASE MANAGEMENT | Age: 55
End: 2020-08-04

## 2020-08-04 NOTE — CARE COORDINATION
Patient contacted regarding COVID-19 risk and screening. Care Transition Nurse/ Ambulatory Care Manager contacted the patient by telephone to perform follow-up assessment. Verified name and  with patient as identifiers. Patient has following risk factors of: no known risk factors. Symptoms reviewed with patient who verbalized the following symptoms: pain or aching joints, flank pain, no new symptoms and no worsening symptoms. Due to no new or worsening symptoms encounter was not routed to provider for escalation. Patient was called on 2020. Education provided regarding infection prevention, and signs and symptoms of COVID-19 and when to seek medical attention with patient who verbalized understanding. Discussed exposure protocols and quarantine from [address] you at higher risk for severe illness  and given an opportunity for questions and concerns. The patient agrees to contact the COVID-19 hotline [phone] or PCP office for questions related to their healthcare. CTN/ACM provided contact information for future reference. From CDC: Are you at higher risk for severe illness?  Wash your hands often.  Avoid close contact (6 feet, which is about two arm lengths) with people who are sick.  Put distance between yourself and other people if COVID-19 is spreading in your community.  Clean and disinfect frequently touched surfaces.  Avoid all cruise travel and non-essential air travel.  Call your healthcare professional if you have concerns about COVID-19 and your underlying condition or if you are sick. For more information on steps you can take to protect yourself, see CDC's How to [place] for follow-up call in 5-7 days based on severity of symptoms and risk factors.

## 2020-08-04 NOTE — TELEPHONE ENCOUNTER
Spoke with pt by phone. Had episode of low temperature last night: 94 degrees and pain all over. Temperature slowly increased. By the time she got to the ED it was 97.5 degrees  In the ED, HR slowed to 40's per her report. Cardiology decreased metoprolol from 50 mg bid to 25 mg bid  ED physician suggested the temperature may be due to the thyroid and recommended following up with me.  7/22/20 TSH 7.64. Also the temperature came up on its own.  Therefore, it would not been from the thyroid  However, will repeat the TSH this week  I referred to Dr. Sander Hebert for work up  [facility]  08/03/20

## 2020-08-05 LAB — URINE CULTURE, ROUTINE: NORMAL

## 2020-08-06 ENCOUNTER — TELEPHONE (OUTPATIENT)
Dept: CARDIOLOGY CLINIC | Age: 55
End: 2020-08-06

## 2020-08-06 NOTE — TELEPHONE ENCOUNTER
Either would be fine from our standpoint. Emeterio Avalos D.O.   Cardiologist  Cardiology, [address]

## 2020-08-07 ENCOUNTER — HOSPITAL ENCOUNTER (EMERGENCY)
Age: 55
Discharge: HOME | End: 2020-08-07
Attending: EMERGENCY MEDICINE
Payer: COMMERCIAL

## 2020-08-07 VITALS
HEIGHT: 66 IN | SYSTOLIC BLOOD PRESSURE: 144 MMHG | HEART RATE: 73 BPM | WEIGHT: 224 LBS | OXYGEN SATURATION: 97 % | TEMPERATURE: 98.3 F | RESPIRATION RATE: 20 BRPM | BODY MASS INDEX: 36 KG/M2 | DIASTOLIC BLOOD PRESSURE: 66 MMHG

## 2020-08-07 LAB
ALBUMIN SERPL-MCNC: 3.9 G/DL (ref 3.5–5.2)
ALP BLD-CCNC: 59 U/L (ref 35–104)
ALT SERPL-CCNC: 36 U/L (ref 0–32)
ANION GAP SERPL CALCULATED.3IONS-SCNC: 15 MMOL/L (ref 7–16)
AST SERPL-CCNC: 23 U/L (ref 0–31)
BACTERIA: ABNORMAL /HPF
BASOPHILS ABSOLUTE: 0.05 E9/L (ref 0–0.2)
BASOPHILS RELATIVE PERCENT: 0.7 % (ref 0–2)
BILIRUB SERPL-MCNC: 0.7 MG/DL (ref 0–1.2)
BILIRUBIN URINE: NEGATIVE
BLOOD, URINE: ABNORMAL
BUN BLDV-MCNC: 11 MG/DL (ref 6–20)
CALCIUM SERPL-MCNC: 9 MG/DL (ref 8.6–10.2)
CHLORIDE BLD-SCNC: 103 MMOL/L (ref 98–107)
CLARITY: CLEAR
CO2: 24 MMOL/L (ref 22–29)
COLOR: YELLOW
CREAT SERPL-MCNC: 0.7 MG/DL (ref 0.5–1)
EOSINOPHILS ABSOLUTE: 0.06 E9/L (ref 0.05–0.5)
EOSINOPHILS RELATIVE PERCENT: 0.8 % (ref 0–6)
GFR AFRICAN AMERICAN: >60
GFR NON-AFRICAN AMERICAN: >60 ML/MIN/1.73
GLUCOSE BLD-MCNC: 98 MG/DL (ref 74–99)
GLUCOSE URINE: NEGATIVE MG/DL
HCT VFR BLD CALC: 40.4 % (ref 34–48)
HEMOGLOBIN: 13.4 G/DL (ref 11.5–15.5)
IMMATURE GRANULOCYTES #: 0.04 E9/L
IMMATURE GRANULOCYTES %: 0.5 % (ref 0–5)
KETONES, URINE: 40 MG/DL
LACTIC ACID: 1.2 MMOL/L (ref 0.5–2.2)
LEUKOCYTE ESTERASE, URINE: ABNORMAL
LIPASE: 16 U/L (ref 13–60)
LYMPHOCYTES ABSOLUTE: 1.14 E9/L (ref 1.5–4)
LYMPHOCYTES RELATIVE PERCENT: 15.3 % (ref 20–42)
MAGNESIUM: 2.1 MG/DL (ref 1.6–2.6)
MCH RBC QN AUTO: 29.6 PG (ref 26–35)
MCHC RBC AUTO-ENTMCNC: 33.2 % (ref 32–34.5)
MCV RBC AUTO: 89.4 FL (ref 80–99.9)
MONOCYTES ABSOLUTE: 0.58 E9/L (ref 0.1–0.95)
MONOCYTES RELATIVE PERCENT: 7.8 % (ref 2–12)
NEUTROPHILS ABSOLUTE: 5.57 E9/L (ref 1.8–7.3)
NEUTROPHILS RELATIVE PERCENT: 74.9 % (ref 43–80)
NITRITE, URINE: NEGATIVE
PDW BLD-RTO: 13.2 FL (ref 11.5–15)
PH UA: 6 (ref 5–9)
PLATELET # BLD: 281 E9/L (ref 130–450)
PMV BLD AUTO: 10.7 FL (ref 7–12)
POTASSIUM REFLEX MAGNESIUM: 3.4 MMOL/L (ref 3.5–5)
PROTEIN UA: NEGATIVE MG/DL
RBC # BLD: 4.52 E12/L (ref 3.5–5.5)
RBC UA: ABNORMAL /HPF (ref 0–2)
RENAL EPITHELIAL, UA: ABNORMAL /HPF
SODIUM BLD-SCNC: 142 MMOL/L (ref 132–146)
SPECIFIC GRAVITY UA: 1.01 (ref 1–1.03)
TOTAL PROTEIN: 6.8 G/DL (ref 6.4–8.3)
UROBILINOGEN, URINE: 0.2 E.U./DL
WBC # BLD: 7.4 E9/L (ref 4.5–11.5)
WBC UA: ABNORMAL /HPF (ref 0–5)

## 2020-08-07 PROCEDURE — 81001 URINALYSIS AUTO W/SCOPE: CPT

## 2020-08-07 PROCEDURE — 6370000000 HC RX 637 (ALT 250 FOR IP): Performed by: STUDENT IN AN ORGANIZED HEALTH CARE EDUCATION/TRAINING PROGRAM

## 2020-08-07 PROCEDURE — 83735 ASSAY OF MAGNESIUM: CPT

## 2020-08-07 PROCEDURE — 80053 COMPREHEN METABOLIC PANEL: CPT

## 2020-08-07 PROCEDURE — 83690 ASSAY OF LIPASE: CPT

## 2020-08-07 PROCEDURE — 6360000002 HC RX W HCPCS: Performed by: STUDENT IN AN ORGANIZED HEALTH CARE EDUCATION/TRAINING PROGRAM

## 2020-08-07 PROCEDURE — 2580000003 HC RX 258: Performed by: STUDENT IN AN ORGANIZED HEALTH CARE EDUCATION/TRAINING PROGRAM

## 2020-08-07 PROCEDURE — 96374 THER/PROPH/DIAG INJ IV PUSH: CPT

## 2020-08-07 PROCEDURE — 99284 EMERGENCY DEPT VISIT MOD MDM: CPT

## 2020-08-07 PROCEDURE — 85025 COMPLETE CBC W/AUTO DIFF WBC: CPT

## 2020-08-07 PROCEDURE — 99285 EMERGENCY DEPT VISIT HI MDM: CPT

## 2020-08-07 PROCEDURE — 83605 ASSAY OF LACTIC ACID: CPT

## 2020-08-07 RX ORDER — DIAZEPAM 5 MG/1
5 TABLET ORAL ONCE
Status: COMPLETED | OUTPATIENT
Start: 2020-08-07 | End: 2020-08-07

## 2020-08-07 RX ORDER — 0.9 % SODIUM CHLORIDE 0.9 %
1000 INTRAVENOUS SOLUTION INTRAVENOUS ONCE
Status: COMPLETED | OUTPATIENT
Start: 2020-08-07 | End: 2020-08-07

## 2020-08-07 RX ORDER — ONDANSETRON 2 MG/ML
4 INJECTION INTRAMUSCULAR; INTRAVENOUS ONCE
Status: COMPLETED | OUTPATIENT
Start: 2020-08-07 | End: 2020-08-07

## 2020-08-07 RX ORDER — DIAZEPAM 5 MG/1
5 TABLET ORAL EVERY 6 HOURS PRN
Qty: 20 TABLET | Refills: 0 | Status: SHIPPED | OUTPATIENT
Start: 2020-08-07 | End: 2020-08-12

## 2020-08-07 RX ORDER — CEFDINIR 300 MG/1
300 CAPSULE ORAL 2 TIMES DAILY
Qty: 14 CAPSULE | Refills: 0 | Status: SHIPPED | OUTPATIENT
Start: 2020-08-07 | End: 2020-08-14

## 2020-08-07 RX ADMIN — ONDANSETRON 4 MG: 2 INJECTION INTRAMUSCULAR; INTRAVENOUS at 08:02

## 2020-08-07 RX ADMIN — POTASSIUM BICARBONATE 20 MEQ: 782 TABLET, EFFERVESCENT ORAL at 09:00

## 2020-08-07 RX ADMIN — DIAZEPAM 5 MG: 5 TABLET ORAL at 08:02

## 2020-08-07 RX ADMIN — SODIUM CHLORIDE 1000 ML: 9 INJECTION, SOLUTION INTRAVENOUS at 08:04

## 2020-08-07 ASSESSMENT — ENCOUNTER SYMPTOMS
NAUSEA: 1
CHEST TIGHTNESS: 0
COLOR CHANGE: 0
COUGH: 0
SORE THROAT: 0
VOMITING: 0
ABDOMINAL PAIN: 1
DIARRHEA: 0
SHORTNESS OF BREATH: 0
CONSTIPATION: 0
WHEEZING: 0

## 2020-08-07 NOTE — ED PROVIDER NOTES
Patient is a 70-year-old female with past medical history significant for recent cholecystectomy on July 2, anxiety who presents the ER today with chief complaints of nausea and vertigo. Triage chief complaint states that patient is having allergic reaction and having weak legs with a sensation of her throat closing after taking Xanax. When I questioned patient about this, she states that she is not having those symptoms right now but that the last few weeks she has had symptoms like that when she takes her Xanax. Patient states that she is had intermittent nausea ever since having cholecystectomy. Also states that she thinks that her Xanax is reacting poorly with her body after having cholecystectomy. States that this morning she woke up at approximately 3 AM feeling dizzy. She describes her dizziness as a sensation of the room spinning which worsens whenever she moves her head or tries to ambulate. She states that this is accompanied by severe nausea. Further, patient states that she was recently seen in this ED and diagnosed with pyelonephritis. She states that she was started on Omnicef and that she did take some of but she stopped because it also made her nauseous. Patient states that she is still having some left flank pain. She also endorses vague epigastric abdominal pain associated with her nausea. She denies any headache, lightheadedness, chest pain, cough, shortness of breath, diarrhea, constipation, urinary symptoms. The history is provided by the patient. Review of Systems   Constitutional: Negative for chills and fever. HENT: Negative for drooling, nosebleeds and sore throat. Eyes: Negative for visual disturbance. Respiratory: Negative for cough, chest tightness, shortness of breath and wheezing. Cardiovascular: Negative for chest pain and palpitations. Gastrointestinal: Positive for abdominal pain and nausea. Negative for constipation, diarrhea and vomiting. Genitourinary: Positive for flank pain. Negative for dysuria. Musculoskeletal: Negative for arthralgias. Skin: Negative for color change and wound. Neurological: Negative for headaches. Psychiatric/Behavioral: Negative for confusion. Physical Exam  Constitutional:       General: She is awake. Appearance: Normal appearance. She is well-developed. She is obese. HENT:      Head: Normocephalic and atraumatic. Right Ear: Hearing and external ear normal.      Left Ear: Hearing and external ear normal.      Nose: Nose normal.      Mouth/Throat:      Lips: Pink. Mouth: Mucous membranes are moist.      Pharynx: Oropharynx is clear. Uvula midline. Eyes:      General: Lids are normal.      Extraocular Movements: Extraocular movements intact. Conjunctiva/sclera: Conjunctivae normal.      Pupils: Pupils are equal, round, and reactive to light. Neck:      Musculoskeletal: Normal range of motion and neck supple. Trachea: Phonation normal.   Cardiovascular:      Rate and Rhythm: Normal rate and regular rhythm. Heart sounds: Normal heart sounds. Pulmonary:      Effort: Pulmonary effort is normal.      Breath sounds: Normal breath sounds and air entry. Abdominal:      General: Abdomen is protuberant. There is no distension. Palpations: Abdomen is soft. Tenderness: There is abdominal tenderness in the epigastric area. There is no guarding or rebound. Skin:     General: Skin is warm and dry. Neurological:      General: No focal deficit present. Mental Status: She is alert and oriented to person, place, and time. GCS: GCS eye subscore is 4. GCS verbal subscore is 5. GCS motor subscore is 6. Comments: Patient has reproducible vertiginous symptoms with left-sided extraocular motion, also [place] positive on the left. No nystagmus appreciated.    Psychiatric:         Attention and Perception: Attention and perception normal.         Mood and Affect: Mood and affect normal.         Speech: Speech normal.         Behavior: Behavior normal. Behavior is cooperative. Thought Content: Thought content normal.         Cognition and Memory: Cognition and memory normal.         Judgment: Judgment normal.          Procedures     MDM  Number of Diagnoses or Management Options  Acute cystitis without hematuria:   Benign paroxysmal positional vertigo of left ear:   Diagnosis management comments: Patient presents with concern of reaction to her Xanax and vertiginous symptoms. Patient's blood work all within normal limits with exception of very slightly low potassium at 3.4. Clinically, patient has left-sided benign positional paroxysmal vertigo, it was reproducible to left-sided tilted head, as well as left-sided extraocular motion. Patient given 5 mg of Valium with complete resolution of vertiginous symptoms and nausea. Patient was further complaining of low back pain. Urinalysis was mildly negative, patient was being treated for pyelonephritis but discontinued her antibiotics prematurely. Otherwise labs within normal limits and patient clinically appears well. Will discharge patient home with new prescription for WILDE JOSE and a prescription for Valium. I informed patient that she should stop taking her Xanax as she feels that she is having adverse reaction to it. I further informed her that she is under no circumstances simultaneously take Xanax and the Valium that I am prescribing for her BPPV. Patient verbalized understanding of this plan and states that she will longer take Xanax and will take her Valium and Omnicef as prescribed. Patient also states that she will make a follow-up appointment with both her PCP and her psychiatrist.  Patient to be discharged home at this time. She verbalized understanding of plan and is agreeable discharge at this time.                   --------------------------------------------- PAST HISTORY Neutrophils % 74.9 43.0 - 80.0 %    Immature Granulocytes % 0.5 0.0 - 5.0 %    Lymphocytes % 15.3 (L) 20.0 - 42.0 %    Monocytes % 7.8 2.0 - 12.0 %    Eosinophils % 0.8 0.0 - 6.0 %    Basophils % 0.7 0.0 - 2.0 %    Neutrophils Absolute 5.57 1.80 - 7.30 E9/L    Immature Granulocytes # 0.04 E9/L    Lymphocytes Absolute 1.14 (L) 1.50 - 4.00 E9/L    Monocytes Absolute 0.58 0.10 - 0.95 E9/L    Eosinophils Absolute 0.06 0.05 - 0.50 E9/L    Basophils Absolute 0.05 0.00 - 0.20 E9/L   Comprehensive Metabolic Panel w/ Reflex to MG   Result Value Ref Range    Sodium 142 132 - 146 mmol/L    Potassium reflex Magnesium 3.4 (L) 3.5 - 5.0 mmol/L    Chloride 103 98 - 107 mmol/L    CO2 24 22 - 29 mmol/L    Anion Gap 15 7 - 16 mmol/L    Glucose 98 74 - 99 mg/dL    BUN 11 6 - 20 mg/dL    CREATININE 0.7 0.5 - 1.0 mg/dL    GFR Non-African American >60 >=60 mL/min/1.73    GFR African American >60     Calcium 9.0 8.6 - 10.2 mg/dL    Total Protein 6.8 6.4 - 8.3 g/dL    Alb 3.9 3.5 - 5.2 g/dL    Total Bilirubin 0.7 0.0 - 1.2 mg/dL    Alkaline Phosphatase 59 35 - 104 U/L    ALT 36 (H) 0 - 32 U/L    AST 23 0 - 31 U/L   Lipase   Result Value Ref Range    Lipase 16 13 - 60 U/L   Lactic Acid, Plasma   Result Value Ref Range    Lactic Acid 1.2 0.5 - 2.2 mmol/L   Urinalysis with Microscopic   Result Value Ref Range    Color, UA Yellow Straw/Yellow    Clarity, UA Clear Clear    Glucose, Ur Negative Negative mg/dL    Bilirubin Urine Negative Negative    Ketones, Urine 40 (A) Negative mg/dL    Specific Gravity, UA 1.010 1.005 - 1.030    Blood, Urine TRACE-INTACT Negative    pH, UA 6.0 5.0 - 9.0    Protein, UA Negative Negative mg/dL    Urobilinogen, Urine 0.2 <2.0 E.U./dL    Nitrite, Urine Negative Negative    Leukocyte Esterase, Urine SMALL (A) Negative    WBC, UA 5-10 (A) 0 - 5 /HPF    RBC, UA 0-1 0 - 2 /HPF    Renal Epithelial, UA FEW /HPF    Bacteria, UA FEW (A) None Seen /HPF   Magnesium   Result Value Ref Range    Magnesium 2.1 1.6 - 2.6 mg/dL Radiology:  No orders to display       ------------------------- NURSING NOTES AND VITALS REVIEWED ---------------------------  Date / Time Roomed:  8/7/2020  7:23 AM  ED Bed Assignment:  06/06    The nursing notes within the ED encounter and vital signs as below have been reviewed. BP (!) 144/66   Pulse 73   Temp 98.3 °F (36.8 °C)   Resp 20   Ht 5' 6\" (1.676 m)   Wt 224 lb (101.6 kg)   LMP 01/01/2013   SpO2 97%   BMI 36.15 kg/m²   Oxygen Saturation Interpretation: Normal      ------------------------------------------ PROGRESS NOTES ------------------------------------------  12:42 PM EDT  I have spoken with the patient and discussed todays results, in addition to providing specific details for the plan of care and counseling regarding the diagnosis and prognosis. Their questions are answered at this time and they are agreeable with the plan. I discussed at length with them reasons for immediate return here for re evaluation. They will followup with their Psychiatrist and primary care physician by calling their office tomorrow. --------------------------------- ADDITIONAL PROVIDER NOTES ---------------------------------  At this time the patient is without objective evidence of an acute process requiring hospitalization or inpatient management. They have remained hemodynamically stable throughout their entire ED visit and are stable for discharge with outpatient follow-up. The plan has been discussed in detail and they are aware of the specific conditions for emergent return, as well as the importance of follow-up. Discharge Medication List as of 8/7/2020 10:50 AM      START taking these medications    Details   diazePAM (VALIUM) 5 MG tablet Take 1 tablet by mouth every 6 hours as needed (Vertigo) for up to 5 days. , Disp-20 tablet,R-0Print      cefdinir (OMNICEF) 300 MG capsule Take 1 capsule by mouth 2 times daily for 7 days, Disp-14 capsule,R-0Print             Diagnosis:  1. Benign paroxysmal positional vertigo of left ear    2. Acute cystitis without hematuria        Disposition:  Patient's disposition: Discharge to home  Patient's condition is stable.            Ebb Orf, DO  Resident  08/07/20 [id number]

## 2020-08-08 LAB
BLOOD CULTURE, ROUTINE: NORMAL
CULTURE, BLOOD 2: NORMAL

## 2020-08-10 ENCOUNTER — HOSPITAL ENCOUNTER (EMERGENCY)
Age: 55
Discharge: HOME | End: 2020-08-10
Attending: EMERGENCY MEDICINE
Payer: COMMERCIAL

## 2020-08-10 ENCOUNTER — CARE COORDINATION (OUTPATIENT)
Dept: CASE MANAGEMENT | Age: 55
End: 2020-08-10

## 2020-08-10 ENCOUNTER — APPOINTMENT (OUTPATIENT)
Dept: CT IMAGING | Age: 55
End: 2020-08-10
Payer: COMMERCIAL

## 2020-08-10 ENCOUNTER — TELEPHONE (OUTPATIENT)
Dept: CARDIOLOGY CLINIC | Age: 55
End: 2020-08-10

## 2020-08-10 VITALS
BODY MASS INDEX: 34.55 KG/M2 | RESPIRATION RATE: 16 BRPM | DIASTOLIC BLOOD PRESSURE: 96 MMHG | OXYGEN SATURATION: 98 % | TEMPERATURE: 97.3 F | HEART RATE: 78 BPM | HEIGHT: 66 IN | SYSTOLIC BLOOD PRESSURE: 152 MMHG | WEIGHT: 215 LBS

## 2020-08-10 LAB
ALBUMIN SERPL-MCNC: 4.1 G/DL (ref 3.5–5.2)
ALP BLD-CCNC: 66 U/L (ref 35–104)
ALT SERPL-CCNC: 45 U/L (ref 0–32)
ANION GAP SERPL CALCULATED.3IONS-SCNC: 17 MMOL/L (ref 7–16)
AST SERPL-CCNC: 29 U/L (ref 0–31)
BASOPHILS ABSOLUTE: 0.07 E9/L (ref 0–0.2)
BASOPHILS RELATIVE PERCENT: 0.7 % (ref 0–2)
BILIRUB SERPL-MCNC: 0.7 MG/DL (ref 0–1.2)
BUN BLDV-MCNC: 11 MG/DL (ref 6–20)
CALCIUM SERPL-MCNC: 9.5 MG/DL (ref 8.6–10.2)
CHLORIDE BLD-SCNC: 99 MMOL/L (ref 98–107)
CO2: 24 MMOL/L (ref 22–29)
CREAT SERPL-MCNC: 0.8 MG/DL (ref 0.5–1)
EOSINOPHILS ABSOLUTE: 0.02 E9/L (ref 0.05–0.5)
EOSINOPHILS RELATIVE PERCENT: 0.2 % (ref 0–6)
GFR AFRICAN AMERICAN: >60
GFR NON-AFRICAN AMERICAN: >60 ML/MIN/1.73
GLUCOSE BLD-MCNC: 102 MG/DL (ref 74–99)
HCT VFR BLD CALC: 41.8 % (ref 34–48)
HEMOGLOBIN: 13.9 G/DL (ref 11.5–15.5)
IMMATURE GRANULOCYTES #: 0.12 E9/L
IMMATURE GRANULOCYTES %: 1.1 % (ref 0–5)
LACTIC ACID: 1.3 MMOL/L (ref 0.5–2.2)
LYMPHOCYTES ABSOLUTE: 1.51 E9/L (ref 1.5–4)
LYMPHOCYTES RELATIVE PERCENT: 14.2 % (ref 20–42)
MCH RBC QN AUTO: 29.8 PG (ref 26–35)
MCHC RBC AUTO-ENTMCNC: 33.3 % (ref 32–34.5)
MCV RBC AUTO: 89.5 FL (ref 80–99.9)
MONOCYTES ABSOLUTE: 0.74 E9/L (ref 0.1–0.95)
MONOCYTES RELATIVE PERCENT: 7 % (ref 2–12)
NEUTROPHILS ABSOLUTE: 8.17 E9/L (ref 1.8–7.3)
NEUTROPHILS RELATIVE PERCENT: 76.8 % (ref 43–80)
PDW BLD-RTO: 13.3 FL (ref 11.5–15)
PLATELET # BLD: 292 E9/L (ref 130–450)
PMV BLD AUTO: 10.3 FL (ref 7–12)
POTASSIUM SERPL-SCNC: 3.5 MMOL/L (ref 3.5–5)
RBC # BLD: 4.67 E12/L (ref 3.5–5.5)
SODIUM BLD-SCNC: 140 MMOL/L (ref 132–146)
TOTAL PROTEIN: 7.1 G/DL (ref 6.4–8.3)
WBC # BLD: 10.6 E9/L (ref 4.5–11.5)

## 2020-08-10 PROCEDURE — 83605 ASSAY OF LACTIC ACID: CPT

## 2020-08-10 PROCEDURE — 6360000004 HC RX CONTRAST MEDICATION: Performed by: RADIOLOGY

## 2020-08-10 PROCEDURE — 70491 CT SOFT TISSUE NECK W/DYE: CPT

## 2020-08-10 PROCEDURE — 99283 EMERGENCY DEPT VISIT LOW MDM: CPT

## 2020-08-10 PROCEDURE — 99284 EMERGENCY DEPT VISIT MOD MDM: CPT

## 2020-08-10 PROCEDURE — 80053 COMPREHEN METABOLIC PANEL: CPT

## 2020-08-10 PROCEDURE — 85025 COMPLETE CBC W/AUTO DIFF WBC: CPT

## 2020-08-10 PROCEDURE — 2580000003 HC RX 258: Performed by: PHYSICIAN ASSISTANT

## 2020-08-10 PROCEDURE — 96360 HYDRATION IV INFUSION INIT: CPT

## 2020-08-10 RX ORDER — 0.9 % SODIUM CHLORIDE 0.9 %
500 INTRAVENOUS SOLUTION INTRAVENOUS ONCE
Status: COMPLETED | OUTPATIENT
Start: 2020-08-10 | End: 2020-08-10

## 2020-08-10 RX ORDER — CARVEDILOL 6.25 MG/1
12.5 TABLET ORAL ONCE
Status: DISCONTINUED | OUTPATIENT
Start: 2020-08-10 | End: 2020-08-10

## 2020-08-10 RX ORDER — CARVEDILOL 12.5 MG/1
12.5 TABLET ORAL 2 TIMES DAILY WITH MEALS
Qty: 60 TABLET | Status: CANCELLED | OUTPATIENT
Start: 2020-08-10

## 2020-08-10 RX ORDER — CARVEDILOL 12.5 MG/1
12.5 TABLET ORAL 2 TIMES DAILY WITH MEALS
COMMUNITY
End: 2020-08-17

## 2020-08-10 RX ORDER — CARVEDILOL 12.5 MG
12.5 TABLET ORAL 2 TIMES DAILY WITH MEALS
Qty: 60 TABLET | Refills: 3 | Status: SHIPPED
Start: 2020-08-10 | End: 2020-08-17

## 2020-08-10 RX ORDER — CARVEDILOL 12.5 MG
12.5 TABLET ORAL 2 TIMES DAILY WITH MEALS
COMMUNITY
End: 2020-08-17

## 2020-08-10 RX ORDER — PREDNISONE 10 MG/1
TABLET ORAL
Qty: 20 TABLET | Refills: 0 | Status: SHIPPED | OUTPATIENT
Start: 2020-08-10 | End: 2020-08-17

## 2020-08-10 RX ORDER — CARVEDILOL 12.5 MG
12.5 TABLET ORAL 2 TIMES DAILY WITH MEALS
COMMUNITY
End: 2020-08-10 | Stop reason: SDUPTHER

## 2020-08-10 RX ADMIN — IOPAMIDOL 70 ML: 755 INJECTION, SOLUTION INTRAVENOUS at 15:51

## 2020-08-10 RX ADMIN — SODIUM CHLORIDE 500 ML: 9 INJECTION, SOLUTION INTRAVENOUS at 15:12

## 2020-08-10 ASSESSMENT — PAIN SCALES - GENERAL: PAINLEVEL_OUTOF10: 9

## 2020-08-10 NOTE — CARE COORDINATION
Patient contacted regarding COVID-19 risk and screening. Patient stated she was preparing to go to see her PCP. She was diagnosed with having a reaction to her medication. This wasn't a good time. Plan for follow-up call in 3-5 days based on severity of symptoms and risk factors.

## 2020-08-10 NOTE — TELEPHONE ENCOUNTER
Stop toprol. Start coreg 12.5 mg BID. Eduard Liu D.O.   Cardiologist  Cardiology, [facility]

## 2020-08-10 NOTE — ED NOTES
Patient states that the feeling of something being in her throat returned after taking her medication at 1630. Requesting to stay for 15 minutes before leaving for discharge. Airway is clear.       Sandra Chen RN  08/10/20 [id number]

## 2020-08-10 NOTE — ED PROVIDER NOTES
ED Attending  CC: No                                                                                                                                    Department of Emergency Medicine   ED  Provider Note  Admit Date/RoomTime: 8/10/2020  2:29 PM  ED Room: [facility] [id number]        HPI:  8/10/20,   Time: 2:53 PM EDT         Ernie Haque is a 47 y.o. female presenting to the ED for globus sensation/swelling posterior pharynx, beginning few days ago. The complaint has been persistent, moderate in severity, and worsened by talking and swallowing. The patient states that she has a strange sensation in the back of her throat. She reports that it feels like the back of her tongue and throat is swollen. She states that it is definitely not normal for her. She has been thinking that she was having some sort of allergic reaction to 1 of her medications. Patient's been seen here several times for the same complaint. She states that they gave her Valium on one occasion. She actually spoke with her cardiologist just yesterday and was switched from metoprolol to Coreg because of a concern for an allergy. The patient denies any new medications. She reports a rash across both cheeks. The rash is not located in any other body part. Specifically she has no rash on her arms legs trunk or back. She is never had anything like this before. Again there are no new medications. She has not had any rounds of steroid and is not taking Benadryl. She does have a history of thyroid cancer and is status post thyroidectomy. She has had radiation treatments to the neck as well.    Pt reports she is eating and drinking normally        ROS:     Constitutional: Negative for fever and chills  HENT: Negative for ear pain, sore throat and sinus pressure  Eyes: Negative for pain, discharge and redness  Respiratory:  Negative for shortness of breath, cough and wheezing  Cardiovascular: Negative for CP, edema or palpitations  Gastrointestinal:  See HPI  Genitourinary: Negative for dysuria and frequency  Musculoskeletal: Negative for back pain and arthralgia  Skin: Negative for rash and wound  Neurological: Negative for weakness and headaches  Hematological: Negative for adenopathy    All other systems reviewed and are negative      -------------------------------- PAST HISTORY ----------------------------------  Past Medical History:  has a past medical history of Allergic rhinitis, Anxiety, Atrial fibrillation ([facility] [address] Cancer ([facility] Chest pain, Chronic back pain, Depression, Headache, Hypertension, NAFLD (nonalcoholic fatty liver disease), Obesity with body mass index (BMI) of 30.0 to 39.9, Osteoarthritis, Palpitations, Palpitations, Sleep apnea, Spinal stenosis, lumbar, and Thyroid disease. Past Surgical History:  has a past surgical history that includes ablation of dysrhythmic focus (1990); hysteroscopy (8/15/12); Colonoscopy; Endometrial ablation (2012); Thyroidectomy (Bilateral, 01/19/2017); Hysterectomy (07/25/2018); Breast surgery; Hysterectomy, vaginal; other surgical history (12/23/2019); and Insertable Cardiac Monitor (12/23/2019). Social History:  reports that she has never smoked. She has never used smokeless tobacco. She reports that she does not drink alcohol or use drugs. Family History: family history includes Atrial Fibrillation in her paternal grandfather; Breast Cancer in her paternal grandmother; Cancer in her father and maternal grandmother; Heart Disease in her maternal grandfather; High Blood Pressure in her father; High Cholesterol in her mother; Other in her daughter, paternal grandmother, and sister. The patients home medications have been reviewed.     Allergies: Narcan [naloxone hcl] and Fentanyl    --------------------------------- RESULTS ------------------------------------------  All laboratory and radiology results have been personally reviewed by myself   LABS:  Results for orders placed or performed during the hospital encounter of 08/10/20   CBC Auto Differential   Result Value Ref Range    WBC 10.6 4.5 - 11.5 E9/L    RBC 4.67 3.50 - 5.50 E12/L    Hemoglobin 13.9 11.5 - 15.5 g/dL    Hematocrit 41.8 34.0 - 48.0 %    MCV 89.5 80.0 - 99.9 fL    MCH 29.8 26.0 - 35.0 pg    MCHC 33.3 32.0 - 34.5 %    RDW 13.3 11.5 - 15.0 fL    Platelets 582 530 - 626 E9/L    MPV 10.3 7.0 - 12.0 fL    Neutrophils % 76.8 43.0 - 80.0 %    Immature Granulocytes % 1.1 0.0 - 5.0 %    Lymphocytes % 14.2 (L) 20.0 - 42.0 %    Monocytes % 7.0 2.0 - 12.0 %    Eosinophils % 0.2 0.0 - 6.0 %    Basophils % 0.7 0.0 - 2.0 %    Neutrophils Absolute 8.17 (H) 1.80 - 7.30 E9/L    Immature Granulocytes # 0.12 E9/L    Lymphocytes Absolute 1.51 1.50 - 4.00 E9/L    Monocytes Absolute 0.74 0.10 - 0.95 E9/L    Eosinophils Absolute 0.02 (L) 0.05 - 0.50 E9/L    Basophils Absolute 0.07 0.00 - 0.20 E9/L   Comprehensive Metabolic Panel   Result Value Ref Range    Sodium 140 132 - 146 mmol/L    Potassium 3.5 3.5 - 5.0 mmol/L    Chloride 99 98 - 107 mmol/L    CO2 24 22 - 29 mmol/L    Anion Gap 17 (H) 7 - 16 mmol/L    Glucose 102 (H) 74 - 99 mg/dL    BUN 11 6 - 20 mg/dL    CREATININE 0.8 0.5 - 1.0 mg/dL    GFR Non-African American >60 >=60 mL/min/1.73    GFR African American >60     Calcium 9.5 8.6 - 10.2 mg/dL    Total Protein 7.1 6.4 - 8.3 g/dL    Alb 4.1 3.5 - 5.2 g/dL    Total Bilirubin 0.7 0.0 - 1.2 mg/dL    Alkaline Phosphatase 66 35 - 104 U/L    ALT 45 (H) 0 - 32 U/L    AST 29 0 - 31 U/L   Lactic Acid, Plasma   Result Value Ref Range    Lactic Acid 1.3 0.5 - 2.2 mmol/L       RADIOLOGY:  Interpreted by Radiologist.  CT SOFT TISSUE NECK W CONTRAST   Final Result      1. No neck mass, edema or lymphadenopathy. 2. Status post thyroidectomy. ----------------- NURSING NOTES AND VITALS REVIEWED ---------------   The nursing notes within the ED encounter and vital signs as below have been reviewed.    BP (!) 152/96   Pulse 78 Temp 97.3 °F (36.3 °C) (Temporal)   Resp 16   Ht 5' 6\" (1.676 m)   Wt 215 lb (97.5 kg)   LMP 01/01/2013   SpO2 98%   BMI 34.70 kg/m²   Oxygen Saturation Interpretation: Normal      --------------------------------PHYSICAL EXAM------------------------------------      Constitutional/General: Alert and oriented x3, well appearing, non toxic in NAD  Head: NC/AT  Eyes: PERRL, EOMI  Clear PND. Mild cobblestoning. NO visible erythema or exudates. No visible mass. Mouth: Oropharynx clear, handling secretions, no trismus  Neck: Supple, full ROM, no meningeal signs  Pulmonary: Lungs clear to auscultation bilaterally, no wheezes, rales, or rhonchi. Not in respiratory distress  Cardiovascular:  Regular rate and rhythm, no murmurs, gallops, or rubs. 2+ distal pulses  Abdomen: Soft, + BS. No distension. Nontender. No palpable rigidity, rebound or guarding  Extremities: Moves all extremities x 4. Warm and well perfused  Skin: warm and dry without rash  Neurologic: GCS 15,  Intact. No focal deficits  Psych: Normal Affect      ------------------------ ED COURSE/MEDICAL DECISION MAKING----------------------  Medications   0.9 % sodium chloride bolus (0 mLs Intravenous Stopped 8/10/20 1623)   iopamidol (ISOVUE-370) 76 % injection 70 mL (70 mLs Intravenous Given 8/10/20 1551)         Medical Decision Making:    Seen and evaluated with Dr. Hipolito Youngblood. The patient's complaints were all over the place. It was very difficult to piece together the history. She seems convinced that 1 of the medications is making her throat swell even now the new ones after she been switched off of other medications. She is off the antibiotics. The Xanax was switched to Valium. She called Dr. Hollie Hernandez yesterday and he took her off of the metoprolol for Coreg. She is convinced that this is a medication reaction.   Her labs look great and the CAT scan soft tissue neck negative for any acute swelling lymphadenopathy or other acute changes. The patient wanted to take a dose of Valium to see if she had any reaction here in the ED. We will going to watch her here for a little bit longer but otherwise she can need to follow-up with Dr. Sanchez. I am not convinced that this all is not secondary to anxiety. I do see a little bit of clear postnasal drip but otherwise her exam is within normal limits. She was completely comfortable and in no distress at all throughout her visit. ED Course as of Aug 10 1704   Mon Aug 10, 2020   1628 PHYSICAL EXAM:  Vitals Reviewed  Constitutional/General: Alert and oriented x3, well appearing, non toxic in NAD  Head: Normocephalic and atraumatic  Eyes: PERRL, EOMI  Mouth: Oropharynx clear, handling secretions, no trismus  Neck: Supple, full ROM, No pharyngeal swelling or erythema. Uvula midline. No tonsillar enlargement or exudate. No evidence of peritonsillar abscess. No tongue swelling or lip swelling. No soft palate elevation or evidence of dianelys's. No stridor or trismus. No nuchal rigidity. Pulmonary: Lungs clear to auscultation bilaterally, no wheezes, rales, or rhonchi. Not in respiratory distress  Cardiovascular:  Regular rate and rhythm, no murmurs, gallops, or rubs. 2+ distal pulses  Abdomen: Soft, non tender, non distended,   Extremities: Moves all extremities x 4. Warm and well perfused  Skin: warm and dry without rash  Neurologic: GCS 15, no focal motor or sensory deficits  Psych: Normal Affect        [JA]      ED Course User Index  [JA] Stacey Raman MD       Counseling: The emergency provider has spoken with the patient and spouse/SO and discussed todays results, in addition to providing specific details for the plan of care and counseling regarding the diagnosis and prognosis. Questions are answered at this time and they are agreeable with the plan.      ------------------------ IMPRESSION AND DISPOSITION -------------------------------    IMPRESSION  1.  Globus sensation DISPOSITION  Disposition: Discharge to home  Patient condition is stable                   Jyoti March PA-C  08/10/20 1654       Jyoti March PA-C  08/10/20 1704       Jyoti March PA-C  08/10/20 1707

## 2020-08-10 NOTE — TELEPHONE ENCOUNTER
Pt. Says she thinks she is allergic to metoprolol and that she had discussed changing meds with you yesterday, please advise

## 2020-08-10 NOTE — TELEPHONE ENCOUNTER
Pt. Wants brand not generic, pharmacy will not have it until tomorrow at 3pm, she is having anxiety and wants to know if she is ok without meds until then,please advise

## 2020-08-11 ENCOUNTER — TELEPHONE (OUTPATIENT)
Dept: CARDIOLOGY CLINIC | Age: 55
End: 2020-08-11

## 2020-08-11 ENCOUNTER — VIRTUAL VISIT (OUTPATIENT)
Dept: ENDOCRINOLOGY | Age: 55
End: 2020-08-11
Payer: COMMERCIAL

## 2020-08-11 PROCEDURE — 99443 PR PHYS/QHP TELEPHONE EVALUATION 21-30 MIN: CPT | Performed by: INTERNAL MEDICINE

## 2020-08-11 RX ORDER — ATENOLOL 50 MG/1
50 TABLET ORAL 2 TIMES DAILY
Qty: 30 TABLET | Refills: 5 | Status: SHIPPED
Start: 2020-08-11 | End: 2020-08-13 | Stop reason: DRUGHIGH

## 2020-08-11 RX ORDER — ATENOLOL 50 MG/1
50 TABLET ORAL 2 TIMES DAILY
COMMUNITY
End: 2020-08-11 | Stop reason: SDUPTHER

## 2020-08-11 NOTE — PROGRESS NOTES
Ismael Lyon is a 47 y.o. female evaluated via telephone on 8/11/2020. Consent:  She and/or health care decision maker is aware that that she may receive a bill for this telephone service, depending on her insurance coverage, and has provided verbal consent to proceed: Yes    Documentation:  I communicated with the patient and/or health care decision maker about PTC and hypothyroidism. Details of this discussion including any medical advice provided are as follows: Aamir Smith Thyroid Cancer - stable, no signs of recurrence       PTC Stage I, [id number],     Excellent response to therapy (total thyroidectomy, 100mCi BERG)       Risk of recurrence is 1-4% for the next 5years. 09/04/18 US thyroid: no change in the adenopathy that was seen on the 5/17/18 US.       02/11/20 US thyroid: no change in the appearance of the lymph nodes           05/28/19 Tg <0.1, Tg Ab <0.9       07/22/20 Tg <0.1, Tg Ab <0.9         TSH should be maintained at 0.5-2 per the 2015 JOSE guidelines. B/c of her arrhythmias, the goal is a level just below 2.0.       10/29/19 TSH 0.633 on LT4 112 mcg Mon-Sat and 1/2 tab on Sunday. 06/28/20 TSH 0.636 on LT4 100 mcg daily       07/19/20 TSH 6.950 on LT4 100 mcg daily (had gallbladder out 7/2/20)       07/22/20 TSH 7.640 on LT4 100 mcg daily       08/10/20 TSH 5.080 on LT4 100 mcg daily    PFSH -   Medical problems: Anxiety, Thyroid cancer, hypothyroidism (acquired) HTN, palpitations, BPPV, Afib with hx of ablation  Medications: Diazepam, mag-ox, metoprolol succinate, synthroid, Vit D-3 1000, furosemide prn    ROS -   Gen: no fever  Pulm: no cough    PE -   Gen:    WN, WD, NAD  Lung: Nml resp effort  Psych: Normal mood   Full affect    A/P -   1. Thyroid Cancer - stable, no signs of recurrence       PTC Stage I, [id number] (0.5cm),     Excellent response to therapy (total thyroidectomy, 100mCi BERG)       Risk of recurrence is 1-4% for the next 5years.        TSH should be maintained at

## 2020-08-11 NOTE — TELEPHONE ENCOUNTER
Pt. Started atenolol 50mg bid today, she says after taking the first dose she feels tired, lightheaded and dizzy,her pulse is 61. she is asking if she should try 1/2 pill twice daily

## 2020-08-11 NOTE — TELEPHONE ENCOUNTER
Just do the atenolol for now until we figure out what medication the reaction is coming from. Rivera Toscano D.O.   Cardiologist  Cardiology, [facility]

## 2020-08-11 NOTE — TELEPHONE ENCOUNTER
Prescribe atenolol 50 mg BID. Tell her even if she has to pay cash it is cheap and to please get it and start today. Andres Dominguez D.O.   Cardiologist  Cardiology, [facility]

## 2020-08-12 NOTE — TELEPHONE ENCOUNTER
Yes reduce to 1/2 pill BID. Andres Dominguez D.O.   Cardiologist  Cardiology, [facility]

## 2020-08-13 ENCOUNTER — TELEPHONE (OUTPATIENT)
Dept: CARDIOLOGY CLINIC | Age: 55
End: 2020-08-13

## 2020-08-13 RX ORDER — ATENOLOL 25 MG/1
12.5 TABLET ORAL 2 TIMES DAILY
COMMUNITY
End: 2020-08-13 | Stop reason: SDUPTHER

## 2020-08-13 RX ORDER — ATENOLOL 25 MG/1
12.5 TABLET ORAL 2 TIMES DAILY
Qty: 30 TABLET | Refills: 5 | Status: SHIPPED
Start: 2020-08-13 | End: 2020-08-17

## 2020-08-13 NOTE — PROGRESS NOTES
Dr Shane Toro I did not see the TSH order to be repeated in 6 weeks per last note.  Please advise If I should mail this out with her AVS

## 2020-08-17 ENCOUNTER — OFFICE VISIT (OUTPATIENT)
Dept: CARDIOLOGY CLINIC | Age: 55
End: 2020-08-17
Payer: COMMERCIAL

## 2020-08-17 ENCOUNTER — NURSE ONLY (OUTPATIENT)
Dept: NON INVASIVE DIAGNOSTICS | Age: 55
End: 2020-08-17
Payer: COMMERCIAL

## 2020-08-17 VITALS
HEIGHT: 66 IN | BODY MASS INDEX: 35.03 KG/M2 | DIASTOLIC BLOOD PRESSURE: 86 MMHG | WEIGHT: 218 LBS | HEART RATE: 101 BPM | SYSTOLIC BLOOD PRESSURE: 134 MMHG

## 2020-08-17 PROCEDURE — G8427 DOCREV CUR MEDS BY ELIG CLIN: HCPCS | Performed by: INTERNAL MEDICINE

## 2020-08-17 PROCEDURE — 3017F COLORECTAL CA SCREEN DOC REV: CPT | Performed by: INTERNAL MEDICINE

## 2020-08-17 PROCEDURE — G8417 CALC BMI ABV UP PARAM F/U: HCPCS | Performed by: INTERNAL MEDICINE

## 2020-08-17 PROCEDURE — 93298 REM INTERROG DEV EVAL SCRMS: CPT | Performed by: INTERNAL MEDICINE

## 2020-08-17 PROCEDURE — 99214 OFFICE O/P EST MOD 30 MIN: CPT | Performed by: INTERNAL MEDICINE

## 2020-08-17 PROCEDURE — 1036F TOBACCO NON-USER: CPT | Performed by: INTERNAL MEDICINE

## 2020-08-17 PROCEDURE — G2066 INTER DEVC REMOTE 30D: HCPCS | Performed by: INTERNAL MEDICINE

## 2020-08-17 PROCEDURE — 93000 ELECTROCARDIOGRAM COMPLETE: CPT | Performed by: INTERNAL MEDICINE

## 2020-08-17 RX ORDER — METOPROLOL SUCCINATE 50 MG/1
50 TABLET, EXTENDED RELEASE ORAL DAILY
Qty: 90 TABLET | Refills: 3 | Status: SHIPPED
Start: 2020-08-17 | End: 2020-09-03

## 2020-08-17 RX ORDER — PREDNISONE 20 MG/1
20 TABLET ORAL 2 TIMES DAILY
COMMUNITY
End: 2020-09-02

## 2020-08-17 RX ORDER — METOPROLOL SUCCINATE 25 MG/1
25 TABLET, EXTENDED RELEASE ORAL DAILY
COMMUNITY
End: 2020-08-17

## 2020-08-17 NOTE — PROGRESS NOTES
CHIEF COMPLAINT: Palpitations/HTN    HISTORY OF PRESENT ILLNESS: Patient is a 47 y.o. female seen at the request of Nila Trinidad MD    Patient seen in follow up. Hx of palpitations. Increased palpitations and tachycardia. No CP. Some SOB.      Past Medical History:   Diagnosis Date    Allergic rhinitis     Anxiety     Atrial fibrillation (HCC)     Cancer (HCC)     thyroid    Chest pain     Chronic back pain     Depression     Headache     Hypertension     NAFLD (nonalcoholic fatty liver disease)     By CT    Obesity with body mass index (BMI) of 30.0 to 39.9 6/28/2020    Osteoarthritis     Palpitations     Palpitations 12/2019    Sleep apnea     Spinal stenosis, lumbar 01/14/2020    Severe central stenosis by CT of abdomen, L1-L2    Thyroid disease     PTC microcarcinoma (0.5 cm) treated by thyroidectomy and BERG 100 mci       Patient Active Problem List   Diagnosis    BPPV (benign paroxysmal positional vertigo)    SVT (supraventricular tachycardia) (HCC)    Anxiety    Dyspnea on exertion    Intermittent palpitations    Essential hypertension    Morbid obesity due to excess calories (HCC)    Palpitations    Chest pain    Thyroid cancer ([facility] [address]    Hypothyroidism (acquired)    Weight gain    Dizziness    Biliary colic    Obesity with body mass index (BMI) of 30.0 to 39.9    Spinal stenosis, lumbar    NAFLD (nonalcoholic fatty liver disease)     Allergies   Allergen Reactions    Narcan [Naloxone Hcl]     Fentanyl        Current Outpatient Medications   Medication Sig Dispense Refill    metoprolol succinate (TOPROL XL) 25 MG extended release tablet Take 25 mg by mouth daily      predniSONE (DELTASONE) 20 MG tablet Take 20 mg by mouth 2 times daily      potassium chloride (KLOR-CON M) 20 MEQ extended release tablet Take 2 tablets by mouth daily 10 tablet 0    senna (SENOKOT) 8.6 MG TABS tablet TAKE 1 OR 2 TABLETS BY MOUTH EVERY EVENING      furosemide (LASIX) 20 MG tablet brain cancer    Other Sister         nephrectomy due to a benign tumor    Breast Cancer Paternal Grandmother     Other Paternal Grandmother         bowel    Other Daughter         IGG immunodeficiency    Cancer Maternal Grandmother         lung, cervical    Heart Disease Maternal Grandfather     Atrial Fibrillation Paternal Grandfather      Review of Systems:  Heart: as above   Lungs: as above   Eyes: denies changes in vision or discharge. Ears: denies changes in hearing or pain. Nose: denies epistaxis or masses   Throat: denies sore throat or trouble swallowing. Neuro: denies numbness, tingling, tremors. Skin: denies rashes or itching. : denies hematuria, dysuria   GI: denies vomiting, diarrhea   Psych: denies mood changed, anxiety, depression. All other systems negative. Physical Exam   /86   Pulse 101   Ht 5' 6\" (1.676 m)   Wt 218 lb (98.9 kg)   LMP 01/01/2013   BMI 35.19 kg/m²   Constitutional: Oriented to person, place, and time. Well-developed and well-nourished. No distress. Head: Normocephalic and atraumatic. Eyes: EOM are normal. Pupils are equal, round, and reactive to light. Neck: Normal range of motion. Neck supple. No hepatojugular reflux and no JVD present. Carotid bruit is not present. No tracheal deviation present. No thyromegaly present. Cardiovascular: Normal rate, regular rhythm, normal heart sounds and intact distal pulses. Exam reveals no gallop and no friction rub. No murmur heard. Pulmonary/Chest: Effort normal and breath sounds normal. No respiratory distress. No wheezes. No rales. No tenderness. Abdominal: Soft. Bowel sounds are normal. No distension and no mass. No tenderness. No rebound and no guarding. Musculoskeletal: Normal range of motion. Tender lump left lateral arm. Lymphadenopathy:   No cervical adenopathy. No groin adenopathy. Neurological: Alert and oriented to person, place, and time. Skin: Skin is warm and dry.  No rash noted. Not diaphoretic. No erythema. Psychiatric: Normal mood and affect. Behavior is normal.     EKG:  normal sinus rhythm, nonspecific ST and T waves changes. ASSESSMENT AND PLAN:  Patient Active Problem List   Diagnosis    BPPV (benign paroxysmal positional vertigo)    SVT (supraventricular tachycardia) (HCC)    Anxiety    Dyspnea on exertion    Intermittent palpitations    Essential hypertension    Morbid obesity due to excess calories (HCC)    Palpitations    Chest pain    Thyroid cancer ([facility] [address]    Hypothyroidism (acquired)    Weight gain    Dizziness    Biliary colic    Obesity with body mass index (BMI) of 30.0 to 39.9    Spinal stenosis, lumbar    NAFLD (nonalcoholic fatty liver disease)     1. Palpitations/Hx of SVT ablation:  Echo normal 7/16.      30 day monitor unremarkable aside from PVC's and PAC's 9/16.      Pharm stress normal 11/1/16. Still with intermittent episodes with severe symptoms.      Continue titrated Toprol. Following with EP. Has LINQ. 2. Hypothyroid: Following with Endo. 3. HTN: Observe. 4. Edema: Lasix PRN. 5. OLU    6. Thrust?: Swish and swallow. Catalina Soto D.O.   Cardiologist  Cardiology, [facility]

## 2020-08-17 NOTE — PROGRESS NOTES
See [employer] report. Remote monitoring reviewed over a 30 day period.   End of 30 day monitoring period date of service 08/17/2020

## 2020-08-18 ENCOUNTER — CARE COORDINATION (OUTPATIENT)
Dept: CASE MANAGEMENT | Age: 55
End: 2020-08-18

## 2020-08-18 NOTE — CARE COORDINATION
You Patient resolved from the Care Transitions episode on 8/18/2020  Discussed COVID-19 related testing which was available at this time. Test results were negative. Patient informed of results, if available? Yes    Patient/family has been provided the following resources and education related to COVID-19:                         Signs, symptoms and red flags related to COVID-19            CDC exposure and quarantine guidelines            [employer] exposure contact - [phone]            Contact for their local Department of Health                 Patient currently reports that the following symptoms have improved: pain or aching joints    No further outreach scheduled with this CTN/ACM. Episode of Care resolved. Patient has this CTN/ACM contact information if future needs arise.

## 2020-08-23 ENCOUNTER — HOSPITAL ENCOUNTER (EMERGENCY)
Age: 55
Discharge: HOME | End: 2020-08-23
Attending: EMERGENCY MEDICINE
Payer: COMMERCIAL

## 2020-08-23 ENCOUNTER — APPOINTMENT (OUTPATIENT)
Dept: GENERAL RADIOLOGY | Age: 55
End: 2020-08-23
Payer: COMMERCIAL

## 2020-08-23 VITALS
HEIGHT: 66 IN | SYSTOLIC BLOOD PRESSURE: 126 MMHG | RESPIRATION RATE: 16 BRPM | DIASTOLIC BLOOD PRESSURE: 82 MMHG | OXYGEN SATURATION: 100 % | BODY MASS INDEX: 33.75 KG/M2 | TEMPERATURE: 98.3 F | WEIGHT: 210 LBS | HEART RATE: 91 BPM

## 2020-08-23 LAB
ALBUMIN SERPL-MCNC: 3.6 G/DL (ref 3.5–5.2)
ALP BLD-CCNC: 58 U/L (ref 35–104)
ALT SERPL-CCNC: 39 U/L (ref 0–32)
ANION GAP SERPL CALCULATED.3IONS-SCNC: 12 MMOL/L (ref 7–16)
AST SERPL-CCNC: 23 U/L (ref 0–31)
BASOPHILS ABSOLUTE: 0.02 E9/L (ref 0–0.2)
BASOPHILS RELATIVE PERCENT: 0.2 % (ref 0–2)
BILIRUB SERPL-MCNC: 0.7 MG/DL (ref 0–1.2)
BUN BLDV-MCNC: 12 MG/DL (ref 6–20)
CALCIUM SERPL-MCNC: 9.2 MG/DL (ref 8.6–10.2)
CHLORIDE BLD-SCNC: 101 MMOL/L (ref 98–107)
CO2: 25 MMOL/L (ref 22–29)
CREAT SERPL-MCNC: 0.7 MG/DL (ref 0.5–1)
EKG ATRIAL RATE: 78 BPM
EKG P AXIS: 5 DEGREES
EKG P-R INTERVAL: 184 MS
EKG Q-T INTERVAL: 384 MS
EKG QRS DURATION: 82 MS
EKG QTC CALCULATION (BAZETT): 437 MS
EKG R AXIS: 2 DEGREES
EKG T AXIS: -31 DEGREES
EKG VENTRICULAR RATE: 78 BPM
EOSINOPHILS ABSOLUTE: 0.07 E9/L (ref 0.05–0.5)
EOSINOPHILS RELATIVE PERCENT: 0.9 % (ref 0–6)
GFR AFRICAN AMERICAN: >60
GFR NON-AFRICAN AMERICAN: >60 ML/MIN/1.73
GLUCOSE BLD-MCNC: 102 MG/DL (ref 74–99)
HCT VFR BLD CALC: 42.6 % (ref 34–48)
HEMOGLOBIN: 14.2 G/DL (ref 11.5–15.5)
IMMATURE GRANULOCYTES #: 0.03 E9/L
IMMATURE GRANULOCYTES %: 0.4 % (ref 0–5)
LYMPHOCYTES ABSOLUTE: 1.36 E9/L (ref 1.5–4)
LYMPHOCYTES RELATIVE PERCENT: 17 % (ref 20–42)
MCH RBC QN AUTO: 30.3 PG (ref 26–35)
MCHC RBC AUTO-ENTMCNC: 33.3 % (ref 32–34.5)
MCV RBC AUTO: 91 FL (ref 80–99.9)
MONOCYTES ABSOLUTE: 0.51 E9/L (ref 0.1–0.95)
MONOCYTES RELATIVE PERCENT: 6.4 % (ref 2–12)
NEUTROPHILS ABSOLUTE: 6.02 E9/L (ref 1.8–7.3)
NEUTROPHILS RELATIVE PERCENT: 75.1 % (ref 43–80)
PDW BLD-RTO: 13.2 FL (ref 11.5–15)
PLATELET # BLD: 273 E9/L (ref 130–450)
PMV BLD AUTO: 9.8 FL (ref 7–12)
POTASSIUM SERPL-SCNC: 4.1 MMOL/L (ref 3.5–5)
RBC # BLD: 4.68 E12/L (ref 3.5–5.5)
SODIUM BLD-SCNC: 138 MMOL/L (ref 132–146)
TOTAL PROTEIN: 6.8 G/DL (ref 6.4–8.3)
TROPONIN: <0.01 NG/ML (ref 0–0.03)
WBC # BLD: 8 E9/L (ref 4.5–11.5)

## 2020-08-23 PROCEDURE — 99283 EMERGENCY DEPT VISIT LOW MDM: CPT

## 2020-08-23 PROCEDURE — 36415 COLL VENOUS BLD VENIPUNCTURE: CPT

## 2020-08-23 PROCEDURE — 6370000000 HC RX 637 (ALT 250 FOR IP): Performed by: EMERGENCY MEDICINE

## 2020-08-23 PROCEDURE — 93005 ELECTROCARDIOGRAM TRACING: CPT | Performed by: EMERGENCY MEDICINE

## 2020-08-23 PROCEDURE — 74018 RADEX ABDOMEN 1 VIEW: CPT

## 2020-08-23 PROCEDURE — 96360 HYDRATION IV INFUSION INIT: CPT

## 2020-08-23 PROCEDURE — 93010 ELECTROCARDIOGRAM REPORT: CPT | Performed by: INTERNAL MEDICINE

## 2020-08-23 PROCEDURE — 85025 COMPLETE CBC W/AUTO DIFF WBC: CPT

## 2020-08-23 PROCEDURE — 2580000003 HC RX 258: Performed by: EMERGENCY MEDICINE

## 2020-08-23 PROCEDURE — 80053 COMPREHEN METABOLIC PANEL: CPT

## 2020-08-23 PROCEDURE — 84484 ASSAY OF TROPONIN QUANT: CPT

## 2020-08-23 RX ORDER — 0.9 % SODIUM CHLORIDE 0.9 %
1000 INTRAVENOUS SOLUTION INTRAVENOUS ONCE
Status: COMPLETED | OUTPATIENT
Start: 2020-08-23 | End: 2020-08-23

## 2020-08-23 RX ADMIN — SODIUM CHLORIDE 1000 ML: 9 INJECTION, SOLUTION INTRAVENOUS at 12:00

## 2020-08-23 RX ADMIN — MAGNESIUM CITRATE 296 ML: 1.75 LIQUID ORAL at 12:37

## 2020-08-23 ASSESSMENT — PAIN DESCRIPTION - DESCRIPTORS: DESCRIPTORS: SHARP

## 2020-08-23 ASSESSMENT — PAIN DESCRIPTION - LOCATION: LOCATION: ABDOMEN

## 2020-08-23 ASSESSMENT — PAIN DESCRIPTION - PAIN TYPE: TYPE: ACUTE PAIN

## 2020-08-23 ASSESSMENT — PAIN SCALES - GENERAL: PAINLEVEL_OUTOF10: 5

## 2020-08-23 ASSESSMENT — PAIN DESCRIPTION - ORIENTATION: ORIENTATION: RIGHT;LOWER

## 2020-08-23 NOTE — ED PROVIDER NOTES
HPI:  8/23/20, Time: 11:14 AM EDT         Leonel Barrientos is a 47 y.o. female presenting to the ED for constipation for 10 days. Patient states she hasn't had a BM for at least 10 days. She called her bowel doctor, Dr Apple Clark, who recommended Miralax and a Dulcolax suppository and it didn't work. She has not tried anything else. She reports no previous bowel issues but then reports histoyr of irritable bowel syndrome and has had colonoscopy in the past. She denies nausea, emesis or abdominal pain at this time. The complaint has been persistent, moderate in severity, and worsened by nothing. Hasn't been eating her regular diet with Raisin Bran daily or drinking plenty of water daily since her gallbladder removal one month ago. Patient denies fever/chills, cough, congestion, chest pain, shortness of breath, edema, headache, visual disturbances, focal paresthesias, focal weakness, abdominal pain, nausea, vomiting, diarrhea, dysuria, hematuria, trauma, neck or back pain or other complaints. ROS:   Pertinent positives and negatives are stated within HPI, all other systems reviewed and are negative.      --------------------------------------------- PAST HISTORY ---------------------------------------------  Past Medical History:  has a past medical history of Allergic rhinitis, Anxiety, Atrial fibrillation ([facility] [address] Cancer ([facility] [address] Chest pain, Chronic back pain, Depression, Headache, Hypertension, NAFLD (nonalcoholic fatty liver disease), Obesity with body mass index (BMI) of 30.0 to 39.9, Osteoarthritis, Palpitations, Palpitations, Sleep apnea, Spinal stenosis, lumbar, and Thyroid disease. Past Surgical History:  has a past surgical history that includes ablation of dysrhythmic focus (1990); hysteroscopy (8/15/12); Colonoscopy; Endometrial ablation (2012); Thyroidectomy (Bilateral, 01/19/2017); Hysterectomy (07/25/2018); Breast surgery; Hysterectomy, vaginal; other surgical history (12/23/2019);  Insertable Cardiac Monitor (12/23/2019); Gallbladder surgery (07/2020); and Cholecystectomy. Social History:  reports that she has never smoked. She has never used smokeless tobacco. She reports that she does not drink alcohol or use drugs. Family History: family history includes Atrial Fibrillation in her paternal grandfather; Breast Cancer in her paternal grandmother; Cancer in her father and maternal grandmother; Heart Disease in her maternal grandfather; High Blood Pressure in her father; High Cholesterol in her mother; Other in her daughter, paternal grandmother, and sister. The patients home medications have been reviewed. Allergies: Narcan [naloxone hcl] and Fentanyl        ---------------------------------------------------PHYSICAL EXAM--------------------------------------    Constitutional:  Well developed, well nourished, obese, no acute distress, non-toxic appearance   Eyes:  PERRL, conjunctiva normal, EOMI  HENT:  Atraumatic, external ears normal, nose normal, oropharynx moist. Neck- normal range of motion, no nuchal rigidity   Respiratory:  No respiratory distress, normal breath sounds, no rales, no wheezing   Cardiovascular:  Normal rate, normal rhythm, no murmurs, no gallops, no rubs. Radialpulses 2+ bilaterally. GI:  Soft, nondistended, normal bowel sounds, nontender, no rebound, no guarding   :  No costovertebral angle tenderness   Musculoskeletal:  No edema,  no deformities. Back- no tenderness  Integument:  Well hydrated. Adequate perfusion. Neurologic:  Alert & oriented x 3, CN 2-12 normal, normal gait, no focal deficits noted. Psychiatric:  Speech and behavior appropriate     EKG:  Time: 1217pm  This EKG is signed and interpreted by me.     Rate: 78  Rhythm: Sinus  Interpretation: non-specific eKG  Comparison: stable as compared to patient's most recent EKG    -------------------------------------------------- RESULTS -------------------------------------------------  I have personally reviewed all laboratory and imaging results for this patient. Results are listed below.      LABS:  Results for orders placed or performed during the hospital encounter of 08/23/20   CBC auto differential   Result Value Ref Range    WBC 8.0 4.5 - 11.5 E9/L    RBC 4.68 3.50 - 5.50 E12/L    Hemoglobin 14.2 11.5 - 15.5 g/dL    Hematocrit 42.6 34.0 - 48.0 %    MCV 91.0 80.0 - 99.9 fL    MCH 30.3 26.0 - 35.0 pg    MCHC 33.3 32.0 - 34.5 %    RDW 13.2 11.5 - 15.0 fL    Platelets 953 841 - 313 E9/L    MPV 9.8 7.0 - 12.0 fL    Neutrophils % 75.1 43.0 - 80.0 %    Immature Granulocytes % 0.4 0.0 - 5.0 %    Lymphocytes % 17.0 (L) 20.0 - 42.0 %    Monocytes % 6.4 2.0 - 12.0 %    Eosinophils % 0.9 0.0 - 6.0 %    Basophils % 0.2 0.0 - 2.0 %    Neutrophils Absolute 6.02 1.80 - 7.30 E9/L    Immature Granulocytes # 0.03 E9/L    Lymphocytes Absolute 1.36 (L) 1.50 - 4.00 E9/L    Monocytes Absolute 0.51 0.10 - 0.95 E9/L    Eosinophils Absolute 0.07 0.05 - 0.50 E9/L    Basophils Absolute 0.02 0.00 - 0.20 E9/L   Comprehensive Metabolic Panel   Result Value Ref Range    Sodium 138 132 - 146 mmol/L    Potassium 4.1 3.5 - 5.0 mmol/L    Chloride 101 98 - 107 mmol/L    CO2 25 22 - 29 mmol/L    Anion Gap 12 7 - 16 mmol/L    Glucose 102 (H) 74 - 99 mg/dL    BUN 12 6 - 20 mg/dL    CREATININE 0.7 0.5 - 1.0 mg/dL    GFR Non-African American >60 >=60 mL/min/1.73    GFR African American >60     Calcium 9.2 8.6 - 10.2 mg/dL    Total Protein 6.8 6.4 - 8.3 g/dL    Alb 3.6 3.5 - 5.2 g/dL    Total Bilirubin 0.7 0.0 - 1.2 mg/dL    Alkaline Phosphatase 58 35 - 104 U/L    ALT 39 (H) 0 - 32 U/L    AST 23 0 - 31 U/L   Troponin   Result Value Ref Range    Troponin <0.01 0.00 - 0.03 ng/mL   EKG 12 Lead   Result Value Ref Range    Ventricular Rate 78 BPM    Atrial Rate 78 BPM    P-R Interval 184 ms    QRS Duration 82 ms    Q-T Interval 384 ms    QTc Calculation (Bazett) 437 ms    P Axis 5 degrees    R Axis 2 degrees    T Axis -31 degrees RADIOLOGY:  Interpreted by Radiologist.  XR ABDOMEN (KUB) (SINGLE AP VIEW)   Final Result      NON-SPECIFIC GAS PATTERN.              ------------------------- NURSING NOTES AND VITALS REVIEWED ---------------------------   The nursing notes within the ED encounter and vital signs as below have been reviewed by myself. /82   Pulse 91   Temp 98.3 °F (36.8 °C) (Infrared)   Resp 16   Ht 5' 6\" (1.676 m)   Wt 210 lb (95.3 kg)   LMP 2013   SpO2 100%   BMI 33.89 kg/m²   Oxygen Saturation Interpretation: Normal    The patients available past medical records and past encounters were reviewed. ------------------------------ ED COURSE/MEDICAL DECISION MAKING----------------------  Medications   0.9 % sodium chloride bolus (0 mLs Intravenous Stopped 20 1315)   magnesium citrate solution 296 mL (296 mLs Oral Given 20 1237)           Procedures:  none      Medical Decision Makin:50 AM EDT  After results of xray given, she states she's very weak and dizzy and wants blood work and IV fluids. Neg acute   Feels better   Magnesium citrate to drink at home for bowels. F/u with Dr Manpreet Gipson   Patient was explicitly instructed on specific signs and symptoms on which to return to the emergency room for. Patient was instructed to return to the ER for any new or worsening symptoms. Additional discharge instructions were given verbally. All questions were answered. Patient is comfortable and agreeable with discharge plan. Patient in no acute distress and non-toxic in appearance. This patient's ED course included: re-evaluation prior to disposition and a personal history and physicial eaxmination    This patient has remained hemodynamically stable, improved and been closely monitored during their ED course. Re-Evaluations:             Time: 1:17 PM EDT  Re-evaluation.   Patients symptoms show improvement (no dizziness)  Repeat physical examination is not

## 2020-08-24 ENCOUNTER — CARE COORDINATION (OUTPATIENT)
Dept: CARE COORDINATION | Age: 55
End: 2020-08-24

## 2020-09-02 ENCOUNTER — HOSPITAL ENCOUNTER (EMERGENCY)
Age: 55
Discharge: HOME | End: 2020-09-02
Payer: COMMERCIAL

## 2020-09-02 ENCOUNTER — APPOINTMENT (OUTPATIENT)
Dept: CT IMAGING | Age: 55
End: 2020-09-02
Payer: COMMERCIAL

## 2020-09-02 VITALS
RESPIRATION RATE: 16 BRPM | OXYGEN SATURATION: 99 % | HEART RATE: 106 BPM | WEIGHT: 210 LBS | BODY MASS INDEX: 33.75 KG/M2 | TEMPERATURE: 98.2 F | HEIGHT: 66 IN | SYSTOLIC BLOOD PRESSURE: 147 MMHG | DIASTOLIC BLOOD PRESSURE: 86 MMHG

## 2020-09-02 LAB
ALBUMIN SERPL-MCNC: 4 G/DL (ref 3.5–5.2)
ALP BLD-CCNC: 63 U/L (ref 35–104)
ALT SERPL-CCNC: 53 U/L (ref 0–32)
ANION GAP SERPL CALCULATED.3IONS-SCNC: 15 MMOL/L (ref 7–16)
AST SERPL-CCNC: 34 U/L (ref 0–31)
BACTERIA: ABNORMAL /HPF
BASOPHILS ABSOLUTE: 0.03 E9/L (ref 0–0.2)
BASOPHILS RELATIVE PERCENT: 0.5 % (ref 0–2)
BILIRUB SERPL-MCNC: 0.8 MG/DL (ref 0–1.2)
BILIRUBIN URINE: ABNORMAL
BLOOD, URINE: ABNORMAL
BUN BLDV-MCNC: 10 MG/DL (ref 6–20)
CALCIUM SERPL-MCNC: 9.2 MG/DL (ref 8.6–10.2)
CHLORIDE BLD-SCNC: 103 MMOL/L (ref 98–107)
CLARITY: CLEAR
CO2: 23 MMOL/L (ref 22–29)
COLOR: YELLOW
CREAT SERPL-MCNC: 0.7 MG/DL (ref 0.5–1)
EOSINOPHILS ABSOLUTE: 0.05 E9/L (ref 0.05–0.5)
EOSINOPHILS RELATIVE PERCENT: 0.9 % (ref 0–6)
GFR AFRICAN AMERICAN: >60
GFR NON-AFRICAN AMERICAN: >60 ML/MIN/1.73
GLUCOSE BLD-MCNC: 101 MG/DL (ref 74–99)
GLUCOSE URINE: NEGATIVE MG/DL
HCT VFR BLD CALC: 39.3 % (ref 34–48)
HEMOGLOBIN: 13.4 G/DL (ref 11.5–15.5)
IMMATURE GRANULOCYTES #: 0.02 E9/L
IMMATURE GRANULOCYTES %: 0.4 % (ref 0–5)
KETONES, URINE: >=80 MG/DL
LACTIC ACID: 1.2 MMOL/L (ref 0.5–2.2)
LEUKOCYTE ESTERASE, URINE: ABNORMAL
LYMPHOCYTES ABSOLUTE: 1.4 E9/L (ref 1.5–4)
LYMPHOCYTES RELATIVE PERCENT: 25.1 % (ref 20–42)
MCH RBC QN AUTO: 30.7 PG (ref 26–35)
MCHC RBC AUTO-ENTMCNC: 34.1 % (ref 32–34.5)
MCV RBC AUTO: 90.1 FL (ref 80–99.9)
MONOCYTES ABSOLUTE: 0.5 E9/L (ref 0.1–0.95)
MONOCYTES RELATIVE PERCENT: 9 % (ref 2–12)
NEUTROPHILS ABSOLUTE: 3.58 E9/L (ref 1.8–7.3)
NEUTROPHILS RELATIVE PERCENT: 64.1 % (ref 43–80)
NITRITE, URINE: NEGATIVE
PDW BLD-RTO: 13.3 FL (ref 11.5–15)
PH UA: 6 (ref 5–9)
PLATELET # BLD: 227 E9/L (ref 130–450)
PMV BLD AUTO: 9.8 FL (ref 7–12)
POTASSIUM SERPL-SCNC: 3.5 MMOL/L (ref 3.5–5)
PROTEIN UA: ABNORMAL MG/DL
RBC # BLD: 4.36 E12/L (ref 3.5–5.5)
RBC UA: ABNORMAL /HPF (ref 0–2)
SODIUM BLD-SCNC: 141 MMOL/L (ref 132–146)
SPECIFIC GRAVITY UA: 1.02 (ref 1–1.03)
TOTAL PROTEIN: 7.1 G/DL (ref 6.4–8.3)
UROBILINOGEN, URINE: 0.2 E.U./DL
WBC # BLD: 5.6 E9/L (ref 4.5–11.5)
WBC UA: ABNORMAL /HPF (ref 0–5)

## 2020-09-02 PROCEDURE — 2580000003 HC RX 258: Performed by: PHYSICIAN ASSISTANT

## 2020-09-02 PROCEDURE — 6370000000 HC RX 637 (ALT 250 FOR IP): Performed by: PHYSICIAN ASSISTANT

## 2020-09-02 PROCEDURE — 74177 CT ABD & PELVIS W/CONTRAST: CPT

## 2020-09-02 PROCEDURE — 2580000003 HC RX 258: Performed by: RADIOLOGY

## 2020-09-02 PROCEDURE — 93005 ELECTROCARDIOGRAM TRACING: CPT | Performed by: PHYSICIAN ASSISTANT

## 2020-09-02 PROCEDURE — 80053 COMPREHEN METABOLIC PANEL: CPT

## 2020-09-02 PROCEDURE — 83605 ASSAY OF LACTIC ACID: CPT

## 2020-09-02 PROCEDURE — 36415 COLL VENOUS BLD VENIPUNCTURE: CPT

## 2020-09-02 PROCEDURE — 6360000004 HC RX CONTRAST MEDICATION: Performed by: RADIOLOGY

## 2020-09-02 PROCEDURE — 99283 EMERGENCY DEPT VISIT LOW MDM: CPT

## 2020-09-02 PROCEDURE — 81001 URINALYSIS AUTO W/SCOPE: CPT

## 2020-09-02 PROCEDURE — 85025 COMPLETE CBC W/AUTO DIFF WBC: CPT

## 2020-09-02 PROCEDURE — 99282 EMERGENCY DEPT VISIT SF MDM: CPT

## 2020-09-02 RX ORDER — MAGNESIUM CARB/ALUMINUM HYDROX 105-160MG
150 TABLET,CHEWABLE ORAL DAILY
Qty: 300 ML | Refills: 0 | Status: SHIPPED | OUTPATIENT
Start: 2020-09-02 | End: 2020-09-04

## 2020-09-02 RX ORDER — SODIUM CHLORIDE 0.9 % (FLUSH) 0.9 %
10 SYRINGE (ML) INJECTION PRN
Status: DISCONTINUED | OUTPATIENT
Start: 2020-09-02 | End: 2020-09-02 | Stop reason: HOSPADM

## 2020-09-02 RX ORDER — 0.9 % SODIUM CHLORIDE 0.9 %
1000 INTRAVENOUS SOLUTION INTRAVENOUS ONCE
Status: COMPLETED | OUTPATIENT
Start: 2020-09-02 | End: 2020-09-02

## 2020-09-02 RX ORDER — METOCLOPRAMIDE 10 MG/1
10 TABLET ORAL 4 TIMES DAILY
Qty: 20 TABLET | Refills: 0 | Status: ON HOLD | OUTPATIENT
Start: 2020-09-02 | End: 2020-09-06 | Stop reason: HOSPADM

## 2020-09-02 RX ORDER — ALPRAZOLAM 3 MG/1
1.5 TABLET, EXTENDED RELEASE ORAL 3 TIMES DAILY
Status: ON HOLD | COMMUNITY
End: 2020-09-06

## 2020-09-02 RX ORDER — METOPROLOL SUCCINATE 25 MG/1
50 TABLET, EXTENDED RELEASE ORAL ONCE
Status: COMPLETED | OUTPATIENT
Start: 2020-09-02 | End: 2020-09-02

## 2020-09-02 RX ADMIN — Medication 10 ML: at 16:26

## 2020-09-02 RX ADMIN — SODIUM CHLORIDE 1000 ML: 9 INJECTION, SOLUTION INTRAVENOUS at 15:28

## 2020-09-02 RX ADMIN — METOPROLOL SUCCINATE 50 MG: 25 TABLET, EXTENDED RELEASE ORAL at 17:45

## 2020-09-02 RX ADMIN — IOPAMIDOL 100 ML: 755 INJECTION, SOLUTION INTRAVENOUS at 16:26

## 2020-09-02 ASSESSMENT — PAIN DESCRIPTION - LOCATION: LOCATION: ABDOMEN

## 2020-09-02 ASSESSMENT — PAIN DESCRIPTION - PAIN TYPE: TYPE: ACUTE PAIN

## 2020-09-02 ASSESSMENT — PAIN DESCRIPTION - FREQUENCY: FREQUENCY: CONTINUOUS

## 2020-09-02 ASSESSMENT — PAIN DESCRIPTION - PROGRESSION: CLINICAL_PROGRESSION: GRADUALLY WORSENING

## 2020-09-02 ASSESSMENT — PAIN SCALES - GENERAL: PAINLEVEL_OUTOF10: 6

## 2020-09-02 ASSESSMENT — PAIN DESCRIPTION - ONSET: ONSET: SUDDEN

## 2020-09-02 ASSESSMENT — PAIN DESCRIPTION - DESCRIPTORS: DESCRIPTORS: CRAMPING

## 2020-09-02 ASSESSMENT — PAIN DESCRIPTION - ORIENTATION: ORIENTATION: RIGHT;UPPER

## 2020-09-02 NOTE — ED NOTES
Patient states that she was feeling fluttering in her chest. Dr. Jamshid Castro notified and metoprolol and EKG ordered. Per Dr. Jamshid Castro, patient is able to be discharged. Patient instructed to return with any new or worsening symptoms. Patient educated on palpitations and different techniques to try to make them less frequent. Patient and family verbalized understanding. Patient states that she has an appointment with pcp tomorrow.       Kari Leal RN  09/02/20 [id number]

## 2020-09-03 ENCOUNTER — OFFICE VISIT (OUTPATIENT)
Dept: CARDIOLOGY CLINIC | Age: 55
End: 2020-09-03
Payer: COMMERCIAL

## 2020-09-03 VITALS
WEIGHT: 215 LBS | RESPIRATION RATE: 16 BRPM | SYSTOLIC BLOOD PRESSURE: 146 MMHG | DIASTOLIC BLOOD PRESSURE: 88 MMHG | BODY MASS INDEX: 35.82 KG/M2 | HEART RATE: 85 BPM | HEIGHT: 65 IN

## 2020-09-03 LAB
EKG ATRIAL RATE: 92 BPM
EKG P AXIS: 29 DEGREES
EKG P-R INTERVAL: 220 MS
EKG Q-T INTERVAL: 374 MS
EKG QRS DURATION: 72 MS
EKG QTC CALCULATION (BAZETT): 462 MS
EKG R AXIS: 34 DEGREES
EKG T AXIS: -57 DEGREES
EKG VENTRICULAR RATE: 92 BPM

## 2020-09-03 PROCEDURE — G8427 DOCREV CUR MEDS BY ELIG CLIN: HCPCS | Performed by: INTERNAL MEDICINE

## 2020-09-03 PROCEDURE — 3017F COLORECTAL CA SCREEN DOC REV: CPT | Performed by: INTERNAL MEDICINE

## 2020-09-03 PROCEDURE — 93010 ELECTROCARDIOGRAM REPORT: CPT | Performed by: INTERNAL MEDICINE

## 2020-09-03 PROCEDURE — 93000 ELECTROCARDIOGRAM COMPLETE: CPT | Performed by: INTERNAL MEDICINE

## 2020-09-03 PROCEDURE — 99214 OFFICE O/P EST MOD 30 MIN: CPT | Performed by: INTERNAL MEDICINE

## 2020-09-03 PROCEDURE — G8417 CALC BMI ABV UP PARAM F/U: HCPCS | Performed by: INTERNAL MEDICINE

## 2020-09-03 PROCEDURE — 1036F TOBACCO NON-USER: CPT | Performed by: INTERNAL MEDICINE

## 2020-09-03 RX ORDER — METOPROLOL SUCCINATE 50 MG/1
50 TABLET, EXTENDED RELEASE ORAL 2 TIMES DAILY
Qty: 180 TABLET | Refills: 3 | Status: SHIPPED
Start: 2020-09-03 | End: 2020-10-30

## 2020-09-03 RX ORDER — METOPROLOL SUCCINATE 50 MG/1
50 TABLET, EXTENDED RELEASE ORAL DAILY
Qty: 180 TABLET | Refills: 3 | Status: SHIPPED
Start: 2020-09-03 | End: 2020-09-03 | Stop reason: SDUPTHER

## 2020-09-03 NOTE — ED PROVIDER NOTES
[facility]      Department of Emergency Medicine   ED  Provider Note  Admit Date/RoomTime: 2020  2:53 PM  ED Room:   MRN: [id number]  Chief Complaint       Constipation (tried suppository, no results. Gets nausea when she eats. Wants to make sure there is no blockage. )    History of Present Illness   Source of history provided by:  patient. History/Exam Limitations: none. Telma Valdez is a 47 y.o. old female who has a past medical history of:   Past Medical History:   Diagnosis Date    Allergic rhinitis     Anxiety     Atrial fibrillation (HCC)     Cancer ([facility] [address]     thyroid    Chest pain     Chronic back pain     Depression     Headache     Hypertension     NAFLD (nonalcoholic fatty liver disease)     By CT    Obesity with body mass index (BMI) of 30.0 to 39.9 2020    Osteoarthritis     Palpitations     Palpitations 2019    Sleep apnea     Spinal stenosis, lumbar 2020    Severe central stenosis by CT of abdomen, L1-L2    Thyroid disease     PTC microcarcinoma (0.5 cm) treated by thyroidectomy and BERG 100 mci    presents to the emergency department by private vehicle, for complaints of persistent constipation and nausea x1 week. Patient states she is a suppository with a small amount of stool after. Patient states her symptoms are mild in severity and describes it as an aching. Patient denies anything making it better or worse. Patient states she is still passing gas. Patient is still able to tolerate p.o. Denies fever/chills, headache, vision change, dizziness, cough, hemoptysis, chest pain, dyspnea, vomiting, diarrhea, melena, bloody stool, urinary symptoms, numbness/weakness. GYN History: NA  STD History: NA  Patient's last menstrual period was 2013. Current Pregnancy: No.     Birth Control: Status post hysterectomy.     Gravid Status:       ROS   Pertinent positives and negatives are stated within HPI, all other systems reviewed and are negative. Past Surgical History:   Procedure Laterality Date    ABLATION OF DYSRHYTHMIC FOCUS  1990    BREAST SURGERY      cysts removed    CHOLECYSTECTOMY      COLONOSCOPY      ENDOMETRIAL ABLATION  2012    GALLBLADDER SURGERY  07/2020    removal     HYSTERECTOMY  07/25/2018    HYSTERECTOMY, VAGINAL      HYSTEROSCOPY  8/15/12    dilatation & curettage, novasure ablation    INSERTABLE CARDIAC MONITOR  12/23/2019    [facility] Confirm Linq     (Dr. Mari Hammond)      OTHER SURGICAL HISTORY  12/23/2019    Dr. Mari Hammond- Loop Recorder implant, Confirm    THYROIDECTOMY Bilateral 01/19/2017    Dr. Denise Mcgrath   Social History:  reports that she has never smoked. She has never used smokeless tobacco. She reports that she does not drink alcohol or use drugs. Family History: family history includes Atrial Fibrillation in her paternal grandfather; Breast Cancer in her paternal grandmother; Cancer in her father and maternal grandmother; Heart Disease in her maternal grandfather; High Blood Pressure in her father; High Cholesterol in her mother; Other in her daughter, paternal grandmother, and sister. Allergies: Narcan [naloxone hcl] and Fentanyl    Physical Exam           ED Triage Vitals   BP Temp Temp Source Pulse Resp SpO2 Height Weight   09/02/20 1454 09/02/20 1454 09/02/20 1454 09/02/20 1454 09/02/20 1454 09/02/20 1454 09/02/20 1510 09/02/20 1510   (!) 150/94 98.2 °F (36.8 °C) Infrared 116 16 97 % 5' 6\" (1.676 m) 210 lb (95.3 kg)      Oxygen Saturation Interpretation: Normal.    · General Appearance/Constitutional:  Alert, development consistent with age. · HEENT:  NC/NT. PERRLA. Airway patent. · Neck:  Supple. No lymphadenopathy. · Respiratory:  No retractions. Lungs Clear to auscultation and breath sounds equal.  · CV:  Regular rate and rhythm. · GI:  General Appearance: normal.         Bowel sounds: normal bowel sounds. Distension:  None.             Tenderness: mild tenderness is present epigastric, left lower           Liver: non-tender. Spleen:  non-tender. Pulsatile Mass: absent. Hernia:  no inguinal or femoral hernias noted. · Back: CVA Tenderness: No.  · Integument:  Normal turgor. Warm, dry, without visible rash, unless noted elsewhere. · Lymphatics: No edema, cap.refill <3sec. · Neurological:  Orientation age-appropriate. Motor functions intact.     Lab / Imaging Results   (All laboratory and radiology results have been personally reviewed by myself)  Labs:  Results for orders placed or performed during the hospital encounter of 09/02/20   CBC Auto Differential   Result Value Ref Range    WBC 5.6 4.5 - 11.5 E9/L    RBC 4.36 3.50 - 5.50 E12/L    Hemoglobin 13.4 11.5 - 15.5 g/dL    Hematocrit 39.3 34.0 - 48.0 %    MCV 90.1 80.0 - 99.9 fL    MCH 30.7 26.0 - 35.0 pg    MCHC 34.1 32.0 - 34.5 %    RDW 13.3 11.5 - 15.0 fL    Platelets 238 931 - 867 E9/L    MPV 9.8 7.0 - 12.0 fL    Neutrophils % 64.1 43.0 - 80.0 %    Immature Granulocytes % 0.4 0.0 - 5.0 %    Lymphocytes % 25.1 20.0 - 42.0 %    Monocytes % 9.0 2.0 - 12.0 %    Eosinophils % 0.9 0.0 - 6.0 %    Basophils % 0.5 0.0 - 2.0 %    Neutrophils Absolute 3.58 1.80 - 7.30 E9/L    Immature Granulocytes # 0.02 E9/L    Lymphocytes Absolute 1.40 (L) 1.50 - 4.00 E9/L    Monocytes Absolute 0.50 0.10 - 0.95 E9/L    Eosinophils Absolute 0.05 0.05 - 0.50 E9/L    Basophils Absolute 0.03 0.00 - 0.20 E9/L   Comprehensive Metabolic Panel   Result Value Ref Range    Sodium 141 132 - 146 mmol/L    Potassium 3.5 3.5 - 5.0 mmol/L    Chloride 103 98 - 107 mmol/L    CO2 23 22 - 29 mmol/L    Anion Gap 15 7 - 16 mmol/L    Glucose 101 (H) 74 - 99 mg/dL    BUN 10 6 - 20 mg/dL    CREATININE 0.7 0.5 - 1.0 mg/dL    GFR Non-African American >60 >=60 mL/min/1.73    GFR African American >60     Calcium 9.2 8.6 - 10.2 mg/dL    Total Protein 7.1 6.4 - 8.3 g/dL    Alb 4.0 3.5 - 5.2 g/dL    Total Bilirubin 0.8 0.0 - 1.2 mg/dL    Alkaline Phosphatase 63 35 - 104 U/L    ALT 53 (H) 0 - 32 U/L    AST 34 (H) 0 - 31 U/L   Urinalysis with Microscopic   Result Value Ref Range    Color, UA Yellow Straw/Yellow    Clarity, UA Clear Clear    Glucose, Ur Negative Negative mg/dL    Bilirubin Urine MODERATE (A) Negative    Ketones, Urine >=80 (A) Negative mg/dL    Specific Gravity, UA 1.020 1.005 - 1.030    Blood, Urine SMALL (A) Negative    pH, UA 6.0 5.0 - 9.0    Protein, UA TRACE Negative mg/dL    Urobilinogen, Urine 0.2 <2.0 E.U./dL    Nitrite, Urine Negative Negative    Leukocyte Esterase, Urine MODERATE (A) Negative    WBC, UA 5-10 (A) 0 - 5 /HPF    RBC, UA 2-5 0 - 2 /HPF    Bacteria, UA FEW (A) None Seen /HPF   Lactic Acid, Plasma   Result Value Ref Range    Lactic Acid 1.2 0.5 - 2.2 mmol/L   EKG 12 Lead   Result Value Ref Range    Ventricular Rate 92 BPM    Atrial Rate 92 BPM    P-R Interval 220 ms    QRS Duration 72 ms    Q-T Interval 374 ms    QTc Calculation (Bazett) 462 ms    P Axis 29 degrees    R Axis 34 degrees    T Axis -57 degrees     Imaging: All Radiology results interpreted by Radiologist unless otherwise noted. CT ABDOMEN PELVIS W IV CONTRAST Additional Contrast? None   Final Result      1. Findings of colitis and proctitis. 2. Small bowel ileus. 3. Mild splenomegaly. 4. There are several nodular enhancing lesions in the liver with   interval slight increase in size since 2015. The largest of these is   at the inferior medial segment left hepatic lobe. I suspect multiple   hemangiomas. 5. Severe fatty change in the liver and old cholecystectomy. 6. Normal appendix. EKG #1:  Interpreted by emergency department physician unless otherwise noted. Time:  1748    Rate: 92  Rhythm: Sinus.   Interpretation: no acute changes  ED Course / Medical Decision Making     Medications   0.9 % sodium chloride bolus (0 mLs Intravenous Stopped 9/2/20 1609)   iopamidol (ISOVUE-370) 76 % injection 100 mL (100 mLs Intravenous Given 9/2/20

## 2020-09-03 NOTE — PROGRESS NOTES
tablet by mouth daily 90 tablet 3    potassium chloride (KLOR-CON M) 20 MEQ extended release tablet Take 2 tablets by mouth daily (Patient taking differently: Take 40 mEq by mouth as needed ) 10 tablet 0    furosemide (LASIX) 20 MG tablet Take 1 tablet by mouth daily as needed (edema) 90 tablet 3    levothyroxine (SYNTHROID) 100 MCG tablet Take one tablet every day 90 tablet 2    pantoprazole (PROTONIX) 40 MG tablet Take 1 tablet by mouth daily (Patient not taking: Reported on 9/3/2020) 30 tablet 3     No current facility-administered medications for this visit.         Social History     Socioeconomic History    Marital status:      Spouse name: Emperatriz Gonzalez Number of children: 3    Years of education: Not on file    Highest education level: Not on file   Occupational History    Occupation: homemaker   Social Needs    Financial resource strain: Not hard at all   [place] insecurity     Worry: Never true     Inability: Never true   Protek-dor needs     Medical: Not on file     Non-medical: Not on file   Tobacco Use    Smoking status: Never Smoker    Smokeless tobacco: Never Used   Substance and Sexual Activity    Alcohol use: No    Drug use: No    Sexual activity: Yes     Partners: Male   Lifestyle    Physical activity     Days per week: Not on file     Minutes per session: Not on file    Stress: Not on file   Relationships    Social connections     Talks on phone: Not on file     Gets together: Not on file     Attends Jain service: Not on file     Active member of club or organization: Not on file     Attends meetings of clubs or organizations: Not on file     Relationship status: Not on file    Intimate partner violence     Fear of current or ex partner: Not on file     Emotionally abused: Not on file     Physically abused: Not on file     Forced sexual activity: Not on file   Other Topics Concern    Not on file   Social History Narrative    Lives in a house with  who is disabled and one child. Homeschools her daughter. 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       Family History   Problem Relation Age of Onset    High Cholesterol Mother     High Blood Pressure Father     Cancer Father         leukemia, lung cancer +smoker, brain cancer    Other Sister         nephrectomy due to a benign tumor    Breast Cancer Paternal Grandmother     Other Paternal Grandmother         bowel    Other Daughter         IGG immunodeficiency    Cancer Maternal Grandmother         lung, cervical    Heart Disease Maternal Grandfather     Atrial Fibrillation Paternal Grandfather      Review of Systems:  Heart: as above   Lungs: as above   Eyes: denies changes in vision or discharge. Ears: denies changes in hearing or pain. Nose: denies epistaxis or masses   Throat: denies sore throat or trouble swallowing. Neuro: denies numbness, tingling, tremors. Skin: denies rashes or itching. : denies hematuria, dysuria   GI: denies vomiting, diarrhea   Psych: denies mood changed, anxiety, depression. All other systems negative. Physical Exam   BP (!) 146/88   Pulse 85   Resp 16   Ht 5' 5\" (1.651 m)   Wt 215 lb (97.5 kg)   LMP 01/01/2013   BMI 35.78 kg/m²   Constitutional: Oriented to person, place, and time. Well-developed and well-nourished. No distress. Head: Normocephalic and atraumatic. Eyes: EOM are normal. Pupils are equal, round, and reactive to light. Neck: Normal range of motion. Neck supple. No hepatojugular reflux and no JVD present. Carotid bruit is not present. No tracheal deviation present. No thyromegaly present. Cardiovascular: Normal rate, regular rhythm, normal heart sounds and intact distal pulses. Exam reveals no gallop and no friction rub. No murmur heard. Pulmonary/Chest: Effort normal and breath sounds normal. No respiratory distress. No wheezes. No rales. No tenderness. Abdominal: Soft.  Bowel sounds are normal. No distension and no mass. No tenderness. No rebound and no guarding. Musculoskeletal: Normal range of motion. Tender lump left lateral arm. Lymphadenopathy:   No cervical adenopathy. No groin adenopathy. Neurological: Alert and oriented to person, place, and time. Skin: Skin is warm and dry. No rash noted. Not diaphoretic. No erythema. Psychiatric: Normal mood and affect. Behavior is normal.     EKG:  normal sinus rhythm, nonspecific ST and T waves changes. ASSESSMENT AND PLAN:  Patient Active Problem List   Diagnosis    BPPV (benign paroxysmal positional vertigo)    SVT (supraventricular tachycardia) (HCC)    Anxiety    Dyspnea on exertion    Intermittent palpitations    Essential hypertension    Morbid obesity due to excess calories (HCC)    Palpitations    Chest pain    Thyroid cancer ([facility] [address]    Hypothyroidism (acquired)    Weight gain    Dizziness    Biliary colic    Obesity with body mass index (BMI) of 30.0 to 39.9    Spinal stenosis, lumbar    NAFLD (nonalcoholic fatty liver disease)     1. Palpitations/Hx of SVT ablation:  Echo normal 7/16.      30 day monitor unremarkable aside from PVC's and PAC's 9/16.      Pharm stress normal 11/1/16. Still with intermittent episodes with severe symptoms.      Continue titrated Toprol. Following with EP. Has LINQ. 2. Hypothyroid: Following with Endo. 3. HTN: Observe. 4. Edema: Lasix PRN. 5. OLU    Mary Ann Craft D.O.   Cardiologist  Cardiology, [facility]

## 2020-09-04 ENCOUNTER — CARE COORDINATION (OUTPATIENT)
Dept: CARE COORDINATION | Age: 55
End: 2020-09-04

## 2020-09-04 ENCOUNTER — HOSPITAL ENCOUNTER (EMERGENCY)
Age: 55
Discharge: HOME | End: 2020-09-04
Attending: EMERGENCY MEDICINE
Payer: COMMERCIAL

## 2020-09-04 ENCOUNTER — APPOINTMENT (OUTPATIENT)
Dept: GENERAL RADIOLOGY | Age: 55
End: 2020-09-04
Payer: COMMERCIAL

## 2020-09-04 ENCOUNTER — HOSPITAL ENCOUNTER (OUTPATIENT)
Age: 55
Discharge: HOME | End: 2020-09-04
Payer: COMMERCIAL

## 2020-09-04 VITALS
TEMPERATURE: 97.4 F | SYSTOLIC BLOOD PRESSURE: 155 MMHG | BODY MASS INDEX: 34.99 KG/M2 | OXYGEN SATURATION: 94 % | RESPIRATION RATE: 18 BRPM | DIASTOLIC BLOOD PRESSURE: 69 MMHG | HEART RATE: 60 BPM | WEIGHT: 210 LBS | HEIGHT: 65 IN

## 2020-09-04 LAB
ALBUMIN SERPL-MCNC: 4 G/DL (ref 3.5–5.2)
ALP BLD-CCNC: 65 U/L (ref 35–104)
ALT SERPL-CCNC: 42 U/L (ref 0–32)
AMYLASE: 35 U/L (ref 20–100)
ANION GAP SERPL CALCULATED.3IONS-SCNC: 15 MMOL/L (ref 7–16)
AST SERPL-CCNC: 24 U/L (ref 0–31)
BACTERIA: NORMAL /HPF
BASOPHILS ABSOLUTE: 0.03 E9/L (ref 0–0.2)
BASOPHILS RELATIVE PERCENT: 0.5 % (ref 0–2)
BILIRUB SERPL-MCNC: 0.8 MG/DL (ref 0–1.2)
BILIRUBIN URINE: ABNORMAL
BLOOD, URINE: ABNORMAL
BUN BLDV-MCNC: 8 MG/DL (ref 6–20)
CALCIUM SERPL-MCNC: 9.3 MG/DL (ref 8.6–10.2)
CHLORIDE BLD-SCNC: 106 MMOL/L (ref 98–107)
CLARITY: CLEAR
CO2: 22 MMOL/L (ref 22–29)
COLOR: YELLOW
CREAT SERPL-MCNC: 0.7 MG/DL (ref 0.5–1)
EOSINOPHILS ABSOLUTE: 0.08 E9/L (ref 0.05–0.5)
EOSINOPHILS RELATIVE PERCENT: 1.4 % (ref 0–6)
EPITHELIAL CELLS, UA: NORMAL /HPF
GFR AFRICAN AMERICAN: >60
GFR NON-AFRICAN AMERICAN: >60 ML/MIN/1.73
GLUCOSE BLD-MCNC: 81 MG/DL (ref 74–99)
GLUCOSE URINE: NEGATIVE MG/DL
HCT VFR BLD CALC: 39.6 % (ref 34–48)
HEMOGLOBIN: 13.3 G/DL (ref 11.5–15.5)
IMMATURE GRANULOCYTES #: 0.02 E9/L
IMMATURE GRANULOCYTES %: 0.3 % (ref 0–5)
KETONES, URINE: >=80 MG/DL
LACTIC ACID: 1.2 MMOL/L (ref 0.5–2.2)
LEUKOCYTE ESTERASE, URINE: ABNORMAL
LIPASE: 13 U/L (ref 13–60)
LYMPHOCYTES ABSOLUTE: 1.59 E9/L (ref 1.5–4)
LYMPHOCYTES RELATIVE PERCENT: 27 % (ref 20–42)
MAGNESIUM: 2 MG/DL (ref 1.6–2.6)
MCH RBC QN AUTO: 30 PG (ref 26–35)
MCHC RBC AUTO-ENTMCNC: 33.6 % (ref 32–34.5)
MCV RBC AUTO: 89.4 FL (ref 80–99.9)
MONOCYTES ABSOLUTE: 0.51 E9/L (ref 0.1–0.95)
MONOCYTES RELATIVE PERCENT: 8.7 % (ref 2–12)
NEUTROPHILS ABSOLUTE: 3.66 E9/L (ref 1.8–7.3)
NEUTROPHILS RELATIVE PERCENT: 62.1 % (ref 43–80)
NITRITE, URINE: NEGATIVE
PDW BLD-RTO: 13.3 FL (ref 11.5–15)
PH UA: 6 (ref 5–9)
PLATELET # BLD: 260 E9/L (ref 130–450)
PMV BLD AUTO: 10.4 FL (ref 7–12)
POTASSIUM REFLEX MAGNESIUM: 3.1 MMOL/L (ref 3.5–5)
PROTEIN UA: NEGATIVE MG/DL
RBC # BLD: 4.43 E12/L (ref 3.5–5.5)
RBC UA: NORMAL /HPF (ref 0–2)
SODIUM BLD-SCNC: 143 MMOL/L (ref 132–146)
SPECIFIC GRAVITY UA: 1.01 (ref 1–1.03)
TOTAL PROTEIN: 6.7 G/DL (ref 6.4–8.3)
UROBILINOGEN, URINE: 0.2 E.U./DL
WBC # BLD: 5.9 E9/L (ref 4.5–11.5)
WBC UA: NORMAL /HPF (ref 0–5)

## 2020-09-04 PROCEDURE — 83993 ASSAY FOR CALPROTECTIN FECAL: CPT

## 2020-09-04 PROCEDURE — 87338 HPYLORI STOOL AG IA: CPT

## 2020-09-04 PROCEDURE — 87324 CLOSTRIDIUM AG IA: CPT

## 2020-09-04 PROCEDURE — 87045 FECES CULTURE AEROBIC BACT: CPT

## 2020-09-04 PROCEDURE — 6370000000 HC RX 637 (ALT 250 FOR IP): Performed by: STUDENT IN AN ORGANIZED HEALTH CARE EDUCATION/TRAINING PROGRAM

## 2020-09-04 PROCEDURE — 6370000000 HC RX 637 (ALT 250 FOR IP): Performed by: EMERGENCY MEDICINE

## 2020-09-04 PROCEDURE — 83735 ASSAY OF MAGNESIUM: CPT

## 2020-09-04 PROCEDURE — 81001 URINALYSIS AUTO W/SCOPE: CPT

## 2020-09-04 PROCEDURE — 74018 RADEX ABDOMEN 1 VIEW: CPT

## 2020-09-04 PROCEDURE — 6360000002 HC RX W HCPCS: Performed by: STUDENT IN AN ORGANIZED HEALTH CARE EDUCATION/TRAINING PROGRAM

## 2020-09-04 PROCEDURE — 83690 ASSAY OF LIPASE: CPT

## 2020-09-04 PROCEDURE — 99284 EMERGENCY DEPT VISIT MOD MDM: CPT

## 2020-09-04 PROCEDURE — 89055 LEUKOCYTE ASSESSMENT FECAL: CPT

## 2020-09-04 PROCEDURE — 83605 ASSAY OF LACTIC ACID: CPT

## 2020-09-04 PROCEDURE — 87329 GIARDIA AG IA: CPT

## 2020-09-04 PROCEDURE — 96375 TX/PRO/DX INJ NEW DRUG ADDON: CPT

## 2020-09-04 PROCEDURE — 82150 ASSAY OF AMYLASE: CPT

## 2020-09-04 PROCEDURE — 96374 THER/PROPH/DIAG INJ IV PUSH: CPT

## 2020-09-04 PROCEDURE — 83520 IMMUNOASSAY QUANT NOS NONAB: CPT

## 2020-09-04 PROCEDURE — 85025 COMPLETE CBC W/AUTO DIFF WBC: CPT

## 2020-09-04 PROCEDURE — 36415 COLL VENOUS BLD VENIPUNCTURE: CPT

## 2020-09-04 PROCEDURE — 2580000003 HC RX 258: Performed by: STUDENT IN AN ORGANIZED HEALTH CARE EDUCATION/TRAINING PROGRAM

## 2020-09-04 PROCEDURE — 87088 URINE BACTERIA CULTURE: CPT

## 2020-09-04 PROCEDURE — 87449 NOS EACH ORGANISM AG IA: CPT

## 2020-09-04 PROCEDURE — 80053 COMPREHEN METABOLIC PANEL: CPT

## 2020-09-04 PROCEDURE — 99285 EMERGENCY DEPT VISIT HI MDM: CPT

## 2020-09-04 PROCEDURE — 87328 CRYPTOSPORIDIUM AG IA: CPT

## 2020-09-04 RX ORDER — ONDANSETRON 2 MG/ML
4 INJECTION INTRAMUSCULAR; INTRAVENOUS ONCE
Status: COMPLETED | OUTPATIENT
Start: 2020-09-04 | End: 2020-09-04

## 2020-09-04 RX ORDER — SUCRALFATE 1 G/1
1 TABLET ORAL ONCE
Status: COMPLETED | OUTPATIENT
Start: 2020-09-04 | End: 2020-09-04

## 2020-09-04 RX ORDER — 0.9 % SODIUM CHLORIDE 0.9 %
1000 INTRAVENOUS SOLUTION INTRAVENOUS ONCE
Status: COMPLETED | OUTPATIENT
Start: 2020-09-04 | End: 2020-09-04

## 2020-09-04 RX ORDER — KETOROLAC TROMETHAMINE 30 MG/ML
30 INJECTION, SOLUTION INTRAMUSCULAR; INTRAVENOUS ONCE
Status: COMPLETED | OUTPATIENT
Start: 2020-09-04 | End: 2020-09-04

## 2020-09-04 RX ORDER — SUCRALFATE 1 G/1
TABLET ORAL
Status: DISCONTINUED
Start: 2020-09-04 | End: 2020-09-05 | Stop reason: HOSPADM

## 2020-09-04 RX ORDER — SUCRALFATE 1 G/1
1 TABLET ORAL 4 TIMES DAILY
Qty: 120 TABLET | Refills: 0 | Status: SHIPPED | OUTPATIENT
Start: 2020-09-04 | End: 2020-10-20

## 2020-09-04 RX ORDER — POTASSIUM CHLORIDE 20 MEQ/1
40 TABLET, EXTENDED RELEASE ORAL ONCE
Status: DISCONTINUED | OUTPATIENT
Start: 2020-09-04 | End: 2020-09-04

## 2020-09-04 RX ADMIN — ONDANSETRON 4 MG: 2 INJECTION INTRAMUSCULAR; INTRAVENOUS at 20:18

## 2020-09-04 RX ADMIN — SODIUM CHLORIDE 1000 ML: 9 INJECTION, SOLUTION INTRAVENOUS at 20:18

## 2020-09-04 RX ADMIN — LIDOCAINE HYDROCHLORIDE: 20 SOLUTION ORAL; TOPICAL at 22:11

## 2020-09-04 RX ADMIN — SUCRALFATE 1 G: 1 TABLET ORAL at 23:12

## 2020-09-04 RX ADMIN — POTASSIUM BICARBONATE 20 MEQ: 782 TABLET, EFFERVESCENT ORAL at 22:11

## 2020-09-04 RX ADMIN — KETOROLAC TROMETHAMINE 30 MG: 30 INJECTION, SOLUTION INTRAMUSCULAR at 20:18

## 2020-09-04 ASSESSMENT — ENCOUNTER SYMPTOMS
EYE DISCHARGE: 0
BACK PAIN: 0
COUGH: 0
EYE REDNESS: 0
VOMITING: 1
EYE PAIN: 0
SINUS PRESSURE: 0
BLOOD IN STOOL: 0
SINUS PAIN: 0
WHEEZING: 0
HEMATOCHEZIA: 0
ABDOMINAL DISTENTION: 0
CONSTIPATION: 0
SHORTNESS OF BREATH: 0
CHEST TIGHTNESS: 0
DIARRHEA: 1
ABDOMINAL PAIN: 1
NAUSEA: 1
SORE THROAT: 0
RHINORRHEA: 0

## 2020-09-04 ASSESSMENT — PAIN DESCRIPTION - PROGRESSION: CLINICAL_PROGRESSION: GRADUALLY IMPROVING

## 2020-09-04 ASSESSMENT — PAIN DESCRIPTION - ORIENTATION: ORIENTATION: RIGHT;UPPER

## 2020-09-04 ASSESSMENT — PAIN DESCRIPTION - FREQUENCY
FREQUENCY: CONTINUOUS
FREQUENCY: CONTINUOUS

## 2020-09-04 ASSESSMENT — PAIN SCALES - GENERAL
PAINLEVEL_OUTOF10: 9
PAINLEVEL_OUTOF10: 6

## 2020-09-04 ASSESSMENT — PAIN DESCRIPTION - PAIN TYPE
TYPE: ACUTE PAIN
TYPE: ACUTE PAIN

## 2020-09-04 ASSESSMENT — PAIN DESCRIPTION - LOCATION: LOCATION: ABDOMEN

## 2020-09-04 NOTE — ED NOTES
Bed: 07  Expected date:   Expected time:   Means of arrival:   Comments:  EMS     Romi Hoffman RN  09/04/20 2086

## 2020-09-05 ENCOUNTER — HOSPITAL ENCOUNTER (OUTPATIENT)
Age: 55
Setting detail: OBSERVATION
Discharge: HOME | End: 2020-09-06
Attending: EMERGENCY MEDICINE | Admitting: INTERNAL MEDICINE
Payer: COMMERCIAL

## 2020-09-05 PROBLEM — R10.9 ABDOMINAL PAIN: Status: ACTIVE | Noted: 2020-09-05

## 2020-09-05 LAB
ALBUMIN SERPL-MCNC: 4 G/DL (ref 3.5–5.2)
ALP BLD-CCNC: 64 U/L (ref 35–104)
ALT SERPL-CCNC: 39 U/L (ref 0–32)
ANION GAP SERPL CALCULATED.3IONS-SCNC: 15 MMOL/L (ref 7–16)
AST SERPL-CCNC: 24 U/L (ref 0–31)
BACTERIA: ABNORMAL /HPF
BASOPHILS ABSOLUTE: 0.03 E9/L (ref 0–0.2)
BASOPHILS RELATIVE PERCENT: 0.6 % (ref 0–2)
BILIRUB SERPL-MCNC: 0.6 MG/DL (ref 0–1.2)
BILIRUBIN URINE: ABNORMAL
BLOOD, URINE: ABNORMAL
BUN BLDV-MCNC: 5 MG/DL (ref 6–20)
C DIFF TOXIN/ANTIGEN: NORMAL
CALCIUM SERPL-MCNC: 9.3 MG/DL (ref 8.6–10.2)
CHLORIDE BLD-SCNC: 107 MMOL/L (ref 98–107)
CLARITY: CLEAR
CO2: 23 MMOL/L (ref 22–29)
COLOR: YELLOW
CREAT SERPL-MCNC: 0.7 MG/DL (ref 0.5–1)
EOSINOPHILS ABSOLUTE: 0.08 E9/L (ref 0.05–0.5)
EOSINOPHILS RELATIVE PERCENT: 1.6 % (ref 0–6)
EPITHELIAL CELLS, UA: ABNORMAL /HPF
GFR AFRICAN AMERICAN: >60
GFR NON-AFRICAN AMERICAN: >60 ML/MIN/1.73
GLUCOSE BLD-MCNC: 82 MG/DL (ref 74–99)
GLUCOSE URINE: NEGATIVE MG/DL
HCT VFR BLD CALC: 39.9 % (ref 34–48)
HEMOGLOBIN: 13.2 G/DL (ref 11.5–15.5)
IMMATURE GRANULOCYTES #: 0.01 E9/L
IMMATURE GRANULOCYTES %: 0.2 % (ref 0–5)
KETONES, URINE: >=80 MG/DL
LACTIC ACID: 1 MMOL/L (ref 0.5–2.2)
LEUKOCYTE ESTERASE, URINE: ABNORMAL
LIPASE: 13 U/L (ref 13–60)
LYMPHOCYTES ABSOLUTE: 1.44 E9/L (ref 1.5–4)
LYMPHOCYTES RELATIVE PERCENT: 29.4 % (ref 20–42)
MCH RBC QN AUTO: 29.9 PG (ref 26–35)
MCHC RBC AUTO-ENTMCNC: 33.1 % (ref 32–34.5)
MCV RBC AUTO: 90.3 FL (ref 80–99.9)
MONOCYTES ABSOLUTE: 0.42 E9/L (ref 0.1–0.95)
MONOCYTES RELATIVE PERCENT: 8.6 % (ref 2–12)
NEUTROPHILS ABSOLUTE: 2.91 E9/L (ref 1.8–7.3)
NEUTROPHILS RELATIVE PERCENT: 59.6 % (ref 43–80)
NITRITE, URINE: NEGATIVE
PDW BLD-RTO: 13.2 FL (ref 11.5–15)
PH UA: 6 (ref 5–9)
PLATELET # BLD: 249 E9/L (ref 130–450)
PMV BLD AUTO: 10.2 FL (ref 7–12)
POTASSIUM REFLEX MAGNESIUM: 3.7 MMOL/L (ref 3.5–5)
PROTEIN UA: ABNORMAL MG/DL
RBC # BLD: 4.42 E12/L (ref 3.5–5.5)
RBC UA: ABNORMAL /HPF (ref 0–2)
SODIUM BLD-SCNC: 145 MMOL/L (ref 132–146)
SPECIFIC GRAVITY UA: 1.02 (ref 1–1.03)
TOTAL PROTEIN: 6.7 G/DL (ref 6.4–8.3)
UROBILINOGEN, URINE: 0.2 E.U./DL
WBC # BLD: 4.9 E9/L (ref 4.5–11.5)
WBC UA: >20 /HPF (ref 0–5)
WHITE BLOOD CELLS (WBC), STOOL: NORMAL

## 2020-09-05 PROCEDURE — 83605 ASSAY OF LACTIC ACID: CPT

## 2020-09-05 PROCEDURE — 85025 COMPLETE CBC W/AUTO DIFF WBC: CPT

## 2020-09-05 PROCEDURE — 2580000003 HC RX 258: Performed by: STUDENT IN AN ORGANIZED HEALTH CARE EDUCATION/TRAINING PROGRAM

## 2020-09-05 PROCEDURE — 96367 TX/PROPH/DG ADDL SEQ IV INF: CPT

## 2020-09-05 PROCEDURE — 83690 ASSAY OF LIPASE: CPT

## 2020-09-05 PROCEDURE — 96365 THER/PROPH/DIAG IV INF INIT: CPT

## 2020-09-05 PROCEDURE — 87040 BLOOD CULTURE FOR BACTERIA: CPT

## 2020-09-05 PROCEDURE — 96375 TX/PRO/DX INJ NEW DRUG ADDON: CPT

## 2020-09-05 PROCEDURE — G0378 HOSPITAL OBSERVATION PER HR: HCPCS

## 2020-09-05 PROCEDURE — 81001 URINALYSIS AUTO W/SCOPE: CPT

## 2020-09-05 PROCEDURE — 2500000003 HC RX 250 WO HCPCS: Performed by: STUDENT IN AN ORGANIZED HEALTH CARE EDUCATION/TRAINING PROGRAM

## 2020-09-05 PROCEDURE — 99284 EMERGENCY DEPT VISIT MOD MDM: CPT

## 2020-09-05 PROCEDURE — 80053 COMPREHEN METABOLIC PANEL: CPT

## 2020-09-05 PROCEDURE — 6360000002 HC RX W HCPCS: Performed by: STUDENT IN AN ORGANIZED HEALTH CARE EDUCATION/TRAINING PROGRAM

## 2020-09-05 RX ORDER — ALPRAZOLAM 3 MG/1
3 TABLET, EXTENDED RELEASE ORAL 3 TIMES DAILY PRN
Status: DISCONTINUED | OUTPATIENT
Start: 2020-09-05 | End: 2020-09-05 | Stop reason: CLARIF

## 2020-09-05 RX ORDER — ALPRAZOLAM 0.25 MG/1
0.75 TABLET ORAL EVERY 6 HOURS
Status: DISCONTINUED | OUTPATIENT
Start: 2020-09-06 | End: 2020-09-06 | Stop reason: HOSPADM

## 2020-09-05 RX ORDER — 0.9 % SODIUM CHLORIDE 0.9 %
1000 INTRAVENOUS SOLUTION INTRAVENOUS ONCE
Status: COMPLETED | OUTPATIENT
Start: 2020-09-05 | End: 2020-09-05

## 2020-09-05 RX ORDER — SUCRALFATE 1 G/1
1 TABLET ORAL 4 TIMES DAILY
Status: DISCONTINUED | OUTPATIENT
Start: 2020-09-06 | End: 2020-09-06 | Stop reason: HOSPADM

## 2020-09-05 RX ORDER — METOPROLOL SUCCINATE 50 MG/1
50 TABLET, EXTENDED RELEASE ORAL 2 TIMES DAILY
Status: DISCONTINUED | OUTPATIENT
Start: 2020-09-06 | End: 2020-09-06 | Stop reason: HOSPADM

## 2020-09-05 RX ORDER — METOCLOPRAMIDE 10 MG/1
10 TABLET ORAL 4 TIMES DAILY
Status: DISCONTINUED | OUTPATIENT
Start: 2020-09-06 | End: 2020-09-06

## 2020-09-05 RX ORDER — METOCLOPRAMIDE HYDROCHLORIDE 5 MG/ML
10 INJECTION INTRAMUSCULAR; INTRAVENOUS ONCE
Status: COMPLETED | OUTPATIENT
Start: 2020-09-05 | End: 2020-09-05

## 2020-09-05 RX ORDER — FUROSEMIDE 20 MG/1
20 TABLET ORAL DAILY PRN
Status: DISCONTINUED | OUTPATIENT
Start: 2020-09-05 | End: 2020-09-06 | Stop reason: HOSPADM

## 2020-09-05 RX ORDER — LEVOTHYROXINE SODIUM 0.1 MG/1
100 TABLET ORAL DAILY
Status: DISCONTINUED | OUTPATIENT
Start: 2020-09-06 | End: 2020-09-06 | Stop reason: HOSPADM

## 2020-09-05 RX ORDER — CIPROFLOXACIN 2 MG/ML
400 INJECTION, SOLUTION INTRAVENOUS ONCE
Status: COMPLETED | OUTPATIENT
Start: 2020-09-05 | End: 2020-09-05

## 2020-09-05 RX ADMIN — METOCLOPRAMIDE 10 MG: 5 INJECTION, SOLUTION INTRAMUSCULAR; INTRAVENOUS at 20:35

## 2020-09-05 RX ADMIN — METRONIDAZOLE 500 MG: 500 INJECTION, SOLUTION INTRAVENOUS at 20:35

## 2020-09-05 RX ADMIN — CIPROFLOXACIN 400 MG: 2 INJECTION INTRAVENOUS at 21:35

## 2020-09-05 RX ADMIN — SODIUM CHLORIDE 1000 ML: 9 INJECTION, SOLUTION INTRAVENOUS at 20:30

## 2020-09-05 ASSESSMENT — PAIN SCALES - GENERAL
PAINLEVEL_OUTOF10: 2
PAINLEVEL_OUTOF10: 7

## 2020-09-05 ASSESSMENT — ENCOUNTER SYMPTOMS
ABDOMINAL PAIN: 1
EYE DISCHARGE: 0
EYE REDNESS: 0
VOMITING: 1
CONSTIPATION: 1
SORE THROAT: 0
EYE PAIN: 0
ABDOMINAL DISTENTION: 0
WHEEZING: 0
SINUS PRESSURE: 0
COUGH: 0
BACK PAIN: 0
DIARRHEA: 0
SHORTNESS OF BREATH: 0
NAUSEA: 1

## 2020-09-05 ASSESSMENT — PAIN DESCRIPTION - PAIN TYPE: TYPE: ACUTE PAIN

## 2020-09-05 ASSESSMENT — PAIN DESCRIPTION - LOCATION: LOCATION: ABDOMEN

## 2020-09-05 NOTE — ED PROVIDER NOTES
[facility]  Department of Emergency Medicine     Written by: Cassius Cushing, DO  Patient Name: Hang Hebert  Attending Provider: Luis Cline DO  Admit Date: 2020  7:16 PM  MRN: [id number]                   : 1965        Chief Complaint   Patient presents with    Abdominal Pain     RUQ, had jose x6 weeks ago pain has been increasing since. has CT done x2 days ago showed inflammation of bowel    Nausea     took phenergan at 1730    - Chief complaint    Ms. Sujit Valadez, is a 59-year-old female past medical history of A. fib, and nonalcoholic fatty liver disease. Patient presents with chronic abdominal pain for the last month. Patient states that pain initially began when she had her gallbladder removed at the end of July. Patient states the pain is been intermittent in nature however has increased over the last week. Patient describes the pain as sharp and generalized in nature. She was recently seen here yesterday and underwent a CT scan with contrast which demonstrated colitis and proctitis. Patient states that she was seen at the GI office earlier this morning who recommended a stool culture possible antibiotics. Patient notes that throughout the day today the pain worsened and she was unable to tolerate p.o. intake which prompted her visit. Patient states pain is currently 9 out of 10. In addition she has noted nausea and she has tried Phenergan with no relief. In addition patient does note some mild dysuria and states that she has had chronic kidney infections in the past.  Patient does admit to some chills but denies any fevers, cough, headaches or shortness of breath. The history is provided by the patient. No  was used.    Abdominal Pain   Pain location:  Generalized  Pain quality: sharp and stabbing    Pain radiates to:  Does not radiate  Pain severity:  Severe  Onset quality:  Gradual  Timing:  Intermittent  Progression: Worsening  Chronicity:  Chronic  Context: previous surgery    Relieved by:  Nothing  Worsened by: Movement, palpation, position changes and eating  Ineffective treatments:  NSAIDs  Associated symptoms: anorexia, chills, diarrhea, dysuria, nausea and vomiting    Associated symptoms: no chest pain, no constipation, no cough, no fever, no hematochezia, no hematuria, no melena, no shortness of breath, no sore throat and no vaginal bleeding         Review of Systems   Constitutional: Positive for chills. Negative for fever. HENT: Negative for congestion, ear pain, postnasal drip, rhinorrhea, sinus pressure, sinus pain, sneezing and sore throat. Eyes: Negative for pain, discharge and redness. Respiratory: Negative for cough, chest tightness, shortness of breath and wheezing. Cardiovascular: Negative for chest pain. Gastrointestinal: Positive for abdominal pain, anorexia, diarrhea, nausea and vomiting. Negative for abdominal distention, blood in stool, constipation, hematochezia and melena. Genitourinary: Positive for dysuria. Negative for frequency, hematuria, pelvic pain and vaginal bleeding. Musculoskeletal: Negative for arthralgias and back pain. Skin: Negative for rash and wound. Neurological: Negative for weakness and headaches. Hematological: Negative for adenopathy. All other systems reviewed and are negative. Physical Exam  Vitals signs and nursing note reviewed. Constitutional:       General: She is not in acute distress. Appearance: Normal appearance. HENT:      Head: Normocephalic and atraumatic. Nose: No congestion or rhinorrhea. Mouth/Throat:      Mouth: Mucous membranes are moist.      Pharynx: Oropharynx is clear. Eyes:      Extraocular Movements: Extraocular movements intact. Pupils: Pupils are equal, round, and reactive to light. Neck:      Musculoskeletal: Normal range of motion. No neck rigidity or muscular tenderness.    Cardiovascular:      Rate and Rhythm: Normal rate and regular rhythm. Heart sounds: No murmur. No gallop. Pulmonary:      Effort: Pulmonary effort is normal. No respiratory distress. Breath sounds: No wheezing, rhonchi or rales. Abdominal:      General: Abdomen is flat. Palpations: Abdomen is soft. There is no mass. Tenderness: There is generalized abdominal tenderness. There is no right CVA tenderness, left CVA tenderness, guarding or rebound. Negative signs include Kenyon's sign and McBurney's sign. Hernia: No hernia is present. Musculoskeletal: Normal range of motion. General: No swelling, tenderness or signs of injury. Skin:     General: Skin is warm and dry. Capillary Refill: Capillary refill takes less than 2 seconds. Neurological:      General: No focal deficit present. Mental Status: She is alert and oriented to person, place, and time. Mental status is at baseline. Psychiatric:         Mood and Affect: Mood normal.          Procedures       MDM  Number of Diagnoses or Management Options  Generalized abdominal pain:   Nausea:   Diagnosis management comments: Patient is a 59-year-old female presents with chronic abdominal pain. Vital signs stable on presentation. Physical exam demonstrate diffuse abdominal tenderness without rigidity, rebound or guarding. Patient has extensive imaging performed in the past this was reviewed and KUB was ordered which was unremarkable. In addition laboratory work was obtained which demonstrated potassium of 3.1. Laboratory work was unremarkable otherwise. Patient was given potassium repletion in the emergency department. Patient continued to complain of abdominal pain. She was treated with GI cocktail with some improvement. Findings consistent with possible gastritis versus chronic abdominal pain. Patient is instructed to follow-up with her primary care provider in 3 days if no improvement.   Patient was also instructed to follow-up with a gastroenterologist as soon as possible. Patient was given prescription for Carafate as instructed continue Protonix. Plan of care discussed with patient patient is in agreement. Patient discharged home in stable condition. Amount and/or Complexity of Data Reviewed  Clinical lab tests: ordered and reviewed  Tests in the radiology section of CPT®: ordered and reviewed  Tests in the medicine section of CPT®: ordered and reviewed  Review and summarize past medical records: yes  Independent visualization of images, tracings, or specimens: yes    Risk of Complications, Morbidity, and/or Mortality  Presenting problems: moderate  Diagnostic procedures: moderate  Management options: moderate    Patient Progress  Patient progress: stable             --------------------------------------------- PAST HISTORY ---------------------------------------------  Past Medical History:  has a past medical history of Allergic rhinitis, Anxiety, Atrial fibrillation ([facility] [address] Cancer ([facility] [address] Chest pain, Chronic back pain, Depression, Headache, Hypertension, NAFLD (nonalcoholic fatty liver disease), Obesity with body mass index (BMI) of 30.0 to 39.9, Osteoarthritis, Palpitations, Palpitations, Sleep apnea, Spinal stenosis, lumbar, and Thyroid disease. Past Surgical History:  has a past surgical history that includes ablation of dysrhythmic focus (1990); hysteroscopy (8/15/12); Colonoscopy; Endometrial ablation (2012); Thyroidectomy (Bilateral, 01/19/2017); Hysterectomy (07/25/2018); Breast surgery; Hysterectomy, vaginal; other surgical history (12/23/2019); Insertable Cardiac Monitor (12/23/2019); Gallbladder surgery (07/2020); and Cholecystectomy. Social History:  reports that she has never smoked. She has never used smokeless tobacco. She reports that she does not drink alcohol or use drugs.     Family History: family history includes Atrial Fibrillation in her paternal grandfather; Breast Cancer in her paternal grandmother; Cancer in her father and maternal grandmother; Heart Disease in her maternal grandfather; High Blood Pressure in her father; High Cholesterol in her mother; Other in her daughter, paternal grandmother, and sister. The patients home medications have been reviewed.     Allergies: Narcan [naloxone hcl] and Fentanyl    -------------------------------------------------- RESULTS -------------------------------------------------  Labs:  Results for orders placed or performed during the hospital encounter of 09/04/20   CBC Auto Differential   Result Value Ref Range    WBC 5.9 4.5 - 11.5 E9/L    RBC 4.43 3.50 - 5.50 E12/L    Hemoglobin 13.3 11.5 - 15.5 g/dL    Hematocrit 39.6 34.0 - 48.0 %    MCV 89.4 80.0 - 99.9 fL    MCH 30.0 26.0 - 35.0 pg    MCHC 33.6 32.0 - 34.5 %    RDW 13.3 11.5 - 15.0 fL    Platelets 513 691 - 087 E9/L    MPV 10.4 7.0 - 12.0 fL    Neutrophils % 62.1 43.0 - 80.0 %    Immature Granulocytes % 0.3 0.0 - 5.0 %    Lymphocytes % 27.0 20.0 - 42.0 %    Monocytes % 8.7 2.0 - 12.0 %    Eosinophils % 1.4 0.0 - 6.0 %    Basophils % 0.5 0.0 - 2.0 %    Neutrophils Absolute 3.66 1.80 - 7.30 E9/L    Immature Granulocytes # 0.02 E9/L    Lymphocytes Absolute 1.59 1.50 - 4.00 E9/L    Monocytes Absolute 0.51 0.10 - 0.95 E9/L    Eosinophils Absolute 0.08 0.05 - 0.50 E9/L    Basophils Absolute 0.03 0.00 - 0.20 E9/L   Comprehensive Metabolic Panel w/ Reflex to MG   Result Value Ref Range    Sodium 143 132 - 146 mmol/L    Potassium reflex Magnesium 3.1 (L) 3.5 - 5.0 mmol/L    Chloride 106 98 - 107 mmol/L    CO2 22 22 - 29 mmol/L    Anion Gap 15 7 - 16 mmol/L    Glucose 81 74 - 99 mg/dL    BUN 8 6 - 20 mg/dL    CREATININE 0.7 0.5 - 1.0 mg/dL    GFR Non-African American >60 >=60 mL/min/1.73    GFR African American >60     Calcium 9.3 8.6 - 10.2 mg/dL    Total Protein 6.7 6.4 - 8.3 g/dL    Alb 4.0 3.5 - 5.2 g/dL    Total Bilirubin 0.8 0.0 - 1.2 mg/dL    Alkaline Phosphatase 65 35 - 104 U/L    ALT 42 (H) 0 - 32 U/L    AST 24 0 - 31 U/L   Lipase   Result Value Ref Range    Lipase 13 13 - 60 U/L   Amylase   Result Value Ref Range    Amylase 35 20 - 100 U/L   Urinalysis, reflex to microscopic   Result Value Ref Range    Color, UA Yellow Straw/Yellow    Clarity, UA Clear Clear    Glucose, Ur Negative Negative mg/dL    Bilirubin Urine SMALL (A) Negative    Ketones, Urine >=80 (A) Negative mg/dL    Specific Gravity, UA 1.010 1.005 - 1.030    Blood, Urine SMALL (A) Negative    pH, UA 6.0 5.0 - 9.0    Protein, UA Negative Negative mg/dL    Urobilinogen, Urine 0.2 <2.0 E.U./dL    Nitrite, Urine Negative Negative    Leukocyte Esterase, Urine MODERATE (A) Negative   Lactic Acid, Plasma   Result Value Ref Range    Lactic Acid 1.2 0.5 - 2.2 mmol/L   Microscopic Urinalysis   Result Value Ref Range    WBC, UA 1-3 0 - 5 /HPF    RBC, UA 0-1 0 - 2 /HPF    Epithelial Cells, UA RARE /HPF    Bacteria, UA NONE SEEN None Seen /HPF   Magnesium   Result Value Ref Range    Magnesium 2.0 1.6 - 2.6 mg/dL       Radiology:  XR ABDOMEN (KUB) (SINGLE AP VIEW)   Final Result      NON-SPECIFIC GAS PATTERN.              ------------------------- NURSING NOTES AND VITALS REVIEWED ---------------------------  Date / Time Roomed:  9/4/2020  7:16 PM  ED Bed Assignment:  SAIGE/SAIGE    The nursing notes within the ED encounter and vital signs as below have been reviewed. BP (!) 155/69   Pulse 60   Temp 97.4 °F (36.3 °C) (Oral)   Resp 18   Ht 5' 5\" (1.651 m)   Wt 210 lb (95.3 kg)   LMP 01/01/2013   SpO2 94%   BMI 34.95 kg/m²   Oxygen Saturation Interpretation: Normal      ------------------------------------------ PROGRESS NOTES ------------------------------------------  5:01 AM EDT  I have spoken with the patient and discussed todays results, in addition to providing specific details for the plan of care and counseling regarding the diagnosis and prognosis. Their questions are answered at this time and they are agreeable with the plan.  I discussed at length with them reasons for immediate return here for re evaluation. They will followup with their Gastroenterologist and primary care physician by calling their office on Monday.      --------------------------------- ADDITIONAL PROVIDER NOTES ---------------------------------  At this time the patient is without objective evidence of an acute process requiring hospitalization or inpatient management. They have remained hemodynamically stable throughout their entire ED visit and are stable for discharge with outpatient follow-up. The plan has been discussed in detail and they are aware of the specific conditions for emergent return, as well as the importance of follow-up. Discharge Medication List as of 9/4/2020 10:53 PM      START taking these medications    Details   sucralfate (CARAFATE) 1 GM tablet Take 1 tablet by mouth 4 times daily, Disp-120 tablet,R-0Print             Diagnosis:  1. Generalized abdominal pain    2. Nausea        Disposition:  Patient's disposition: Discharge to home  Patient's condition is stable. Patient was seen and evaluated by myself and my attending Leni Turk DO. Assessment and Plan discussed with attending provider, please see attestation for final plan of care. DO Kimberlyn Morales DO  Resident  09/05/20 [id number]    ATTENDING PROVIDER ATTESTATION:     I have personally performed and/or participated in the history, exam, medical decision making, and procedures and agree with all pertinent clinical information unless otherwise noted. I have also reviewed and agree with the past medical, family and social history unless otherwise noted. I have discussed this patient in detail with the resident and provided the instruction and education regarding the evidence-based evaluation and treatment of Abdominal Pain (RUQ, had jose x6 weeks ago pain has been increasing since.  has CT done x2 days ago showed inflammation of bowel) and Nausea (took phenergan at 9342)    Ny plan: Symptomatic and supportive care. Patient seen and examined patient has been to the emergency department multiple times with multiple CT scans of her abdomen last CT scan did show mild colitis. Patient jumps on the bed anticipating abdominal pain despite when pushing her abdomen is soft. She is easily distracted and shows no tenderness on abdominal auscultation. Labs were found to be reassuring belly x-ray revealed no blockages. At this point the patient is felt safe for discharge after improvement of symptoms with medications here and she will follow-up with her primary care physician.     Electronically signed by Lyric Curiel DO on 9/8/20 at 3:59 PM EDT             Lyric Curiel DO  09/08/20 1600

## 2020-09-06 VITALS
TEMPERATURE: 98.2 F | RESPIRATION RATE: 16 BRPM | HEIGHT: 65 IN | WEIGHT: 210 LBS | HEART RATE: 75 BPM | SYSTOLIC BLOOD PRESSURE: 140 MMHG | BODY MASS INDEX: 34.99 KG/M2 | DIASTOLIC BLOOD PRESSURE: 82 MMHG | OXYGEN SATURATION: 98 %

## 2020-09-06 LAB
ALBUMIN SERPL-MCNC: 3.5 G/DL (ref 3.5–5.2)
ALP BLD-CCNC: 59 U/L (ref 35–104)
ALT SERPL-CCNC: 33 U/L (ref 0–32)
ANION GAP SERPL CALCULATED.3IONS-SCNC: 12 MMOL/L (ref 7–16)
AST SERPL-CCNC: 22 U/L (ref 0–31)
BILIRUB SERPL-MCNC: 0.5 MG/DL (ref 0–1.2)
BUN BLDV-MCNC: 4 MG/DL (ref 6–20)
C-REACTIVE PROTEIN: 0.2 MG/DL (ref 0–0.4)
CALCIUM SERPL-MCNC: 8.7 MG/DL (ref 8.6–10.2)
CHLORIDE BLD-SCNC: 108 MMOL/L (ref 98–107)
CO2: 24 MMOL/L (ref 22–29)
CREAT SERPL-MCNC: 0.6 MG/DL (ref 0.5–1)
CULTURE, STOOL: NORMAL
GFR AFRICAN AMERICAN: >60
GFR NON-AFRICAN AMERICAN: >60 ML/MIN/1.73
GLUCOSE BLD-MCNC: 89 MG/DL (ref 74–99)
HCT VFR BLD CALC: 37.3 % (ref 34–48)
HEMOGLOBIN: 12.2 G/DL (ref 11.5–15.5)
MCH RBC QN AUTO: 29.5 PG (ref 26–35)
MCHC RBC AUTO-ENTMCNC: 32.7 % (ref 32–34.5)
MCV RBC AUTO: 90.1 FL (ref 80–99.9)
PDW BLD-RTO: 13.2 FL (ref 11.5–15)
PLATELET # BLD: 226 E9/L (ref 130–450)
PMV BLD AUTO: 10.1 FL (ref 7–12)
POTASSIUM SERPL-SCNC: 3.2 MMOL/L (ref 3.5–5)
RBC # BLD: 4.14 E12/L (ref 3.5–5.5)
SEDIMENTATION RATE, ERYTHROCYTE: 16 MM/HR (ref 0–20)
SODIUM BLD-SCNC: 144 MMOL/L (ref 132–146)
T4 FREE: 1.56 NG/DL (ref 0.93–1.7)
TOTAL PROTEIN: 6.2 G/DL (ref 6.4–8.3)
TSH SERPL DL<=0.05 MIU/L-ACNC: 4.81 UIU/ML (ref 0.27–4.2)
URINE CULTURE, ROUTINE: NORMAL
WBC # BLD: 4 E9/L (ref 4.5–11.5)

## 2020-09-06 PROCEDURE — 86706 HEP B SURFACE ANTIBODY: CPT

## 2020-09-06 PROCEDURE — G0378 HOSPITAL OBSERVATION PER HR: HCPCS

## 2020-09-06 PROCEDURE — 85651 RBC SED RATE NONAUTOMATED: CPT

## 2020-09-06 PROCEDURE — 82378 CARCINOEMBRYONIC ANTIGEN: CPT

## 2020-09-06 PROCEDURE — 83516 IMMUNOASSAY NONANTIBODY: CPT

## 2020-09-06 PROCEDURE — 83550 IRON BINDING TEST: CPT

## 2020-09-06 PROCEDURE — 83540 ASSAY OF IRON: CPT

## 2020-09-06 PROCEDURE — 6370000000 HC RX 637 (ALT 250 FOR IP): Performed by: INTERNAL MEDICINE

## 2020-09-06 PROCEDURE — 80074 ACUTE HEPATITIS PANEL: CPT

## 2020-09-06 PROCEDURE — 84439 ASSAY OF FREE THYROXINE: CPT

## 2020-09-06 PROCEDURE — 96376 TX/PRO/DX INJ SAME DRUG ADON: CPT

## 2020-09-06 PROCEDURE — 85027 COMPLETE CBC AUTOMATED: CPT

## 2020-09-06 PROCEDURE — 96372 THER/PROPH/DIAG INJ SC/IM: CPT

## 2020-09-06 PROCEDURE — 36415 COLL VENOUS BLD VENIPUNCTURE: CPT

## 2020-09-06 PROCEDURE — 2500000003 HC RX 250 WO HCPCS: Performed by: NURSE PRACTITIONER

## 2020-09-06 PROCEDURE — 86316 IMMUNOASSAY TUMOR OTHER: CPT

## 2020-09-06 PROCEDURE — 6360000002 HC RX W HCPCS: Performed by: NURSE PRACTITIONER

## 2020-09-06 PROCEDURE — 82784 ASSAY IGA/IGD/IGG/IGM EACH: CPT

## 2020-09-06 PROCEDURE — 96366 THER/PROPH/DIAG IV INF ADDON: CPT

## 2020-09-06 PROCEDURE — 82105 ALPHA-FETOPROTEIN SERUM: CPT

## 2020-09-06 PROCEDURE — 84443 ASSAY THYROID STIM HORMONE: CPT

## 2020-09-06 PROCEDURE — 80053 COMPREHEN METABOLIC PANEL: CPT

## 2020-09-06 PROCEDURE — 82232 ASSAY OF BETA-2 PROTEIN: CPT

## 2020-09-06 PROCEDURE — 6360000002 HC RX W HCPCS: Performed by: INTERNAL MEDICINE

## 2020-09-06 PROCEDURE — 82728 ASSAY OF FERRITIN: CPT

## 2020-09-06 PROCEDURE — 2580000003 HC RX 258: Performed by: INTERNAL MEDICINE

## 2020-09-06 PROCEDURE — 86140 C-REACTIVE PROTEIN: CPT

## 2020-09-06 RX ORDER — CIPROFLOXACIN 500 MG/1
500 TABLET, FILM COATED ORAL 2 TIMES DAILY
Qty: 20 TABLET | Refills: 0 | Status: SHIPPED | OUTPATIENT
Start: 2020-09-06 | End: 2020-09-16

## 2020-09-06 RX ORDER — ALPRAZOLAM 1 MG/1
1.5 TABLET ORAL 2 TIMES DAILY
Status: ON HOLD | COMMUNITY
End: 2020-09-06 | Stop reason: HOSPADM

## 2020-09-06 RX ORDER — CIPROFLOXACIN 2 MG/ML
400 INJECTION, SOLUTION INTRAVENOUS EVERY 12 HOURS
Status: DISCONTINUED | OUTPATIENT
Start: 2020-09-06 | End: 2020-09-06 | Stop reason: HOSPADM

## 2020-09-06 RX ORDER — MONTELUKAST SODIUM 4 MG/1
1 TABLET, CHEWABLE ORAL 2 TIMES DAILY
Qty: 60 TABLET | Refills: 1 | Status: SHIPPED | OUTPATIENT
Start: 2020-09-06 | End: 2020-10-20

## 2020-09-06 RX ORDER — FAMOTIDINE 40 MG/1
40 TABLET, FILM COATED ORAL EVERY EVENING
Qty: 30 TABLET | Refills: 1 | Status: SHIPPED | OUTPATIENT
Start: 2020-09-06 | End: 2020-10-20

## 2020-09-06 RX ORDER — METRONIDAZOLE 500 MG/1
500 TABLET ORAL 3 TIMES DAILY
Qty: 30 TABLET | Refills: 0 | Status: SHIPPED | OUTPATIENT
Start: 2020-09-06 | End: 2020-09-16

## 2020-09-06 RX ORDER — CIPROFLOXACIN 2 MG/ML
200 INJECTION, SOLUTION INTRAVENOUS EVERY 12 HOURS
Status: DISCONTINUED | OUTPATIENT
Start: 2020-09-06 | End: 2020-09-06 | Stop reason: SDUPTHER

## 2020-09-06 RX ORDER — ALPRAZOLAM 0.25 MG/1
0.75 TABLET ORAL EVERY 6 HOURS
Qty: 360 TABLET | Refills: 0 | Status: SHIPPED | OUTPATIENT
Start: 2020-09-06 | End: 2020-10-06

## 2020-09-06 RX ORDER — METOCLOPRAMIDE HYDROCHLORIDE 5 MG/ML
10 INJECTION INTRAMUSCULAR; INTRAVENOUS ONCE
Status: COMPLETED | OUTPATIENT
Start: 2020-09-06 | End: 2020-09-06

## 2020-09-06 RX ORDER — ONDANSETRON 4 MG/1
4 TABLET, FILM COATED ORAL EVERY 12 HOURS PRN
Qty: 60 TABLET | Refills: 0 | Status: CANCELLED | OUTPATIENT
Start: 2020-09-06

## 2020-09-06 RX ORDER — ONDANSETRON 4 MG/1
4 TABLET, FILM COATED ORAL EVERY 6 HOURS PRN
Status: DISCONTINUED | OUTPATIENT
Start: 2020-09-06 | End: 2020-09-06 | Stop reason: HOSPADM

## 2020-09-06 RX ORDER — SODIUM CHLORIDE 9 MG/ML
INJECTION, SOLUTION INTRAVENOUS CONTINUOUS
Status: DISCONTINUED | OUTPATIENT
Start: 2020-09-06 | End: 2020-09-06 | Stop reason: HOSPADM

## 2020-09-06 RX ORDER — PROMETHAZINE HYDROCHLORIDE 25 MG/ML
25 INJECTION, SOLUTION INTRAMUSCULAR; INTRAVENOUS EVERY 6 HOURS PRN
Status: DISCONTINUED | OUTPATIENT
Start: 2020-09-06 | End: 2020-09-06 | Stop reason: HOSPADM

## 2020-09-06 RX ADMIN — ONDANSETRON HYDROCHLORIDE 4 MG: 4 TABLET, FILM COATED ORAL at 10:49

## 2020-09-06 RX ADMIN — POTASSIUM BICARBONATE 20 MEQ: 782 TABLET, EFFERVESCENT ORAL at 09:06

## 2020-09-06 RX ADMIN — ALPRAZOLAM 0.75 MG: 0.25 TABLET ORAL at 14:00

## 2020-09-06 RX ADMIN — METOPROLOL SUCCINATE 50 MG: 50 TABLET, EXTENDED RELEASE ORAL at 00:14

## 2020-09-06 RX ADMIN — METRONIDAZOLE 500 MG: 500 INJECTION, SOLUTION INTRAVENOUS at 12:59

## 2020-09-06 RX ADMIN — ENOXAPARIN SODIUM 40 MG: 40 INJECTION SUBCUTANEOUS at 09:06

## 2020-09-06 RX ADMIN — METOCLOPRAMIDE 10 MG: 10 TABLET ORAL at 00:14

## 2020-09-06 RX ADMIN — SODIUM CHLORIDE: 9 INJECTION, SOLUTION INTRAVENOUS at 09:07

## 2020-09-06 RX ADMIN — CIPROFLOXACIN 400 MG: 2 INJECTION, SOLUTION INTRAVENOUS at 14:52

## 2020-09-06 RX ADMIN — METRONIDAZOLE 500 MG: 500 INJECTION, SOLUTION INTRAVENOUS at 04:56

## 2020-09-06 RX ADMIN — METOCLOPRAMIDE 10 MG: 5 INJECTION, SOLUTION INTRAMUSCULAR; INTRAVENOUS at 18:14

## 2020-09-06 RX ADMIN — METOPROLOL SUCCINATE 50 MG: 50 TABLET, EXTENDED RELEASE ORAL at 12:58

## 2020-09-06 ASSESSMENT — PAIN SCALES - GENERAL: PAINLEVEL_OUTOF10: 0

## 2020-09-06 NOTE — CONSULTS
extended release tablet Take 1 tablet by mouth 2 times daily 9/3/20  Yes Mary Ann Wernerr, DO   levothyroxine (SYNTHROID) 100 MCG tablet Take one tablet every day 3/30/20  Yes Jj Vides MD   furosemide (LASIX) 20 MG tablet Take 1 tablet by mouth daily as needed (edema) 7/13/20   Mary Ann Lover, DO       Allergies  Narcan [naloxone hcl] and Fentanyl    Social Hx  Social History     Socioeconomic History    Marital status:      Spouse name: Bonny Braswell Number of children: 3    Years of education: Not on file    Highest education level: Not on file   Occupational History    Occupation: homemaker   Social Needs    Financial resource strain: Not hard at all   [place] insecurity     Worry: Never true     Inability: Never true   Break30 needs     Medical: Not on file     Non-medical: Not on file   Tobacco Use    Smoking status: Never Smoker    Smokeless tobacco: Never Used   Substance and Sexual Activity    Alcohol use: No    Drug use: No    Sexual activity: Yes     Partners: Male   Lifestyle    Physical activity     Days per week: Not on file     Minutes per session: Not on file    Stress: Not on file   Relationships    Social connections     Talks on phone: Not on file     Gets together: Not on file     Attends Quaker service: Not on file     Active member of club or organization: Not on file     Attends meetings of clubs or organizations: Not on file     Relationship status: Not on file    Intimate partner violence     Fear of current or ex partner: Not on file     Emotionally abused: Not on file     Physically abused: Not on file     Forced sexual activity: Not on file   Other Topics Concern    Not on file   Social History Narrative    Lives in a house with  who is disabled and one child. Homeschools her daughter.     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       Review of Systems  All bolded are positive; please see HPI  General:  Fever, chills, diaphoresis, fatigue, malaise, night sweats, weight loss  Psychological:  Anxiety, disorientation, hallucinations. ENT:  Epistaxis, headaches, vertigo, visual changes. Cardiovascular:  Chest pain, irregular heartbeats, palpitations, paroxysmal nocturnal dyspnea. Respiratory:  Shortness of breath, coughing, sputum production, hemoptysis, wheezing, orthopnea. Gastrointestinal:  Nausea, vomiting, diarrhea, heartburn, constipation, abdominal pain, hematemesis, hematochezia, melena, acholic stools  Genito-Urinary:  Dysuria, urgency, frequency, hematuria  Musculoskeletal:  Joint pain, joint stiffness, joint swelling, muscle pain  Neurology:  Headache, focal neurological deficits, weakness, numbness, paresthesia  Derm:  Rashes, ulcers, excoriations, bruising  Extremities:  Decreased ROM, peripheral edema, mottling    Physical Examination  Vitals:  BP (!) 140/82   Pulse 75   Temp 98.2 °F (36.8 °C) (Oral)   Resp 16   Ht 5' 5\" (1.651 m)   Wt 210 lb (95.3 kg)   LMP 01/01/2013   SpO2 98%   BMI 34.95 kg/m²   General Appearance:  awake, alert, and oriented to person, place, time, and purpose; appears stated age and cooperative; no apparent distress no labored breathing  HEENT:  NCAT; PERRL; conjunctiva pink, sclera clear  Neck:  no adenopathy, bruit, JVD, tenderness, masses, or nodules; supple, symmetrical, trachea midline, thyroid not enlarged  Lung:  clear to auscultation bilaterally; no use of accessory muscles; no rhonchi, rales, or crackles  Heart:  regular rate and regular rhythm without murmur, rub, or gallop  Abdomen:  soft, nontender, nondistended; normoactive bowel sounds; no organomegaly  Extremities:  extremities normal, atraumatic, no cyanosis or edema  Musculokeletal:  no joint swelling, no muscle tenderness. ROM normal in all joints of extremities.    Neurologic:  mental status A&Ox3, thought content appropriate; CN II-XII grossly intact; sensation intact, motor strength 5/5 globally; no slurred speech    Laboratory Data  Recent Results (from the past 24 hour(s))   CBC Auto Differential    Collection Time: 09/05/20  8:17 PM   Result Value Ref Range    WBC 4.9 4.5 - 11.5 E9/L    RBC 4.42 3.50 - 5.50 E12/L    Hemoglobin 13.2 11.5 - 15.5 g/dL    Hematocrit 39.9 34.0 - 48.0 %    MCV 90.3 80.0 - 99.9 fL    MCH 29.9 26.0 - 35.0 pg    MCHC 33.1 32.0 - 34.5 %    RDW 13.2 11.5 - 15.0 fL    Platelets 528 676 - 096 E9/L    MPV 10.2 7.0 - 12.0 fL    Neutrophils % 59.6 43.0 - 80.0 %    Immature Granulocytes % 0.2 0.0 - 5.0 %    Lymphocytes % 29.4 20.0 - 42.0 %    Monocytes % 8.6 2.0 - 12.0 %    Eosinophils % 1.6 0.0 - 6.0 %    Basophils % 0.6 0.0 - 2.0 %    Neutrophils Absolute 2.91 1.80 - 7.30 E9/L    Immature Granulocytes # 0.01 E9/L    Lymphocytes Absolute 1.44 (L) 1.50 - 4.00 E9/L    Monocytes Absolute 0.42 0.10 - 0.95 E9/L    Eosinophils Absolute 0.08 0.05 - 0.50 E9/L    Basophils Absolute 0.03 0.00 - 0.20 E9/L   Comprehensive Metabolic Panel w/ Reflex to MG    Collection Time: 09/05/20  8:17 PM   Result Value Ref Range    Sodium 145 132 - 146 mmol/L    Potassium reflex Magnesium 3.7 3.5 - 5.0 mmol/L    Chloride 107 98 - 107 mmol/L    CO2 23 22 - 29 mmol/L    Anion Gap 15 7 - 16 mmol/L    Glucose 82 74 - 99 mg/dL    BUN 5 (L) 6 - 20 mg/dL    CREATININE 0.7 0.5 - 1.0 mg/dL    GFR Non-African American >60 >=60 mL/min/1.73    GFR African American >60     Calcium 9.3 8.6 - 10.2 mg/dL    Total Protein 6.7 6.4 - 8.3 g/dL    Alb 4.0 3.5 - 5.2 g/dL    Total Bilirubin 0.6 0.0 - 1.2 mg/dL    Alkaline Phosphatase 64 35 - 104 U/L    ALT 39 (H) 0 - 32 U/L    AST 24 0 - 31 U/L   Lipase    Collection Time: 09/05/20  8:17 PM   Result Value Ref Range    Lipase 13 13 - 60 U/L   Urinalysis, reflex to microscopic    Collection Time: 09/05/20  8:17 PM   Result Value Ref Range    Color, UA Yellow Straw/Yellow    Clarity, UA Clear Clear    Glucose, Ur Negative Negative mg/dL    Bilirubin Urine MODERATE (A) Negative    Ketones, Urine >=80 (A) Negative mg/dL    Specific Gravity, UA 1.025 1.005 - 1.030    Blood, Urine SMALL (A) Negative    pH, UA 6.0 5.0 - 9.0    Protein, UA TRACE Negative mg/dL    Urobilinogen, Urine 0.2 <2.0 E.U./dL    Nitrite, Urine Negative Negative    Leukocyte Esterase, Urine MODERATE (A) Negative   Lactic Acid, Plasma    Collection Time: 09/05/20  8:17 PM   Result Value Ref Range    Lactic Acid 1.0 0.5 - 2.2 mmol/L   Microscopic Urinalysis    Collection Time: 09/05/20  8:17 PM   Result Value Ref Range    WBC, UA >20 (A) 0 - 5 /HPF    RBC, UA 2-5 0 - 2 /HPF    Epithelial Cells, UA FEW /HPF    Bacteria, UA MANY (A) None Seen /HPF   Comprehensive metabolic panel    Collection Time: 09/06/20  5:00 AM   Result Value Ref Range    Sodium 144 132 - 146 mmol/L    Potassium 3.2 (L) 3.5 - 5.0 mmol/L    Chloride 108 (H) 98 - 107 mmol/L    CO2 24 22 - 29 mmol/L    Anion Gap 12 7 - 16 mmol/L    Glucose 89 74 - 99 mg/dL    BUN 4 (L) 6 - 20 mg/dL    CREATININE 0.6 0.5 - 1.0 mg/dL    GFR Non-African American >60 >=60 mL/min/1.73    GFR African American >60     Calcium 8.7 8.6 - 10.2 mg/dL    Total Protein 6.2 (L) 6.4 - 8.3 g/dL    Alb 3.5 3.5 - 5.2 g/dL    Total Bilirubin 0.5 0.0 - 1.2 mg/dL    Alkaline Phosphatase 59 35 - 104 U/L    ALT 33 (H) 0 - 32 U/L    AST 22 0 - 31 U/L   CBC    Collection Time: 09/06/20  5:00 AM   Result Value Ref Range    WBC 4.0 (L) 4.5 - 11.5 E9/L    RBC 4.14 3.50 - 5.50 E12/L    Hemoglobin 12.2 11.5 - 15.5 g/dL    Hematocrit 37.3 34.0 - 48.0 %    MCV 90.1 80.0 - 99.9 fL    MCH 29.5 26.0 - 35.0 pg    MCHC 32.7 32.0 - 34.5 %    RDW 13.2 11.5 - 15.0 fL    Platelets 967 415 - 562 E9/L    MPV 10.1 7.0 - 12.0 fL   Sedimentation Rate    Collection Time: 09/06/20  5:00 AM   Result Value Ref Range    Sed Rate 16 0 - 20 mm/Hr   C-REACTIVE PROTEIN    Collection Time: 09/06/20  5:00 AM   Result Value Ref Range    CRP 0.2 0.0 - 0.4 mg/dL       Imaging  Xr Abdomen (kub) (single Ap View)    Result Date: 9/4/2020  Patient MRN: The upper mediastinum included in the field-of-view of this study is unremarkable. Evaluation of the bones reveals no fracture or destructive lesion. 1. No neck mass, edema or lymphadenopathy. 2. Status post thyroidectomy. Ct Abdomen Pelvis W Iv Contrast Additional Contrast? None    Result Date: 2020  Patient MRN:  [id number] : 1965 Age: 47 years Gender: Female Order Date:  2020 4:39 PM EXAM: CT ABDOMEN PELVIS W IV CONTRAST INDICATION:  pain, constipation . TECHNIQUE: Axial images from the lung bases to the pubic symphysis with IV contrast, without oral contrast. Axial sagittal and coronal 2-D reconstructions with soft tissue windows. Axial lung windows. Dose reduction techniques with automated exposure control. Contrast is 100 mL Isovue-370 IV. Dose is total DLP 1234.33 MG Y CM. COMPARISON: CT abdomen and pelvis 2020, CT abdomen without contrast 12/3/2018, CT abdomen with contrast 2015. Ilya Croft FINDINGS:  Lung bases: A chronic small focus of atelectasis in the inferior lingula near the cardiac apex. Tiny linear band of atelectasis in the lateral left lower lobe. No acute pneumonia or effusion. Heart size normal. No hiatal hernia. ABDOMEN: Chronic severe diffuse fatty change in the liver. There is a subtle mass in the inferior margin of the medial segment left hepatic lobe at the junction with the right lobe, measuring approximately 2.1 cm x 1.7 cm x 1.7 cm. It has a 1 cm area of intense peripheral nodular enhancement and a thin enhancing rim. Most of the patient's prior exams were done without contrast and doesn't show up. On today's exam it is bulging slightly below the liver margin. On the  exam there was a 1 cm area of nodular enhancement in that region. On the noncontrast studies there is a vague 1.8 cm rounded mass in that region. Possible hemangioma.  In addition, there is a chronic subcapsular enhancing nodule in the lateral right hepatic lobe on axial image 18 measuring 8mm, it was previously 4 mm. A 7 mm x 3 mm subcapsular enhancing nodule in the posterior right hepatic lobe on image 26 was previously 2 mm. No cystic changes. Bile ducts are nondilated. Liver length 16 cm with chronic enlargement of the lateral segment left hepatic lobe. No ascites. Borderline splenomegaly with the spleen measuring 13 cm x 10 cm x 7.7 cm, the spleen was previously 12.6 cm. A minimal increase in size without any obvious splenic masses. Has been previous cholecystectomy. The common bile duct measures 2 mm. Intrahepatic bile ducts are not dilated. Normal appearance of the pancreas. A few small portacaval lymph nodes are demonstrated all in the 1 cm size range. Normal adrenal glands. Both kidneys have normal size and are excreting contrast without hydronephrosis. No perinephric stranding. No obvious renal cyst. Normal aorta and IVC. No retroperitoneal adenopathy. No hiatal hernia. Normal stomach. The very proximal jejunum is nondilated. The mid to distal jejunum shows scattered mild amounts of fluid retention. The mid and distal small bowel show mild fluid distention with appearance suggesting ileus. The largest loop has diameter 1.5 cm. A gradual transition in the right lower quadrant. The terminal ileum is not dilated. Pelvis: A normal appendix in the right lower quadrant. Minimal stool in the very proximal colon. Otherwise the colon is empty. Mild diffuse wall thickening of the sigmoid colon with increased vascularity in the adjacent pelvic fat. Mild wall thickening of the rectum with moderate enhancement suggesting inflammation. Prominent perirectal vessels. The rectum is mildly distended with fluid. No evidence for a diverticulitis. No free fluid, free air or abscess. The urinary bladder was mildly distended at the time of exam with some excreted contrast. Ureters are not dilated. No inguinal hernia. Absent uterus. Ovaries are not seen and appear to be absent. Bones: No compression fracture.  At L1/L2, there is a large posterior bridging bone spur and possibly a tiny chronic disc herniation. Moderate facet degeneration. A very severe central stenosis caused by the bone spur. Moderate loss of disc height and mild anterior and left lateral spurring. At L2/3, there is flattening of the disc with anterior spurring and sclerosis, mild kyphosis, facet degeneration and a moderate stenosis. At L3/4, there is a disc bulge eccentric to the left with a mild left neural foraminal stenosis and minimal facet degeneration. At L4/5, there is a mild disc bulge and a tiny posterior midline bone spur off the L5 margin. Moderate right and mild left facet degeneration with mild stenosis. At L5/S1 there is flattening of the disc with mild spurring and mild facet degeneration, mild left neural foraminal stenosis. 1. Findings of colitis and proctitis. 2. Small bowel ileus. 3. Mild splenomegaly. 4. There are several nodular enhancing lesions in the liver with interval slight increase in size since 2015. The largest of these is at the inferior medial segment left hepatic lobe. I suspect multiple hemangiomas. 5. Severe fatty change in the liver and old cholecystectomy. 6. Normal appendix. Assessment and Plan  Patient is a 47 y.o. female on consult for colitis, abdominal pain, and nausea. 1.  Abnormal CT colon  -CT showing inflammation of the sigmoid and rectum  -Continue Cipro and Flagyl  -Patient will likely require colonoscopy as outpatient  -Infectious versus inflammatory etiology  -Further work-up as outpatient    2. Diarrhea  -Possible component of bile acid diarrhea  -Stool studies negative 9/4/2020  -Consider bile acid diarrhea given recent cholecystectomy  -Trial colestipol 1-2 times daily as outpatient - advised to take apart from other medications  -Elevated TSH 7/2/2020 - repeat now  -Check chromogranin A    3.   Abdominal pain  -Recent EGD unremarkable  -CT with findings of colitis in sigmoid and rectum  -Continue

## 2020-09-06 NOTE — PROGRESS NOTES
This nurse spoke with patient to verify for the third time xanax dosage. Patient presented to this nurse a pill bottle for xanax. Instructions reading to take 1.5 tabs of 1mg twice daily. Patient requests to keep pill bottle in her possession. Patient instructed not to take any home medications and to only take medications provided by [facility] ([facility] Patient voiced understanding and stated she would not take home medications. Will notify Dr. Ja Mendosa of information.

## 2020-09-06 NOTE — PROGRESS NOTES
Called Dr. Kim Luna to report patient c/o recurring nausea/vomitting.     Electronically signed by Tabitha Casillas RN on 9/6/2020 at 10:44 AM

## 2020-09-06 NOTE — H&P
CHIEF COMPLAINT: abdominal pain      HISTORY OF PRESENT ILLNESS:      The patient is a 47 y.o. female patient presents with increased abdominal pain. She has been to ER several times for same pain. She has seen GI as outpatient and out office as outpatient. has had abnormal CT can (which I haven't seen), stool cultured which has been negative to this point  She has outpatient UTI showing entercoccus. 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 Narda Martel)      OTHER SURGICAL HISTORY  12/23/2019    Dr. Narda Martel- Loop Recorder implant, Confirm    THYROIDECTOMY Bilateral 01/19/2017    Dr. Yanely Paiz       Medications Prior to Admission:    Medications Prior to Admission: ALPRAZolam (XANAX) 1 MG tablet, Take 1.5 mg by mouth 2 times daily.   sucralfate (CARAFATE) 1 GM tablet, Take 1 tablet by mouth 4 times daily  metoprolol succinate (TOPROL XL) 50 MG extended release tablet, Take 1 tablet by mouth 2 times daily  levothyroxine (SYNTHROID) 100 MCG tablet, Take one tablet every day  metoclopramide (REGLAN) 10 MG tablet, Take 1 tablet by mouth 4 times daily for 5 days  [DISCONTINUED] ALPRAZolam (XANAX XR) 3 MG extended release tablet, Take 1.5 mg by mouth 3 times daily. [DISCONTINUED] potassium chloride (KLOR-CON M) 20 MEQ extended release tablet, Take 2 tablets by mouth daily (Patient taking differently: Take 40 mEq by mouth as needed )  furosemide (LASIX) 20 MG tablet, Take 1 tablet by mouth daily as needed (edema)    Allergies:    Narcan [naloxone hcl] and Fentanyl    Social History:    reports that she has never smoked. She has never used smokeless tobacco. She reports that she does not drink alcohol or use drugs. Family History:   family history includes Atrial Fibrillation in her paternal grandfather; Breast Cancer in her paternal grandmother; Cancer in her father and maternal grandmother; Heart Disease in her maternal grandfather; High Blood Pressure in her father; High Cholesterol in her mother; Other in her daughter, paternal grandmother, and sister. REVIEW OF SYSTEMS:  As above in the HPI, otherwise negative    PHYSICAL EXAM:    Vitals:  BP (!) 156/88 Comment: manual  Pulse 76   Temp 97.7 °F (36.5 °C) (Oral)   Resp 18   Ht 5' 5\" (1.651 m)   Wt 210 lb (95.3 kg)   LMP 01/01/2013   SpO2 99%   BMI 34.95 kg/m²     General:  Awake, alert, oriented X 3. Well developed, well nourished, well groomed. No apparent distress. HEENT:  Normocephalic, atraumatic. Pupils equal, round, reactive to light. No scleral icterus. No conjunctival injection. Normal lips, teeth, and gums. No nasal discharge. Neck:  Supple  Heart:  RRR, no murmurs, gallops, rubs  Lungs:  CTA bilaterally, bilat symmetrical expansion, no wheeze, rales, or rhonchi  Abdomen:   Bowel sounds present, soft, nontender, no masses, no organomegaly, no peritoneal signs  Extremities:  No clubbing, cyanosis, or edema  Skin:  Warm and dry, no open lesions or rash  Neuro:  Cranial nerves 2-12 intact, no focal deficits  Breast: deferred  Rectal: deferred  Genitalia:  deferred    LABS:  CBC with Differential:    Lab Results   Component Value Date    WBC 4.0 09/06/2020    RBC 4.14 09/06/2020    HGB 12.2 09/06/2020    HCT 37.3 09/06/2020     09/06/2020    MCV 90.1 09/06/2020    MCH 29.5 09/06/2020    MCHC 32.7 09/06/2020    RDW 13.2 09/06/2020    LYMPHOPCT 29.4 09/05/2020    MONOPCT 8.6 09/05/2020    BASOPCT 0.6 09/05/2020    MONOSABS 0.42 09/05/2020    LYMPHSABS 1.44 09/05/2020    EOSABS 0.08 09/05/2020    BASOSABS 0.03 09/05/2020     CMP:    Lab Results   Component Value Date     09/06/2020    K 3.2 09/06/2020    K 3.7 09/05/2020     09/06/2020    CO2 24 09/06/2020    BUN 4 09/06/2020    CREATININE 0.6 09/06/2020    GFRAA >60 09/06/2020    LABGLOM >60 09/06/2020    GLUCOSE 89 09/06/2020    GLUCOSE 93 06/13/2011    PROT 6.2 09/06/2020    LABALBU 3.5 09/06/2020    LABALBU 4.6 06/13/2011    CALCIUM 8.7 09/06/2020    BILITOT 0.5 09/06/2020    ALKPHOS 59 09/06/2020    AST 22 09/06/2020    ALT 33 09/06/2020     PT/INR:    Lab Results   Component Value Date    PROTIME 11.3 10/31/2016    INR 1.0 10/31/2016     @cktotal:3,ckmb:3,ckmbindex:3,troponini:3@  U/A:    Lab Results   Component Value Date    NITRU Negative 09/05/2020    COLORU Yellow 09/05/2020    PHUR 6.0 09/05/2020    LABCAST RARE 06/27/2016    WBCUA >20 09/05/2020    RBCUA 2-5 09/05/2020    MUCUS Present 06/27/2020    BACTERIA MANY 09/05/2020    CLARITYU Clear 09/05/2020    SPECGRAV 1.025 09/05/2020    UROBILINOGEN 0.2 09/05/2020    BILIRUBINUR MODERATE 09/05/2020    BILIRUBINUR neg 06/16/2020    BLOODU SMALL 09/05/2020    GLUCOSEU Negative 09/05/2020    KETUA >=80 09/05/2020    AMORPHOUS RARE 06/25/2020          ASSESSMENT:      Active Problems:    Abdominal pain  Resolved Problems:    * No resolved hospital problems.  *          PLAN:    Abdominal pain-  seems as this is ongoing  Apparently had abnormal ct scan  I will order ABT pending GI consult  We will need to cover for the enterococcus in urine  Maybe home today if no further work sup anticiapted    Entercoccus UTI-  Iv apple [place] DO  6:56 AM  9/6/2020

## 2020-09-06 NOTE — PROGRESS NOTES
[facility] Quality Flow/Interdisciplinary Rounds Progress Note        Quality Flow Rounds held on September 6, 2020    Disciplines Attending:  Bedside Nurse, ,  and Nursing Unit [address] [place] was admitted on 9/5/2020  7:46 PM    Anticipated Discharge Date:  Expected Discharge Date: 09/07/20    Disposition:    Jr Score:  Jr Scale Score: 20    Readmission Risk              Risk of Unplanned Readmission:        0           Discussed patient goal for the day, patient clinical progression, and barriers to discharge.   The following Goal(s) of the Day/Commitment(s) have been identified:  Await GI Consult       Kimberley Donovan  September 6, 2020

## 2020-09-06 NOTE — ED NOTES
Bed assigned, SBAR faxed to floor, rec'd, pt ready to go up     Rito Alvarenga RN  09/05/20 [id number] English

## 2020-09-06 NOTE — ED PROVIDER NOTES
headaches. Hematological: Negative for adenopathy. All other systems reviewed and are negative. Physical Exam  Vitals signs and nursing note reviewed. Constitutional:       General: She is not in acute distress. Appearance: Normal appearance. HENT:      Head: Normocephalic and atraumatic. Nose: No congestion or rhinorrhea. Mouth/Throat:      Mouth: Mucous membranes are moist.      Pharynx: Oropharynx is clear. Eyes:      Extraocular Movements: Extraocular movements intact. Pupils: Pupils are equal, round, and reactive to light. Neck:      Musculoskeletal: Normal range of motion. No neck rigidity or muscular tenderness. Cardiovascular:      Rate and Rhythm: Normal rate and regular rhythm. Heart sounds: No murmur. No gallop. Pulmonary:      Effort: Pulmonary effort is normal. No respiratory distress. Breath sounds: No wheezing, rhonchi or rales. Abdominal:      General: Abdomen is flat. Palpations: Abdomen is soft. There is no mass. Tenderness: There is generalized abdominal tenderness. There is no right CVA tenderness, left CVA tenderness, guarding or rebound. Hernia: No hernia is present. Musculoskeletal: Normal range of motion. General: No swelling, tenderness or signs of injury. Skin:     General: Skin is warm and dry. Capillary Refill: Capillary refill takes less than 2 seconds. Neurological:      General: No focal deficit present. Mental Status: She is alert and oriented to person, place, and time. Mental status is at baseline. Psychiatric:         Mood and Affect: Mood normal.          Procedures       MDM  Number of Diagnoses or Management Options  Generalized abdominal pain:   Non-intractable vomiting with nausea, unspecified vomiting type:   Diagnosis management comments: Patient 70-year-old female presenting with chronic abdominal pain. Patient has had multiple ED visits in the past for similar episodes.   Patient is undergone multiple CT scans including one this past week which demonstrated mild colitis. Vital signs stable on presentation. Physical exam demonstrated diffuse generalized abdominal tenderness no focal findings. Spoke with Dr. Dotty Méndez gastroenterology who recommended admission and IV antibiotic therapy. Patient started on Cipro and Flagyl along with Reglan for nausea. Spoke with patient's PCP Dr. Jessica Hutton who will admit patient. Plan of care discussed with patient who is in agreement. Patient was admitted to [place] [address] in stable condition. Amount and/or Complexity of Data Reviewed  Clinical lab tests: ordered and reviewed  Tests in the medicine section of CPT®: ordered and reviewed  Decide to obtain previous medical records or to obtain history from someone other than the patient: yes  Review and summarize past medical records: yes  Discuss the patient with other providers: yes    Risk of Complications, Morbidity, and/or Mortality  Presenting problems: moderate  Diagnostic procedures: moderate  Management options: moderate    Patient Progress  Patient progress: stable       ED Course as of Sep 05 2114   Sat Sep 05, 2020   2109 Spoke with Dr. Dotty Méndez recommended IV antibiotics and admission for intractable abdominal pain. [BP]   2111 Spoke with Dr. Jessica Hutton he will admit patient. [BP]      ED Course User Index  [BP] Selin Hyde, DO        --------------------------------------------- PAST HISTORY ---------------------------------------------  Past Medical History:  has a past medical history of Allergic rhinitis, Anxiety, Atrial fibrillation ([facility] [address] Cancer ([facility] [address] Chest pain, Chronic back pain, Depression, Headache, Hypertension, NAFLD (nonalcoholic fatty liver disease), Obesity with body mass index (BMI) of 30.0 to 39.9, Osteoarthritis, Palpitations, Palpitations, Sleep apnea, Spinal stenosis, lumbar, and Thyroid disease.     Past Surgical History:  has a past surgical history that includes ablation of dysrhythmic focus (1990); hysteroscopy (8/15/12); Colonoscopy; Endometrial ablation (2012); Thyroidectomy (Bilateral, 01/19/2017); Hysterectomy (07/25/2018); Breast surgery; Hysterectomy, vaginal; other surgical history (12/23/2019); Insertable Cardiac Monitor (12/23/2019); Gallbladder surgery (07/2020); and Cholecystectomy. Social History:  reports that she has never smoked. She has never used smokeless tobacco. She reports that she does not drink alcohol or use drugs. Family History: family history includes Atrial Fibrillation in her paternal grandfather; Breast Cancer in her paternal grandmother; Cancer in her father and maternal grandmother; Heart Disease in her maternal grandfather; High Blood Pressure in her father; High Cholesterol in her mother; Other in her daughter, paternal grandmother, and sister. The patients home medications have been reviewed.     Allergies: Narcan [naloxone hcl] and Fentanyl    -------------------------------------------------- RESULTS -------------------------------------------------    LABS:  Results for orders placed or performed during the hospital encounter of 09/05/20   CBC Auto Differential   Result Value Ref Range    WBC 4.9 4.5 - 11.5 E9/L    RBC 4.42 3.50 - 5.50 E12/L    Hemoglobin 13.2 11.5 - 15.5 g/dL    Hematocrit 39.9 34.0 - 48.0 %    MCV 90.3 80.0 - 99.9 fL    MCH 29.9 26.0 - 35.0 pg    MCHC 33.1 32.0 - 34.5 %    RDW 13.2 11.5 - 15.0 fL    Platelets 239 549 - 317 E9/L    MPV 10.2 7.0 - 12.0 fL    Neutrophils % 59.6 43.0 - 80.0 %    Immature Granulocytes % 0.2 0.0 - 5.0 %    Lymphocytes % 29.4 20.0 - 42.0 %    Monocytes % 8.6 2.0 - 12.0 %    Eosinophils % 1.6 0.0 - 6.0 %    Basophils % 0.6 0.0 - 2.0 %    Neutrophils Absolute 2.91 1.80 - 7.30 E9/L    Immature Granulocytes # 0.01 E9/L    Lymphocytes Absolute 1.44 (L) 1.50 - 4.00 E9/L    Monocytes Absolute 0.42 0.10 - 0.95 E9/L    Eosinophils Absolute 0.08 0.05 - 0.50 E9/L    Basophils Absolute 0.03 0.00 - 0.20 E9/L   Comprehensive Metabolic Panel w/ Reflex to MG   Result Value Ref Range    Sodium 145 132 - 146 mmol/L    Potassium reflex Magnesium 3.7 3.5 - 5.0 mmol/L    Chloride 107 98 - 107 mmol/L    CO2 23 22 - 29 mmol/L    Anion Gap 15 7 - 16 mmol/L    Glucose 82 74 - 99 mg/dL    BUN 5 (L) 6 - 20 mg/dL    CREATININE 0.7 0.5 - 1.0 mg/dL    GFR Non-African American >60 >=60 mL/min/1.73    GFR African American >60     Calcium 9.3 8.6 - 10.2 mg/dL    Total Protein 6.7 6.4 - 8.3 g/dL    Alb 4.0 3.5 - 5.2 g/dL    Total Bilirubin 0.6 0.0 - 1.2 mg/dL    Alkaline Phosphatase 64 35 - 104 U/L    ALT 39 (H) 0 - 32 U/L    AST 24 0 - 31 U/L   Lipase   Result Value Ref Range    Lipase 13 13 - 60 U/L   Urinalysis, reflex to microscopic   Result Value Ref Range    Color, UA Yellow Straw/Yellow    Clarity, UA Clear Clear    Glucose, Ur Negative Negative mg/dL    Bilirubin Urine MODERATE (A) Negative    Ketones, Urine >=80 (A) Negative mg/dL    Specific Gravity, UA 1.025 1.005 - 1.030    Blood, Urine SMALL (A) Negative    pH, UA 6.0 5.0 - 9.0    Protein, UA TRACE Negative mg/dL    Urobilinogen, Urine 0.2 <2.0 E.U./dL    Nitrite, Urine Negative Negative    Leukocyte Esterase, Urine MODERATE (A) Negative   Lactic Acid, Plasma   Result Value Ref Range    Lactic Acid 1.0 0.5 - 2.2 mmol/L       RADIOLOGY:  No orders to display         ------------------------- NURSING NOTES AND VITALS REVIEWED ---------------------------  Date / Time Roomed:  9/5/2020  7:46 PM  ED Bed Assignment:  08/08    The nursing notes within the ED encounter and vital signs as below have been reviewed.      Patient Vitals for the past 24 hrs:   BP Temp Temp src Pulse Resp SpO2 Height Weight   09/05/20 1950 (!) 132/59 -- -- -- -- -- -- --   09/05/20 1948 -- -- -- 88 16 98 % 5' 5\" (1.651 m) 210 lb (95.3 kg)   09/05/20 1931 -- 97.1 °F (36.2 °C) Infrared 102 -- 98 % -- --       Oxygen Saturation Interpretation: Normal    ------------------------------------------ PROGRESS

## 2020-09-06 NOTE — PROGRESS NOTES
Spoke to Dr. Sue Harris. Resident is rounding and will be in to see patient this evening. Patient is not to be discharged unless seen by GI unless patient wants to sign out AMA.

## 2020-09-07 LAB
CEA: 1 NG/ML (ref 0–5.2)
FERRITIN: 202 NG/ML
H PYLORI ANTIGEN STOOL: NEGATIVE
IGA: 222 MG/DL (ref 70–400)
IRON SATURATION: 16 % (ref 15–50)
IRON: 50 MCG/DL (ref 37–145)
TOTAL IRON BINDING CAPACITY: 308 MCG/DL (ref 250–450)

## 2020-09-08 LAB
CRYPTOSPORIDIUM ANTIGEN STOOL: NORMAL
GIARDIA ANTIGEN STOOL: NORMAL
HAV IGM SER IA-ACNC: ABNORMAL
HBV SURFACE AB TITR SER: NORMAL {TITER}
HEPATITIS B CORE IGM ANTIBODY: REACTIVE
HEPATITIS B SURFACE ANTIGEN INTERPRETATION: ABNORMAL
HEPATITIS C ANTIBODY INTERPRETATION: ABNORMAL

## 2020-09-09 LAB
AFP-TUMOR MARKER: 2 NG/ML (ref 0–9)
BETA-2 MICROGLOBULIN: 1.6 MG/L (ref 0.6–2.4)

## 2020-09-10 LAB
CALPROTECTIN, FECAL: 115 UG/G
GLIADIN ANTIBODIES IGA: 5 UNITS (ref 0–19)
GLIADIN ANTIBODIES IGG: 6 UNITS (ref 0–19)
TISSUE TRANSGLUTAMINASE ANTIBODY: <2 U/ML (ref 0–5)
TISSUE TRANSGLUTAMINASE IGA: <2 U/ML (ref 0–3)

## 2020-09-11 LAB
BLOOD CULTURE, ROUTINE: NORMAL
CULTURE, BLOOD 2: NORMAL

## 2020-09-17 ENCOUNTER — NURSE ONLY (OUTPATIENT)
Dept: NON INVASIVE DIAGNOSTICS | Age: 55
End: 2020-09-17
Payer: COMMERCIAL

## 2020-09-17 PROCEDURE — 93298 REM INTERROG DEV EVAL SCRMS: CPT | Performed by: INTERNAL MEDICINE

## 2020-09-17 PROCEDURE — G2066 INTER DEVC REMOTE 30D: HCPCS | Performed by: INTERNAL MEDICINE

## 2020-09-23 ENCOUNTER — TELEPHONE (OUTPATIENT)
Dept: CARDIOLOGY CLINIC | Age: 55
End: 2020-09-23

## 2020-09-23 NOTE — PROGRESS NOTES
See [employer] [place] report. Remote monitoring reviewed over a 30 day period.   End of 30 day monitoring period date of service 09/17/2020

## 2020-09-23 NOTE — TELEPHONE ENCOUNTER
Pt. States that she has been having SOB and her heart rate has been elevated up to 130 on exertion, please advise

## 2020-09-24 NOTE — TELEPHONE ENCOUNTER
Increase toprol to 75 mg twice a day. Update us in 1 week. Martir Reza D.O.   Cardiologist  Cardiology, [facility]

## 2020-09-25 LAB
ALBUMIN SERPL-MCNC: NORMAL G/DL
ALP BLD-CCNC: NORMAL U/L
ALT SERPL-CCNC: NORMAL U/L
ANION GAP SERPL CALCULATED.3IONS-SCNC: NORMAL MMOL/L
AST SERPL-CCNC: NORMAL U/L
BASOPHILS ABSOLUTE: NORMAL
BASOPHILS RELATIVE PERCENT: NORMAL
BILIRUB SERPL-MCNC: NORMAL MG/DL
BUN BLDV-MCNC: 7 MG/DL
CALCIUM SERPL-MCNC: NORMAL MG/DL
CHLORIDE BLD-SCNC: NORMAL MMOL/L
CO2: NORMAL
CREAT SERPL-MCNC: 0.61 MG/DL
EOSINOPHILS ABSOLUTE: NORMAL
EOSINOPHILS RELATIVE PERCENT: NORMAL
GFR CALCULATED: NORMAL
GLUCOSE BLD-MCNC: 101 MG/DL
HCT VFR BLD CALC: NORMAL %
HEMOGLOBIN: NORMAL
LYMPHOCYTES ABSOLUTE: NORMAL
LYMPHOCYTES RELATIVE PERCENT: NORMAL
MCH RBC QN AUTO: NORMAL PG
MCHC RBC AUTO-ENTMCNC: NORMAL G/DL
MCV RBC AUTO: NORMAL FL
MONOCYTES ABSOLUTE: NORMAL
MONOCYTES RELATIVE PERCENT: NORMAL
NEUTROPHILS ABSOLUTE: NORMAL
NEUTROPHILS RELATIVE PERCENT: NORMAL
PLATELET # BLD: NORMAL 10*3/UL
PMV BLD AUTO: NORMAL FL
POTASSIUM SERPL-SCNC: 3.5 MMOL/L
RBC # BLD: NORMAL 10*6/UL
SODIUM BLD-SCNC: NORMAL MMOL/L
TOTAL PROTEIN: NORMAL
WBC # BLD: NORMAL 10*3/UL

## 2020-09-28 ENCOUNTER — TELEPHONE (OUTPATIENT)
Dept: NON INVASIVE DIAGNOSTICS | Age: 55
End: 2020-09-28

## 2020-09-28 NOTE — TELEPHONE ENCOUNTER
Message left from patient questioning if her device is MRI compatible. Called and spoke with patient that her Confirm is MRI comparable.      Alfonso Mejia

## 2020-09-29 ENCOUNTER — HOSPITAL ENCOUNTER (OUTPATIENT)
Age: 55
Discharge: HOME | End: 2020-10-01
Payer: COMMERCIAL

## 2020-09-29 PROCEDURE — 87088 URINE BACTERIA CULTURE: CPT

## 2020-10-01 LAB — URINE CULTURE, ROUTINE: NORMAL

## 2020-10-08 ENCOUNTER — OFFICE VISIT (OUTPATIENT)
Dept: CARDIOLOGY CLINIC | Age: 55
End: 2020-10-08
Payer: COMMERCIAL

## 2020-10-08 VITALS
HEART RATE: 81 BPM | WEIGHT: 203 LBS | BODY MASS INDEX: 33.82 KG/M2 | RESPIRATION RATE: 18 BRPM | SYSTOLIC BLOOD PRESSURE: 136 MMHG | HEIGHT: 65 IN | DIASTOLIC BLOOD PRESSURE: 70 MMHG

## 2020-10-08 PROCEDURE — 93000 ELECTROCARDIOGRAM COMPLETE: CPT | Performed by: INTERNAL MEDICINE

## 2020-10-08 PROCEDURE — 99214 OFFICE O/P EST MOD 30 MIN: CPT | Performed by: INTERNAL MEDICINE

## 2020-10-08 PROCEDURE — G8484 FLU IMMUNIZE NO ADMIN: HCPCS | Performed by: INTERNAL MEDICINE

## 2020-10-08 PROCEDURE — 1036F TOBACCO NON-USER: CPT | Performed by: INTERNAL MEDICINE

## 2020-10-08 PROCEDURE — 3017F COLORECTAL CA SCREEN DOC REV: CPT | Performed by: INTERNAL MEDICINE

## 2020-10-08 PROCEDURE — G8417 CALC BMI ABV UP PARAM F/U: HCPCS | Performed by: INTERNAL MEDICINE

## 2020-10-08 PROCEDURE — G8427 DOCREV CUR MEDS BY ELIG CLIN: HCPCS | Performed by: INTERNAL MEDICINE

## 2020-10-08 NOTE — PROGRESS NOTES
CHIEF COMPLAINT: Palpitations/HTN    HISTORY OF PRESENT ILLNESS: Patient is a 54 y.o. female seen at the request of Tabby Valdez DO    Patient seen in follow up. Hx of palpitations. Increased palpitations and tachycardia. No CP. Some SOB.      Past Medical History:   Diagnosis Date    Allergic rhinitis     Anxiety     Atrial fibrillation (HCC)     Cancer (HCC)     thyroid    Chest pain     Chronic back pain     Depression     Headache     Hypertension     NAFLD (nonalcoholic fatty liver disease)     By CT    Obesity with body mass index (BMI) of 30.0 to 39.9 6/28/2020    Osteoarthritis     Palpitations     Palpitations 12/2019    Sleep apnea     Spinal stenosis, lumbar 01/14/2020    Severe central stenosis by CT of abdomen, L1-L2    Thyroid disease     PTC microcarcinoma (0.5 cm) treated by thyroidectomy and BERG 100 mci       Patient Active Problem List   Diagnosis    BPPV (benign paroxysmal positional vertigo)    SVT (supraventricular tachycardia) (HCC)    Anxiety    Dyspnea on exertion    Intermittent palpitations    Essential hypertension    Morbid obesity due to excess calories (HCC)    Palpitations    Chest pain    Thyroid cancer ([facility] [address]    Hypothyroidism (acquired)    Weight gain    Dizziness    Biliary colic    Obesity with body mass index (BMI) of 30.0 to 39.9    Spinal stenosis, lumbar    NAFLD (nonalcoholic fatty liver disease)    Abdominal pain     Allergies   Allergen Reactions    Narcan [Naloxone Hcl]     Fentanyl        Current Outpatient Medications   Medication Sig Dispense Refill    metoprolol succinate (TOPROL XL) 50 MG extended release tablet Take 1 tablet by mouth 2 times daily 180 tablet 3    furosemide (LASIX) 20 MG tablet Take 1 tablet by mouth daily as needed (edema) 90 tablet 3    levothyroxine (SYNTHROID) 100 MCG tablet Take one tablet every day 90 tablet 2    colestipol (COLESTID) 1 g tablet Take 1 tablet by mouth 2 times daily (Patient not taking: Reported on 10/8/2020) 60 tablet 1    famotidine (PEPCID) 40 MG tablet Take 1 tablet by mouth every evening (Patient not taking: Reported on 10/8/2020) 30 tablet 1    sucralfate (CARAFATE) 1 GM tablet Take 1 tablet by mouth 4 times daily (Patient not taking: Reported on 10/8/2020) 120 tablet 0     No current facility-administered medications for this visit. Social History     Socioeconomic History    Marital status:      Spouse name: Rico Alvarez Number of children: 3    Years of education: Not on file    Highest education level: Not on file   Occupational History    Occupation: homemaker   Social Needs    Financial resource strain: Not hard at all   [employer] insecurity     Worry: Never true     Inability: Never true   [employer] needs     Medical: Not on file     Non-medical: Not on file   Tobacco Use    Smoking status: Never Smoker    Smokeless tobacco: Never Used   Substance and Sexual Activity    Alcohol use: No    Drug use: No    Sexual activity: Yes     Partners: Male   Lifestyle    Physical activity     Days per week: Not on file     Minutes per session: Not on file    Stress: Not on file   Relationships    Social connections     Talks on phone: Not on file     Gets together: Not on file     Attends Ten[employer] service: Not on file     Active member of club or organization: Not on file     Attends meetings of clubs or organizations: Not on file     Relationship status: Not on file    Intimate partner violence     Fear of current or ex partner: Not on file     Emotionally abused: Not on file     Physically abused: Not on file     Forced sexual activity: Not on file   Other Topics Concern    Not on file   Social History Narrative    Lives in a house with  who is disabled and one child. Homeschools her daughter.     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       Family History   Problem Relation Age of Onset    High Cholesterol Mother     High Blood Pressure Father     Cancer Father         leukemia, lung cancer +smoker, brain cancer    Other Sister         nephrectomy due to a benign tumor    Breast Cancer Paternal Grandmother     Other Paternal Grandmother         bowel    Other Daughter         IGG immunodeficiency    Cancer Maternal Grandmother         lung, cervical    Heart Disease Maternal Grandfather     Atrial Fibrillation Paternal Grandfather      Review of Systems:  Heart: as above   Lungs: as above   Eyes: denies changes in vision or discharge. Ears: denies changes in hearing or pain. Nose: denies epistaxis or masses   Throat: denies sore throat or trouble swallowing. Neuro: denies numbness, tingling, tremors. Skin: denies rashes or itching. : denies hematuria, dysuria   GI: denies vomiting, diarrhea   Psych: denies mood changed, anxiety, depression. All other systems negative. Physical Exam   /70   Pulse 81   Resp 18   Ht 5' 5\" (1.651 m)   Wt 203 lb (92.1 kg)   LMP 01/01/2013   BMI 33.78 kg/m²   Constitutional: Oriented to person, place, and time. Well-developed and well-nourished. No distress. Head: Normocephalic and atraumatic. Eyes: EOM are normal. Pupils are equal, round, and reactive to light. Neck: Normal range of motion. Neck supple. No hepatojugular reflux and no JVD present. Carotid bruit is not present. No tracheal deviation present. No thyromegaly present. Cardiovascular: Normal rate, regular rhythm, normal heart sounds and intact distal pulses. Exam reveals no gallop and no friction rub. No murmur heard. Pulmonary/Chest: Effort normal and breath sounds normal. No respiratory distress. No wheezes. No rales. No tenderness. Abdominal: Soft. Bowel sounds are normal. No distension and no mass. No tenderness. No rebound and no guarding. Musculoskeletal: Normal range of motion. Tender lump left lateral arm. Lymphadenopathy:   No cervical adenopathy.  No groin adenopathy. Neurological: Alert and oriented to person, place, and time. Skin: Skin is warm and dry. No rash noted. Not diaphoretic. No erythema. Psychiatric: Normal mood and affect. Behavior is normal.     EKG:  normal sinus rhythm, nonspecific ST and T waves changes. ASSESSMENT AND PLAN:  Patient Active Problem List   Diagnosis    BPPV (benign paroxysmal positional vertigo)    SVT (supraventricular tachycardia) (HCC)    Anxiety    Dyspnea on exertion    Intermittent palpitations    Essential hypertension    Morbid obesity due to excess calories (HCC)    Palpitations    Chest pain    Thyroid cancer ([facility] [address]    Hypothyroidism (acquired)    Weight gain    Dizziness    Biliary colic    Obesity with body mass index (BMI) of 30.0 to 39.9    Spinal stenosis, lumbar    NAFLD (nonalcoholic fatty liver disease)    Abdominal pain     1. Palpitations/Hx of SVT ablation:  Echo normal 7/16.      30 day monitor unremarkable aside from PVC's and PAC's 9/16.      Pharm stress normal 11/1/16. Still with intermittent episodes with severe symptoms.      Continue Toprol. Following with EP. Has LINQ. 2. Hypothyroid: Following with Endo. 3. HTN: Observe. 4. OLU    5. Orthostatic symptoms: Hose and hydration. Wolfgang Paul D.O.   Cardiologist  Cardiology, [facility]

## 2020-10-09 ENCOUNTER — TELEPHONE (OUTPATIENT)
Dept: ENDOCRINOLOGY | Age: 55
End: 2020-10-09

## 2020-10-09 NOTE — TELEPHONE ENCOUNTER
Pt called and left message regarding results, attempted to return pt's call, no answer, left message.

## 2020-10-16 ENCOUNTER — TELEPHONE (OUTPATIENT)
Dept: CARDIOLOGY CLINIC | Age: 55
End: 2020-10-16

## 2020-10-19 ENCOUNTER — NURSE ONLY (OUTPATIENT)
Dept: NON INVASIVE DIAGNOSTICS | Age: 55
End: 2020-10-19
Payer: COMMERCIAL

## 2020-10-19 PROCEDURE — G2066 INTER DEVC REMOTE 30D: HCPCS | Performed by: INTERNAL MEDICINE

## 2020-10-19 PROCEDURE — 93298 REM INTERROG DEV EVAL SCRMS: CPT | Performed by: INTERNAL MEDICINE

## 2020-10-19 NOTE — TELEPHONE ENCOUNTER
Patient is an acceptable risk to proceed with scope. Madeleine Bishop D.O.   Cardiologist  Cardiology, [facility]

## 2020-10-19 NOTE — TELEPHONE ENCOUNTER
[facility] [address] [place], [place] [address]

## 2020-10-20 ENCOUNTER — VIRTUAL VISIT (OUTPATIENT)
Dept: ENDOCRINOLOGY | Age: 55
End: 2020-10-20
Payer: COMMERCIAL

## 2020-10-20 PROCEDURE — 99443 PR PHYS/QHP TELEPHONE EVALUATION 21-30 MIN: CPT | Performed by: INTERNAL MEDICINE

## 2020-10-20 RX ORDER — ALPRAZOLAM 1 MG/1
0.5 TABLET ORAL 4 TIMES DAILY
COMMUNITY

## 2020-10-20 NOTE — PROGRESS NOTES
Matt Bassett was read the following message We want to confirm that, for purposes of billing, this is a virtual visit with your provider for which we will submit a claim for reimbursement with your insurance company. You will be responsible for any copays, coinsurance amounts or other amounts not covered by your insurance company. If you do not accept this, unfortunately we will not be able to schedule a virtual visit with the provider. Do you accept?  Ruben Santana responded yes

## 2020-10-20 NOTE — PROGRESS NOTES
Martha De Leon is a 54 y.o. female evaluated via telephone on 10/20/2020. Consent:  She and/or health care decision maker is aware that that she may receive a bill for this telephone service, depending on her insurance coverage, and has provided verbal consent to proceed: Yes    Documentation:  I communicated with the patient and/or health care decision maker about PTC and hypothyroidism. Details of this discussion including any medical advice provided are as follows:    CC:   PTC    Background:    Thyroid Cancer - stable, no signs of recurrence       PTC Stage I, [id number],     Excellent response to therapy (total thyroidectomy, 100mCi BERG)       Risk of recurrence is 1-4% for the next 5years. 09/04/18 US thyroid: no change in the adenopathy that was seen on the 5/17/18 US.       02/11/20 US thyroid: no change in the appearance of the lymph nodes           05/28/19 Tg <0.1, Tg Ab <0.9       07/22/20 Tg <0.1, Tg Ab <0.9         TSH should be maintained at 0.5-2 per the 2015 JOSE guidelines. B/c of her arrhythmias, the goal is a level just below 2.0.       10/29/19 TSH 0.633 on LT4 112 mcg Mon-Sat and 1/2 tab on Sunday.         06/28/20 TSH 0.636 on LT4 100 mcg daily       07/19/20 TSH 6.950 on LT4 100 mcg daily (had gallbladder out 7/2/20)       07/22/20 TSH 7.640 on LT4 100 mcg daily the rise in her TSH is may be due a recovery period after non-thyroidal illness syndrome; so dose was not increased       08/10/20 TSH 5.080 on LT4 100 mcg daily concern about allergic reaction to Synthroid; therefore was planning a switch to a compounding LT4; so dose was not increased       10/20/20 TSH 1.110 on LT4 100 mcg daily    PFSH -   Medical problems: Anxiety, Thyroid cancer, hypothyroidism (acquired) HTN, palpitations, BPPV, Afib with hx of ablation  Medications: LT4,  mag-ox, metoprolol succinate, Vit D-3 2000, furosemide prn, Alprazolam    ROS -   Gen: no fever  Pulm: no cough    PE -   Gen:    WN, WD, NAD  Lung: Nml resp effort  Psych: Normal mood   Full affect    A/P -   1. Thyroid Cancer - stable, no signs of recurrence       PTC Stage I, [id number] (0.5cm),     Excellent response to therapy (total thyroidectomy, 100mCi BERG)       Risk of recurrence is 1-4% for the next 5years. TSH should be maintained at 0.5-2 per the 2015 JOSE guidelines; because of her recurrent tachyarrhythmias, need to keep the TSH just below 2        09/15/20 TSH 1.11 on LT4 100 mcg daily       Never made the switch to compounded version of LT4. She determined that she tolerates Synthroid       Therefore the plan is:       Cont Synthroid 100 mcg, one tablet every day       Repeat TSH in 4 months    2. Hypothyroidism - controlled      Symptoms: + chronic constipation, no diarrhea, + occ palpitations, + diaphoresis xcouple of months, no fatigue       09/15/20 TSH 1.11 on LT4 100 mcg daily       Cont same dose of LT4    3. Follow up       See me in four months    I affirm this is a Patient Initiated Episode with a Patient who has not had a related appointment within my department in the past 7 days or scheduled within the next 24 hours.     Patient identification was verified at the start of the visit: Yes    Start time:   11:24 am  End time:     11:56 am    Total Time: minutes: 21-30 minutes (32 min)    Note: not billable if this call serves to triage the patient into an appointment for the relevant concern      Ollie Castleman, MD  Endocrinology/obesity  10/20/20

## 2020-10-21 NOTE — PROGRESS NOTES
See [employer] [place] report. Remote monitoring reviewed over a 30 day period.   End of 30 day monitoring period date of service 10/18/2020

## 2020-10-23 ENCOUNTER — TELEPHONE (OUTPATIENT)
Dept: ENDOCRINOLOGY | Age: 55
End: 2020-10-23

## 2020-10-23 NOTE — TELEPHONE ENCOUNTER
The pt was started on Protonix by her GI doctor. The pharmacy warned her that it may interact with her thyroid medication. I advised her to make sure that she takes them at least an hour apart. Is there any other interaction that should concern her with the two medications?

## 2020-10-24 NOTE — TELEPHONE ENCOUNTER
No. What you told her is good. Protonix causes its problem because LT4 needs an acidic environment for optimal absorption.

## 2020-10-27 RX ORDER — LEVOTHYROXINE SODIUM 0.1 MG/1
TABLET ORAL
Qty: 90 TABLET | Refills: 3 | Status: SHIPPED
Start: 2020-10-27 | End: 2020-12-10 | Stop reason: DRUGHIGH

## 2020-10-30 ENCOUNTER — OFFICE VISIT (OUTPATIENT)
Dept: CARDIOLOGY CLINIC | Age: 55
End: 2020-10-30
Payer: COMMERCIAL

## 2020-10-30 VITALS
HEIGHT: 65 IN | BODY MASS INDEX: 33.09 KG/M2 | SYSTOLIC BLOOD PRESSURE: 126 MMHG | RESPIRATION RATE: 18 BRPM | WEIGHT: 198.6 LBS | HEART RATE: 74 BPM | DIASTOLIC BLOOD PRESSURE: 80 MMHG

## 2020-10-30 PROCEDURE — G8427 DOCREV CUR MEDS BY ELIG CLIN: HCPCS | Performed by: INTERNAL MEDICINE

## 2020-10-30 PROCEDURE — 1036F TOBACCO NON-USER: CPT | Performed by: INTERNAL MEDICINE

## 2020-10-30 PROCEDURE — G8417 CALC BMI ABV UP PARAM F/U: HCPCS | Performed by: INTERNAL MEDICINE

## 2020-10-30 PROCEDURE — G8484 FLU IMMUNIZE NO ADMIN: HCPCS | Performed by: INTERNAL MEDICINE

## 2020-10-30 PROCEDURE — 93000 ELECTROCARDIOGRAM COMPLETE: CPT | Performed by: INTERNAL MEDICINE

## 2020-10-30 PROCEDURE — 3017F COLORECTAL CA SCREEN DOC REV: CPT | Performed by: INTERNAL MEDICINE

## 2020-10-30 PROCEDURE — 99214 OFFICE O/P EST MOD 30 MIN: CPT | Performed by: INTERNAL MEDICINE

## 2020-10-30 RX ORDER — METOPROLOL SUCCINATE 50 MG
50 TABLET, EXTENDED RELEASE 24 HR ORAL DAILY
Qty: 180 TABLET | Refills: 3 | Status: SHIPPED
Start: 2020-10-30 | End: 2021-09-08 | Stop reason: DRUGHIGH

## 2020-10-30 NOTE — PROGRESS NOTES
CHIEF COMPLAINT: Palpitations/HTN    HISTORY OF PRESENT ILLNESS: Patient is a 54 y.o. female seen at the request of Anahy Peres DO    Patient seen in follow up. Hx of palpitations. Increased palpitations and tachycardia. No CP. Some SOB. Past Medical History:   Diagnosis Date    Allergic rhinitis     Anxiety     Atrial fibrillation (HCC)     Depression     Headache     Hypertension     Hypothyroidism     NAFLD (nonalcoholic fatty liver disease)     By CT    Obesity with body mass index (BMI) of 30.0 to 39.9 6/28/2020    Osteoarthritis     Sleep apnea     Spinal stenosis, lumbar 01/14/2020    Severe central stenosis by CT of abdomen, L1-L2    Thyroid disease     PTC microcarcinoma (0.5 cm) treated by thyroidectomy and BERG 100 mci       Patient Active Problem List   Diagnosis    BPPV (benign paroxysmal positional vertigo)    SVT (supraventricular tachycardia) (HCC)    Anxiety    Dyspnea on exertion    Intermittent palpitations    Essential hypertension    Morbid obesity due to excess calories (HCC)    Palpitations    Chest pain    Thyroid [facility] ([facility] [address]    Hypothyroidism (acquired)    Weight gain    Dizziness    Biliary colic    Obesity with body mass index (BMI) of 30.0 to 39.9    Spinal stenosis, lumbar    NAFLD (nonalcoholic fatty liver disease)    Abdominal pain    Hypothyroidism    Thyroid disease     Allergies   Allergen Reactions    Narcan [Naloxone Hcl]     Fentanyl        Current Outpatient Medications   Medication Sig Dispense Refill    levothyroxine (SYNTHROID) 100 MCG tablet Take one tablet every day 90 tablet 3    ALPRAZolam (XANAX) 1 MG tablet Take 1 mg by mouth three times daily.       metoprolol succinate (TOPROL XL) 50 MG extended release tablet Take 1 tablet by mouth 2 times daily 180 tablet 3    furosemide (LASIX) 20 MG tablet Take 1 tablet by mouth daily as needed (edema) 90 tablet 3     No current facility-administered medications for this visit. Social History     Socioeconomic History    Marital status:      Spouse name: Latricia Polanco Number of children: 3    Years of education: Not on file    Highest education level: Not on file   Occupational History    Occupation: homemaker   Social Needs    Financial resource strain: Not hard at all   [employer] insecurity     Worry: Never true     Inability: Never true   [employer] needs     Medical: Not on file     Non-medical: Not on file   Tobacco Use    Smoking status: Never Smoker    Smokeless tobacco: Never Used   Substance and Sexual Activity    Alcohol use: No    Drug use: No    Sexual activity: Yes     Partners: Male   Lifestyle    Physical activity     Days per week: Not on file     Minutes per session: Not on file    Stress: Not on file   Relationships    Social connections     Talks on phone: Not on file     Gets together: Not on file     Attends Confucianism service: Not on file     Active member of club or organization: Not on file     Attends meetings of clubs or organizations: Not on file     Relationship status: Not on file    Intimate partner violence     Fear of current or ex partner: Not on file     Emotionally abused: Not on file     Physically abused: Not on file     Forced sexual activity: Not on file   Other Topics Concern    Not on file   Social History Narrative    Lives in a house with  who is disabled and one child. Homeschools her daughter.     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       Family History   Problem Relation Age of Onset    High Cholesterol Mother     High Blood Pressure Father     Cancer Father         leukemia, lung cancer +smoker, brain cancer    Other Sister         nephrectomy due to a benign tumor    Breast Cancer Paternal Grandmother     Other Paternal Grandmother         bowel    Other Daughter         IGG immunodeficiency    Cancer Maternal Grandmother         lung, cervical    Heart Disease Maternal Grandfather     Atrial Fibrillation Paternal Grandfather      Review of Systems:  Heart: as above   Lungs: as above   Eyes: denies changes in vision or discharge. Ears: denies changes in hearing or pain. Nose: denies epistaxis or masses   Throat: denies sore throat or trouble swallowing. Neuro: denies numbness, tingling, tremors. Skin: denies rashes or itching. : denies hematuria, dysuria   GI: denies vomiting, diarrhea   Psych: denies mood changed, anxiety, depression. All other systems negative. Physical Exam   /80   Pulse 74   Resp 18   Ht 5' 5\" (1.651 m)   Wt 198 lb 9.6 oz (90.1 kg)   LMP 01/01/2013   BMI 33.05 kg/m²   Constitutional: Oriented to person, place, and time. Well-developed and well-nourished. No distress. Head: Normocephalic and atraumatic. Eyes: EOM are normal. Pupils are equal, round, and reactive to light. Neck: Normal range of motion. Neck supple. No hepatojugular reflux and no JVD present. Carotid bruit is not present. No tracheal deviation present. No thyromegaly present. Cardiovascular: Normal rate, regular rhythm, normal heart sounds and intact distal pulses. Exam reveals no gallop and no friction rub. No murmur heard. Pulmonary/Chest: Effort normal and breath sounds normal. No respiratory distress. No wheezes. No rales. No tenderness. Abdominal: Soft. Bowel sounds are normal. No distension and no mass. No tenderness. No rebound and no guarding. Musculoskeletal: Normal range of motion. Tender lump left lateral arm. Lymphadenopathy:   No cervical adenopathy. No groin adenopathy. Neurological: Alert and oriented to person, place, and time. Skin: Skin is warm and dry. No rash noted. Not diaphoretic. No erythema. Psychiatric: Normal mood and affect. Behavior is normal.     EKG:  normal sinus rhythm, nonspecific ST and T waves changes.     ASSESSMENT AND PLAN:  Patient Active Problem List   Diagnosis    BPPV (benign paroxysmal positional vertigo)    SVT (supraventricular tachycardia) (HCC)    Anxiety    Dyspnea on exertion    Intermittent palpitations    Essential hypertension    Morbid obesity due to excess calories (HCC)    Palpitations    Chest pain    Thyroid cancer ([address]    Hypothyroidism (acquired)    Weight gain    Dizziness    Biliary colic    Obesity with body mass index (BMI) of 30.0 to 39.9    Spinal stenosis, lumbar    NAFLD (nonalcoholic fatty liver disease)    Abdominal pain    Hypothyroidism    Thyroid disease     1. Palpitations/Hx of SVT ablation:  Echo normal 7/16.      30 day monitor unremarkable aside from PVC's and PAC's 9/16.      Pharm stress normal 11/1/16. Still with intermittent episodes with severe symptoms.      Continue Toprol. Following with EP. Has LINQ. 2. Hypothyroid: Following with Endo. 3. HTN: Observe. 4. OLU    5. Orthostatic symptoms: Hose and hydration. Pino Wilks D.O.   Cardiologist  Cardiology, [id number]

## 2020-11-03 PROBLEM — E03.9 HYPOTHYROIDISM (ACQUIRED): Status: RESOLVED | Noted: 2018-01-17 | Resolved: 2020-11-03

## 2020-11-11 ENCOUNTER — TELEPHONE (OUTPATIENT)
Dept: CARDIOLOGY CLINIC | Age: 55
End: 2020-11-11

## 2020-11-11 NOTE — TELEPHONE ENCOUNTER
pcp wants to start pt. On antidepressant, her options are effexor, lexapro, or celexa, they are asking for your recommendations on which one is safest for pt.  Please advise

## 2020-11-18 ENCOUNTER — TELEPHONE (OUTPATIENT)
Dept: CARDIOLOGY CLINIC | Age: 55
End: 2020-11-18

## 2020-11-18 ENCOUNTER — NURSE ONLY (OUTPATIENT)
Dept: NON INVASIVE DIAGNOSTICS | Age: 55
End: 2020-11-18

## 2020-11-18 PROCEDURE — 93298 REM INTERROG DEV EVAL SCRMS: CPT | Performed by: INTERNAL MEDICINE

## 2020-11-18 PROCEDURE — G2066 INTER DEVC REMOTE 30D: HCPCS | Performed by: INTERNAL MEDICINE

## 2020-11-18 NOTE — PROGRESS NOTES
See [facility] report. Remote monitoring reviewed over a 30 day period.   End of 30 day monitoring period date of service 11/18/2020

## 2020-11-18 NOTE — TELEPHONE ENCOUNTER
Pt. States that during her last OV you encouraged her to keep hydrating and she has been,she still feels dizzy sometimes she is asking if she should have labs to evaluate her electrolytes, sodium, and potassium levels, please advise

## 2020-11-24 ENCOUNTER — TELEPHONE (OUTPATIENT)
Dept: CARDIOLOGY CLINIC | Age: 55
End: 2020-11-24

## 2020-11-30 NOTE — TELEPHONE ENCOUNTER
Lipids were good last summer. Continue same and we can recheck in 3 months. Chuy Wilkins D.O.   Cardiologist  Cardiology, [address],

## 2020-11-30 NOTE — TELEPHONE ENCOUNTER
Pt. States that she has been on a bland diet since July 2nd due gallbladder surgery, she says she can't eat red meat or fatty food, her diet consists mostly of chicken, egg whites and tuna

## 2020-12-07 ENCOUNTER — TELEPHONE (OUTPATIENT)
Dept: ENDOCRINOLOGY | Age: 55
End: 2020-12-07

## 2020-12-07 NOTE — TELEPHONE ENCOUNTER
Pt called pcp ordered her a TSH she thinks and it was low. I asked for them to fax the results. She was asking if you can order her three thyroid test so she can see what's going on her. Her PCP wanted to lower her to 50 mcg. She wanted to talk to you to see what you thought.

## 2020-12-09 ENCOUNTER — OFFICE VISIT (OUTPATIENT)
Dept: CARDIOLOGY CLINIC | Age: 55
End: 2020-12-09
Payer: COMMERCIAL

## 2020-12-09 VITALS
RESPIRATION RATE: 14 BRPM | HEIGHT: 65 IN | DIASTOLIC BLOOD PRESSURE: 80 MMHG | HEART RATE: 80 BPM | WEIGHT: 189.5 LBS | BODY MASS INDEX: 31.57 KG/M2 | SYSTOLIC BLOOD PRESSURE: 122 MMHG

## 2020-12-09 PROCEDURE — 93000 ELECTROCARDIOGRAM COMPLETE: CPT | Performed by: INTERNAL MEDICINE

## 2020-12-09 PROCEDURE — 99214 OFFICE O/P EST MOD 30 MIN: CPT | Performed by: INTERNAL MEDICINE

## 2020-12-09 PROCEDURE — 3017F COLORECTAL CA SCREEN DOC REV: CPT | Performed by: INTERNAL MEDICINE

## 2020-12-09 PROCEDURE — G8427 DOCREV CUR MEDS BY ELIG CLIN: HCPCS | Performed by: INTERNAL MEDICINE

## 2020-12-09 PROCEDURE — G8484 FLU IMMUNIZE NO ADMIN: HCPCS | Performed by: INTERNAL MEDICINE

## 2020-12-09 PROCEDURE — G8417 CALC BMI ABV UP PARAM F/U: HCPCS | Performed by: INTERNAL MEDICINE

## 2020-12-09 PROCEDURE — 1036F TOBACCO NON-USER: CPT | Performed by: INTERNAL MEDICINE

## 2020-12-09 NOTE — PROGRESS NOTES
CHIEF COMPLAINT: Palpitations/HTN    HISTORY OF PRESENT ILLNESS: Patient is a 54 y.o. female seen at the request of Josué Santiago DO    Patient seen in follow up. Hx of palpitations. Some palpitations and tachycardia. No CP. Some SOB. Past Medical History:   Diagnosis Date    Allergic rhinitis     Anxiety     Atrial fibrillation (HCC)     Depression     Headache     Hypertension     Hypothyroidism     NAFLD (nonalcoholic fatty liver disease)     By CT    Obesity with body mass index (BMI) of 30.0 to 39.9 6/28/2020    Osteoarthritis     Sleep apnea     Spinal stenosis, lumbar 01/14/2020    Severe central stenosis by CT of abdomen, L1-L2    Thyroid disease     PTC microcarcinoma (0.5 cm) treated by thyroidectomy and BERG 100 mci       Patient Active Problem List   Diagnosis    BPPV (benign paroxysmal positional vertigo)    SVT (supraventricular tachycardia) (HCC)    Anxiety    Dyspnea on exertion    Intermittent palpitations    Essential hypertension    Morbid obesity due to excess calories (HCC)    Palpitations    Chest pain    Thyroid cancer ([address]    Weight gain    Dizziness    Biliary colic    Obesity with body mass index (BMI) of 30.0 to 39.9    Spinal stenosis, lumbar    NAFLD (nonalcoholic fatty liver disease)    Abdominal pain    Hypothyroidism    Thyroid disease     Allergies   Allergen Reactions    Narcan [Naloxone Hcl]     Fentanyl        Current Outpatient Medications   Medication Sig Dispense Refill    TOPROL XL 50 MG extended release tablet Take 1 tablet by mouth daily 180 tablet 3    levothyroxine (SYNTHROID) 100 MCG tablet Take one tablet every day 90 tablet 3    ALPRAZolam (XANAX) 1 MG tablet Take 1 mg by mouth three times daily.  furosemide (LASIX) 20 MG tablet Take 1 tablet by mouth daily as needed (edema) 90 tablet 3     No current facility-administered medications for this visit.         Social History     Socioeconomic History    Marital status:      Spouse name: Yessi Guerrero Number of children: 3    Years of education: Not on file    Highest education level: Not on file   Occupational History    Occupation: homemaker   Social Needs    Financial resource strain: Not hard at all   [employer] insecurity     Worry: Never true     Inability: Never true   [employer] needs     Medical: Not on file     Non-medical: Not on file   Tobacco Use    Smoking status: Never Smoker    Smokeless tobacco: Never Used   Substance and Sexual Activity    Alcohol use: No    Drug use: No    Sexual activity: Yes     Partners: Male   Lifestyle    Physical activity     Days per week: Not on file     Minutes per session: Not on file    Stress: Not on file   Relationships    Social connections     Talks on phone: Not on file     Gets together: Not on file     Attends Hinduism service: Not on file     Active member of club or organization: Not on file     Attends meetings of clubs or organizations: Not on file     Relationship status: Not on file    Intimate partner violence     Fear of current or ex partner: Not on file     Emotionally abused: Not on file     Physically abused: Not on file     Forced sexual activity: Not on file   Other Topics Concern    Not on file   Social History Narrative    Lives in a house with  who is disabled and one child. Homeschools her daughter.     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       Family History   Problem Relation Age of Onset    High Cholesterol Mother     High Blood Pressure Father     Cancer Father         leukemia, lung cancer +smoker, brain cancer    Other Sister         nephrectomy due to a benign tumor    Breast Cancer Paternal Grandmother     Other Paternal Grandmother         bowel    Other Daughter         IGG immunodeficiency    Cancer Maternal Grandmother         lung, cervical    Heart Disease Maternal Grandfather     Atrial Fibrillation Paternal Grandfather Review of Systems:  Heart: as above   Lungs: as above   Eyes: denies changes in vision or discharge. Ears: denies changes in hearing or pain. Nose: denies epistaxis or masses   Throat: denies sore throat or trouble swallowing. Neuro: denies numbness, tingling, tremors. Skin: denies rashes or itching. : denies hematuria, dysuria   GI: denies vomiting, diarrhea   Psych: denies mood changed, anxiety, depression. All other systems negative. Physical Exam   /80   Pulse 80   Resp 14   Ht 5' 5\" (1.651 m)   Wt 189 lb 8 oz (86 kg)   LMP 01/01/2013   BMI 31.53 kg/m²   Constitutional: Oriented to person, place, and time. Well-developed and well-nourished. No distress. Head: Normocephalic and atraumatic. Eyes: EOM are normal. Pupils are equal, round, and reactive to light. Neck: Normal range of motion. Neck supple. No hepatojugular reflux and no JVD present. Carotid bruit is not present. No tracheal deviation present. No thyromegaly present. Cardiovascular: Normal rate, regular rhythm, normal heart sounds and intact distal pulses. Exam reveals no gallop and no friction rub. No murmur heard. Pulmonary/Chest: Effort normal and breath sounds normal. No respiratory distress. No wheezes. No rales. No tenderness. Abdominal: Soft. Bowel sounds are normal. No distension and no mass. No tenderness. No rebound and no guarding. Musculoskeletal: Normal range of motion. Tender lump left lateral arm. Lymphadenopathy:   No cervical adenopathy. No groin adenopathy. Neurological: Alert and oriented to person, place, and time. Skin: Skin is warm and dry. No rash noted. Not diaphoretic. No erythema. Psychiatric: Normal mood and affect. Behavior is normal.     EKG:  normal sinus rhythm, nonspecific ST and T waves changes.     ASSESSMENT AND PLAN:  Patient Active Problem List   Diagnosis    BPPV (benign paroxysmal positional vertigo)    SVT (supraventricular tachycardia) (HCC)    Anxiety    Dyspnea on exertion    Intermittent palpitations    Essential hypertension    Morbid obesity due to excess calories (HCC)    Palpitations    Chest pain    Thyroid cancer (HCC)    Weight gain    Dizziness    Biliary colic    Obesity with body mass index (BMI) of 30.0 to 39.9    Spinal stenosis, lumbar    NAFLD (nonalcoholic fatty liver disease)    Abdominal pain    Hypothyroidism    Thyroid disease     1. Palpitations/Hx of SVT ablation:  Echo normal 7/16.      30 day monitor unremarkable aside from PVC's and PAC's 9/16.      Pharm stress normal 11/1/16. Still with intermittent episodes with severe symptoms.      Continue Toprol. Following with EP. Has LINQ. 2. Hypothyroid: Following with Endo. 3. HTN: Observe. 4. OLU    5. Orthostatic symptoms: Hose and hydration. Dawit Payan D.O.   Cardiologist  Cardiology, [facility]

## 2020-12-10 RX ORDER — LEVOTHYROXINE SODIUM 0.07 MG/1
75 TABLET ORAL DAILY
Qty: 90 TABLET | Refills: 2 | Status: SHIPPED
Start: 2020-12-10 | End: 2021-02-23 | Stop reason: SDUPTHER

## 2020-12-10 NOTE — TELEPHONE ENCOUNTER
Spoke with pt by phone  Lost 80 lbs b/c of issue with her gallbladder  11/24/20 TSH 0.08 on LT4 100 mcg daily  Repeated TSH this week at [facility]:  Stop LT4 for two days  Decrease LT4 to 75 mcg daily  Recheck in six weeks  SDR  12/10/20

## 2020-12-14 ENCOUNTER — TELEPHONE (OUTPATIENT)
Dept: ENDOCRINOLOGY | Age: 55
End: 2020-12-14

## 2020-12-18 ENCOUNTER — NURSE ONLY (OUTPATIENT)
Dept: NON INVASIVE DIAGNOSTICS | Age: 55
End: 2020-12-18

## 2020-12-18 PROCEDURE — G2066 INTER DEVC REMOTE 30D: HCPCS | Performed by: INTERNAL MEDICINE

## 2020-12-18 PROCEDURE — 93298 REM INTERROG DEV EVAL SCRMS: CPT | Performed by: INTERNAL MEDICINE

## 2020-12-21 ENCOUNTER — TELEPHONE (OUTPATIENT)
Dept: CARDIOLOGY CLINIC | Age: 55
End: 2020-12-21

## 2020-12-21 NOTE — TELEPHONE ENCOUNTER
Patient states that she discussed her low heart rate with you yesterday and you instructed to hold the Toprol, she states today that her heart rate is up to 60-80 but when she stands up it goes up to 120 and she gets shaky, she is asking if she should take 1/2dose because she feels her body needs it, please advise

## 2020-12-22 NOTE — TELEPHONE ENCOUNTER
Labs reviewed  11/24/20 TSH 0.08 on LT4 100 mcg daily  12/10/20 TSH 0.02 on LT4 100 mcg daily    I have already given the following instructions on 12/10/20 before getting the results from that day:  Stop LT4 for two days  Decrease LT4 to 75 mcg daily  Recheck in six weeks    No need to alter these based on the 12/10/20 results    [facility] [address]  12/22/20

## 2021-01-04 ENCOUNTER — TELEPHONE (OUTPATIENT)
Dept: ADMINISTRATIVE | Age: 56
End: 2021-01-04

## 2021-01-04 NOTE — TELEPHONE ENCOUNTER
Pt called to schedule apptw/ Dr Lesly Conner, was to be seen 4 weeks from 12/9.   She is scheduled 1/19, wants in sooner bc she was told his schedule was not avail at her last visit, please advise pt

## 2021-01-07 ENCOUNTER — OFFICE VISIT (OUTPATIENT)
Dept: CARDIOLOGY CLINIC | Age: 56
End: 2021-01-07
Payer: COMMERCIAL

## 2021-01-07 ENCOUNTER — TELEPHONE (OUTPATIENT)
Dept: ENDOCRINOLOGY | Age: 56
End: 2021-01-07

## 2021-01-07 VITALS
HEIGHT: 65 IN | HEART RATE: 64 BPM | WEIGHT: 179 LBS | BODY MASS INDEX: 29.82 KG/M2 | DIASTOLIC BLOOD PRESSURE: 86 MMHG | SYSTOLIC BLOOD PRESSURE: 110 MMHG | RESPIRATION RATE: 12 BRPM

## 2021-01-07 DIAGNOSIS — I10 ESSENTIAL HYPERTENSION: Primary | ICD-10-CM

## 2021-01-07 PROCEDURE — G8427 DOCREV CUR MEDS BY ELIG CLIN: HCPCS | Performed by: INTERNAL MEDICINE

## 2021-01-07 PROCEDURE — 93000 ELECTROCARDIOGRAM COMPLETE: CPT | Performed by: INTERNAL MEDICINE

## 2021-01-07 PROCEDURE — 1036F TOBACCO NON-USER: CPT | Performed by: INTERNAL MEDICINE

## 2021-01-07 PROCEDURE — G8484 FLU IMMUNIZE NO ADMIN: HCPCS | Performed by: INTERNAL MEDICINE

## 2021-01-07 PROCEDURE — 3017F COLORECTAL CA SCREEN DOC REV: CPT | Performed by: INTERNAL MEDICINE

## 2021-01-07 PROCEDURE — 99214 OFFICE O/P EST MOD 30 MIN: CPT | Performed by: INTERNAL MEDICINE

## 2021-01-07 PROCEDURE — G8417 CALC BMI ABV UP PARAM F/U: HCPCS | Performed by: INTERNAL MEDICINE

## 2021-01-07 RX ORDER — MAGNESIUM OXIDE 400 MG/1
TABLET ORAL
COMMUNITY
End: 2021-01-28

## 2021-01-07 RX ORDER — POTASSIUM CHLORIDE 750 MG/1
TABLET, FILM COATED, EXTENDED RELEASE ORAL
COMMUNITY
Start: 2020-12-23 | End: 2021-05-07

## 2021-01-07 NOTE — PROGRESS NOTES
CHIEF COMPLAINT: Palpitations/HTN    HISTORY OF PRESENT ILLNESS: Patient is a 54 y.o. female seen at the request of Airam DO    Patient seen in follow up. Hx of palpitations. Some palpitations and tachycardia. No CP. Some SOB.      Past Medical History:   Diagnosis Date    Allergic rhinitis     Anxiety     Atrial fibrillation (HCC)     Depression     Headache     Hypertension     Hypothyroidism     NAFLD (nonalcoholic fatty liver disease)     By CT    Obesity with body mass index (BMI) of 30.0 to 39.9 6/28/2020    Osteoarthritis     Sleep apnea     Spinal stenosis, lumbar 01/14/2020    Severe central stenosis by CT of abdomen, L1-L2    Thyroid disease     PTC microcarcinoma (0.5 cm) treated by thyroidectomy and BERG 100 mci       Patient Active Problem List   Diagnosis    BPPV (benign paroxysmal positional vertigo)    SVT (supraventricular tachycardia) (HCC)    Anxiety    Dyspnea on exertion    Intermittent palpitations    Essential hypertension    Morbid obesity due to excess calories (HCC)    Palpitations    Chest pain    [facility] ([facility] [address]    Weight gain    Dizziness    Biliary colic    Obesity with body mass index (BMI) of 30.0 to 39.9    Spinal stenosis, lumbar    NAFLD (nonalcoholic fatty liver disease)    Abdominal pain    Hypothyroidism    Thyroid disease     Allergies   Allergen Reactions    Narcan [Naloxone Hcl]     Fentanyl        Current Outpatient Medications   Medication Sig Dispense Refill    vitamin D (CHOLECALCIFEROL) 25 MCG (1000 UT) TABS tablet Take 1,000 Units by mouth daily      magnesium oxide (MAG-OX) 400 MG tablet magnesium oxide 400 mg (241.3 mg magnesium) tablet      potassium chloride (KLOR-CON) 10 MEQ extended release tablet TAKE ONE TABLET BY MOUTH ONCE DAILY      levothyroxine (SYNTHROID) 75 MCG tablet Take 1 tablet by mouth daily 90 tablet 2  TOPROL XL 50 MG extended release tablet Take 1 tablet by mouth daily (Patient taking differently: Take 25 mg by mouth daily Take 1/2 tablet daily) 180 tablet 3    ALPRAZolam (XANAX) 1 MG tablet Take 1 mg by mouth three times daily.  furosemide (LASIX) 20 MG tablet Take 1 tablet by mouth daily as needed (edema) 90 tablet 3     No current facility-administered medications for this visit. Social History     Socioeconomic History    Marital status:      Spouse name: Bertha Martin Number of children: 3    Years of education: Not on file    Highest education level: Not on file   Occupational History    Occupation: homemaker   Social Needs    Financial resource strain: Not hard at all   [employer] insecurity     Worry: Never true     Inability: Never true   [employer] needs     Medical: Not on file     Non-medical: Not on file   Tobacco Use    Smoking status: Never Smoker    Smokeless tobacco: Never Used   Substance and Sexual Activity    Alcohol use: No    Drug use: No    Sexual activity: Yes     Partners: Male   Lifestyle    Physical activity     Days per week: Not on file     Minutes per session: Not on file    Stress: Not on file   Relationships    Social connections     Talks on phone: Not on file     Gets together: Not on file     Attends Mosque service: Not on file     Active member of club or organization: Not on file     Attends meetings of clubs or organizations: Not on file     Relationship status: Not on file    Intimate partner violence     Fear of current or ex partner: Not on file     Emotionally abused: Not on file     Physically abused: Not on file     Forced sexual activity: Not on file   Other Topics Concern    Not on file   Social History Narrative    Lives in a house with  who is disabled and one child. Homeschools her daughter.     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       Family History   Problem Relation Age of Onset  High Cholesterol Mother     High Blood Pressure Father     Cancer Father         leukemia, lung cancer +smoker, brain cancer    Other Sister         nephrectomy due to a benign tumor    Breast Cancer Paternal Grandmother     Other Paternal Grandmother         bowel    Other Daughter         IGG immunodeficiency    Cancer Maternal Grandmother         lung, cervical    Heart Disease Maternal Grandfather     Atrial Fibrillation Paternal Grandfather      Review of Systems:  Heart: as above   Lungs: as above   Eyes: denies changes in vision or discharge. Ears: denies changes in hearing or pain. Nose: denies epistaxis or masses   Throat: denies sore throat or trouble swallowing. Neuro: denies numbness, tingling, tremors. Skin: denies rashes or itching. : denies hematuria, dysuria   GI: denies vomiting, diarrhea   Psych: denies mood changed, anxiety, depression. All other systems negative. Physical Exam   /86   Pulse 64   Resp 12   Ht 5' 5\" (1.651 m)   Wt 179 lb (81.2 kg)   LMP 01/01/2013   BMI 29.79 kg/m²   Constitutional: Oriented to person, place, and time. Well-developed and well-nourished. No distress. Head: Normocephalic and atraumatic. Eyes: EOM are normal. Pupils are equal, round, and reactive to light. Neck: Normal range of motion. Neck supple. No hepatojugular reflux and no JVD present. Carotid bruit is not present. No tracheal deviation present. No thyromegaly present. Cardiovascular: Normal rate, regular rhythm, normal heart sounds and intact distal pulses. Exam reveals no gallop and no friction rub. No murmur heard. Pulmonary/Chest: Effort normal and breath sounds normal. No respiratory distress. No wheezes. No rales. No tenderness. Abdominal: Soft. Bowel sounds are normal. No distension and no mass. No tenderness. No rebound and no guarding. Musculoskeletal: Normal range of motion. Tender lump left lateral arm. Lymphadenopathy:   No cervical adenopathy. No groin adenopathy. Neurological: Alert and oriented to person, place, and time. Skin: Skin is warm and dry. No rash noted. Not diaphoretic. No erythema. Psychiatric: Normal mood and affect. Behavior is normal.     EKG:  normal sinus rhythm, nonspecific ST and T waves changes. ASSESSMENT AND PLAN:  Patient Active Problem List   Diagnosis    BPPV (benign paroxysmal positional vertigo)    SVT (supraventricular tachycardia) (HCC)    Anxiety    Dyspnea on exertion    Intermittent palpitations    Essential hypertension    Morbid obesity due to excess calories (HCC)    Palpitations    Chest pain    [facility] ([facility] [address]    Weight gain    Dizziness    Biliary colic    Obesity with body mass index (BMI) of 30.0 to 39.9    Spinal stenosis, lumbar    NAFLD (nonalcoholic fatty liver disease)    Abdominal pain    Hypothyroidism    Thyroid disease     1. Palpitations/Hx of SVT ablation:  Echo normal 7/16.      30 day monitor unremarkable aside from PVC's and PAC's 9/16.      Pharm stress normal 11/1/16. Still with intermittent episodes with severe symptoms.      Continue Toprol. Following with EP. Has LINQ. 2. Hypothyroid: Following with Endo. 3. HTN: Observe. 4. OLU    5. Orthostatic symptoms: Hose and hydration. Nakita Paniagua D.O.   Cardiologist  Cardiology, [facility]

## 2021-01-07 NOTE — TELEPHONE ENCOUNTER
Pt called wanted to get new lab work to check all her levels. She had her dosage of synthroid lowed and she wanted to recheck to see how they are.

## 2021-01-10 NOTE — TELEPHONE ENCOUNTER
Piter Sommer. I ordered a TSH for her on 12/10/20. Please let her know it is in the [facility] system. See if she needs you to mail the req so that she can get it at another lab.

## 2021-01-12 ENCOUNTER — OFFICE VISIT (OUTPATIENT)
Dept: PRIMARY CARE CLINIC | Age: 56
End: 2021-01-12
Payer: COMMERCIAL

## 2021-01-12 VITALS
TEMPERATURE: 97.5 F | HEART RATE: 68 BPM | OXYGEN SATURATION: 98 % | SYSTOLIC BLOOD PRESSURE: 122 MMHG | BODY MASS INDEX: 29.45 KG/M2 | WEIGHT: 177 LBS | DIASTOLIC BLOOD PRESSURE: 74 MMHG

## 2021-01-12 DIAGNOSIS — E78.00 HYPERCHOLESTEROLEMIA: ICD-10-CM

## 2021-01-12 DIAGNOSIS — M54.30 SCIATICA, UNSPECIFIED LATERALITY: ICD-10-CM

## 2021-01-12 DIAGNOSIS — E03.9 HYPOTHYROIDISM, UNSPECIFIED TYPE: ICD-10-CM

## 2021-01-12 DIAGNOSIS — E53.8 FOLIC ACID DEFICIENCY: ICD-10-CM

## 2021-01-12 DIAGNOSIS — K76.0 FATTY LIVER: Primary | ICD-10-CM

## 2021-01-12 DIAGNOSIS — K76.0 FATTY LIVER: ICD-10-CM

## 2021-01-12 DIAGNOSIS — E55.9 VITAMIN D DEFICIENCY: ICD-10-CM

## 2021-01-12 LAB
ALBUMIN SERPL-MCNC: 4 G/DL (ref 3.5–5.2)
ALP BLD-CCNC: 66 U/L (ref 35–104)
ALT SERPL-CCNC: 24 U/L (ref 0–32)
ANION GAP SERPL CALCULATED.3IONS-SCNC: 12 MMOL/L (ref 7–16)
AST SERPL-CCNC: 21 U/L (ref 0–31)
BILIRUB SERPL-MCNC: 0.6 MG/DL (ref 0–1.2)
BUN BLDV-MCNC: 21 MG/DL (ref 6–20)
CALCIUM SERPL-MCNC: 9.8 MG/DL (ref 8.6–10.2)
CHLORIDE BLD-SCNC: 103 MMOL/L (ref 98–107)
CHOLESTEROL, TOTAL: 189 MG/DL (ref 0–199)
CO2: 24 MMOL/L (ref 22–29)
CREAT SERPL-MCNC: 0.8 MG/DL (ref 0.5–1)
FOLATE: 5.8 NG/ML (ref 4.8–24.2)
GFR AFRICAN AMERICAN: >60
GFR NON-AFRICAN AMERICAN: >60 ML/MIN/1.73
GLUCOSE BLD-MCNC: 87 MG/DL (ref 74–99)
HCT VFR BLD CALC: 45.2 % (ref 34–48)
HDLC SERPL-MCNC: 44 MG/DL
HEMOGLOBIN: 14.5 G/DL (ref 11.5–15.5)
LDL CHOLESTEROL CALCULATED: 132 MG/DL (ref 0–99)
MCH RBC QN AUTO: 28.1 PG (ref 26–35)
MCHC RBC AUTO-ENTMCNC: 32.1 % (ref 32–34.5)
MCV RBC AUTO: 87.6 FL (ref 80–99.9)
PDW BLD-RTO: 13.3 FL (ref 11.5–15)
PLATELET # BLD: 251 E9/L (ref 130–450)
PMV BLD AUTO: 10.9 FL (ref 7–12)
POTASSIUM SERPL-SCNC: 5 MMOL/L (ref 3.5–5)
RBC # BLD: 5.16 E12/L (ref 3.5–5.5)
SODIUM BLD-SCNC: 139 MMOL/L (ref 132–146)
TOTAL PROTEIN: 7.1 G/DL (ref 6.4–8.3)
TRIGL SERPL-MCNC: 67 MG/DL (ref 0–149)
TSH SERPL DL<=0.05 MIU/L-ACNC: 0.58 UIU/ML (ref 0.27–4.2)
VITAMIN D 25-HYDROXY: 19 NG/ML (ref 30–100)
VLDLC SERPL CALC-MCNC: 13 MG/DL
WBC # BLD: 5.9 E9/L (ref 4.5–11.5)

## 2021-01-12 PROCEDURE — G8427 DOCREV CUR MEDS BY ELIG CLIN: HCPCS | Performed by: INTERNAL MEDICINE

## 2021-01-12 PROCEDURE — 3017F COLORECTAL CA SCREEN DOC REV: CPT | Performed by: INTERNAL MEDICINE

## 2021-01-12 PROCEDURE — G8484 FLU IMMUNIZE NO ADMIN: HCPCS | Performed by: INTERNAL MEDICINE

## 2021-01-12 PROCEDURE — G8417 CALC BMI ABV UP PARAM F/U: HCPCS | Performed by: INTERNAL MEDICINE

## 2021-01-12 PROCEDURE — 99204 OFFICE O/P NEW MOD 45 MIN: CPT | Performed by: INTERNAL MEDICINE

## 2021-01-12 PROCEDURE — 1036F TOBACCO NON-USER: CPT | Performed by: INTERNAL MEDICINE

## 2021-01-12 RX ORDER — DOXYCYCLINE HYCLATE 100 MG/1
100 CAPSULE ORAL 2 TIMES DAILY
Qty: 20 CAPSULE | Refills: 0 | Status: SHIPPED | OUTPATIENT
Start: 2021-01-12 | End: 2021-01-22

## 2021-01-12 RX ORDER — FEXOFENADINE HCL 180 MG/1
180 TABLET ORAL DAILY
Qty: 30 TABLET | Refills: 0 | Status: SHIPPED | OUTPATIENT
Start: 2021-01-12 | End: 2021-01-28

## 2021-01-12 RX ORDER — AZELASTINE 1 MG/ML
1 SPRAY, METERED NASAL 2 TIMES DAILY
Qty: 1 BOTTLE | Refills: 0 | Status: SHIPPED
Start: 2021-01-12 | End: 2021-01-28

## 2021-01-12 ASSESSMENT — PATIENT HEALTH QUESTIONNAIRE - PHQ9
2. FEELING DOWN, DEPRESSED OR HOPELESS: 0
SUM OF ALL RESPONSES TO PHQ QUESTIONS 1-9: 0
SUM OF ALL RESPONSES TO PHQ QUESTIONS 1-9: 0

## 2021-01-13 ENCOUNTER — TELEPHONE (OUTPATIENT)
Dept: CARDIOLOGY CLINIC | Age: 56
End: 2021-01-13

## 2021-01-13 DIAGNOSIS — R53.83 FATIGUE, UNSPECIFIED TYPE: Primary | ICD-10-CM

## 2021-01-13 DIAGNOSIS — R06.09 DYSPNEA ON EXERTION: Primary | ICD-10-CM

## 2021-01-13 DIAGNOSIS — R00.2 PALPITATIONS: ICD-10-CM

## 2021-01-13 NOTE — TELEPHONE ENCOUNTER
Patient is asking if she should have labs for magnesium levels, she said she feels fatigue and a lot of  mood swings, she thinks her bland diet might be the cause and asked for labs, please advise

## 2021-01-14 ENCOUNTER — TELEPHONE (OUTPATIENT)
Dept: CARDIOLOGY CLINIC | Age: 56
End: 2021-01-14

## 2021-01-14 DIAGNOSIS — R00.2 PALPITATIONS: ICD-10-CM

## 2021-01-14 DIAGNOSIS — R53.83 FATIGUE, UNSPECIFIED TYPE: ICD-10-CM

## 2021-01-14 NOTE — TELEPHONE ENCOUNTER
----- Message from Hao Lizama DO sent at 1/14/2021  1:05 PM EST -----  Labs looked good. Hao Lizama D.O.   Cardiologist  Cardiology, [facility]

## 2021-01-19 ENCOUNTER — NURSE ONLY (OUTPATIENT)
Dept: NON INVASIVE DIAGNOSTICS | Age: 56
End: 2021-01-19
Payer: COMMERCIAL

## 2021-01-19 DIAGNOSIS — Z95.818 STATUS POST PLACEMENT OF IMPLANTABLE LOOP RECORDER: ICD-10-CM

## 2021-01-19 DIAGNOSIS — R00.2 PALPITATIONS: ICD-10-CM

## 2021-01-19 DIAGNOSIS — I47.1 SVT (SUPRAVENTRICULAR TACHYCARDIA) (HCC): Primary | ICD-10-CM

## 2021-01-19 PROCEDURE — G2066 INTER DEVC REMOTE 30D: HCPCS | Performed by: INTERNAL MEDICINE

## 2021-01-19 PROCEDURE — 93298 REM INTERROG DEV EVAL SCRMS: CPT | Performed by: INTERNAL MEDICINE

## 2021-01-19 NOTE — PROGRESS NOTES
See [facility] [place] report. Remote monitoring reviewed over a 30 day period.   End of 30 day monitoring period date of service 01/19/2021

## 2021-01-20 NOTE — TELEPHONE ENCOUNTER
[facility] would like to prescribe effexor or cymbalta for sever anxiety and derpression and wants to know if either one would be acceptable for patient, please advise Dorsal Nasal Flap Text: The defect edges were debeveled with a #15 scalpel blade.  Given the location of the defect and the proximity to free margins a dorsal nasal flap was deemed most appropriate.  Using a sterile surgical marker, an appropriate dorsal nasal flap was drawn around the defect.    The area thus outlined was incised deep to adipose tissue with a #15 scalpel blade.  The skin margins were undermined to an appropriate distance in all directions utilizing iris scissors.

## 2021-01-25 RX ORDER — ENALAPRIL MALEATE 5 MG/1
TABLET ORAL
Qty: 330 TABLET | Refills: 0 | Status: SHIPPED
Start: 2021-01-25 | End: 2021-01-28

## 2021-01-28 ENCOUNTER — OFFICE VISIT (OUTPATIENT)
Dept: CARDIOLOGY CLINIC | Age: 56
End: 2021-01-28
Payer: COMMERCIAL

## 2021-01-28 VITALS
BODY MASS INDEX: 29.6 KG/M2 | DIASTOLIC BLOOD PRESSURE: 80 MMHG | SYSTOLIC BLOOD PRESSURE: 132 MMHG | HEIGHT: 64 IN | RESPIRATION RATE: 18 BRPM | HEART RATE: 78 BPM | WEIGHT: 173.4 LBS

## 2021-01-28 DIAGNOSIS — R06.02 SOB (SHORTNESS OF BREATH): ICD-10-CM

## 2021-01-28 DIAGNOSIS — R00.2 PALPITATIONS: Primary | ICD-10-CM

## 2021-01-28 PROCEDURE — 3017F COLORECTAL CA SCREEN DOC REV: CPT | Performed by: INTERNAL MEDICINE

## 2021-01-28 PROCEDURE — G8417 CALC BMI ABV UP PARAM F/U: HCPCS | Performed by: INTERNAL MEDICINE

## 2021-01-28 PROCEDURE — 99214 OFFICE O/P EST MOD 30 MIN: CPT | Performed by: INTERNAL MEDICINE

## 2021-01-28 PROCEDURE — 93000 ELECTROCARDIOGRAM COMPLETE: CPT | Performed by: INTERNAL MEDICINE

## 2021-01-28 PROCEDURE — G8427 DOCREV CUR MEDS BY ELIG CLIN: HCPCS | Performed by: INTERNAL MEDICINE

## 2021-01-28 PROCEDURE — 1036F TOBACCO NON-USER: CPT | Performed by: INTERNAL MEDICINE

## 2021-01-28 PROCEDURE — G8484 FLU IMMUNIZE NO ADMIN: HCPCS | Performed by: INTERNAL MEDICINE

## 2021-01-28 NOTE — PROGRESS NOTES
CHIEF COMPLAINT: CP/ARAYA/Palpitations/HTN    HISTORY OF PRESENT ILLNESS: Patient is a 54 y.o. female seen at the request of Francisca Diaz DO    Patient seen in follow up. Hx of palpitations. Significant ARAYA and some chest symptoms. Some palpitations and tachycardia. Past Medical History:   Diagnosis Date    Allergic rhinitis     Anxiety     Atrial fibrillation (HCC)     Depression     Headache     Hypertension     Hypothyroidism     NAFLD (nonalcoholic fatty liver disease)     By CT    Obesity with body mass index (BMI) of 30.0 to 39.9 6/28/2020    Osteoarthritis     Sleep apnea     Spinal stenosis, lumbar 01/14/2020    Severe central stenosis by CT of abdomen, L1-L2    Thyroid disease     PTC microcarcinoma (0.5 cm) treated by thyroidectomy and BERG 100 mci       Patient Active Problem List   Diagnosis    BPPV (benign paroxysmal positional vertigo)    SVT (supraventricular tachycardia) (HCC)    Anxiety    Dyspnea on exertion    Intermittent palpitations    Essential hypertension    Morbid obesity due to excess calories (HCC)    Palpitations    Chest pain    Thyroid cancer ([address]    Weight gain    Dizziness    Biliary colic    Obesity with body mass index (BMI) of 30.0 to 39.9    Spinal stenosis, lumbar    NAFLD (nonalcoholic fatty liver disease)    Abdominal pain    Hypothyroidism    Thyroid disease     Allergies   Allergen Reactions    Narcan [Naloxone Hcl]     Fentanyl        Current Outpatient Medications   Medication Sig Dispense Refill    potassium chloride (KLOR-CON) 10 MEQ extended release tablet TAKE ONE TABLET BY MOUTH ONCE DAILY      levothyroxine (SYNTHROID) 75 MCG tablet Take 1 tablet by mouth daily 90 tablet 2    TOPROL XL 50 MG extended release tablet Take 1 tablet by mouth daily (Patient taking differently: Take 25 mg by mouth daily Take 1/2 tablet daily) 180 tablet 3    ALPRAZolam (XANAX) 1 MG tablet Take 1 mg by mouth three times daily.  furosemide (LASIX) 20 MG tablet Take 1 tablet by mouth daily as needed (edema) 90 tablet 3     No current facility-administered medications for this visit. Social History     Socioeconomic History    Marital status:      Spouse name: Aaron Villavicencio Number of children: 3    Years of education: Not on file    Highest education level: Not on file   Occupational History    Occupation: homemaker   Social Needs    Financial resource strain: Not hard at all   [employer] insecurity     Worry: Never true     Inability: Never true   [employer] needs     Medical: Not on file     Non-medical: Not on file   Tobacco Use    Smoking status: Never Smoker    Smokeless tobacco: Never Used   Substance and Sexual Activity    Alcohol use: No    Drug use: No    Sexual activity: Yes     Partners: Male   Lifestyle    Physical activity     Days per week: Not on file     Minutes per session: Not on file    Stress: Not on file   Relationships    Social connections     Talks on phone: Not on file     Gets together: Not on file     Attends [employer] service: Not on file     Active member of club or organization: Not on file     Attends meetings of clubs or organizations: Not on file     Relationship status: Not on file    Intimate partner violence     Fear of current or ex partner: Not on file     Emotionally abused: Not on file     Physically abused: Not on file     Forced sexual activity: Not on file   Other Topics Concern    Not on file   Social History Narrative    Lives in a house with  who is disabled and one child. Homeschools her daughter.     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       Family History   Problem Relation Age of Onset    High Cholesterol Mother     High Blood Pressure Father     Cancer Father         leukemia, lung cancer +smoker, brain cancer    Other Sister         nephrectomy due to a benign tumor    Breast Cancer Paternal Grandmother     Other Paternal Grandmother         bowel    Other Daughter         IGG immunodeficiency    Cancer Maternal Grandmother         lung, cervical    Heart Disease Maternal Grandfather     Atrial Fibrillation Paternal Grandfather      Review of Systems:  Heart: as above   Lungs: as above   Eyes: denies changes in vision or discharge. Ears: denies changes in hearing or pain. Nose: denies epistaxis or masses   Throat: denies sore throat or trouble swallowing. Neuro: denies numbness, tingling, tremors. Skin: denies rashes or itching. : denies hematuria, dysuria   GI: denies vomiting, diarrhea   Psych: denies mood changed, anxiety, depression. All other systems negative. Physical Exam   /80   Pulse 78   Resp 18   Ht 5' 4\" (1.626 m)   Wt 173 lb 6.4 oz (78.7 kg)   LMP 01/01/2013   BMI 29.76 kg/m²   Constitutional: Oriented to person, place, and time. Well-developed and well-nourished. No distress. Head: Normocephalic and atraumatic. Eyes: EOM are normal. Pupils are equal, round, and reactive to light. Neck: Normal range of motion. Neck supple. No hepatojugular reflux and no JVD present. Carotid bruit is not present. No tracheal deviation present. No thyromegaly present. Cardiovascular: Normal rate, regular rhythm, normal heart sounds and intact distal pulses. Exam reveals no gallop and no friction rub. No murmur heard. Pulmonary/Chest: Effort normal and breath sounds normal. No respiratory distress. No wheezes. No rales. No tenderness. Abdominal: Soft. Bowel sounds are normal. No distension and no mass. No tenderness. No rebound and no guarding. Musculoskeletal: Normal range of motion. Tender lump left lateral arm. Lymphadenopathy:   No cervical adenopathy. No groin adenopathy. Neurological: Alert and oriented to person, place, and time. Skin: Skin is warm and dry. No rash noted. Not diaphoretic. No erythema. Psychiatric: Normal mood and affect.  Behavior is normal.     EKG:  normal sinus rhythm, nonspecific ST and T waves changes. ASSESSMENT AND PLAN:  Patient Active Problem List   Diagnosis    BPPV (benign paroxysmal positional vertigo)    SVT (supraventricular tachycardia) (HCC)    Anxiety    Dyspnea on exertion    Intermittent palpitations    Essential hypertension    Morbid obesity due to excess calories (HCC)    Palpitations    Chest pain    Thyroid cancer [address]    Weight gain    Dizziness    Biliary colic    Obesity with body mass index (BMI) of 30.0 to 39.9    Spinal stenosis, lumbar    NAFLD (nonalcoholic fatty liver disease)    Abdominal pain    Hypothyroidism    Thyroid disease     1. CP/ARAYA: Stress. 2. Palpitations/Hx of SVT ablation:  Echo normal 7/16.      30 day monitor unremarkable aside from PVC's and PAC's 9/16.      Pharm stress normal 11/1/16. Still with intermittent episodes with severe symptoms.      Continue Toprol. Following with EP. Has LINQ. 3. Hypothyroid: Following with Endo. 4. HTN: Observe. 5. OLU    6. Orthostatic symptoms: Hose and hydration. Cristine Zimmer D.O.   Cardiologist  Cardiology, [facility]

## 2021-02-11 LAB — TSH SERPL DL<=0.05 MIU/L-ACNC: NORMAL M[IU]/L

## 2021-02-12 ENCOUNTER — OFFICE VISIT (OUTPATIENT)
Dept: PRIMARY CARE CLINIC | Age: 56
End: 2021-02-12
Payer: COMMERCIAL

## 2021-02-12 VITALS
SYSTOLIC BLOOD PRESSURE: 122 MMHG | TEMPERATURE: 97.5 F | BODY MASS INDEX: 29.7 KG/M2 | WEIGHT: 173 LBS | OXYGEN SATURATION: 97 % | DIASTOLIC BLOOD PRESSURE: 78 MMHG | HEART RATE: 82 BPM

## 2021-02-12 DIAGNOSIS — M67.40 GANGLION CYST: Primary | ICD-10-CM

## 2021-02-12 DIAGNOSIS — E03.9 HYPOTHYROIDISM, UNSPECIFIED TYPE: ICD-10-CM

## 2021-02-12 DIAGNOSIS — L98.9 SKIN LESION: ICD-10-CM

## 2021-02-12 DIAGNOSIS — E78.00 HYPERCHOLESTEROLEMIA: ICD-10-CM

## 2021-02-12 PROCEDURE — 1036F TOBACCO NON-USER: CPT | Performed by: INTERNAL MEDICINE

## 2021-02-12 PROCEDURE — G8484 FLU IMMUNIZE NO ADMIN: HCPCS | Performed by: INTERNAL MEDICINE

## 2021-02-12 PROCEDURE — 3017F COLORECTAL CA SCREEN DOC REV: CPT | Performed by: INTERNAL MEDICINE

## 2021-02-12 PROCEDURE — G8427 DOCREV CUR MEDS BY ELIG CLIN: HCPCS | Performed by: INTERNAL MEDICINE

## 2021-02-12 PROCEDURE — G8417 CALC BMI ABV UP PARAM F/U: HCPCS | Performed by: INTERNAL MEDICINE

## 2021-02-12 PROCEDURE — 99213 OFFICE O/P EST LOW 20 MIN: CPT | Performed by: INTERNAL MEDICINE

## 2021-02-12 NOTE — PROGRESS NOTES
2/12/21    Keily Herrera, a female of 54 y.o. came to the office     Keily Herrera presents today for 1 month follow-up. At last appointment her chronic sinusitis was treated with doxycycline. She was started on Astelin and Allegra. She has met with cardiology who recommended stress testing. Patient Active Problem List   Diagnosis    BPPV (benign paroxysmal positional vertigo)    SVT (supraventricular tachycardia) (HCC)    Anxiety    Dyspnea on exertion    Intermittent palpitations    Essential hypertension    Morbid obesity due to excess calories (HCC)    Palpitations    Chest pain    Thyroid cancer ([facility]    Weight gain    Dizziness    Biliary colic    Obesity with body mass index (BMI) of 30.0 to 39.9    Spinal stenosis, lumbar    NAFLD (nonalcoholic fatty liver disease)    Abdominal pain    Hypothyroidism    Thyroid disease      Allergies   Allergen Reactions    Narcan [Naloxone Hcl]     Fentanyl      Current Outpatient Medications on File Prior to Visit   Medication Sig Dispense Refill    potassium chloride (KLOR-CON) 10 MEQ extended release tablet TAKE ONE TABLET BY MOUTH ONCE DAILY      levothyroxine (SYNTHROID) 75 MCG tablet Take 1 tablet by mouth daily 90 tablet 2    TOPROL XL 50 MG extended release tablet Take 1 tablet by mouth daily (Patient taking differently: Take 25 mg by mouth daily Take 1/2 tablet daily) 180 tablet 3    ALPRAZolam (XANAX) 1 MG tablet Take 1 mg by mouth three times daily.  furosemide (LASIX) 20 MG tablet Take 1 tablet by mouth daily as needed (edema) 90 tablet 3     No current facility-administered medications on file prior to visit. Review of Systems  Constitutional:Negative for activity change, appetite change, chills, fatigue and fever. Respiratory: Negative for choking, chest tightness, shortness of breath and wheezing. Cardiovascular: Negative for chest pain, palpitations and leg swelling. Gastrointestinal: Negative for abdominal distention, constipation, diarrhea, nausea and vomiting. Musculoskeletal: Negative for arthralgias, back pain, gait problem and joint swelling. Neurological: Negative for dizziness, weakness,numbness and headaches. /78   Pulse 82   Temp 97.5 °F (36.4 °C)   Wt 173 lb (78.5 kg)   LMP 01/01/2013   SpO2 97%   BMI 29.70 kg/m²      Physical Exam   Constitutional:  Oriented to person, place, and time. Appears well-developed and well-nourished. No acute distress. HENT: No sinus tenderness or lymphadenopathy  Head: Normocephalic and atraumatic. Eyes: Eyes exhibits no discharge. No scleral icterus present. Neck: No tracheal deviation present. No thyromegaly present. Cardiovascular: Normal rate, regular rhythm, normal heart sounds and intact distal pulses. Exam reveals no gallop nor friction rub. No murmur heard. Pulmonary: Effort normal and breath sounds normal. No respiratory distress. No wheezes or rales. Musculoskeletal:  No tenderness to palpation  Neurological:Alert and oriented to person, place, and time. Skin: No diaphoresis. Psychiatric: Normal mood and affect. Behavior is Normal.     ASSESSMENT AND PLAN:    Daphne Gandhi was seen today for 1 month follow-up. Diagnoses and all orders for this visit:    Ganglion cyst  -     HILARIO Yepez MD, Plastic & Reconstructive Surgery, [place]    Hypercholesterolemia    Hypothyroidism, unspecified type        1 month follow-up today    Ganglion cyst of left wrist    Multiple skin lesions, will refer to plastic surgery    Blood work reviewed, unremarkable    Stress test ordered by cardiology    Sinusitis improving with allergy medications and modification of home    Return to care in 3 months          Please note that the above documentation was prepared using voice recognition software. Every attempt was made to ensure accuracy but there may be spelling, grammatical, and contextual errors. Electronically signed by Mery Cna DO on 2/12/2021 at 9:42 AM        Return in about 3 months (around 5/12/2021).     Mery Can DO

## 2021-02-16 ENCOUNTER — TELEPHONE (OUTPATIENT)
Dept: CARDIOLOGY | Age: 56
End: 2021-02-16

## 2021-02-16 NOTE — TELEPHONE ENCOUNTER
Spoke with patient and confirmed Lexiscan stress test appointment on Feb. 18, 2021 at 0730. Instructions for test and COVID-19 preprocedure checklist reviewed with patient, questions answered.

## 2021-02-18 ENCOUNTER — HOSPITAL ENCOUNTER (OUTPATIENT)
Dept: CARDIOLOGY | Age: 56
Discharge: HOME | End: 2021-02-18
Payer: COMMERCIAL

## 2021-02-18 ENCOUNTER — TELEPHONE (OUTPATIENT)
Dept: CARDIOLOGY CLINIC | Age: 56
End: 2021-02-18

## 2021-02-18 VITALS
TEMPERATURE: 97.5 F | HEIGHT: 64 IN | DIASTOLIC BLOOD PRESSURE: 70 MMHG | HEART RATE: 95 BPM | SYSTOLIC BLOOD PRESSURE: 110 MMHG | WEIGHT: 173 LBS | BODY MASS INDEX: 29.53 KG/M2

## 2021-02-18 DIAGNOSIS — R00.2 PALPITATIONS: ICD-10-CM

## 2021-02-18 DIAGNOSIS — R06.02 SOB (SHORTNESS OF BREATH): Primary | ICD-10-CM

## 2021-02-18 PROCEDURE — 78452 HT MUSCLE IMAGE SPECT MULT: CPT

## 2021-02-18 PROCEDURE — 93017 CV STRESS TEST TRACING ONLY: CPT

## 2021-02-18 PROCEDURE — 3430000000 HC RX DIAGNOSTIC RADIOPHARMACEUTICAL: Performed by: INTERNAL MEDICINE

## 2021-02-18 PROCEDURE — 2580000003 HC RX 258: Performed by: INTERNAL MEDICINE

## 2021-02-18 PROCEDURE — 6360000002 HC RX W HCPCS: Performed by: INTERNAL MEDICINE

## 2021-02-18 PROCEDURE — A9500 TC99M SESTAMIBI: HCPCS | Performed by: INTERNAL MEDICINE

## 2021-02-18 RX ORDER — SODIUM CHLORIDE 0.9 % (FLUSH) 0.9 %
10 SYRINGE (ML) INJECTION PRN
Status: DISCONTINUED | OUTPATIENT
Start: 2021-02-18 | End: 2021-02-19 | Stop reason: HOSPADM

## 2021-02-18 RX ADMIN — SODIUM CHLORIDE, PRESERVATIVE FREE 10 ML: 5 INJECTION INTRAVENOUS at 08:53

## 2021-02-18 RX ADMIN — Medication 10 MILLICURIE: at 07:30

## 2021-02-18 RX ADMIN — SODIUM CHLORIDE, PRESERVATIVE FREE 10 ML: 5 INJECTION INTRAVENOUS at 08:54

## 2021-02-18 RX ADMIN — Medication 32.3 MILLICURIE: at 08:53

## 2021-02-18 RX ADMIN — SODIUM CHLORIDE, PRESERVATIVE FREE 10 ML: 5 INJECTION INTRAVENOUS at 07:30

## 2021-02-18 RX ADMIN — REGADENOSON 0.4 MG: 0.08 INJECTION, SOLUTION INTRAVENOUS at 08:53

## 2021-02-18 NOTE — PROCEDURES
[address] [facility] [address] [phone]                Pharmacologic Stress Nuclear Gated SPECT Study    Name: [address] Account Number: [de-identified]    :  1965          Sex: female         Date of Study:  2021    Height: 5' 4\" (162.6 cm)         Weight: 173 lb (78.5 kg)     Ordering Provider: Araseli Valentin DO          PCP: Monica Duckworth DO      Cardiologist: Araseli Valentin DO             Interpreting Physician: Crista Antonio. Lluvia Herrera MD  _________________________________________________________________________________    Indication:   Detecting the presence and location of coronary artery disease    Clinical History:   Patient has no known history of coronary artery disease. Procedure:   Pharmacologic stress testing was performed with regadenoson 0.4 mg for 15 seconds. .  The heart rate was 95 at baseline and joaquín to 141 beats during the infusion. This corresponds to 85% of maximum predicted heart rate. The blood pressure at baseline was 110/70 and blood pressure at the end of infusion was 138/70. Blood pressure response was normal during the stress procedure. The patient experienced mild increased breathing, headache post infusion. Symptoms lessened. ECG during the infusion was uninterpretable for ischemia given baseline ST changes. IMAGING: Myocardial perfusion imaging was performed at rest 30-35 minutes following the intravenous injection of 10.0 mCi of (Tc-Sestamibi) followed by 10 ml of Normal Saline. As per infusion protocol, the patient was injected intravenously with 32.2 mCi of (Tc-Sestamibi) followed by 10 ml of Normal Saline. Gated post-stress tomographic imaging was performed 20-25 minutes after stress. FINDINGS: The overall quality of the study was excellent.      Left ventricular cavity size was noted to be normal.    Rotational analog analysis demonstrated no patient motion or abnormal extracardiac radioactivity. The gated SPECT stress imaging in the short, vertical long, and horizontal long axis demonstrated normal homogeneous tracer distribution throughout the myocardium. The resting images are normal.     Gated SPECT left ventricular ejection fraction was calculated to be 58%, with normal myocardial thickening and wall motion. Impression:    1. ECG during the infusion was uninterpretable for ischemia given baseline ST changes. 2. The myocardial perfusion imaging was normal.    3. Overall left ventricular systolic function was normal without regional wall motion abnormalities. 4. Low risk general pharmacologic nuclear stress test.    Thank you for sending your patient to this [employer].      Electronically signed by Iram Prater MD on 2/18/21 at 10:58 AM EST

## 2021-02-19 ENCOUNTER — NURSE ONLY (OUTPATIENT)
Dept: NON INVASIVE DIAGNOSTICS | Age: 56
End: 2021-02-19
Payer: COMMERCIAL

## 2021-02-19 DIAGNOSIS — R00.2 PALPITATIONS: Primary | ICD-10-CM

## 2021-02-19 DIAGNOSIS — Z95.818 STATUS POST PLACEMENT OF IMPLANTABLE LOOP RECORDER: ICD-10-CM

## 2021-02-19 PROCEDURE — G2066 INTER DEVC REMOTE 30D: HCPCS | Performed by: INTERNAL MEDICINE

## 2021-02-19 PROCEDURE — 93298 REM INTERROG DEV EVAL SCRMS: CPT | Performed by: INTERNAL MEDICINE

## 2021-02-19 NOTE — PROGRESS NOTES
See [facility] [place] report. Remote monitoring reviewed over a 30 day period.   End of 30 day monitoring period date of service 02/19/2021

## 2021-02-23 ENCOUNTER — TELEPHONE (OUTPATIENT)
Dept: ENDOCRINOLOGY | Age: 56
End: 2021-02-23

## 2021-02-23 ENCOUNTER — VIRTUAL VISIT (OUTPATIENT)
Dept: ENDOCRINOLOGY | Age: 56
End: 2021-02-23
Payer: COMMERCIAL

## 2021-02-23 DIAGNOSIS — E03.9 HYPOTHYROIDISM (ACQUIRED): Primary | ICD-10-CM

## 2021-02-23 DIAGNOSIS — E03.9 HYPOTHYROIDISM (ACQUIRED): ICD-10-CM

## 2021-02-23 DIAGNOSIS — C73 THYROID CANCER (HCC): ICD-10-CM

## 2021-02-23 PROCEDURE — G8428 CUR MEDS NOT DOCUMENT: HCPCS | Performed by: INTERNAL MEDICINE

## 2021-02-23 PROCEDURE — 3017F COLORECTAL CA SCREEN DOC REV: CPT | Performed by: INTERNAL MEDICINE

## 2021-02-23 PROCEDURE — 99214 OFFICE O/P EST MOD 30 MIN: CPT | Performed by: INTERNAL MEDICINE

## 2021-02-23 RX ORDER — LEVOTHYROXINE SODIUM 0.07 MG/1
TABLET ORAL
Qty: 90 TABLET | Refills: 2
Start: 2021-02-23 | End: 2021-09-20

## 2021-02-23 NOTE — PROGRESS NOTES
daily (Patient taking differently: Take 25 mg by mouth daily Take 1/2 tablet daily) 180 tablet 3    ALPRAZolam (XANAX) 1 MG tablet Take 1 mg by mouth three times daily.  furosemide (LASIX) 20 MG tablet Take 1 tablet by mouth daily as needed (edema) 90 tablet 3     No current facility-administered medications for this visit. ROS -   Gen: no fever  Pulm: no cough    PE -   Gen:    WN, WD, NAD  Lung: Nml resp effort  Psych: Normal mood   Full affect    A/P -   1. Thyroid Cancer - stable, no signs of recurrence       PTC Stage I, [id number] (0.5cm),     Excellent response to therapy (total thyroidectomy, 100mCi BERG)       Risk of recurrence is 1-4% for the next 5years. TSH should be maintained at 0.5-2 per the 2015 JOSE guidelines; because of her recurrent tachyarrhythmias, need to keep the TSH just below 2        She conts to see Dr. Hernan Wade for treatment of this. No symptoms of hyper or hypothyroidism. Does have chronic hot flashes and chronic constipation       02/11/21 TSH 0.35 on LT4 75 mcg daily       No symptoms of hoarseness or difficulty swallowing       Had US of thyroid ordered by PCP at [facility] a couple of months ago       Therefore the plan is:       Decrease dose of LT4 to 75 mcg daily M-Sa, none on Simeon       Repeat TSH six weeks    2. Hypothyroidism - controlled      Symptoms: + chronic constipation, no diarrhea, + occ palpitations, + diaphoresis xcouple of months, no fatigue       02/11/21 TSH 0.35 on LT4 75 mcg daily       Decrease dose of LT4 to 75 mcg daily M-Sa, none on Simeon       Repeat TSH in six weeks    3. Follow up       See me in four months    Start time:   11:42 am  End time:     12:15 am    Total time spent on encounter: 33 min    An  electronic signature was used to authenticate this note.     --Tom Quick MD on 2/23/2021 at 5:05 PM      Pursuant to the emergency declaration under the [facility] waiver authority and the Coronavirus Preparedness and Response Supplemental Appropriations Act, this Virtual  Visit was conducted, with patient's consent, to reduce the patient's risk of exposure to COVID-19 and provide continuity of care for an established patient. Services were provided through a video synchronous discussion virtually to substitute for in-person clinic visit.

## 2021-02-23 NOTE — PATIENT INSTRUCTIONS
Decrease dose of LT4 to 75 mcg daily M-Sa, none on Simeon       Repeat TSH six weeks         See me back in four months

## 2021-03-02 NOTE — PROGRESS NOTES
1/12/21    Lei Bobo, a female of 54 y.o. came to the office    HPI     Lei Bobo presents today as a new patient. She recently has been following up with cardiology. She was advised to continue Toprol for intermittent palpitations. Follows up with endocrinology for her hypothyroidism. She has a history of sciatica --- she follows with Dr. Mandy Arzate for injections. She has been having issues with sinusitis. She has been having a metallic taste in her mouth. She is having a lot of mucous in her sinuses. She was covid positive. She had a cholecystectomy early in last year. She has a history of thryoid cancer and is s/p thyroidectomy. Chronic health problems - hypothyroidism, anxiety, GERD, chonic back pain, thyroid cancer    Chronic medications- see attached    Surgical History  Hysterectomy  Cholecystectomy  thyroidectomy    Family History  Dad -Leukemia, prostate cancer, ataxia  Mom - living and well  Siblings - benign kidney tumor  Children - IgG deficiency    Social History  Occupation- office work, retired   Tobacco - none  Alcohol - none  Drug -  none    Allergies   Allergen Reactions    Narcan [Naloxone Hcl]     Fentanyl        Current Outpatient Medications on File Prior to Visit   Medication Sig Dispense Refill    vitamin D (CHOLECALCIFEROL) 25 MCG (1000 UT) TABS tablet Take 1,000 Units by mouth daily      magnesium oxide (MAG-OX) 400 MG tablet magnesium oxide 400 mg (241.3 mg magnesium) tablet      potassium chloride (KLOR-CON) 10 MEQ extended release tablet TAKE ONE TABLET BY MOUTH ONCE DAILY      levothyroxine (SYNTHROID) 75 MCG tablet Take 1 tablet by mouth daily 90 tablet 2    TOPROL XL 50 MG extended release tablet Take 1 tablet by mouth daily (Patient taking differently: Take 25 mg by mouth daily Take 1/2 tablet daily) 180 tablet 3    ALPRAZolam (XANAX) 1 MG tablet Take 1 mg by mouth three times daily.  furosemide (LASIX) 20 MG tablet Take 1 tablet by mouth daily as needed (edema) 90 tablet 3     No current facility-administered medications on file prior to visit. Review of Systems   Constitutional: Negative for activity change, appetite change, chills and fatigue. HENT: Negative for hearing loss, Negative for congestion. Respiratory: Negative for chest tightness, shortness of breath and wheezing. Cardiovascular: Negative for leg swelling Negative for chest pain and palpitations. Gastrointestinal: Negative for abdominal pain, Negative for abdominal distention, constipation and diarrhea. Genitourinary: Negative for difficulty urinating, dysuria and frequency. Musculoskeletal: Negative for arthralgias, back pain, gait problem and joint swelling. Skin: Negative for color change rash or wound     Neurological: Negative for dizziness, seizures and headaches. Hematological: Negative for adenopathy. Does not bruise/bleed easily. Psychiatric/Behavioral: Negative for agitation, behavioral problems, confusion and decreased concentration. OBJECTIVE:  /74   Pulse 68   Temp 97.5 °F (36.4 °C)   Wt 177 lb (80.3 kg)   LMP 01/01/2013   SpO2 98%   BMI 29.45 kg/m²      Physical Exam   Constitutional: Oriented to person, place, and time. Appears well-developed and well-nourished. No distress. HENT:   Head: Normocephalic and atraumatic. Eyes: Pupils are equal, round, and reactive to light. EOM are normal.   Neck: Neck supple. No JVD present. No tracheal deviation present. No thyromegaly present. Cardiovascular: Normal rate, regular rhythm, normal heart sounds and intact distal pulses. Exam reveals no gallop and no friction rub. No murmur heard. Pulmonary/Chest: Effort normal and breath sounds normal. No stridor. No respiratory distress. No wheezes or rales. Abdominal: Soft. Bowel sounds are normal. There is no distension nor mass. There is no tenderness. There is no guarding. Musculoskeletal: No edema tenderness or deformity  Neurological: Alert and oriented to person, place, and time. No cranial nerve deficit. Normal muscle tone. Coordination normal.   Skin: Skin is warm and dry. No diaphoresis. No erythema. Psychiatric: Normal mood and affect. Behavior is normal.         ASSESSMENT AND PLAN:    Augusta Sanchez was seen today for established new doctor and sinusitis. Diagnoses and all orders for this visit:    Fatty liver  -     CBC; Future  -     Comprehensive Metabolic Panel; Future    Hypercholesterolemia  -     Lipid Panel; Future    Hypothyroidism, unspecified type  -     TSH without Reflex; Future    Sciatica, unspecified laterality    Vitamin D deficiency  -     Vitamin D 25 Hydroxy; Future    Folic acid deficiency  -     FOLATE; Future    Other orders  -     azelastine (ASTELIN) 0.1 % nasal spray; 1 spray by Nasal route 2 times daily Use in each nostril as directed  -     doxycycline hyclate (VIBRAMYCIN) 100 MG capsule; Take 1 capsule by mouth 2 times daily for 10 days  -     fexofenadine (ALLEGRA) 180 MG tablet; Take 1 tablet by mouth daily        Jackie Chris presents today as a new patient. Today I will perform blood work. Complains today of acute on chronic sinusitis    We will treat with doxycycline    I do feel that she may have some allergies to multiple pets that are in her house. I will start her on Astelin and Allegra    Following with endocrinology for hypothyroidism iatrogenic from thyroidectomy    Following with gastroenterology for nausea    Please note that the above documentation was prepared using voice recognition software. Every attempt was made to ensure accuracy but there may be spelling, grammatical, and contextual errors. Return in about 1 month (around 2/12/2021).     [place] Fails, DO None

## 2021-03-05 ENCOUNTER — TELEPHONE (OUTPATIENT)
Dept: ENDOCRINOLOGY | Age: 56
End: 2021-03-05

## 2021-03-05 NOTE — TELEPHONE ENCOUNTER
The pt called and left a vm stating that her hair has been falling out and she has been having some other issues. She called her pcp to enquire about blood work and he told her to call us. She wants to know if she can have lab work to get her estrogen, and vitamin levels checked.

## 2021-03-06 ENCOUNTER — TELEPHONE (OUTPATIENT)
Dept: ENDOCRINOLOGY | Age: 56
End: 2021-03-06

## 2021-03-06 DIAGNOSIS — L65.9 HAIR LOSS: ICD-10-CM

## 2021-03-06 DIAGNOSIS — R63.4 EXCESSIVE BODY WEIGHT LOSS: Primary | ICD-10-CM

## 2021-03-06 DIAGNOSIS — E55.9 VITAMIN D DEFICIENCY: ICD-10-CM

## 2021-03-06 NOTE — TELEPHONE ENCOUNTER
Spoke with pt by phone  +rapid wt loss over past 9 mo of 90+ lbs  Concerned about hairloss  Assessment - hair loss is due the weight loss, need to r/o vit and mineral deficiencies; if does not resolve with cessation of weight loss, then will rule out hyperandrogenism  Plan -   See lab orders for this encounter.   [place] 45  03/06/21

## 2021-03-08 DIAGNOSIS — R63.4 EXCESSIVE BODY WEIGHT LOSS: ICD-10-CM

## 2021-03-08 DIAGNOSIS — E55.9 VITAMIN D DEFICIENCY: ICD-10-CM

## 2021-03-08 DIAGNOSIS — L65.9 HAIR LOSS: ICD-10-CM

## 2021-03-08 LAB
FERRITIN: 158 NG/ML
FOLATE: 10.4 NG/ML (ref 4.8–24.2)
TSH SERPL DL<=0.05 MIU/L-ACNC: 1.44 UIU/ML (ref 0.27–4.2)
VITAMIN B-12: 360 PG/ML (ref 211–946)
VITAMIN D 25-HYDROXY: 23 NG/ML (ref 30–100)

## 2021-03-10 LAB
COPPER: 112.8 UG/DL (ref 80–155)
SELENIUM: 178.7 UG/L (ref 23–190)
ZINC: 66.2 UG/DL (ref 60–120)

## 2021-03-13 LAB — VITAMIN B1 WHOLE BLOOD: 61 NMOL/L (ref 70–180)

## 2021-03-14 ENCOUNTER — TELEPHONE (OUTPATIENT)
Dept: ENDOCRINOLOGY | Age: 56
End: 2021-03-14

## 2021-03-14 DIAGNOSIS — E55.9 VITAMIN D DEFICIENCY: Primary | ICD-10-CM

## 2021-03-14 DIAGNOSIS — E51.9 VITAMIN B1 DEFICIENCY: ICD-10-CM

## 2021-03-14 RX ORDER — THIAMINE HCL 50 MG
TABLET ORAL
Qty: 14 TABLET | Refills: 3 | Status: SHIPPED
Start: 2021-03-14 | End: 2021-07-19 | Stop reason: ALTCHOICE

## 2021-03-14 RX ORDER — CHOLECALCIFEROL (VITAMIN D3) 1250 MCG
CAPSULE ORAL
Qty: 13 CAPSULE | Refills: 0 | Status: SHIPPED
Start: 2021-03-14 | End: 2021-05-07

## 2021-03-14 NOTE — TELEPHONE ENCOUNTER
3/8/21 Vit B1 61 () and D 25OH 23  Plan:  Vitamin B1 50 mg daily for 2 weeks  Vitamin D3 daily for 13 weeks  Recheck labs in six weeks  SDR

## 2021-03-22 ENCOUNTER — NURSE ONLY (OUTPATIENT)
Dept: NON INVASIVE DIAGNOSTICS | Age: 56
End: 2021-03-22
Payer: COMMERCIAL

## 2021-03-22 DIAGNOSIS — R00.2 PALPITATIONS: Primary | ICD-10-CM

## 2021-03-22 DIAGNOSIS — Z95.818 STATUS POST PLACEMENT OF IMPLANTABLE LOOP RECORDER: ICD-10-CM

## 2021-03-22 DIAGNOSIS — I47.1 SVT (SUPRAVENTRICULAR TACHYCARDIA) (HCC): ICD-10-CM

## 2021-03-22 PROCEDURE — G2066 INTER DEVC REMOTE 30D: HCPCS | Performed by: INTERNAL MEDICINE

## 2021-03-22 PROCEDURE — 93298 REM INTERROG DEV EVAL SCRMS: CPT | Performed by: INTERNAL MEDICINE

## 2021-03-25 ENCOUNTER — TELEPHONE (OUTPATIENT)
Dept: PRIMARY CARE CLINIC | Age: 56
End: 2021-03-25

## 2021-03-25 DIAGNOSIS — R43.2 DYSGEUSIA: Primary | ICD-10-CM

## 2021-03-25 NOTE — TELEPHONE ENCOUNTER
Patient has not had her taste for 3 m, except for a salty taste in her mouth. She was tested for covid twice and it was negative. Her sense of smell is fine. She would like a referral to [facility]. Please order if you agree.

## 2021-03-31 ENCOUNTER — VIRTUAL VISIT (OUTPATIENT)
Dept: PRIMARY CARE CLINIC | Age: 56
End: 2021-03-31
Payer: COMMERCIAL

## 2021-03-31 DIAGNOSIS — E55.9 VITAMIN D DEFICIENCY: ICD-10-CM

## 2021-03-31 DIAGNOSIS — Z13.1 DIABETES MELLITUS SCREENING: ICD-10-CM

## 2021-03-31 DIAGNOSIS — R43.2 LOSS OF TASTE: Primary | ICD-10-CM

## 2021-03-31 PROCEDURE — G8417 CALC BMI ABV UP PARAM F/U: HCPCS | Performed by: INTERNAL MEDICINE

## 2021-03-31 PROCEDURE — G8427 DOCREV CUR MEDS BY ELIG CLIN: HCPCS | Performed by: INTERNAL MEDICINE

## 2021-03-31 PROCEDURE — 3017F COLORECTAL CA SCREEN DOC REV: CPT | Performed by: INTERNAL MEDICINE

## 2021-03-31 PROCEDURE — 99213 OFFICE O/P EST LOW 20 MIN: CPT | Performed by: INTERNAL MEDICINE

## 2021-03-31 PROCEDURE — G8484 FLU IMMUNIZE NO ADMIN: HCPCS | Performed by: INTERNAL MEDICINE

## 2021-03-31 PROCEDURE — 1036F TOBACCO NON-USER: CPT | Performed by: INTERNAL MEDICINE

## 2021-03-31 RX ORDER — ESCITALOPRAM OXALATE 10 MG/1
TABLET ORAL
COMMUNITY
Start: 2021-02-24 | End: 2021-06-28

## 2021-03-31 NOTE — PROGRESS NOTES
3/31/21    Karolyn Costa, a female of 54 y.o. came to the office     Karolyn Costa presents today for virtual visit for evaluation of impaired sense of taste. She was previously told that she has sinusitis. She has been following with Dr. Frank Basilio for her sinus issues. Patient Active Problem List   Diagnosis    BPPV (benign paroxysmal positional vertigo)    [facility]    Anxiety    Dyspnea on exertion    Intermittent palpitations    Essential hypertension    Morbid obesity due to excess calories (HCC)    Palpitations    Chest pain    [facility] [address]    Weight gain    Dizziness    Biliary colic    Obesity with body mass index (BMI) of 30.0 to 39.9    Spinal stenosis, lumbar    NAFLD (nonalcoholic fatty liver disease)    Abdominal pain    Hypothyroidism    Thyroid disease      Allergies   Allergen Reactions    Narcan [Naloxone Hcl] Anaphylaxis     Prolonged anesthesia     Fentanyl      Current Outpatient Medications on File Prior to Visit   Medication Sig Dispense Refill    Thiamine HCl (VITAMIN B-1) 50 MG tablet Take one tablet daily for two weeks 14 tablet 3    levothyroxine (SYNTHROID) 75 MCG tablet Take one tablet daily M-Sa, none on Simeon (Patient taking differently: Take one tablet daily M-Sa, none on Sun DAW1) 90 tablet 2    TOPROL XL 50 MG extended release tablet Take 1 tablet by mouth daily (Patient taking differently: Take 25 mg by mouth daily Take 1/2 tablet daily) 180 tablet 3    ALPRAZolam (XANAX) 1 MG tablet Take 1 mg by mouth three times daily.       escitalopram (LEXAPRO) 10 MG tablet TAKE ONE TABLET BY MOUTH ONCE DAILY      Cholecalciferol (VITAMIN D3) 1.25 MG (97901 UT) CAPS Take one capsule once each week for 13 weeks (Patient not taking: Reported on 3/31/2021) 13 capsule 0    potassium chloride (KLOR-CON) 10 MEQ extended release tablet TAKE ONE TABLET BY MOUTH ONCE DAILY      furosemide (LASIX) 20 MG tablet evaluate the patient and recommend diagnostics and treatments based on my assessment. 3. If it was felt that the patient should be evaluated in clinic or an emergency room setting, then they would be directed there. 4. Our sessions are not being recorded and that personal health information is protected. 5. Our team would provide follow up care in person if/when the patient needs it. Patient Does agree to proceed with telemedicine consultation. Patient's location: home address in [place] [address]  location home address in [place]  Other people involved in call - None      Time spent: Not billed by time    This visit was completed virtually using [employer]. me    Due to this being a TeleHealth encounter, evaluation of the following organ systems is limited: Vitals/Constitutional/EENT/Resp/CV/GI//MS/Neuro/Skin/Heme-Lymph-Imm. Pursuant to the emergency declaration under the [address] [place] [address], [facility] waiver authority and the [employer] Act, this Virtual  Visit was conducted, with patient's consent, to reduce the patient's risk of exposure to COVID-19 and provide continuity of care for an established patient. Services were provided through a video synchronous discussion virtually to substitute for in-person clinic visit. Please note that the above documentation was prepared using voice recognition software. Every attempt was made to ensure accuracy but there may be spelling, grammatical, and contextual errors.     Electronically signed by Monica Duckworth DO on 3/31/2021 at 11:37 AM

## 2021-04-22 ENCOUNTER — NURSE ONLY (OUTPATIENT)
Dept: NON INVASIVE DIAGNOSTICS | Age: 56
End: 2021-04-22
Payer: COMMERCIAL

## 2021-04-22 DIAGNOSIS — R00.2 PALPITATIONS: Primary | ICD-10-CM

## 2021-04-22 DIAGNOSIS — Z95.818 STATUS POST PLACEMENT OF IMPLANTABLE LOOP RECORDER: ICD-10-CM

## 2021-04-22 DIAGNOSIS — I47.1 SVT (SUPRAVENTRICULAR TACHYCARDIA) (HCC): ICD-10-CM

## 2021-04-23 PROCEDURE — G2066 INTER DEVC REMOTE 30D: HCPCS | Performed by: INTERNAL MEDICINE

## 2021-04-23 PROCEDURE — 93298 REM INTERROG DEV EVAL SCRMS: CPT | Performed by: INTERNAL MEDICINE

## 2021-04-23 NOTE — PROGRESS NOTES
See [employer] [place] report. Remote monitoring reviewed over a 30 day period.   End of 30 day monitoring period date of service 04/23/2021

## 2021-05-07 ENCOUNTER — VIRTUAL VISIT (OUTPATIENT)
Dept: PRIMARY CARE CLINIC | Age: 56
End: 2021-05-07
Payer: COMMERCIAL

## 2021-05-07 DIAGNOSIS — R43.2 LOSS OF TASTE: Primary | ICD-10-CM

## 2021-05-07 DIAGNOSIS — E78.00 HYPERCHOLESTEROLEMIA: ICD-10-CM

## 2021-05-07 DIAGNOSIS — I47.1 SVT (SUPRAVENTRICULAR TACHYCARDIA) (HCC): ICD-10-CM

## 2021-05-07 PROCEDURE — G8417 CALC BMI ABV UP PARAM F/U: HCPCS | Performed by: INTERNAL MEDICINE

## 2021-05-07 PROCEDURE — 1036F TOBACCO NON-USER: CPT | Performed by: INTERNAL MEDICINE

## 2021-05-07 PROCEDURE — 99213 OFFICE O/P EST LOW 20 MIN: CPT | Performed by: INTERNAL MEDICINE

## 2021-05-07 PROCEDURE — 3017F COLORECTAL CA SCREEN DOC REV: CPT | Performed by: INTERNAL MEDICINE

## 2021-05-07 PROCEDURE — G8427 DOCREV CUR MEDS BY ELIG CLIN: HCPCS | Performed by: INTERNAL MEDICINE

## 2021-05-07 NOTE — PROGRESS NOTES
5/7/21    Luis Medina, a female of 54 y.o. came to the office     Luis Medina presents today for virtual visit for 3-month follow-up. At her last appointment she was referred to ear nose and throat for her loss of taste. She has an appointment at the end of the month. Prior to that she was referred to plastic surgery for a ganglion cyst of her left wrist.  She denies chest pain shortness of breath palpitations or edema. Patient Active Problem List   Diagnosis    BPPV (benign paroxysmal positional vertigo)    SVT (supraventricular tachycardia) (HCC)    Anxiety    Dyspnea on exertion    Intermittent palpitations    Essential hypertension    Morbid obesity due to excess calories (HCC)    Palpitations    Chest pain    Thyroid cancer ([facility] [address]    Weight gain    Dizziness    Biliary colic    Obesity with body mass index (BMI) of 30.0 to 39.9    Spinal stenosis, lumbar    NAFLD (nonalcoholic fatty liver disease)    Abdominal pain    Hypothyroidism    Thyroid disease      Allergies   Allergen Reactions    Narcan [Naloxone Hcl] Anaphylaxis     Prolonged anesthesia     Fentanyl      Current Outpatient Medications on File Prior to Visit   Medication Sig Dispense Refill    vitamin D 25 MCG (1000 UT) CAPS Take by mouth      escitalopram (LEXAPRO) 10 MG tablet TAKE ONE TABLET BY MOUTH ONCE DAILY      Thiamine HCl (VITAMIN B-1) 50 MG tablet Take one tablet daily for two weeks 14 tablet 3    levothyroxine (SYNTHROID) 75 MCG tablet Take one tablet daily M-Sa, none on Simeon (Patient taking differently: Take one tablet daily M-Sa, none on Sun DAW1) 90 tablet 2    TOPROL XL 50 MG extended release tablet Take 1 tablet by mouth daily (Patient taking differently: Take 25 mg by mouth daily Take 1/2 tablet daily) 180 tablet 3    ALPRAZolam (XANAX) 1 MG tablet Take 1 mg by mouth three times daily. No current facility-administered medications on file prior to visit.       Review of Systems  Constitutional:Negative for activity change, appetite change, chills, fatigue and fever. Respiratory: Negative for choking, chest tightness, shortness of breath and wheezing. Cardiovascular: Negative for chest pain, palpitations and leg swelling. Gastrointestinal: Negative for abdominal distention, constipation, diarrhea, nausea and vomiting. Musculoskeletal: Negative for arthralgias, back pain, gait problem and joint swelling. Neurological: Negative for dizziness, weakness,numbness and headaches. LMP 01/01/2013      Physical Exam   Constitutional:  Oriented to person, place, and time. Appears well-developed and well-nourished. No acute distress. Neurological:Alert and oriented to person, place, and time. Skin: No diaphoresis. Psychiatric: Normal mood and affect. Behavior is Normal.     ASSESSMENT AND PLAN:    Sherrie Ulloa was seen today for 3 month follow-up. Diagnoses and all orders for this visit:    Loss of taste  She is still having symptoms  She plans on following up with otolaryngology at the end of this month    SVT (supraventricular tachycardia) ([facility] [address]  She denies any symptoms today  She is following up with cardiology  Has not been taking metoprolol due to insurance reasons  She admits to having anxiety about her health issues    Hypercholesterolemia  Her last blood work revealed LDL cholesterol of 132  I will repeat her lipid panel and dictate the need for medical therapy based off these findings    Ganglion cyst  Has followed with general surgery, they deferred any intervention  She is asymptomatic        Return in about 6 months (around 11/7/2021). Electronically signed by Ara Madrigal DO on 5/7/2021 at 9:04 AM      TeleMedicine Patient Consent    This visit was performed as a virtual video visit using a synchronous, two-way, audio-video telehealth technology platform.  Patient identification was verified at the start of the visit, including the patient's telephone number and physical location. I discussed with the patient the nature of our telehealth visits, that:     1. Due to the nature of an audio- video modality, the only components of a physical exam that could be done are the elements supported by direct observation. 2. I would evaluate the patient and recommend diagnostics and treatments based on my assessment. 3. If it was felt that the patient should be evaluated in clinic or an emergency room setting, then they would be directed there. 4. Our sessions are not being recorded and that personal health information is protected. 5. Our team would provide follow up care in person if/when the patient needs it. Patient Does agree to proceed with telemedicine consultation. Patient's location: home address in [address] home address in [place]  Other people involved in call - None      Time spent: Not billed by time    This visit was completed virtually using D[employer]. me    Due to this being a TeleHealth encounter, evaluation of the following organ systems is limited: Vitals/Constitutional/EENT/Resp/CV/GI//MS/Neuro/Skin/Heme-Lymph-Imm. Pursuant to the emergency declaration under the [facility] waiver authority and the [employer] Act, this Virtual  Visit was conducted, with patient's consent, to reduce the patient's risk of exposure to COVID-19 and provide continuity of care for an established patient. Services were provided through a video synchronous discussion virtually to substitute for in-person clinic visit. Please note that the above documentation was prepared using voice recognition software. Every attempt was made to ensure accuracy but there may be spelling, grammatical, and contextual errors.     Electronically signed by Sravan Briceño DO on 5/7/2021 at 9:04 AM

## 2021-05-24 ENCOUNTER — NURSE ONLY (OUTPATIENT)
Dept: NON INVASIVE DIAGNOSTICS | Age: 56
End: 2021-05-24
Payer: COMMERCIAL

## 2021-05-24 DIAGNOSIS — R00.2 PALPITATIONS: Primary | ICD-10-CM

## 2021-05-24 DIAGNOSIS — Z95.818 STATUS POST PLACEMENT OF IMPLANTABLE LOOP RECORDER: ICD-10-CM

## 2021-05-24 PROCEDURE — G2066 INTER DEVC REMOTE 30D: HCPCS | Performed by: INTERNAL MEDICINE

## 2021-05-24 PROCEDURE — 93298 REM INTERROG DEV EVAL SCRMS: CPT | Performed by: INTERNAL MEDICINE

## 2021-06-17 ENCOUNTER — TELEPHONE (OUTPATIENT)
Dept: ENDOCRINOLOGY | Age: 56
End: 2021-06-17

## 2021-06-17 DIAGNOSIS — R63.4 RAPID WEIGHT LOSS: Primary | ICD-10-CM

## 2021-06-17 NOTE — TELEPHONE ENCOUNTER
The patient called in and wants to get her Vit b12 , Vit D, and thyroid levels checked before her next visit. The only order that isn't in is the Thyroid order.

## 2021-06-22 DIAGNOSIS — E55.9 VITAMIN D DEFICIENCY: ICD-10-CM

## 2021-06-22 DIAGNOSIS — I47.10 SVT (SUPRAVENTRICULAR TACHYCARDIA): ICD-10-CM

## 2021-06-22 DIAGNOSIS — C73 THYROID CANCER (HCC): ICD-10-CM

## 2021-06-22 DIAGNOSIS — E78.00 HYPERCHOLESTEROLEMIA: ICD-10-CM

## 2021-06-22 DIAGNOSIS — E51.9 VITAMIN B1 DEFICIENCY: ICD-10-CM

## 2021-06-22 DIAGNOSIS — E03.9 HYPOTHYROIDISM (ACQUIRED): ICD-10-CM

## 2021-06-22 LAB
ALBUMIN SERPL-MCNC: 4.2 G/DL (ref 3.5–5.2)
ALP BLD-CCNC: 64 U/L (ref 35–104)
ALT SERPL-CCNC: 11 U/L (ref 0–32)
ANION GAP SERPL CALCULATED.3IONS-SCNC: 11 MMOL/L (ref 7–16)
AST SERPL-CCNC: 16 U/L (ref 0–31)
BILIRUB SERPL-MCNC: 0.6 MG/DL (ref 0–1.2)
BUN BLDV-MCNC: 17 MG/DL (ref 6–20)
CALCIUM SERPL-MCNC: 9.3 MG/DL (ref 8.6–10.2)
CHLORIDE BLD-SCNC: 106 MMOL/L (ref 98–107)
CHOLESTEROL, TOTAL: 206 MG/DL (ref 0–199)
CO2: 27 MMOL/L (ref 22–29)
CREAT SERPL-MCNC: 0.7 MG/DL (ref 0.5–1)
GFR AFRICAN AMERICAN: >60
GFR NON-AFRICAN AMERICAN: >60 ML/MIN/1.73
GLUCOSE BLD-MCNC: 97 MG/DL (ref 74–99)
HCT VFR BLD CALC: 39.9 % (ref 34–48)
HDLC SERPL-MCNC: 61 MG/DL
HEMOGLOBIN: 12.9 G/DL (ref 11.5–15.5)
LDL CHOLESTEROL CALCULATED: 131 MG/DL (ref 0–99)
MCH RBC QN AUTO: 29.3 PG (ref 26–35)
MCHC RBC AUTO-ENTMCNC: 32.3 % (ref 32–34.5)
MCV RBC AUTO: 90.7 FL (ref 80–99.9)
PDW BLD-RTO: 12.3 FL (ref 11.5–15)
PLATELET # BLD: 257 E9/L (ref 130–450)
PMV BLD AUTO: 9.3 FL (ref 7–12)
POTASSIUM SERPL-SCNC: 5.2 MMOL/L (ref 3.5–5)
RBC # BLD: 4.4 E12/L (ref 3.5–5.5)
SODIUM BLD-SCNC: 144 MMOL/L (ref 132–146)
T3 TOTAL: 79.34 NG/DL (ref 80–200)
T4 FREE: 1.07 NG/DL (ref 0.93–1.7)
T4 TOTAL: 7.3 MCG/DL (ref 4.5–11.7)
TOTAL PROTEIN: 7 G/DL (ref 6.4–8.3)
TRIGL SERPL-MCNC: 69 MG/DL (ref 0–149)
TSH SERPL DL<=0.05 MIU/L-ACNC: 5.9 UIU/ML (ref 0.27–4.2)
VITAMIN D 25-HYDROXY: 19 NG/ML (ref 30–100)
VLDLC SERPL CALC-MCNC: 14 MG/DL
WBC # BLD: 4.6 E9/L (ref 4.5–11.5)

## 2021-06-23 ENCOUNTER — TELEPHONE (OUTPATIENT)
Dept: ENDOCRINOLOGY | Age: 56
End: 2021-06-23

## 2021-06-23 ENCOUNTER — NURSE ONLY (OUTPATIENT)
Dept: NON INVASIVE DIAGNOSTICS | Age: 56
End: 2021-06-23

## 2021-06-23 DIAGNOSIS — Z95.818 STATUS POST PLACEMENT OF IMPLANTABLE LOOP RECORDER: ICD-10-CM

## 2021-06-23 DIAGNOSIS — R00.2 PALPITATIONS: Primary | ICD-10-CM

## 2021-06-23 DIAGNOSIS — E55.9 VITAMIN D DEFICIENCY: Primary | ICD-10-CM

## 2021-06-23 PROCEDURE — G2066 INTER DEVC REMOTE 30D: HCPCS | Performed by: INTERNAL MEDICINE

## 2021-06-23 PROCEDURE — 93298 REM INTERROG DEV EVAL SCRMS: CPT | Performed by: INTERNAL MEDICINE

## 2021-06-23 RX ORDER — ERGOCALCIFEROL 1.25 MG/1
50000 CAPSULE ORAL WEEKLY
Qty: 4 CAPSULE | Refills: 0 | Status: SHIPPED
Start: 2021-06-23 | End: 2021-07-19 | Stop reason: ALTCHOICE

## 2021-06-23 NOTE — TELEPHONE ENCOUNTER
Cj Parks called with concerns as she looked at her lab work. Her TSH level is 5.9 and is very concerned due to her thyroid cancer. She was just asking for you to review it and get back to her. Thank you!

## 2021-06-24 ENCOUNTER — TELEPHONE (OUTPATIENT)
Dept: BARIATRICS/WEIGHT MGMT | Age: 56
End: 2021-06-24

## 2021-06-24 DIAGNOSIS — E89.0 POSTOPERATIVE HYPOTHYROIDISM: Primary | ICD-10-CM

## 2021-06-25 NOTE — TELEPHONE ENCOUNTER
Spoke with pt by phone  Results for Duc Law (MRN <[id number]>) as of 6/24/2021 21:59   Ref.  Range 3/8/2021 12:28 6/22/2021 10:40   TSH Latest Ref Range: 0.270 - 4.200 uIU/mL 1.440 5.900 (H)     Regimen:   LT4 75 mcg daily M-Sa, none Simeon  D3 2k daily (50k dose upset her stomach)    Plan:  LT4 75 mcg daily M-Sa, 1/2 tab Simeon  Increase daily D3 in 2k increments as tolerated up to 8k daily  Recheck labs in 6 weeks  sdr  06/24/21

## 2021-06-28 ENCOUNTER — OFFICE VISIT (OUTPATIENT)
Dept: ENDOCRINOLOGY | Age: 56
End: 2021-06-28
Payer: COMMERCIAL

## 2021-06-28 VITALS
OXYGEN SATURATION: 99 % | HEIGHT: 64 IN | BODY MASS INDEX: 31.41 KG/M2 | WEIGHT: 184 LBS | SYSTOLIC BLOOD PRESSURE: 124 MMHG | RESPIRATION RATE: 18 BRPM | TEMPERATURE: 97.5 F | DIASTOLIC BLOOD PRESSURE: 76 MMHG | HEART RATE: 84 BPM

## 2021-06-28 DIAGNOSIS — C73 THYROID CANCER (HCC): Primary | ICD-10-CM

## 2021-06-28 PROCEDURE — 1036F TOBACCO NON-USER: CPT | Performed by: INTERNAL MEDICINE

## 2021-06-28 PROCEDURE — 3017F COLORECTAL CA SCREEN DOC REV: CPT | Performed by: INTERNAL MEDICINE

## 2021-06-28 PROCEDURE — 99214 OFFICE O/P EST MOD 30 MIN: CPT | Performed by: INTERNAL MEDICINE

## 2021-06-28 PROCEDURE — G8427 DOCREV CUR MEDS BY ELIG CLIN: HCPCS | Performed by: INTERNAL MEDICINE

## 2021-06-28 PROCEDURE — G8417 CALC BMI ABV UP PARAM F/U: HCPCS | Performed by: INTERNAL MEDICINE

## 2021-06-28 NOTE — PATIENT INSTRUCTIONS
Cont LT4 75 mcg daily M-Sa, 1/2 tablet   Repeat TSH one week prior to the next appt along with thyroglobulin and thyroglobulin antibodies    Follow up  See Dr. Zoe Fonseca in six weeks

## 2021-06-28 NOTE — PROGRESS NOTES
CC:  PTC  Hypothyroidism    Background:   Last visit: 2/23/21  First visit: 1/17/18     Thyroid Cancer - stable, no signs of recurrence       PTC Stage I, [id number],     Excellent response to therapy (total thyroidectomy, 100mCi BERG)       Risk of recurrence is 1-4% for the next 5years. 09/04/18 US thyroid: no change in the adenopathy that was seen on the 5/17/18 US.       02/11/20 US thyroid: no change in the appearance of the lymph nodes           05/28/19 Tg <0.1, Tg Ab <0.9       07/22/20 Tg <0.1, Tg Ab <0.9         TSH should be maintained at 0.5-2 per the 2015 JOSE guidelines. B/c of her arrhythmias, the goal is a level just below 2.0.       10/29/19 TSH 0.633 on LT4 112 mcg Mon-Sat and 1/2 tab on Sunday.         06/28/20 TSH 0.636 on LT4 100 mcg daily       07/19/20 TSH 6.950 on LT4 100 mcg daily (had gallbladder out 7/2/20)       07/22/20 TSH 7.640 on LT4 100 mcg daily the rise in her TSH is may be due a recovery period after non-thyroidal illness syndrome; so dose was not increased       08/10/20 TSH 5.080 on LT4 100 mcg daily concern about allergic reaction to Synthroid; therefore was planning a switch to a compounding LT4; so dose was not increased       10/20/20 TSH 1.110 on LT4 100 mcg daily       11/24/20 TSH 0.08 on LT4 100 mcg daily       12/10/20 TSH 0.02 on LT4 100 mcg daily, decr to 75 mcg daily         2/11/21 TSH 0.35 on LT4 75 mcg daily, decr to 75 mcg daily M-Sa, none Simeon         3/08/21 TSH 1.44 on LT4 75 mcg daily M-Sa, none Simeon         4/14/21 TSH 2.89 on LT4  75 mcg daily M-Sa, none Simeon         6/22/21 TSH 5.90 on LT4 75 mcg daily M-Sa, none Simeon; incr to 75 mcg daily M-Sa, one-half tab Simeon 6/24/21    [address] [place] [address] [place] -   Medical problems: Anxiety, Thyroid cancer, hypothyroidism (acquired) HTN, palpitations, BPPV, Afib with hx of ablation, Vit D def, Vit B1 def  Current Outpatient Medications   Medication Sig Dispense Refill    vitamin D 25 MCG (1000 UT) CAPS Take by mouth Pt taking 2000 daily      levothyroxine (SYNTHROID) 75 MCG tablet Take one tablet daily M-Sa, none on Simeon (Patient taking differently: Take one tablet daily M-Sa, none on Sun DAW1) 90 tablet 2    TOPROL XL 50 MG extended release tablet Take 1 tablet by mouth daily (Patient taking differently: Take 25 mg by mouth daily Take 1/2 tablet daily) 180 tablet 3    ALPRAZolam (XANAX) 1 MG tablet Take 1 mg by mouth three times daily.  vitamin D (ERGOCALCIFEROL) 1.25 MG (21672 UT) CAPS capsule Take 1 capsule by mouth once a week (Patient not taking: Reported on 6/28/2021) 4 capsule 0    Thiamine HCl (VITAMIN B-1) 50 MG tablet Take one tablet daily for two weeks (Patient not taking: Reported on 6/28/2021) 14 tablet 3     No current facility-administered medications for this visit. ROS -   Gen: no fever  Pulm: no cough    PE -   Gen:    WN, WD, NAD  Neck:   No thyroid tissue appreciated, no cervical LN; trachea is mid-line  Lung: Nml resp effort  Psych: Normal mood   Full affect  Neur:   No tremor of outstretched arms, 2+ biceps reflexes    A/P -   1. Thyroid Cancer - stable, no signs of recurrence       PTC Stage I, [id number] (0.5cm),     Excellent response to therapy (total thyroidectomy, 100mCi BERG)       Risk of recurrence is 1-4% for the next 5years. TSH should be maintained at 0.5-2 per the 2015 JOSE guidelines; because of her recurrent tachyarrhythmias, need to keep the TSH just below 2        She conts to see cardiology (Dr. Sarah Abreu) for treatment of this. No symptoms of hyper or hypothyroidism. Does have chronic hot flashes and also episodes of feeling very cold       No symptoms of difficulty swallowing, began to have issues of episodic hoarseness that lasts only briefly       Plan:       Cont LT4 75 mcg daily M-Sa, 1/2 tablet        Repeat TSH six weeks along with Tg and Tg Ab    2. Follow up       See Dr Chapo Goodwin in six weeks    An  electronic signature was used to authenticate this note.     --Jan Ortega MD on

## 2021-06-29 LAB — VITAMIN B1 WHOLE BLOOD: 69 NMOL/L (ref 70–180)

## 2021-07-07 ENCOUNTER — HOSPITAL ENCOUNTER (EMERGENCY)
Age: 56
Discharge: HOME | End: 2021-07-08
Attending: EMERGENCY MEDICINE
Payer: COMMERCIAL

## 2021-07-07 ENCOUNTER — APPOINTMENT (OUTPATIENT)
Dept: CT IMAGING | Age: 56
End: 2021-07-07
Payer: COMMERCIAL

## 2021-07-07 ENCOUNTER — APPOINTMENT (OUTPATIENT)
Dept: GENERAL RADIOLOGY | Age: 56
End: 2021-07-07
Payer: COMMERCIAL

## 2021-07-07 VITALS
TEMPERATURE: 97.8 F | DIASTOLIC BLOOD PRESSURE: 74 MMHG | RESPIRATION RATE: 16 BRPM | SYSTOLIC BLOOD PRESSURE: 142 MMHG | HEIGHT: 65 IN | OXYGEN SATURATION: 98 % | BODY MASS INDEX: 29.16 KG/M2 | WEIGHT: 175 LBS | HEART RATE: 91 BPM

## 2021-07-07 DIAGNOSIS — R07.9 CHEST PAIN, UNSPECIFIED TYPE: Primary | ICD-10-CM

## 2021-07-07 DIAGNOSIS — R10.9 ABDOMINAL PAIN, UNSPECIFIED ABDOMINAL LOCATION: ICD-10-CM

## 2021-07-07 LAB
ALBUMIN SERPL-MCNC: 3.9 G/DL (ref 3.5–5.2)
ALP BLD-CCNC: 61 U/L (ref 35–104)
ALT SERPL-CCNC: 10 U/L (ref 0–32)
ANION GAP SERPL CALCULATED.3IONS-SCNC: 13 MMOL/L (ref 7–16)
AST SERPL-CCNC: 13 U/L (ref 0–31)
BACTERIA: ABNORMAL /HPF
BASOPHILS ABSOLUTE: 0.03 E9/L (ref 0–0.2)
BASOPHILS RELATIVE PERCENT: 0.5 % (ref 0–2)
BILIRUB SERPL-MCNC: 1 MG/DL (ref 0–1.2)
BILIRUBIN URINE: NEGATIVE
BLOOD, URINE: ABNORMAL
BUN BLDV-MCNC: 11 MG/DL (ref 6–20)
CALCIUM SERPL-MCNC: 8.9 MG/DL (ref 8.6–10.2)
CHLORIDE BLD-SCNC: 104 MMOL/L (ref 98–107)
CLARITY: CLEAR
CO2: 24 MMOL/L (ref 22–29)
COLOR: YELLOW
CREAT SERPL-MCNC: 0.7 MG/DL (ref 0.5–1)
D DIMER: 548 NG/ML DDU
EOSINOPHILS ABSOLUTE: 0.01 E9/L (ref 0.05–0.5)
EOSINOPHILS RELATIVE PERCENT: 0.2 % (ref 0–6)
EPITHELIAL CELLS, UA: ABNORMAL /HPF
GFR AFRICAN AMERICAN: >60
GFR NON-AFRICAN AMERICAN: >60 ML/MIN/1.73
GLUCOSE BLD-MCNC: 96 MG/DL (ref 74–99)
GLUCOSE URINE: NEGATIVE MG/DL
HCT VFR BLD CALC: 37.5 % (ref 34–48)
HEMOGLOBIN: 12.8 G/DL (ref 11.5–15.5)
IMMATURE GRANULOCYTES #: 0.01 E9/L
IMMATURE GRANULOCYTES %: 0.2 % (ref 0–5)
KETONES, URINE: NEGATIVE MG/DL
LEUKOCYTE ESTERASE, URINE: ABNORMAL
LIPASE: 19 U/L (ref 13–60)
LYMPHOCYTES ABSOLUTE: 0.88 E9/L (ref 1.5–4)
LYMPHOCYTES RELATIVE PERCENT: 15.3 % (ref 20–42)
MAGNESIUM: 1.7 MG/DL (ref 1.6–2.6)
MCH RBC QN AUTO: 29.4 PG (ref 26–35)
MCHC RBC AUTO-ENTMCNC: 34.1 % (ref 32–34.5)
MCV RBC AUTO: 86.2 FL (ref 80–99.9)
MONOCYTES ABSOLUTE: 0.49 E9/L (ref 0.1–0.95)
MONOCYTES RELATIVE PERCENT: 8.5 % (ref 2–12)
NEUTROPHILS ABSOLUTE: 4.34 E9/L (ref 1.8–7.3)
NEUTROPHILS RELATIVE PERCENT: 75.3 % (ref 43–80)
NITRITE, URINE: NEGATIVE
PDW BLD-RTO: 12 FL (ref 11.5–15)
PH UA: 7 (ref 5–9)
PLATELET # BLD: 203 E9/L (ref 130–450)
PMV BLD AUTO: 9.2 FL (ref 7–12)
POTASSIUM REFLEX MAGNESIUM: 3.4 MMOL/L (ref 3.5–5)
PRO-BNP: 164 PG/ML (ref 0–125)
PROTEIN UA: NEGATIVE MG/DL
RBC # BLD: 4.35 E12/L (ref 3.5–5.5)
RBC UA: ABNORMAL /HPF (ref 0–2)
SODIUM BLD-SCNC: 141 MMOL/L (ref 132–146)
SPECIFIC GRAVITY UA: 1.01 (ref 1–1.03)
TOTAL PROTEIN: 6.6 G/DL (ref 6.4–8.3)
TROPONIN, HIGH SENSITIVITY: <6 NG/L (ref 0–9)
UROBILINOGEN, URINE: 1 E.U./DL
WBC # BLD: 5.8 E9/L (ref 4.5–11.5)
WBC UA: ABNORMAL /HPF (ref 0–5)

## 2021-07-07 PROCEDURE — 83690 ASSAY OF LIPASE: CPT

## 2021-07-07 PROCEDURE — 6360000002 HC RX W HCPCS: Performed by: EMERGENCY MEDICINE

## 2021-07-07 PROCEDURE — 6360000004 HC RX CONTRAST MEDICATION: Performed by: RADIOLOGY

## 2021-07-07 PROCEDURE — 2580000003 HC RX 258: Performed by: RADIOLOGY

## 2021-07-07 PROCEDURE — 81001 URINALYSIS AUTO W/SCOPE: CPT

## 2021-07-07 PROCEDURE — 85025 COMPLETE CBC W/AUTO DIFF WBC: CPT

## 2021-07-07 PROCEDURE — 99284 EMERGENCY DEPT VISIT MOD MDM: CPT

## 2021-07-07 PROCEDURE — 80053 COMPREHEN METABOLIC PANEL: CPT

## 2021-07-07 PROCEDURE — 85378 FIBRIN DEGRADE SEMIQUANT: CPT

## 2021-07-07 PROCEDURE — 84484 ASSAY OF TROPONIN QUANT: CPT

## 2021-07-07 PROCEDURE — 93005 ELECTROCARDIOGRAM TRACING: CPT | Performed by: EMERGENCY MEDICINE

## 2021-07-07 PROCEDURE — 83880 ASSAY OF NATRIURETIC PEPTIDE: CPT

## 2021-07-07 PROCEDURE — 74177 CT ABD & PELVIS W/CONTRAST: CPT

## 2021-07-07 PROCEDURE — 96374 THER/PROPH/DIAG INJ IV PUSH: CPT

## 2021-07-07 PROCEDURE — 2580000003 HC RX 258: Performed by: EMERGENCY MEDICINE

## 2021-07-07 PROCEDURE — 83735 ASSAY OF MAGNESIUM: CPT

## 2021-07-07 PROCEDURE — 71275 CT ANGIOGRAPHY CHEST: CPT

## 2021-07-07 PROCEDURE — 71045 X-RAY EXAM CHEST 1 VIEW: CPT

## 2021-07-07 RX ORDER — MORPHINE SULFATE 4 MG/ML
4 INJECTION, SOLUTION INTRAMUSCULAR; INTRAVENOUS ONCE
Status: DISCONTINUED | OUTPATIENT
Start: 2021-07-07 | End: 2021-07-08 | Stop reason: HOSPADM

## 2021-07-07 RX ORDER — 0.9 % SODIUM CHLORIDE 0.9 %
1000 INTRAVENOUS SOLUTION INTRAVENOUS ONCE
Status: COMPLETED | OUTPATIENT
Start: 2021-07-07 | End: 2021-07-08

## 2021-07-07 RX ORDER — METOCLOPRAMIDE HYDROCHLORIDE 5 MG/ML
10 INJECTION INTRAMUSCULAR; INTRAVENOUS ONCE
Status: COMPLETED | OUTPATIENT
Start: 2021-07-07 | End: 2021-07-07

## 2021-07-07 RX ORDER — SODIUM CHLORIDE 0.9 % (FLUSH) 0.9 %
10 SYRINGE (ML) INJECTION PRN
Status: DISCONTINUED | OUTPATIENT
Start: 2021-07-07 | End: 2021-07-08 | Stop reason: HOSPADM

## 2021-07-07 RX ADMIN — Medication 10 ML: at 22:23

## 2021-07-07 RX ADMIN — METOCLOPRAMIDE 10 MG: 5 INJECTION, SOLUTION INTRAMUSCULAR; INTRAVENOUS at 21:43

## 2021-07-07 RX ADMIN — SODIUM CHLORIDE 1000 ML: 9 INJECTION, SOLUTION INTRAVENOUS at 21:43

## 2021-07-07 RX ADMIN — SODIUM CHLORIDE 1000 ML: 9 INJECTION, SOLUTION INTRAVENOUS at 22:30

## 2021-07-07 RX ADMIN — IOPAMIDOL 75 ML: 755 INJECTION, SOLUTION INTRAVENOUS at 22:23

## 2021-07-07 ASSESSMENT — PAIN DESCRIPTION - ORIENTATION: ORIENTATION: RIGHT

## 2021-07-07 ASSESSMENT — PAIN DESCRIPTION - PAIN TYPE: TYPE: ACUTE PAIN

## 2021-07-07 ASSESSMENT — PAIN DESCRIPTION - LOCATION: LOCATION: ABDOMEN

## 2021-07-07 ASSESSMENT — PAIN SCALES - GENERAL: PAINLEVEL_OUTOF10: 8

## 2021-07-08 LAB
EKG ATRIAL RATE: 87 BPM
EKG P AXIS: 31 DEGREES
EKG P-R INTERVAL: 182 MS
EKG Q-T INTERVAL: 366 MS
EKG QRS DURATION: 76 MS
EKG QTC CALCULATION (BAZETT): 440 MS
EKG R AXIS: -11 DEGREES
EKG T AXIS: 27 DEGREES
EKG VENTRICULAR RATE: 87 BPM

## 2021-07-08 RX ORDER — ONDANSETRON 4 MG/1
4 TABLET, ORALLY DISINTEGRATING ORAL EVERY 8 HOURS PRN
Qty: 10 TABLET | Refills: 0 | Status: SHIPPED | OUTPATIENT
Start: 2021-07-08 | End: 2021-08-18

## 2021-07-12 NOTE — ED PROVIDER NOTES
Department of Emergency Medicine   ED  Provider Note  Admit Date/RoomTime: 7/7/2021  8:33 PM  ED Room: 22/22          History of Present Illness:  7/12/21, Time: 2:07 AM EDT  Chief Complaint   Patient presents with    Abdominal Pain     RUQ pain after 2nd covid vaccine yesterday    Nausea    Chest Pain                Clair Brito is a 54 y.o. female presenting to the ED for ruq pain, beginning yesterday. The complaint has been intermittent, moderate in severity, and worsened by covid vaccine. Patient received second dose of vaccine and then a few hours later started developing intermittent right upper quadrant sharp pain without radiation of symptoms. She had associated nausea but no vomiting. She admits to intermittent aching chest pain at different times of this abdominal pain. She denies dyspnea. Denies fever or chills. Denies stool changes. Denies urinary symptoms. Review of Systems:   Pertinent positives and negatives are stated within HPI, all other systems reviewed and are negative.        --------------------------------------------- PAST HISTORY ---------------------------------------------  Past Medical History:  has a past medical history of Allergic rhinitis, Anxiety, Atrial fibrillation ([facility] [address] Depression, Headache, Hypertension, Hypothyroidism, NAFLD (nonalcoholic fatty liver disease), Obesity with body mass index (BMI) of 30.0 to 39.9, Osteoarthritis, Sleep apnea, Spinal stenosis, lumbar, and Thyroid disease. Past Surgical History:  has a past surgical history that includes ablation of dysrhythmic focus (1990); hysteroscopy (8/15/12); Colonoscopy; Endometrial ablation (2012); Thyroidectomy (Bilateral, 01/19/2017); Breast surgery; Hysterectomy, vaginal; other surgical history (12/23/2019); Insertable Cardiac Monitor (12/23/2019); Gallbladder surgery (07/2020); and Cholecystectomy. Social History:  reports that she has never smoked.  She has never used smokeless tobacco. She reports that she does not drink alcohol and does not use drugs. Family History: family history includes Arrhythmia in her mother; Atrial Fibrillation in her paternal grandfather; Breast Cancer in her paternal grandmother; Cancer in her father and maternal grandmother; Heart Disease in her maternal grandfather; High Blood Pressure in her father; High Cholesterol in her mother; Other in her daughter, paternal grandmother, and sister. . Unless otherwise noted, family history is non contributory    The patients home medications have been reviewed. Allergies: Narcan [naloxone hcl] and Fentanyl        ---------------------------------------------------PHYSICAL EXAM--------------------------------------    Constitutional/General: Alert and oriented x3  Head: Normocephalic and atraumatic  Eyes: PERRL, EOMI, sclera non icteric  Mouth: Oropharynx clear, handling secretions, no trismus, no asymmetry of the posterior oropharynx or uvular edema  Neck: Supple, full ROM, no stridor, no meningeal signs  Respiratory: Lungs clear to auscultation bilaterally,Not in respiratory distress  Cardiovascular:  Regular rate. Regular rhythm. 2+ distal pulses. Equal extremity pulses. Chest: No chest wall tenderness  GI:  Abdomen Soft, ruq tender, Non distended. No rebound, guarding, or rigidity. Negative delgado sign. Musculoskeletal: Moves all extremities x 4. Warm and well perfused, no clubbing, cyanosis, or edema. Capillary refill <3 seconds  Integument: skin warm and dry. No rashes. Neurologic: GCS 15, no focal deficits, symmetric strength 5/5 in the upper and lower extremities bilaterally  Psychiatric: Normal Affect      EKG: Interpreted by emergency department physician, Dr. Angie Hansen    This EKG is signed and interpreted by me.     Rate: 87  Rhythm: Sinus  Interpretation: non-specific EKG  Comparison: stable as compared to patient's most recent EKG      -------------------------------------------------- RESULTS -------------------------------------------------  I have personally reviewed all laboratory and imaging results for this patient. Results are listed below.      LABS: (Lab results interpreted by me)  Results for orders placed or performed during the hospital encounter of 07/07/21   CBC Auto Differential   Result Value Ref Range    WBC 5.8 4.5 - 11.5 E9/L    RBC 4.35 3.50 - 5.50 E12/L    Hemoglobin 12.8 11.5 - 15.5 g/dL    Hematocrit 37.5 34.0 - 48.0 %    MCV 86.2 80.0 - 99.9 fL    MCH 29.4 26.0 - 35.0 pg    MCHC 34.1 32.0 - 34.5 %    RDW 12.0 11.5 - 15.0 fL    Platelets 716 754 - 126 E9/L    MPV 9.2 7.0 - 12.0 fL    Neutrophils % 75.3 43.0 - 80.0 %    Immature Granulocytes % 0.2 0.0 - 5.0 %    Lymphocytes % 15.3 (L) 20.0 - 42.0 %    Monocytes % 8.5 2.0 - 12.0 %    Eosinophils % 0.2 0.0 - 6.0 %    Basophils % 0.5 0.0 - 2.0 %    Neutrophils Absolute 4.34 1.80 - 7.30 E9/L    Immature Granulocytes # 0.01 E9/L    Lymphocytes Absolute 0.88 (L) 1.50 - 4.00 E9/L    Monocytes Absolute 0.49 0.10 - 0.95 E9/L    Eosinophils Absolute 0.01 (L) 0.05 - 0.50 E9/L    Basophils Absolute 0.03 0.00 - 0.20 E9/L   Comprehensive Metabolic Panel w/ Reflex to MG   Result Value Ref Range    Sodium 141 132 - 146 mmol/L    Potassium reflex Magnesium 3.4 (L) 3.5 - 5.0 mmol/L    Chloride 104 98 - 107 mmol/L    CO2 24 22 - 29 mmol/L    Anion Gap 13 7 - 16 mmol/L    Glucose 96 74 - 99 mg/dL    BUN 11 6 - 20 mg/dL    CREATININE 0.7 0.5 - 1.0 mg/dL    GFR Non-African American >60 >=60 mL/min/1.73    GFR African American >60     Calcium 8.9 8.6 - 10.2 mg/dL    Total Protein 6.6 6.4 - 8.3 g/dL    Albumin 3.9 3.5 - 5.2 g/dL    Total Bilirubin 1.0 0.0 - 1.2 mg/dL    Alkaline Phosphatase 61 35 - 104 U/L    ALT 10 0 - 32 U/L    AST 13 0 - 31 U/L   Lipase   Result Value Ref Range    Lipase 19 13 - 60 U/L   Troponin   Result Value Ref Range    Troponin, High Sensitivity <6 0 - 9 ng/L   Brain Natriuretic Peptide   Result Value Ref Range    Pro- (H) 0 - 125 pg/mL   D-Dimer, Quantitative   Result Value Ref Range    D-Dimer, Quant 548 ng/mL DDU   Urinalysis, reflex to microscopic   Result Value Ref Range    Color, UA Yellow Straw/Yellow    Clarity, UA Clear Clear    Glucose, Ur Negative Negative mg/dL    Bilirubin Urine Negative Negative    Ketones, Urine Negative Negative mg/dL    Specific Gravity, UA 1.010 1.005 - 1.030    Blood, Urine TRACE-INTACT Negative    pH, UA 7.0 5.0 - 9.0    Protein, UA Negative Negative mg/dL    Urobilinogen, Urine 1.0 <2.0 E.U./dL    Nitrite, Urine Negative Negative    Leukocyte Esterase, Urine SMALL (A) Negative   Microscopic Urinalysis   Result Value Ref Range    WBC, UA 10-20 (A) 0 - 5 /HPF    RBC, UA 2-5 0 - 2 /HPF    Epithelial Cells, UA FEW /HPF    Bacteria, UA FEW (A) None Seen /HPF   Magnesium   Result Value Ref Range    Magnesium 1.7 1.6 - 2.6 mg/dL   EKG 12 Lead   Result Value Ref Range    Ventricular Rate 87 BPM    Atrial Rate 87 BPM    P-R Interval 182 ms    QRS Duration 76 ms    Q-T Interval 366 ms    QTc Calculation (Bazett) 440 ms    P Axis 31 degrees    R Axis -11 degrees    T Axis 27 degrees   ,       RADIOLOGY:  Interpreted by Radiologist unless otherwise specified  CT ABDOMEN PELVIS W IV CONTRAST Additional Contrast? None   Final Result   No acute abdominal or pelvic pathology. Hypervascular lesion in the right lobe of the liver inferiorly, also present   on the prior examination and most consistent with hemangioma. Large disc osteophyte complex at L1-L2 resulting in significant spinal   stenosis and which may result in impingement on the cauda equina, also   present on the prior examination. CTA CHEST W CONTRAST   Final Result   No evidence of pulmonary embolism. Bibasilar atelectasis. XR CHEST PORTABLE   Final Result   Segmental lingular opacity. Differential includes atelectasis or potentially   infection in the right clinical setting. No additional cardiopulmonary   pathology seen. ------------------------- NURSING NOTES AND VITALS REVIEWED ---------------------------   The nursing notes within the ED encounter and vital signs as below have been reviewed by myself  BP (!) 142/74   Pulse 91   Temp 97.8 °F (36.6 °C) (Oral)   Resp 16   Ht 5' 5\" (1.651 m)   Wt 175 lb (79.4 kg)   LMP 01/01/2013   SpO2 98%   BMI 29.12 kg/m²     Oxygen Saturation Interpretation: Normal    The cardiac monitor revealed NSR with a heart rate in the 80s as interpreted by me. The cardiac monitor was ordered secondary to the patient's heart rate and to monitor the patient for dysrhythmia. CPT 63313    The patients available past medical records and past encounters were reviewed. ------------------------------ ED COURSE/MEDICAL DECISION MAKING----------------------  Medications   0.9 % sodium chloride bolus (0 mLs Intravenous Stopped 7/8/21 0004)   metoclopramide (REGLAN) injection 10 mg (10 mg Intravenous Given 7/7/21 2143)   iopamidol (ISOVUE-370) 76 % injection 75 mL (75 mLs Intravenous Given 7/7/21 2223)   0.9 % sodium chloride bolus (0 mLs Intravenous Stopped 7/8/21 0004)                    Medical Decision Making:     I, Dr. Best Both am the primary provider of record    Patient presents with abdominal pain after her vaccination. She has a soft nonsurgical abdomen. Her work-up is reassuring. This was all discussed with the patient. The patient has had intermittent chest pain which was the only concern that I would have for further testing at this point but the patient tells me she recently had a stress test and does not need further testing at this point and would like to go home. I do believe she is safe to do so. We discussed at length signs symptoms which return to the emergency department as well as the importance of close outpatient follow-up. Re-Evaluations:       Improved on reevaluation.       This patient's ED course included: a personal history and physicial examination, re-evaluation prior to disposition, multiple bedside re-evaluations, IV medications, cardiac monitoring, continuous pulse oximetry and complex medical decision making and emergency management    This patient has remained hemodynamically stable during their ED course. Counseling: The emergency provider has spoken with the patient and spouse/SO and discussed todays results, in addition to providing specific details for the plan of care and counseling regarding the diagnosis and prognosis. Questions are answered at this time and they are agreeable with the plan.       --------------------------------- IMPRESSION AND DISPOSITION ---------------------------------    IMPRESSION  1. Chest pain, unspecified type    2. Abdominal pain, unspecified abdominal location        DISPOSITION  Disposition: Discharge to home  Patient condition is good        NOTE: This report was transcribed using voice recognition software.  Every effort was made to ensure accuracy; however, inadvertent computerized transcription errors may be present        Oswaldo Tovar MD  07/12/21 1232

## 2021-07-16 ENCOUNTER — TELEPHONE (OUTPATIENT)
Dept: PRIMARY CARE CLINIC | Age: 56
End: 2021-07-16

## 2021-07-16 ENCOUNTER — TELEPHONE (OUTPATIENT)
Dept: CARDIOLOGY CLINIC | Age: 56
End: 2021-07-16

## 2021-07-16 NOTE — TELEPHONE ENCOUNTER
Pt calling for f/u appt for ED visit and is also wondering if you can better explain to her the results of the chest xray she had done in the ED

## 2021-07-16 NOTE — TELEPHONE ENCOUNTER
Spoke with pt f/u is scheduled for Monday. She had some labs that were off while in ED and was told they would need redone she is wondering if you want to order those today and she can go have them done before her appt on Monday.  Pro-BNP and CBC are the ones that were abnormal.

## 2021-07-16 NOTE — TELEPHONE ENCOUNTER
Patient states that she was at the ER on 7/7 due to COVID vaccine reaction, a chest X-ray was done and was abnormal so were the Labs, she was instructed to follow with our office, she is scheduled with her pcp on Monday, please advise

## 2021-07-19 ENCOUNTER — OFFICE VISIT (OUTPATIENT)
Dept: PRIMARY CARE CLINIC | Age: 56
End: 2021-07-19
Payer: COMMERCIAL

## 2021-07-19 ENCOUNTER — TELEPHONE (OUTPATIENT)
Dept: PRIMARY CARE CLINIC | Age: 56
End: 2021-07-19

## 2021-07-19 VITALS
HEART RATE: 78 BPM | BODY MASS INDEX: 30.29 KG/M2 | SYSTOLIC BLOOD PRESSURE: 118 MMHG | TEMPERATURE: 97.5 F | WEIGHT: 182 LBS | OXYGEN SATURATION: 98 % | DIASTOLIC BLOOD PRESSURE: 64 MMHG

## 2021-07-19 DIAGNOSIS — E87.6 HYPOKALEMIA: Primary | ICD-10-CM

## 2021-07-19 DIAGNOSIS — N64.4 BREAST PAIN: Primary | ICD-10-CM

## 2021-07-19 DIAGNOSIS — E55.9 VITAMIN D DEFICIENCY: ICD-10-CM

## 2021-07-19 PROCEDURE — G8427 DOCREV CUR MEDS BY ELIG CLIN: HCPCS | Performed by: INTERNAL MEDICINE

## 2021-07-19 PROCEDURE — 1036F TOBACCO NON-USER: CPT | Performed by: INTERNAL MEDICINE

## 2021-07-19 PROCEDURE — 3017F COLORECTAL CA SCREEN DOC REV: CPT | Performed by: INTERNAL MEDICINE

## 2021-07-19 PROCEDURE — 99213 OFFICE O/P EST LOW 20 MIN: CPT | Performed by: INTERNAL MEDICINE

## 2021-07-19 PROCEDURE — G8417 CALC BMI ABV UP PARAM F/U: HCPCS | Performed by: INTERNAL MEDICINE

## 2021-07-19 RX ORDER — TIZANIDINE 2 MG/1
2 TABLET ORAL 4 TIMES DAILY PRN
Qty: 40 TABLET | Refills: 0 | Status: SHIPPED
Start: 2021-07-19 | End: 2021-08-18

## 2021-07-19 RX ORDER — PHENAZOPYRIDINE HYDROCHLORIDE 100 MG/1
TABLET, FILM COATED ORAL
COMMUNITY
Start: 2021-07-16 | End: 2021-08-18

## 2021-07-19 RX ORDER — NAPROXEN 500 MG/1
500 TABLET ORAL 2 TIMES DAILY WITH MEALS
Qty: 30 TABLET | Refills: 0 | Status: SHIPPED
Start: 2021-07-19 | End: 2021-08-18

## 2021-07-19 RX ORDER — SULFAMETHOXAZOLE AND TRIMETHOPRIM 800; 160 MG/1; MG/1
TABLET ORAL
COMMUNITY
Start: 2021-07-16 | End: 2021-08-18

## 2021-07-19 NOTE — PROGRESS NOTES
7/19/21    Clair Brito, a female of 54 y.o. presents today for ED Follow-up, Back Pain, and Numbness (R side of face)    At last appointment, we discussed her loss of taste. She was advised to follow-up with otolaryngology. She had evidence of hypercholesterolemia, her blood work was repeated. She was seen in the emergency department on July 7 for right upper quadrant pain. She was given Zofran. CT scan of the abdomen revealed a possible liver hemangioma. She also had evidence of an L1 osteophyte. Her CT scan revealed bibasilar atelectasis. She was treated with bactrim and pyridium by her urologist. She has been having some bilateral breast pain, back pain and facial.  It is not worse with eating or moving. It comes and goes.   She has followed with her pulmonologist.      Patient Active Problem List   Diagnosis    BPPV (benign paroxysmal positional vertigo)    SVT (supraventricular tachycardia) (HCC)    Anxiety    Dyspnea on exertion    Intermittent palpitations    Essential hypertension    Morbid obesity due to excess calories ([address]    Palpitations    Chest pain    Thyroid cancer ([address]    Weight gain    Dizziness    Biliary colic    Obesity with body mass index (BMI) of 30.0 to 39.9    Spinal stenosis, lumbar    NAFLD (nonalcoholic fatty liver disease)    Abdominal pain    Hypothyroidism    Thyroid disease      Allergies   Allergen Reactions    Narcan [Naloxone Hcl] Anaphylaxis     Prolonged anesthesia     Fentanyl      Current Outpatient Medications on File Prior to Visit   Medication Sig Dispense Refill    phenazopyridine (PYRIDIUM) 100 MG tablet TAKE ONE TABLET BY MOUTH THREE TIMES DAILY AS NEEDED      sulfamethoxazole-trimethoprim (BACTRIM DS;SEPTRA DS) 800-160 MG per tablet TAKE ONE TABLET BY MOUTH TWICE DAILY      ondansetron (ZOFRAN ODT) 4 MG disintegrating tablet Take 1 tablet by mouth every 8 hours as needed for Nausea or Vomiting 10 tablet 0    vitamin D 25 place, and time. Skin: No diaphoresis. Psychiatric: Normal mood and affect. Behavior is Normal.     ASSESSMENT AND PLAN:    Geena Roman was seen today for ed follow-up, back pain and numbness. Diagnoses and all orders for this visit:    Hypokalemia  -     CBC WITH AUTO DIFFERENTIAL; Future  -     Basic Metabolic Panel; Future    Vitamin D deficiency  -     Vitamin D 25 Hydroxy; Future    Other orders  -     naproxen (NAPROSYN) 500 MG tablet; Take 1 tablet by mouth 2 times daily (with meals)  -     tiZANidine (ZANAFLEX) 2 MG tablet; Take 1 tablet by mouth 4 times daily as needed (muscle spasm)        She will be started on Naprosyn and Zanaflex for muscle strain    I will repeat her blood work including to recheck her potassium    I will also perform diagnostic mammography due to her breast pain      No follow-ups on file.         Electronically signed by Cherelle Tanner DO on 7/19/2021 at 5:25 PM    Cherelle Tanner DO

## 2021-07-20 DIAGNOSIS — E55.9 VITAMIN D DEFICIENCY: ICD-10-CM

## 2021-07-20 DIAGNOSIS — E89.0 POSTOPERATIVE HYPOTHYROIDISM: ICD-10-CM

## 2021-07-20 DIAGNOSIS — C73 THYROID CANCER (HCC): ICD-10-CM

## 2021-07-20 DIAGNOSIS — E87.6 HYPOKALEMIA: ICD-10-CM

## 2021-07-20 LAB
BASOPHILS ABSOLUTE: 0.04 E9/L (ref 0–0.2)
BASOPHILS RELATIVE PERCENT: 0.7 % (ref 0–2)
EOSINOPHILS ABSOLUTE: 0.07 E9/L (ref 0.05–0.5)
EOSINOPHILS RELATIVE PERCENT: 1.3 % (ref 0–6)
HCT VFR BLD CALC: 42 % (ref 34–48)
HEMOGLOBIN: 13.5 G/DL (ref 11.5–15.5)
IMMATURE GRANULOCYTES #: 0.01 E9/L
IMMATURE GRANULOCYTES %: 0.2 % (ref 0–5)
LYMPHOCYTES ABSOLUTE: 1.83 E9/L (ref 1.5–4)
LYMPHOCYTES RELATIVE PERCENT: 33.4 % (ref 20–42)
MCH RBC QN AUTO: 29.5 PG (ref 26–35)
MCHC RBC AUTO-ENTMCNC: 32.1 % (ref 32–34.5)
MCV RBC AUTO: 91.7 FL (ref 80–99.9)
MONOCYTES ABSOLUTE: 0.43 E9/L (ref 0.1–0.95)
MONOCYTES RELATIVE PERCENT: 7.8 % (ref 2–12)
NEUTROPHILS ABSOLUTE: 3.1 E9/L (ref 1.8–7.3)
NEUTROPHILS RELATIVE PERCENT: 56.6 % (ref 43–80)
PDW BLD-RTO: 12.5 FL (ref 11.5–15)
PLATELET # BLD: 345 E9/L (ref 130–450)
PMV BLD AUTO: 9.3 FL (ref 7–12)
RBC # BLD: 4.58 E12/L (ref 3.5–5.5)
WBC # BLD: 5.5 E9/L (ref 4.5–11.5)

## 2021-07-21 LAB
ANION GAP SERPL CALCULATED.3IONS-SCNC: 14 MMOL/L (ref 7–16)
BUN BLDV-MCNC: 21 MG/DL (ref 6–20)
CALCIUM SERPL-MCNC: 9.3 MG/DL (ref 8.6–10.2)
CHLORIDE BLD-SCNC: 103 MMOL/L (ref 98–107)
CO2: 24 MMOL/L (ref 22–29)
CREAT SERPL-MCNC: 0.8 MG/DL (ref 0.5–1)
GFR AFRICAN AMERICAN: >60
GFR NON-AFRICAN AMERICAN: >60 ML/MIN/1.73
GLUCOSE BLD-MCNC: 93 MG/DL (ref 74–99)
POTASSIUM SERPL-SCNC: 5 MMOL/L (ref 3.5–5)
SODIUM BLD-SCNC: 141 MMOL/L (ref 132–146)
TSH SERPL DL<=0.05 MIU/L-ACNC: 4.12 UIU/ML (ref 0.27–4.2)
VITAMIN D 25-HYDROXY: 24 NG/ML (ref 30–100)
VITAMIN D 25-HYDROXY: 24 NG/ML (ref 30–100)

## 2021-07-21 NOTE — PROGRESS NOTES
See [employer] report. Remote monitoring reviewed over a 30 day period.   End of 30 day monitoring period date of service 12/19/2020 Spironolactone Counseling: Patient advised regarding risks of diarrhea, abdominal pain, hyperkalemia, birth defects (for female patients), liver toxicity and renal toxicity. The patient may need blood work to monitor liver and kidney function and potassium levels while on therapy. The patient verbalized understanding of the proper use and possible adverse effects of spironolactone.  All of the patient's questions and concerns were addressed.

## 2021-07-22 DIAGNOSIS — N64.4 BREAST PAIN: ICD-10-CM

## 2021-07-23 ENCOUNTER — TELEPHONE (OUTPATIENT)
Dept: PRIMARY CARE CLINIC | Age: 56
End: 2021-07-23

## 2021-07-23 NOTE — TELEPHONE ENCOUNTER
Patient called with complaint of ongoing back pain that is debilitating. She says that she recently had US of kidneys and a urine specimen sent out by Dr Reno Shoemaker, but results are not back yet. I asked if she had tried any of the medications that Dr Clovis Ziegler prescribed to her at her visit on 7/19. She denied doing so. I then advised patient to either seek care at the ER if pain was that severe or try the regimen that  had prescribed. Patient verbalized understanding.

## 2021-07-25 ENCOUNTER — TELEPHONE (OUTPATIENT)
Dept: ENDOCRINOLOGY | Age: 56
End: 2021-07-25

## 2021-07-25 NOTE — TELEPHONE ENCOUNTER
Results of 7/20/21 lab tests reviewed. See bold below:         09/04/18 US thyroid: no change in the adenopathy that was seen on the 5/17/18 US.       02/11/20 US thyroid: no change in the appearance of the lymph nodes           05/28/19 Tg <0.1, Tg Ab <0.9       07/22/20 Tg <0.1, Tg Ab <0.9       07/20/21 Tg <0.1, Tg Ab <0.9          TSH should be maintained at 0.5-2 per the 2015 JOSE guidelines. B/c of her arrhythmias, the goal is a level just below 2.0.       10/29/19 TSH 0.633 on LT4 112 mcg Mon-Sat and 1/2 tab on Sunday.         06/28/20 TSH 0.636 on LT4 100 mcg daily       07/19/20 TSH 6.950 on LT4 100 mcg daily (had gallbladder out 7/2/20)       07/22/20 TSH 7.640 on LT4 100 mcg daily the rise in her TSH is may be due a recovery period after non-thyroidal illness syndrome; so dose was not increased       08/10/20 TSH 5.080 on LT4 100 mcg daily concern about allergic reaction to Synthroid; therefore was planning a switch to a compounding LT4; so dose was not increased       10/20/20 TSH 1.110 on LT4 100 mcg daily       11/24/20 TSH 0.08 on LT4 100 mcg daily       12/10/20 TSH 0.02 on LT4 100 mcg daily, decr to 75 mcg daily         2/11/21 TSH 0.35 on LT4 75 mcg daily, decr to 75 mcg daily M-Sa, none Simeon         3/08/21 TSH 1.44 on LT4 75 mcg daily M-Sa, none Simeon         4/14/21 TSH 2.89 on LT4  75 mcg daily M-Sa, none Simeon         6/22/21 TSH 5.90 on LT4 75 mcg daily M-Sa, none Simeon; incr to 75 mcg daily M-Sa, one-half tab Simeon 6/24/21 7/20/21 TSH 4.12 on LT4 75 mcg daily M-Sa, one-half tab Simeon          7/20/21 Vit D 25-OH 24    Dr. Nikkie London to address dosing of LT4 and D3 at next appt on 8/3/21

## 2021-07-28 LAB — THYROGLOBULIN ANTIBODY: <12 IU/ML (ref 0–40)

## 2021-08-11 ENCOUNTER — TELEPHONE (OUTPATIENT)
Dept: PRIMARY CARE CLINIC | Age: 56
End: 2021-08-11

## 2021-08-11 RX ORDER — AMPICILLIN 500 MG/1
2000 CAPSULE ORAL ONCE
Qty: 4 CAPSULE | Refills: 0 | Status: SHIPPED | OUTPATIENT
Start: 2021-08-11 | End: 2021-08-11

## 2021-08-11 NOTE — TELEPHONE ENCOUNTER
Pt states her dentist retired she is seeing a new one tomorrow and she needs her antibiotic called in she states she takes ampicillin and they told her her pcp needs to call it in.

## 2021-08-11 NOTE — TELEPHONE ENCOUNTER
Scheduled for 06/28/2021 with Dr. Consuelo Vigil Is Retinoid Dermatitis Present?: Yes - Patient Specific Counseling

## 2021-08-18 ENCOUNTER — OFFICE VISIT (OUTPATIENT)
Dept: CARDIOLOGY CLINIC | Age: 56
End: 2021-08-18
Payer: COMMERCIAL

## 2021-08-18 VITALS
BODY MASS INDEX: 33.05 KG/M2 | RESPIRATION RATE: 18 BRPM | DIASTOLIC BLOOD PRESSURE: 78 MMHG | WEIGHT: 193.6 LBS | HEART RATE: 72 BPM | HEIGHT: 64 IN | SYSTOLIC BLOOD PRESSURE: 120 MMHG

## 2021-08-18 DIAGNOSIS — R00.2 PALPITATIONS: Primary | ICD-10-CM

## 2021-08-18 DIAGNOSIS — R06.02 SOB (SHORTNESS OF BREATH): ICD-10-CM

## 2021-08-18 PROCEDURE — 3017F COLORECTAL CA SCREEN DOC REV: CPT | Performed by: INTERNAL MEDICINE

## 2021-08-18 PROCEDURE — 1036F TOBACCO NON-USER: CPT | Performed by: INTERNAL MEDICINE

## 2021-08-18 PROCEDURE — G8417 CALC BMI ABV UP PARAM F/U: HCPCS | Performed by: INTERNAL MEDICINE

## 2021-08-18 PROCEDURE — 93000 ELECTROCARDIOGRAM COMPLETE: CPT | Performed by: INTERNAL MEDICINE

## 2021-08-18 PROCEDURE — G8427 DOCREV CUR MEDS BY ELIG CLIN: HCPCS | Performed by: INTERNAL MEDICINE

## 2021-08-18 PROCEDURE — 99214 OFFICE O/P EST MOD 30 MIN: CPT | Performed by: INTERNAL MEDICINE

## 2021-08-30 ENCOUNTER — OFFICE VISIT (OUTPATIENT)
Dept: ENT CLINIC | Age: 56
End: 2021-08-30
Payer: COMMERCIAL

## 2021-08-30 VITALS — WEIGHT: 193 LBS | BODY MASS INDEX: 32.95 KG/M2 | HEIGHT: 64 IN

## 2021-08-30 DIAGNOSIS — R43.2 DECREASED SENSE OF TASTE: Primary | ICD-10-CM

## 2021-08-30 PROCEDURE — 1036F TOBACCO NON-USER: CPT | Performed by: OTOLARYNGOLOGY

## 2021-08-30 PROCEDURE — 99204 OFFICE O/P NEW MOD 45 MIN: CPT | Performed by: OTOLARYNGOLOGY

## 2021-08-30 PROCEDURE — G8417 CALC BMI ABV UP PARAM F/U: HCPCS | Performed by: OTOLARYNGOLOGY

## 2021-08-30 PROCEDURE — G8427 DOCREV CUR MEDS BY ELIG CLIN: HCPCS | Performed by: OTOLARYNGOLOGY

## 2021-08-30 PROCEDURE — 3017F COLORECTAL CA SCREEN DOC REV: CPT | Performed by: OTOLARYNGOLOGY

## 2021-08-30 ASSESSMENT — ENCOUNTER SYMPTOMS
SORE THROAT: 0
TROUBLE SWALLOWING: 0
VOMITING: 0
SHORTNESS OF BREATH: 0
RHINORRHEA: 0
COUGH: 0
VOICE CHANGE: 0
SINUS PAIN: 0

## 2021-08-30 NOTE — PROGRESS NOTES
[facility] Otolaryngology  Dr. Humberto Knox. JOY Carmona. Ms.Ed. New Consult       Patient Name:  Tuan Burton  :  1965     CHIEF C/O:    Chief Complaint   Patient presents with    New Patient     patient states she has no taste, it started about 8 months,  patient states that she was tested for covid twice and it was negative. she said she can smell       HISTORY OBTAINED FROM:  patient    HISTORY OF PRESENT ILLNESS:       Daniel Hernandez is a 54y.o. year old female, here today for here for 8 months history of loss of sense of taste. Patient states she can smell normally, but has decreased level of taste tickly with salt or sour and sweet. Patient denies any history of Covid positivity, was tested x2 at initial onset and was negative both times. Patient denies any current complaints of difficulty swallowing shortness of breath or stridor, no other history of neurological diseases. Patient does have a prior history of papillary thyroid carcinoma with total thyroidectomy and radioactive iodine 3 years prior, with recent CT scan ultrasound of the neck showing no significant neck pathology. No other complaints today of active epistaxis nasal congestion tinnitus vertigo or hearing loss.       Past Medical History:   Diagnosis Date    Allergic rhinitis     Anxiety     Atrial fibrillation (HCC)     Depression     Headache     Hypertension     Hypothyroidism     NAFLD (nonalcoholic fatty liver disease)     By CT    Obesity with body mass index (BMI) of 30.0 to 39.9 2020    Osteoarthritis     Sleep apnea     does not use C-Pap    Spinal stenosis, lumbar 2020    Severe central stenosis by CT of abdomen, L1-L2    Thyroid disease     PTC microcarcinoma (0.5 cm) treated by thyroidectomy and BERG 100 mci     Past Surgical History:   Procedure Laterality Date    ABLATION OF DYSRHYTHMIC FOCUS      BREAST SURGERY      cysts removed    CHOLECYSTECTOMY      COLONOSCOPY      ENDOMETRIAL ABLATION 2012    GALLBLADDER SURGERY  07/2020    removal     HYSTERECTOMY, VAGINAL      HYSTEROSCOPY  8/15/12    dilatation & curettage, novasure ablation    INSERTABLE CARDIAC MONITOR  12/23/2019    [facility] Confirm Linq     (Dr. Kristin Hooper)      OTHER SURGICAL HISTORY  12/23/2019    Dr. Kristin Hooper- Loop Recorder implant, Confirm    THYROIDECTOMY Bilateral 01/19/2017    Dr. Bridget Warren       Current Outpatient Medications:     vitamin D 25 MCG (1000 UT) CAPS, Take by mouth Pt taking 2000 daily, Disp: , Rfl:     levothyroxine (SYNTHROID) 75 MCG tablet, Take one tablet daily M-Sa, none on Simeon (Patient taking differently: 75 mcg Daily ), Disp: 90 tablet, Rfl: 2    TOPROL XL 50 MG extended release tablet, Take 1 tablet by mouth daily (Patient taking differently: Take 25 mg by mouth daily Take 1/2 tablet daily), Disp: 180 tablet, Rfl: 3    ALPRAZolam (XANAX) 1 MG tablet, Take 1 mg by mouth three times daily. , Disp: , Rfl:   Narcan [naloxone hcl] and Fentanyl  Social History     Tobacco Use    Smoking status: Never Smoker    Smokeless tobacco: Never Used   Vaping Use    Vaping Use: Never used   Substance Use Topics    Alcohol use: No    Drug use: No     Family History   Problem Relation Age of Onset    High Cholesterol Mother         low blood pressure    Arrhythmia Mother     High Blood Pressure Father     Cancer Father         leukemia, lung cancer +smoker, brain cancer ataxia    Other Sister         nephrectomy due to a benign tumor    Breast Cancer Paternal Grandmother     Other Paternal Grandmother         bowel    Other Daughter         IGG immunodeficiency    Cancer Maternal Grandmother         lung, cervical    Heart Disease Maternal Grandfather     Atrial Fibrillation Paternal Grandfather        Review of Systems   Constitutional: Negative for chills and fever. HENT: Negative for ear discharge, hearing loss, postnasal drip, rhinorrhea, sinus pain, sneezing, sore throat, trouble swallowing and voice change. Respiratory: Negative for cough and shortness of breath. Cardiovascular: Negative for chest pain and palpitations. Gastrointestinal: Negative for vomiting. Skin: Negative for rash. Allergic/Immunologic: Negative for environmental allergies. Neurological: Negative for dizziness and headaches. Hematological: Does not bruise/bleed easily. All other systems reviewed and are negative. Ht 5' 4\" (1.626 m)   Wt 193 lb (87.5 kg)   LMP 01/01/2013   BMI 33.13 kg/m²   Physical Exam  Vitals and nursing note reviewed. Constitutional:       Appearance: She is well-developed. HENT:      Head: Normocephalic and atraumatic. Right Ear: Tympanic membrane and ear canal normal.      Left Ear: Tympanic membrane and ear canal normal.      Nose: Congestion present. No rhinorrhea. Mouth/Throat:      Lips: No lesions. Mouth: Mucous membranes are moist.      Dentition: Normal dentition. No dental tenderness or gingival swelling. Tongue: No lesions. Tongue does not deviate from midline. Pharynx: No pharyngeal swelling, oropharyngeal exudate, posterior oropharyngeal erythema or uvula swelling. Tonsils: No tonsillar exudate or tonsillar abscesses. Eyes:      Pupils: Pupils are equal, round, and reactive to light. Neck:      Thyroid: No thyromegaly. Trachea: No tracheal deviation. Cardiovascular:      Rate and Rhythm: Normal rate. Pulmonary:      Effort: Pulmonary effort is normal. No respiratory distress. Musculoskeletal:         General: Normal range of motion. Cervical back: Normal range of motion. Lymphadenopathy:      Cervical: No cervical adenopathy. Skin:     General: Skin is warm. Findings: No erythema. Neurological:      Mental Status: She is alert. Cranial Nerves: No cranial nerve deficit.          IMPRESSION/PLAN:  Loss of taste no loss of smell recommendation for the patient to trial taste rehab, with various foods including salt sour sweet at

## 2021-09-01 DIAGNOSIS — E03.9 HYPOTHYROIDISM (ACQUIRED): ICD-10-CM

## 2021-09-01 DIAGNOSIS — C73 THYROID CANCER (HCC): ICD-10-CM

## 2021-09-03 RX ORDER — LEVOTHYROXINE SODIUM 0.07 MG/1
TABLET ORAL
Qty: 90 TABLET | Refills: 2 | OUTPATIENT
Start: 2021-09-03

## 2021-09-08 RX ORDER — METOPROLOL SUCCINATE 25 MG/1
25 TABLET, EXTENDED RELEASE ORAL DAILY
Qty: 90 TABLET | Refills: 3 | Status: SHIPPED
Start: 2021-09-08 | End: 2022-08-15

## 2021-09-09 ENCOUNTER — TELEPHONE (OUTPATIENT)
Dept: ENDOCRINOLOGY | Age: 56
End: 2021-09-09

## 2021-09-09 NOTE — TELEPHONE ENCOUNTER
Patient was a Dr Sarkis Villatoro patient. Patient  called stating she needs a refill of her Synthroid 75 MCG Tablets and stressed it needs to be non generic. Patient is out and asked if it can be called in today.

## 2021-09-09 NOTE — TELEPHONE ENCOUNTER
I called the pharmacy and spoke with their pharmacist.  I just called in the medication for name brand Synthroid 75 mcg (name brand) w/6 refills, 1 tab mon-sat and none on Sunday.

## 2021-09-10 RX ORDER — NAPROXEN 500 MG/1
500 TABLET ORAL 2 TIMES DAILY WITH MEALS
Qty: 60 TABLET | Refills: 0 | Status: SHIPPED
Start: 2021-09-10 | End: 2022-02-07 | Stop reason: ALTCHOICE

## 2021-09-20 ENCOUNTER — OFFICE VISIT (OUTPATIENT)
Dept: ENDOCRINOLOGY | Age: 56
End: 2021-09-20
Payer: COMMERCIAL

## 2021-09-20 VITALS
HEIGHT: 65 IN | BODY MASS INDEX: 33.82 KG/M2 | RESPIRATION RATE: 18 BRPM | SYSTOLIC BLOOD PRESSURE: 136 MMHG | HEART RATE: 71 BPM | OXYGEN SATURATION: 98 % | DIASTOLIC BLOOD PRESSURE: 86 MMHG | WEIGHT: 203 LBS

## 2021-09-20 DIAGNOSIS — E55.9 VITAMIN D DEFICIENCY: ICD-10-CM

## 2021-09-20 DIAGNOSIS — C73 THYROID CANCER (HCC): ICD-10-CM

## 2021-09-20 DIAGNOSIS — E03.9 HYPOTHYROIDISM (ACQUIRED): Primary | ICD-10-CM

## 2021-09-20 PROCEDURE — 99214 OFFICE O/P EST MOD 30 MIN: CPT | Performed by: INTERNAL MEDICINE

## 2021-09-20 PROCEDURE — 1036F TOBACCO NON-USER: CPT | Performed by: INTERNAL MEDICINE

## 2021-09-20 PROCEDURE — G8427 DOCREV CUR MEDS BY ELIG CLIN: HCPCS | Performed by: INTERNAL MEDICINE

## 2021-09-20 PROCEDURE — 3017F COLORECTAL CA SCREEN DOC REV: CPT | Performed by: INTERNAL MEDICINE

## 2021-09-20 PROCEDURE — G8417 CALC BMI ABV UP PARAM F/U: HCPCS | Performed by: INTERNAL MEDICINE

## 2021-09-20 RX ORDER — LEVOTHYROXINE SODIUM 75 MCG
75 TABLET ORAL DAILY
Qty: 30 TABLET | Refills: 11
Start: 2021-09-20 | End: 2021-10-01

## 2021-09-20 NOTE — PROGRESS NOTES
[address] [place] [address] Department of Endocrinology Diabetes and Metabolism   [address] [facility] [address]   Phone: [phone]  Fax: [phone]    Date of Service: 9/20/2021  Primary Care Physician: Genesis Alegre DO  Provider: Darren Churchill MD     Reason for the visit:  Papillary thyroid cancer, postsurgical hypothyroidism     History of Present Illness: The history is provided by the patient. No  was used. Accuracy of the patient data is excellent. Tg Virk is a very pleasant 64 y.o. female seen today for management of thyroid cancer     Pt s/p total thyroidectomy in 1/2017   unifocal 4 mm papillary carcinoma, encapsulated. Margins uninvolved by carcinoma. Angioinvasion (vascular invasion)  not identified . Lymphatic Invasion  not identified. Perineural Invasion  not identified   Extrathyroidal Extension  is not identified  Pathologic Staging: pT 1a pNx pM x     PTC Stage I, [id number],     Excellent response to therapy (total thyroidectomy, 100mCi BERG)  09/04/18 US thyroid: no change in the adenopathy that was seen on the 5/17/18 US.  02/11/20 US thyroid: no change in the appearance of the lymph nodes   05/28/19 Tg <0.1, Tg Ab <0.9  07/22/20 Tg <0.1, Tg Ab <0.9     Synthroid 75 mcg daily. Patient takes Synthroid in the morning at empty stomach, wait one hour before eating , avoid multivitamins containing calcium  or iron with it. Lab Results   Component Value Date/Time    TSH 4.120 07/20/2021 01:57 PM    T4FREE 1.07 06/22/2021 10:40 AM    M5FPHYC 7.3 06/22/2021 10:40 AM    FT3 2.3 05/11/2017 02:00 PM    P7ZACGD 79.34 (L) 06/22/2021 10:40 AM    TPOABS 0.4 05/28/2019 12:38 PM    THGAB <0.9 07/20/2021 01:57 PM       Tg Virk denies any new lumps, bumps in the neck, voice change or shortness of breath. No family history of thyroid cancer. No prior history of radiation to head or neck region.     Pt with h/o cardiac arrhythmia s/p ablation    PAST MEDICAL HISTORY   Past Medical History:   Diagnosis Date    Allergic rhinitis     Anxiety     Atrial fibrillation (HCC)     Depression     Headache     Hypertension     Hypothyroidism     NAFLD (nonalcoholic fatty liver disease)     By CT    Obesity with body mass index (BMI) of 30.0 to 39.9 06/28/2020    Osteoarthritis     Sleep apnea     does not use C-Pap    Spinal stenosis, lumbar 01/14/2020    Severe central stenosis by CT of abdomen, L1-L2    Thyroid disease     PTC microcarcinoma (0.5 cm) treated by thyroidectomy and BERG 100 mci       PAST SURGICAL HISTORY   Past Surgical History:   Procedure Laterality Date    ABLATION OF DYSRHYTHMIC FOCUS  1990    BREAST SURGERY      cysts removed    CHOLECYSTECTOMY      COLONOSCOPY      ENDOMETRIAL ABLATION  2012    GALLBLADDER SURGERY  07/2020    removal     HYSTERECTOMY, VAGINAL      HYSTEROSCOPY  8/15/12    dilatation & curettage, novasure ablation    INSERTABLE CARDIAC MONITOR  12/23/2019    [facility] Confirm Linq     (Dr. William Nunez)      OTHER SURGICAL HISTORY  12/23/2019    Dr. William Nunez- Loop Recorder implant, Confirm    THYROIDECTOMY Bilateral 01/19/2017    Dr. Carson Presume HISTORY   Tobacco:   reports that she has never smoked. She has never used smokeless tobacco.  Alcohol:   reports no history of alcohol use. Drugs:   reports no history of drug use.     FAMILY HISTORY   Family History   Problem Relation Age of Onset    High Cholesterol Mother         low blood pressure    Arrhythmia Mother     High Blood Pressure Father     Cancer Father         leukemia, lung cancer +smoker, brain cancer ataxia    Other Sister         nephrectomy due to a benign tumor    Breast Cancer Paternal Grandmother     Other Paternal Grandmother         bowel    Other Daughter         IGG immunodeficiency    Cancer Maternal Grandmother         lung, cervical    Heart Disease Maternal Grandfather     Atrial Fibrillation Paternal Grandfather or movements. HEENT: normocephalic non traumatic  Neck: supple, s/p thyroidectomy  no JVD. Pulm: Clear equal air entry no added sounds, no wheezing or rhonchi    CVS: S1 + S2, no murmur, no heave. Dorsalis pedis pulse palpable   Abd: soft lax, no tenderness, no organomegaly, audible bowel sounds.   Skin: warm, no lesions, no rash  MSK: No local spine tenderness   Neuro: CN intact, sensation notmal , muscle power normal. No tremors   Psych: normal mood, and affect    Review of Laboratory Data:  I have reviewed the following:  Lab Results   Component Value Date/Time    WBC 5.5 07/20/2021 01:54 PM    RBC 4.58 07/20/2021 01:54 PM    HGB 13.5 07/20/2021 01:54 PM    HCT 42.0 07/20/2021 01:54 PM    MCV 91.7 07/20/2021 01:54 PM    MCH 29.5 07/20/2021 01:54 PM    MCHC 32.1 07/20/2021 01:54 PM    RDW 12.5 07/20/2021 01:54 PM     07/20/2021 01:54 PM    MPV 9.3 07/20/2021 01:54 PM      Lab Results   Component Value Date/Time     07/20/2021 01:54 PM    K 5.0 07/20/2021 01:54 PM    K 3.4 (L) 07/07/2021 09:13 PM    CO2 24 07/20/2021 01:54 PM    BUN 21 (H) 07/20/2021 01:54 PM    CREATININE 0.8 07/20/2021 01:54 PM    CALCIUM 9.3 07/20/2021 01:54 PM    LABGLOM >60 07/20/2021 01:54 PM    GFRAA >60 07/20/2021 01:54 PM      Lab Results   Component Value Date/Time    TSH 4.120 07/20/2021 01:57 PM    T4FREE 1.07 06/22/2021 10:40 AM    B1TFGPF 7.3 06/22/2021 10:40 AM    FT3 2.3 05/11/2017 02:00 PM    Z2MSTIC 79.34 (L) 06/22/2021 10:40 AM    TPOABS 0.4 05/28/2019 12:38 PM    THGAB <0.9 07/20/2021 01:57 PM     Lab Results   Component Value Date    LABA1C 5.5 06/28/2020    GLUCOSE 93 07/20/2021    GLUCOSE 93 06/13/2011    LABCREA 142 02/20/2018     Lab Results   Component Value Date    TRIG 69 06/22/2021    HDL 61 06/22/2021    LDLCALC 131 06/22/2021    CHOL 206 06/22/2021     Lab Results   Component Value Date    VITD25 24 07/20/2021    VITD25 24 07/20/2021       ASSESSMENT & RECOMMENDATIONS   Irvnig Del Rio, a 64 y.o.-old female seen in for following issues     Papillary thyroid cancer   · S/p total thyroidectomy and BERG ablation in 2017   · Excellent response to surgery. There was no high risk features identified in her pathology. · There was no evidence of thyroid cancer recurrence in ultrasounds, and Tg level was undetectable early this year  · Will continue monitoring with Tg level and US      Post surgical hypothyroidism:  · Currently on Synthroid 75 mcg daily   · Will check TSH, Free T4 and adjust dose if needed   · With underlying cardiac arrhythmia, will avoid TSH suppression in the pt     I personally reviewed external notes from PCP and other patient's care team providers, and personally interpreted labs associated with the above diagnosis. I also ordered labs to further assess and manage the above addressed medical conditions. Return in about 6 months (around 3/20/2022) for hypothyroidism, thyroid cancer, VitD deficiency. The above issues were reviewed with the patient who understood and agreed with the plan. Thank you for allowing us to participate in the care of this patient. Please do not hesitate to contact us with any additional questions. Diagnosis Orders   1. Hypothyroidism (acquired)  TSH without Reflex    T4, Free    TSH without Reflex    T4, Free    SYNTHROID 75 MCG tablet   2. Thyroid cancer ([address]  TSH without Reflex    T4, Free    Thyroglobulin    TSH without Reflex    T4, Free   3. Vitamin D deficiency  Vitamin D 25 Hydroxy    Basic Metabolic Panel     Paresh Vealrde MD  Endocrinologist, [facility])   [address] [place] [address], [address] [place] [id number]   Phone: [phone]  Fax: [phone]  ----------------------------  An electronic signature was used to authenticate this note.  Cristina Ruff MD on 9/20/2021 at 12:07 PM

## 2021-09-26 ENCOUNTER — TELEPHONE (OUTPATIENT)
Dept: ENDOCRINOLOGY | Age: 56
End: 2021-09-26

## 2021-09-26 DIAGNOSIS — E03.9 HYPOTHYROIDISM (ACQUIRED): Primary | ICD-10-CM

## 2021-09-27 NOTE — TELEPHONE ENCOUNTER
75 mcg is actually small dose of her Weight and likely need more    I am ok with repeating labs in few weeks and proceed accordingly     Let's repeat labs in late October

## 2021-09-27 NOTE — TELEPHONE ENCOUNTER
No pt has not missed any doses of synthroid 75mcg. Pt is wondering if she should get her TSH redrawn since it increased so drastically in 2 months she thinks it may be a lab error. Her TSH was 4.1 on 7/21/21.

## 2021-09-28 ENCOUNTER — TELEPHONE (OUTPATIENT)
Dept: ENDOCRINOLOGY | Age: 56
End: 2021-09-28

## 2021-09-28 DIAGNOSIS — E03.9 HYPOTHYROIDISM (ACQUIRED): Primary | ICD-10-CM

## 2021-09-28 NOTE — TELEPHONE ENCOUNTER
Patient phoned in stating that she is concerned about her abnormal labs due to having thyroid cancer. Patient would like to speak to you directly. She stated that she has spoken to the clinical staff but  She still needs clarity.

## 2021-10-01 RX ORDER — LEVOTHYROXINE SODIUM 88 MCG
88 TABLET ORAL DAILY
Qty: 30 TABLET | Refills: 5 | Status: SHIPPED
Start: 2021-10-01 | End: 2022-02-24

## 2021-10-01 NOTE — TELEPHONE ENCOUNTER
I called pt and adjusted her dose to 88 mcg daily  Repeat labs in 6 weeks     She is a low risk thyroid cancer patient and goal to keep TSH in normal range.  With h/o arrhythmia, will avoid TSH suppression

## 2021-10-04 ENCOUNTER — APPOINTMENT (OUTPATIENT)
Dept: CT IMAGING | Age: 56
End: 2021-10-04
Payer: COMMERCIAL

## 2021-10-04 ENCOUNTER — APPOINTMENT (OUTPATIENT)
Dept: ULTRASOUND IMAGING | Age: 56
End: 2021-10-04
Payer: COMMERCIAL

## 2021-10-04 ENCOUNTER — HOSPITAL ENCOUNTER (EMERGENCY)
Age: 56
Discharge: HOME | End: 2021-10-04
Payer: COMMERCIAL

## 2021-10-04 VITALS
TEMPERATURE: 98.6 F | BODY MASS INDEX: 32.49 KG/M2 | HEART RATE: 91 BPM | SYSTOLIC BLOOD PRESSURE: 184 MMHG | OXYGEN SATURATION: 98 % | DIASTOLIC BLOOD PRESSURE: 101 MMHG | HEIGHT: 65 IN | RESPIRATION RATE: 16 BRPM | WEIGHT: 195 LBS

## 2021-10-04 DIAGNOSIS — K59.00 CONSTIPATION, UNSPECIFIED CONSTIPATION TYPE: ICD-10-CM

## 2021-10-04 DIAGNOSIS — R10.31 RIGHT LOWER QUADRANT ABDOMINAL PAIN: Primary | ICD-10-CM

## 2021-10-04 LAB
ALBUMIN SERPL-MCNC: 4.3 G/DL (ref 3.5–5.2)
ALP BLD-CCNC: 72 U/L (ref 35–104)
ALT SERPL-CCNC: 9 U/L (ref 0–32)
ANION GAP SERPL CALCULATED.3IONS-SCNC: 10 MMOL/L (ref 7–16)
AST SERPL-CCNC: 15 U/L (ref 0–31)
BACTERIA: ABNORMAL /HPF
BASOPHILS ABSOLUTE: 0.05 E9/L (ref 0–0.2)
BASOPHILS RELATIVE PERCENT: 0.7 % (ref 0–2)
BILIRUB SERPL-MCNC: 0.8 MG/DL (ref 0–1.2)
BILIRUBIN URINE: NEGATIVE
BLOOD, URINE: ABNORMAL
BUN BLDV-MCNC: 11 MG/DL (ref 6–20)
CALCIUM SERPL-MCNC: 9.3 MG/DL (ref 8.6–10.2)
CHLORIDE BLD-SCNC: 105 MMOL/L (ref 98–107)
CLARITY: CLEAR
CO2: 27 MMOL/L (ref 22–29)
COLOR: YELLOW
CREAT SERPL-MCNC: 0.6 MG/DL (ref 0.5–1)
EOSINOPHILS ABSOLUTE: 0.03 E9/L (ref 0.05–0.5)
EOSINOPHILS RELATIVE PERCENT: 0.4 % (ref 0–6)
GFR AFRICAN AMERICAN: >60
GFR NON-AFRICAN AMERICAN: >60 ML/MIN/1.73
GLUCOSE BLD-MCNC: 97 MG/DL (ref 74–99)
GLUCOSE URINE: NEGATIVE MG/DL
HCT VFR BLD CALC: 40.6 % (ref 34–48)
HEMOGLOBIN: 13.5 G/DL (ref 11.5–15.5)
IMMATURE GRANULOCYTES #: 0.01 E9/L
IMMATURE GRANULOCYTES %: 0.1 % (ref 0–5)
KETONES, URINE: NEGATIVE MG/DL
LACTIC ACID: 1.9 MMOL/L (ref 0.5–2.2)
LEUKOCYTE ESTERASE, URINE: ABNORMAL
LIPASE: 20 U/L (ref 13–60)
LYMPHOCYTES ABSOLUTE: 1.74 E9/L (ref 1.5–4)
LYMPHOCYTES RELATIVE PERCENT: 25.8 % (ref 20–42)
MCH RBC QN AUTO: 30 PG (ref 26–35)
MCHC RBC AUTO-ENTMCNC: 33.3 % (ref 32–34.5)
MCV RBC AUTO: 90.2 FL (ref 80–99.9)
MONOCYTES ABSOLUTE: 0.38 E9/L (ref 0.1–0.95)
MONOCYTES RELATIVE PERCENT: 5.6 % (ref 2–12)
MUCUS: PRESENT /LPF
NEUTROPHILS ABSOLUTE: 4.53 E9/L (ref 1.8–7.3)
NEUTROPHILS RELATIVE PERCENT: 67.4 % (ref 43–80)
NITRITE, URINE: NEGATIVE
PDW BLD-RTO: 12.4 FL (ref 11.5–15)
PH UA: 6 (ref 5–9)
PLATELET # BLD: 292 E9/L (ref 130–450)
PMV BLD AUTO: 9.3 FL (ref 7–12)
POTASSIUM SERPL-SCNC: 4.1 MMOL/L (ref 3.5–5)
PROTEIN UA: NEGATIVE MG/DL
RBC # BLD: 4.5 E12/L (ref 3.5–5.5)
RBC UA: ABNORMAL /HPF (ref 0–2)
SODIUM BLD-SCNC: 142 MMOL/L (ref 132–146)
SPECIFIC GRAVITY UA: 1.02 (ref 1–1.03)
TOTAL PROTEIN: 7.2 G/DL (ref 6.4–8.3)
UROBILINOGEN, URINE: 0.2 E.U./DL
WBC # BLD: 6.7 E9/L (ref 4.5–11.5)
WBC UA: ABNORMAL /HPF (ref 0–5)

## 2021-10-04 PROCEDURE — 74177 CT ABD & PELVIS W/CONTRAST: CPT

## 2021-10-04 PROCEDURE — 6360000004 HC RX CONTRAST MEDICATION: Performed by: RADIOLOGY

## 2021-10-04 PROCEDURE — 83605 ASSAY OF LACTIC ACID: CPT

## 2021-10-04 PROCEDURE — 99282 EMERGENCY DEPT VISIT SF MDM: CPT

## 2021-10-04 PROCEDURE — 76830 TRANSVAGINAL US NON-OB: CPT

## 2021-10-04 PROCEDURE — 83690 ASSAY OF LIPASE: CPT

## 2021-10-04 PROCEDURE — 85025 COMPLETE CBC W/AUTO DIFF WBC: CPT

## 2021-10-04 PROCEDURE — 80053 COMPREHEN METABOLIC PANEL: CPT

## 2021-10-04 PROCEDURE — 81001 URINALYSIS AUTO W/SCOPE: CPT

## 2021-10-04 RX ORDER — POLYETHYLENE GLYCOL 3350 17 G/17G
POWDER, FOR SOLUTION ORAL
Qty: 238 G | Refills: 0 | Status: SHIPPED | OUTPATIENT
Start: 2021-10-04 | End: 2021-10-18

## 2021-10-04 RX ADMIN — IOPAMIDOL 75 ML: 755 INJECTION, SOLUTION INTRAVENOUS at 16:49

## 2021-10-04 ASSESSMENT — PAIN SCALES - GENERAL: PAINLEVEL_OUTOF10: 8

## 2021-10-04 ASSESSMENT — PAIN DESCRIPTION - PAIN TYPE: TYPE: ACUTE PAIN

## 2021-10-04 NOTE — ED NOTES
FIRST PROVIDER CONTACT ASSESSMENT NOTE                                                                                                Department of Emergency Medicine                                                      First Provider Note  10/4/21  12:51 PM EDT  NAME: Charmaine Champagne  : 1965  MRN: [id number]    Chief Complaint: Abdominal Pain (RLQ, x2 days, nausea, diarrhea, chills)      History of Present Illness:   Charmaine Champagne is a 64 y.o. female who presents to the ED for gi pain right lower quadrant for the last day and a half. Patient states she is having nausea associated with it. Patient states she still has her appendix. Patient denies any history of colitis. Focused Physical Exam:  VS:    ED Triage Vitals [10/04/21 1249]   BP Temp Temp src Pulse Resp SpO2 Height Weight   (!) 184/101 98.6 °F (37 °C) -- 91 16 98 % 5' 5\" (1.651 m) 195 lb (88.5 kg)        General: Alert and in no apparent distress. Medical History:  has a past medical history of Allergic rhinitis, Anxiety, Atrial fibrillation ([address] Depression, Headache, Hypertension, Hypothyroidism, NAFLD (nonalcoholic fatty liver disease), Obesity with body mass index (BMI) of 30.0 to 39.9, Osteoarthritis, Sleep apnea, Spinal stenosis, lumbar, and Thyroid disease. Surgical History:  has a past surgical history that includes ablation of dysrhythmic focus (); hysteroscopy (8/15/12); Colonoscopy; Endometrial ablation (); Thyroidectomy (Bilateral, 2017); Breast surgery; Hysterectomy, vaginal; other surgical history (2019); Insertable Cardiac Monitor (2019); Gallbladder surgery (2020); and Cholecystectomy. Social History:  reports that she has never smoked. She has never used smokeless tobacco. She reports that she does not drink alcohol and does not use drugs.     Family History: family history includes Arrhythmia in her mother; Atrial Fibrillation in her paternal grandfather; Breast Cancer in her paternal grandmother; Cancer in her father and maternal grandmother; Heart Disease in her maternal grandfather; High Blood Pressure in her father; High Cholesterol in her mother; Other in her daughter, paternal grandmother, and sister.     Allergies: Narcan [naloxone hcl] and Fentanyl     Initial Plan of Care:  Initiate Treatment-Testing, Proceed toTreatment Area When Bed Available for ED Attending/MLP to Continue Care    -------------------------------------------------END OF FIRST PROVIDER CONTACT ASSESSMENT NOTE--------------------------------------------------------  Electronically signed by Hannah Kowalski PA-C   DD: 10/4/21       Hannah Kowalski PA-C  10/04/21 1252

## 2021-10-04 NOTE — ED PROVIDER NOTES
[facility]                                                                                                                                      Department of Emergency Medicine   ED  Provider Note  Admit Date/RoomTime: 10/4/2021  4:10 PM  ED Room: 34/        HPI:  10/4/21,   Time: 4:19 PM EDT         Gustavo James is a 64 y.o. female presenting to the ED for RLQ pain, beginning 1 day ago. The complaint has been persistent, moderate in severity, and worsened by nothing. The patient states that the pain is localized to the right lower quadrant. Its been constant and does not radiate. She did have diarrhea yesterday. Mild nausea but no vomiting. The patient denies hematuria, dysuria or polyuria. She is status post cholecystectomy but still has her appendix. Status post total abdominal hysterectomy as well. She states that this was done for dysfunctional uterine bleeding. Patient denies fever or chills.         ROS:     Constitutional: Negative for fever and chills  HENT: Negative for ear pain, sore throat and sinus pressure  Eyes: Negative for pain, discharge and redness  Respiratory:  Negative for shortness of breath, cough and wheezing  Cardiovascular: Negative for CP, edema or palpitations  Gastrointestinal:  See HPI  Genitourinary: Negative for dysuria and frequency  Musculoskeletal: Negative for back pain and arthralgia  Skin: Negative for rash and wound  Neurological: Negative for weakness and headaches  Hematological: Negative for adenopathy    All other systems reviewed and are negative      -------------------------------- PAST HISTORY ----------------------------------  Past Medical History:  has a past medical history of Allergic rhinitis, Anxiety, Atrial fibrillation ([facility] Depression, Headache, Hypertension, Hypothyroidism, NAFLD (nonalcoholic fatty liver disease), Obesity with body mass index (BMI) of 30.0 to 39.9, Osteoarthritis, Sleep apnea, Spinal stenosis, lumbar, and Thyroid disease. Past Surgical History:  has a past surgical history that includes ablation of dysrhythmic focus (1990); hysteroscopy (8/15/12); Colonoscopy; Endometrial ablation (2012); Thyroidectomy (Bilateral, 01/19/2017); Breast surgery; Hysterectomy, vaginal; other surgical history (12/23/2019); Insertable Cardiac Monitor (12/23/2019); Gallbladder surgery (07/2020); and Cholecystectomy. Social History:  reports that she has never smoked. She has never used smokeless tobacco. She reports that she does not drink alcohol and does not use drugs. Family History: family history includes Arrhythmia in her mother; Atrial Fibrillation in her paternal grandfather; Breast Cancer in her paternal grandmother; Cancer in her father and maternal grandmother; Heart Disease in her maternal grandfather; High Blood Pressure in her father; High Cholesterol in her mother; Other in her daughter, paternal grandmother, and sister. The patients home medications have been reviewed.     Allergies: Fentanyl    --------------------------------- RESULTS ------------------------------------------  All laboratory and radiology results have been personally reviewed by myself   LABS:  Results for orders placed or performed during the hospital encounter of 10/04/21   CBC auto differential   Result Value Ref Range    WBC 6.7 4.5 - 11.5 E9/L    RBC 4.50 3.50 - 5.50 E12/L    Hemoglobin 13.5 11.5 - 15.5 g/dL    Hematocrit 40.6 34.0 - 48.0 %    MCV 90.2 80.0 - 99.9 fL    MCH 30.0 26.0 - 35.0 pg    MCHC 33.3 32.0 - 34.5 %    RDW 12.4 11.5 - 15.0 fL    Platelets 737 737 - 417 E9/L    MPV 9.3 7.0 - 12.0 fL    Neutrophils % 67.4 43.0 - 80.0 %    Immature Granulocytes % 0.1 0.0 - 5.0 %    Lymphocytes % 25.8 20.0 - 42.0 %    Monocytes % 5.6 2.0 - 12.0 %    Eosinophils % 0.4 0.0 - 6.0 %    Basophils % 0.7 0.0 - 2.0 %    Neutrophils Absolute 4.53 1.80 - 7.30 E9/L    Immature Granulocytes # 0.01 E9/L    Lymphocytes Absolute 1.74 1.50 - 4.00 E9/L Monocytes Absolute 0.38 0.10 - 0.95 E9/L    Eosinophils Absolute 0.03 (L) 0.05 - 0.50 E9/L    Basophils Absolute 0.05 0.00 - 0.20 E9/L   Comprehensive Metabolic Panel   Result Value Ref Range    Sodium 142 132 - 146 mmol/L    Potassium 4.1 3.5 - 5.0 mmol/L    Chloride 105 98 - 107 mmol/L    CO2 27 22 - 29 mmol/L    Anion Gap 10 7 - 16 mmol/L    Glucose 97 74 - 99 mg/dL    BUN 11 6 - 20 mg/dL    CREATININE 0.6 0.5 - 1.0 mg/dL    GFR Non-African American >60 >=60 mL/min/1.73    GFR African American >60     Calcium 9.3 8.6 - 10.2 mg/dL    Total Protein 7.2 6.4 - 8.3 g/dL    Albumin 4.3 3.5 - 5.2 g/dL    Total Bilirubin 0.8 0.0 - 1.2 mg/dL    Alkaline Phosphatase 72 35 - 104 U/L    ALT 9 0 - 32 U/L    AST 15 0 - 31 U/L   Lipase   Result Value Ref Range    Lipase 20 13 - 60 U/L   Lactic Acid, Plasma   Result Value Ref Range    Lactic Acid 1.9 0.5 - 2.2 mmol/L   Urinalysis with Microscopic   Result Value Ref Range    Color, UA Yellow Straw/Yellow    Clarity, UA Clear Clear    Glucose, Ur Negative Negative mg/dL    Bilirubin Urine Negative Negative    Ketones, Urine Negative Negative mg/dL    Specific Gravity, UA 1.020 1.005 - 1.030    Blood, Urine TRACE-LYSED Negative    pH, UA 6.0 5.0 - 9.0    Protein, UA Negative Negative mg/dL    Urobilinogen, Urine 0.2 <2.0 E.U./dL    Nitrite, Urine Negative Negative    Leukocyte Esterase, Urine SMALL (A) Negative       RADIOLOGY:  Interpreted by Radiologist.  CT ABDOMEN PELVIS W IV CONTRAST Additional Contrast? None   Final Result   Normal appearing appendix. Retained stool involving the ascending colon. US NON OB TRANSVAGINAL   Final Result   Uterus and bilateral ovaries are not visualized and reportedly have been   surgically excised. No free fluid/fluid collection is seen. ----------------- NURSING NOTES AND VITALS REVIEWED ---------------   The nursing notes within the ED encounter and vital signs as below have been reviewed.    BP (!) 184/101   Pulse 91 Temp 98.6 °F (37 °C)   Resp 16   Ht 5' 5\" (1.651 m)   Wt 195 lb (88.5 kg)   LMP 01/01/2013   SpO2 98%   BMI 32.45 kg/m²   Oxygen Saturation Interpretation: Normal      --------------------------------PHYSICAL EXAM------------------------------------      Constitutional/General: Alert and oriented x3, well appearing, non toxic in NAD  Head: NC/AT  Eyes: PERRL, EOMI  Mouth: Oropharynx clear, handling secretions, no trismus  Neck: Supple, full ROM, no meningeal signs  Pulmonary: Lungs clear to auscultation bilaterally, no wheezes, rales, or rhonchi. Not in respiratory distress  Cardiovascular:  Regular rate and rhythm, no murmurs, gallops, or rubs. 2+ distal pulses  Abdomen: Soft, + BS. No distension. RLQ tenderness. Mild/moderate. No palpable rigidity, rebound or guarding  Extremities: Moves all extremities x 4. Warm and well perfused  Skin: warm and dry without rash  Neurologic: GCS 15,  Intact. No focal deficits  Psych: Normal Affect      ------------------------ ED COURSE/MEDICAL DECISION MAKING----------------------  Medications   iopamidol (ISOVUE-370) 76 % injection 75 mL (75 mLs IntraVENous Given 10/4/21 0158)         Medical Decision Making:    Labs normal.   CT scan of abdomen/pelvis negative for appendicitis. Significant retained stool noted on imaging. Urine is clear. Pt aware and agreeable to plan. Counseling: The emergency provider has spoken with the patient and discussed todays results, in addition to providing specific details for the plan of care and counseling regarding the diagnosis and prognosis. Questions are answered at this time and they are agreeable with the plan.      ------------------------ IMPRESSION AND DISPOSITION -------------------------------    IMPRESSION  1. Right lower quadrant abdominal pain    2.  Constipation, unspecified constipation type        DISPOSITION  Disposition: Discharge to home  Patient condition is stable                   Abimbola MENDOZA Larry Gonzales PA-C  10/04/21 1745

## 2021-11-03 ENCOUNTER — TELEPHONE (OUTPATIENT)
Dept: PRIMARY CARE CLINIC | Age: 56
End: 2021-11-03

## 2021-11-03 DIAGNOSIS — M54.30 SCIATICA, UNSPECIFIED LATERALITY: Primary | ICD-10-CM

## 2021-11-05 DIAGNOSIS — E03.9 HYPOTHYROIDISM (ACQUIRED): ICD-10-CM

## 2021-11-05 LAB
T4 FREE: 1.33 NG/DL (ref 0.93–1.7)
TSH SERPL DL<=0.05 MIU/L-ACNC: 3.49 UIU/ML (ref 0.27–4.2)

## 2021-11-11 ENCOUNTER — OFFICE VISIT (OUTPATIENT)
Dept: PRIMARY CARE CLINIC | Age: 56
End: 2021-11-11
Payer: COMMERCIAL

## 2021-11-11 ENCOUNTER — TELEPHONE (OUTPATIENT)
Dept: ENDOCRINOLOGY | Age: 56
End: 2021-11-11

## 2021-11-11 VITALS
RESPIRATION RATE: 14 BRPM | WEIGHT: 195 LBS | DIASTOLIC BLOOD PRESSURE: 78 MMHG | HEIGHT: 64 IN | TEMPERATURE: 98.2 F | SYSTOLIC BLOOD PRESSURE: 120 MMHG | BODY MASS INDEX: 33.29 KG/M2 | HEART RATE: 88 BPM | OXYGEN SATURATION: 98 %

## 2021-11-11 DIAGNOSIS — R00.2 INTERMITTENT PALPITATIONS: ICD-10-CM

## 2021-11-11 DIAGNOSIS — E07.9 THYROID DISEASE: ICD-10-CM

## 2021-11-11 DIAGNOSIS — G50.0 TRIGEMINAL NEURALGIA: ICD-10-CM

## 2021-11-11 DIAGNOSIS — M48.061 SPINAL STENOSIS OF LUMBAR REGION, UNSPECIFIED WHETHER NEUROGENIC CLAUDICATION PRESENT: ICD-10-CM

## 2021-11-11 DIAGNOSIS — I10 ESSENTIAL HYPERTENSION: ICD-10-CM

## 2021-11-11 DIAGNOSIS — E89.0 POSTOPERATIVE HYPOTHYROIDISM: ICD-10-CM

## 2021-11-11 DIAGNOSIS — R63.5 WEIGHT GAIN: Primary | ICD-10-CM

## 2021-11-11 DIAGNOSIS — I47.1 SVT (SUPRAVENTRICULAR TACHYCARDIA) (HCC): ICD-10-CM

## 2021-11-11 PROCEDURE — 99213 OFFICE O/P EST LOW 20 MIN: CPT | Performed by: INTERNAL MEDICINE

## 2021-11-11 PROCEDURE — G8427 DOCREV CUR MEDS BY ELIG CLIN: HCPCS | Performed by: INTERNAL MEDICINE

## 2021-11-11 PROCEDURE — G8484 FLU IMMUNIZE NO ADMIN: HCPCS | Performed by: INTERNAL MEDICINE

## 2021-11-11 PROCEDURE — G8417 CALC BMI ABV UP PARAM F/U: HCPCS | Performed by: INTERNAL MEDICINE

## 2021-11-11 PROCEDURE — 1036F TOBACCO NON-USER: CPT | Performed by: INTERNAL MEDICINE

## 2021-11-11 PROCEDURE — 3017F COLORECTAL CA SCREEN DOC REV: CPT | Performed by: INTERNAL MEDICINE

## 2021-11-11 RX ORDER — CARBAMAZEPINE 100 MG/1
100 TABLET, EXTENDED RELEASE ORAL 2 TIMES DAILY
Qty: 60 TABLET | Refills: 3 | Status: SHIPPED
Start: 2021-11-11 | End: 2022-02-07 | Stop reason: ALTCHOICE

## 2021-11-11 SDOH — ECONOMIC STABILITY: FOOD INSECURITY: WITHIN THE PAST 12 MONTHS, YOU WORRIED THAT YOUR FOOD WOULD RUN OUT BEFORE YOU GOT MONEY TO BUY MORE.: NEVER TRUE

## 2021-11-11 SDOH — ECONOMIC STABILITY: FOOD INSECURITY: WITHIN THE PAST 12 MONTHS, THE FOOD YOU BOUGHT JUST DIDN'T LAST AND YOU DIDN'T HAVE MONEY TO GET MORE.: NEVER TRUE

## 2021-11-11 ASSESSMENT — SOCIAL DETERMINANTS OF HEALTH (SDOH): HOW HARD IS IT FOR YOU TO PAY FOR THE VERY BASICS LIKE FOOD, HOUSING, MEDICAL CARE, AND HEATING?: NOT HARD AT ALL

## 2021-11-11 NOTE — PROGRESS NOTES
11/11/21    Charmaine Champagne, a female of 64 y.o. presents today for Facial Pain (numbness in R side of face)    At last appointment,   she was started on Naprosyn and Zanaflex for muscle strain. Blood work was repeated. Diagnostic mammography was also ordered. She was in the ER on October 4 for right lower quadrant pain. CT scan of the abdomen was positive for retained stool. This was treated with MiraLAX. She has been having R sided facial tingling for the past month. There was no injury. The pain shoots into the ear. She was told that she grinds her teeth by dentistry. Patient Active Problem List   Diagnosis    BPPV (benign paroxysmal positional vertigo)    SVT (supraventricular tachycardia) (HCC)    Anxiety    Dyspnea on exertion    Intermittent palpitations    Essential hypertension    Morbid obesity due to excess calories (HCC)    Palpitations    Chest pain    Thyroid [facility] ([facility]    Weight gain    Dizziness    Biliary colic    Obesity with body mass index (BMI) of 30.0 to 39.9    Spinal stenosis, lumbar    NAFLD (nonalcoholic fatty liver disease)    Abdominal pain    Hypothyroidism    Thyroid disease      Allergies   Allergen Reactions    Fentanyl      Current Outpatient Medications on File Prior to Visit   Medication Sig Dispense Refill    Handicap Placard MISC by Does not apply route 1 each 0    SYNTHROID 88 MCG tablet Take 1 tablet by mouth Daily 30 tablet 5    metoprolol succinate (TOPROL XL) 25 MG extended release tablet Take 1 tablet by mouth daily 90 tablet 3    vitamin D 25 MCG (1000 UT) CAPS Take by mouth Pt taking 2000 daily      ALPRAZolam (XANAX) 1 MG tablet Take 1 mg by mouth three times daily.  naproxen (NAPROSYN) 500 MG tablet Take 1 tablet by mouth 2 times daily (with meals) (Patient not taking: Reported on 11/11/2021) 60 tablet 0     No current facility-administered medications on file prior to visit.      Review of Systems  Constitutional:Negative for activity change, appetite change, chills, fatigue and fever. Respiratory: Negative for choking, chest tightness, shortness of breath and wheezing. Cardiovascular: Negative for chest pain, palpitations and leg swelling. Gastrointestinal: Negative for abdominal distention, constipation, diarrhea, nausea and vomiting. Musculoskeletal: Negative for arthralgias, back pain, gait problem and joint swelling. Neurological: Negative for dizziness, weakness,numbness and headaches. /78   Pulse 88   Temp 98.2 °F (36.8 °C)   Resp 14   Ht 5' 4\" (1.626 m)   Wt 195 lb (88.5 kg)   LMP 01/01/2013   SpO2 98%   BMI 33.47 kg/m²      Physical Exam   Constitutional:  Oriented to person, place, and time. Appears well-developed and well-nourished. No acute distress. HENT: No sinus tenderness or lymphadenopathy  Head: Normocephalic and atraumatic. Eyes: Eyes exhibits no discharge. No scleral icterus present. Neck: No tracheal deviation present. No thyromegaly present. Cardiovascular: Normal rate, regular rhythm, normal heart sounds and intact distal pulses. Exam reveals no gallop nor friction rub. No murmur heard. Pulmonary: Effort normal and breath sounds normal. No respiratory distress. No wheezes or rales. Abdomen: No signs of rigidity rebound or organomegaly  Musculoskeletal:  No tenderness to palpation  Neurological:Alert and oriented to person, place, and time. Skin: No diaphoresis. Psychiatric: Normal mood and affect. Behavior is Normal.     ASSESSMENT AND PLAN:    Greyson Ross was seen today for facial pain.     Diagnoses and all orders for this visit:    Weight gain    Thyroid disease    SVT (supraventricular tachycardia) (HCC)    Spinal stenosis of lumbar region, unspecified whether neurogenic claudication present    Essential hypertension    Postoperative hypothyroidism    Intermittent palpitations    Trigeminal neuralgia  -     Mairann Escalante MD, Neurology, [facility]    Other orders  -     carBAMazepine (TEGRETOL-XR) 100 MG extended release tablet; Take 1 tablet by mouth 2 times daily        Assessment    1. Trigeminal neuralgia  2. Bruxism  3. Chronic sinusitis  4. Essential hypertension    Plan    1. Start tegretol  2. Will refer to neurology at her request  3. Follow up with dentistry for application of a dental appliance    No follow-ups on file.         Electronically signed by Porsha Law DO on 11/11/2021 at 12:28 PM    Porsha Law DO

## 2021-11-12 NOTE — TELEPHONE ENCOUNTER
Notify pt,  I have reviewed your recent results    Great!, thyroid hormones results are much better now. There is no need for a change in your therapy at this time.

## 2022-01-01 NOTE — PROGRESS NOTES
CHIEF COMPLAINT: CP/ARAYA/Palpitations/HTN    HISTORY OF PRESENT ILLNESS: Patient is a 54 y.o. female seen at the request of Carol Bunch DO    Patient seen in follow up. Hx of palpitations. Some ARAYA and some chest symptoms. Some palpitations and tachycardia.       Past Medical History:   Diagnosis Date    Allergic rhinitis     Anxiety     Atrial fibrillation (HCC)     Depression     Headache     Hypertension     Hypothyroidism     NAFLD (nonalcoholic fatty liver disease)     By CT    Obesity with body mass index (BMI) of 30.0 to 39.9 06/28/2020    Osteoarthritis     Sleep apnea     does not use C-Pap    Spinal stenosis, lumbar 01/14/2020    Severe central stenosis by CT of abdomen, L1-L2    Thyroid disease     PTC microcarcinoma (0.5 cm) treated by thyroidectomy and BERG 100 mci       Patient Active Problem List   Diagnosis    BPPV (benign paroxysmal positional vertigo)    SVT (supraventricular tachycardia) (HCC)    Anxiety    Dyspnea on exertion    Intermittent palpitations    Essential hypertension    Morbid obesity due to excess calories (HCC)    Palpitations    Chest pain    Thyroid cancer ([facility] [address]    Weight gain    Dizziness    Biliary colic    Obesity with body mass index (BMI) of 30.0 to 39.9    Spinal stenosis, lumbar    NAFLD (nonalcoholic fatty liver disease)    Abdominal pain    Hypothyroidism    Thyroid disease     Allergies   Allergen Reactions    Narcan [Naloxone Hcl] Anaphylaxis     Prolonged anesthesia     Fentanyl        Current Outpatient Medications   Medication Sig Dispense Refill    vitamin D 25 MCG (1000 UT) CAPS Take by mouth Pt taking 2000 daily      levothyroxine (SYNTHROID) 75 MCG tablet Take one tablet daily M-Sa, none on Simeon (Patient taking differently: 75 mcg Daily ) 90 tablet 2    TOPROL XL 50 MG extended release tablet Take 1 tablet by mouth daily (Patient taking differently: Take 25 mg by mouth daily Take 1/2 tablet daily) 180 tablet 3    ALPRAZolam (XANAX) 1 MG tablet Take 1 mg by mouth three times daily. No current facility-administered medications for this visit. Social History     Socioeconomic History    Marital status:      Spouse name: Jerrica Alfaro Number of children: 3    Years of education: Not on file    Highest education level: Not on file   Occupational History    Occupation: homemaker   Tobacco Use    Smoking status: Never Smoker    Smokeless tobacco: Never Used   Vaping Use    Vaping Use: Never used   Substance and Sexual Activity    Alcohol use: No    Drug use: No    Sexual activity: Yes     Partners: Male   Other Topics Concern    Not on file   Social History Narrative    Lives in a house with  who is disabled and one child. Homeschools her daughter. 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[place] [address], [address] Strain:     Difficulty of Paying Living Expenses:    Food Insecurity:     Worried About [address] in the Last Year:     [address] [employer] in the Last Year:    Transportation Needs:     Lack of Transportation (Medical):      Lack of Transportation (Non-Medical):    Physical Activity:     Days of Exercise per Week:     Minutes of Exercise per Session:    Stress:     Feeling of Stress :    Social Connections:     Frequency of Communication with Friends and Family:     Frequency of Social Gatherings with Friends and Family:     Attends Mandaeism Services:     Active Member of Clubs or Organizations:     Attends Club or Organization Meetings:     Marital Status:    Intimate Partner Violence:     Fear of Current or Ex-Partner:     Emotionally Abused:     Physically Abused:     Sexually Abused:        Family History   Problem Relation Age of Onset    High Cholesterol Mother         low blood pressure    Arrhythmia Mother     High Blood Pressure Father     Cancer Father         leukemia, lung cancer +smoker, brain cancer ataxia    Other Sister         nephrectomy due to a benign tumor    Breast Cancer Paternal Grandmother     Other Paternal Grandmother         bowel    Other Daughter         IGG immunodeficiency    Cancer Maternal Grandmother         lung, cervical    Heart Disease Maternal Grandfather     Atrial Fibrillation Paternal Grandfather      Review of Systems:  Heart: as above   Lungs: as above   Eyes: denies changes in vision or discharge. Ears: denies changes in hearing or pain. Nose: denies epistaxis or masses   Throat: denies sore throat or trouble swallowing. Neuro: denies numbness, tingling, tremors. Skin: denies rashes or itching. : denies hematuria, dysuria   GI: denies vomiting, diarrhea   Psych: denies mood changed, anxiety, depression. All other systems negative. Physical Exam   /78   Pulse 72   Resp 18   Ht 5' 4\" (1.626 m)   Wt 193 lb 9.6 oz (87.8 kg)   LMP 01/01/2013   BMI 33.23 kg/m²   Constitutional: Oriented to person, place, and time. Well-developed and well-nourished. No distress. Head: Normocephalic and atraumatic. Eyes: EOM are normal. Pupils are equal, round, and reactive to light. Neck: Normal range of motion. Neck supple. No hepatojugular reflux and no JVD present. Carotid bruit is not present. No tracheal deviation present. No thyromegaly present. Cardiovascular: Normal rate, regular rhythm, normal heart sounds and intact distal pulses. Exam reveals no gallop and no friction rub. No murmur heard. Pulmonary/Chest: Effort normal and breath sounds normal. No respiratory distress. No wheezes. No rales. No tenderness. Abdominal: Soft. Bowel sounds are normal. No distension and no mass. No tenderness. No rebound and no guarding. Musculoskeletal: Normal range of motion. Tender lump left lateral arm. Lymphadenopathy:   No cervical adenopathy. No groin adenopathy. Neurological: Alert and oriented to person, place, and time.    Skin: Skin is warm and dry. No rash noted. Not diaphoretic. No erythema. Psychiatric: Normal mood and affect. Behavior is normal.     EKG:  normal sinus rhythm, nonspecific ST and T waves changes. ASSESSMENT AND PLAN:  Patient Active Problem List   Diagnosis    BPPV (benign paroxysmal positional vertigo)    SVT (supraventricular tachycardia) (HCC)    Anxiety    Dyspnea on exertion    Intermittent palpitations    Essential hypertension    Morbid obesity due to excess calories (HCC)    Palpitations    Chest pain    Thy[facility] cancer ([facility] [address]    Weight gain    Dizziness    Biliary colic    Obesity with body mass index (BMI) of 30.0 to 39.9    Spinal stenosis, lumbar    NAFLD (nonalcoholic fatty liver disease)    Abdominal pain    Hypothyroidism    Thyroid disease     1. CP/ARAYA: Stress. 2. Palpitations/Hx of SVT ablation:  Echo normal 7/16.      30 day monitor unremarkable aside from PVC's and PAC's 9/16.      Pharm stress normal 11/1/16. Still with intermittent episodes with symptoms.      Continue Toprol. Following with EP. Has LINQ. 3. Hypothyroid: Following with Endo. 4. HTN: Observe. 5. OLU    6. Orthostatic symptoms: Hose and hydration. Meenu Harris D.O.   Cardiologist  Cardiology, [facility]    Statement Selected

## 2022-02-01 ENCOUNTER — TELEPHONE (OUTPATIENT)
Dept: NON INVASIVE DIAGNOSTICS | Age: 57
End: 2022-02-01

## 2022-02-01 ENCOUNTER — OFFICE VISIT (OUTPATIENT)
Dept: PRIMARY CARE CLINIC | Age: 57
End: 2022-02-01
Payer: COMMERCIAL

## 2022-02-01 VITALS
HEART RATE: 92 BPM | WEIGHT: 207 LBS | RESPIRATION RATE: 18 BRPM | DIASTOLIC BLOOD PRESSURE: 84 MMHG | BODY MASS INDEX: 35.34 KG/M2 | OXYGEN SATURATION: 97 % | SYSTOLIC BLOOD PRESSURE: 122 MMHG | HEIGHT: 64 IN | TEMPERATURE: 97.4 F

## 2022-02-01 DIAGNOSIS — N39.0 URINARY TRACT INFECTION WITHOUT HEMATURIA, SITE UNSPECIFIED: ICD-10-CM

## 2022-02-01 DIAGNOSIS — R73.02 IMPAIRED GLUCOSE TOLERANCE: ICD-10-CM

## 2022-02-01 DIAGNOSIS — R73.02 IMPAIRED GLUCOSE TOLERANCE: Primary | ICD-10-CM

## 2022-02-01 DIAGNOSIS — R10.9 ABDOMINAL PAIN, UNSPECIFIED ABDOMINAL LOCATION: ICD-10-CM

## 2022-02-01 DIAGNOSIS — R10.9 ABDOMINAL PAIN, UNSPECIFIED ABDOMINAL LOCATION: Primary | ICD-10-CM

## 2022-02-01 DIAGNOSIS — E89.0 POSTOPERATIVE HYPOTHYROIDISM: ICD-10-CM

## 2022-02-01 DIAGNOSIS — N39.0 URINARY TRACT INFECTION WITHOUT HEMATURIA, SITE UNSPECIFIED: Primary | ICD-10-CM

## 2022-02-01 LAB
ALBUMIN SERPL-MCNC: 4.7 G/DL (ref 3.5–5.2)
ALP BLD-CCNC: 74 U/L (ref 35–104)
ALT SERPL-CCNC: 10 U/L (ref 0–32)
ANION GAP SERPL CALCULATED.3IONS-SCNC: 15 MMOL/L (ref 7–16)
AST SERPL-CCNC: 13 U/L (ref 0–31)
BILIRUB SERPL-MCNC: 0.7 MG/DL (ref 0–1.2)
BILIRUBIN, POC: NEGATIVE
BLOOD URINE, POC: NORMAL
BUN BLDV-MCNC: 19 MG/DL (ref 6–20)
CALCIUM SERPL-MCNC: 10.3 MG/DL (ref 8.6–10.2)
CHLORIDE BLD-SCNC: 101 MMOL/L (ref 98–107)
CLARITY, POC: CLEAR
CO2: 24 MMOL/L (ref 22–29)
COLOR, POC: YELLOW
CREAT SERPL-MCNC: 0.7 MG/DL (ref 0.5–1)
GFR AFRICAN AMERICAN: >60
GFR NON-AFRICAN AMERICAN: >60 ML/MIN/1.73
GLUCOSE BLD-MCNC: 105 MG/DL (ref 74–99)
GLUCOSE URINE, POC: NEGATIVE
HBA1C MFR BLD: 5.5 % (ref 4–5.6)
HCT VFR BLD CALC: 42.3 % (ref 34–48)
HEMOGLOBIN: 13.6 G/DL (ref 11.5–15.5)
KETONES, POC: NEGATIVE
LEUKOCYTE EST, POC: NORMAL
MCH RBC QN AUTO: 29.5 PG (ref 26–35)
MCHC RBC AUTO-ENTMCNC: 32.2 % (ref 32–34.5)
MCV RBC AUTO: 91.8 FL (ref 80–99.9)
NITRITE, POC: NEGATIVE
PDW BLD-RTO: 12.2 FL (ref 11.5–15)
PH, POC: 5.5
PLATELET # BLD: 286 E9/L (ref 130–450)
PMV BLD AUTO: 10 FL (ref 7–12)
POTASSIUM SERPL-SCNC: 5.5 MMOL/L (ref 3.5–5)
PROTEIN, POC: NEGATIVE
RBC # BLD: 4.61 E12/L (ref 3.5–5.5)
SODIUM BLD-SCNC: 140 MMOL/L (ref 132–146)
SPECIFIC GRAVITY, POC: 1.02
TOTAL PROTEIN: 7.7 G/DL (ref 6.4–8.3)
TSH SERPL DL<=0.05 MIU/L-ACNC: 2.29 UIU/ML (ref 0.27–4.2)
UROBILINOGEN, POC: 0.2
WBC # BLD: 5.8 E9/L (ref 4.5–11.5)

## 2022-02-01 PROCEDURE — G8428 CUR MEDS NOT DOCUMENT: HCPCS | Performed by: INTERNAL MEDICINE

## 2022-02-01 PROCEDURE — 99213 OFFICE O/P EST LOW 20 MIN: CPT | Performed by: INTERNAL MEDICINE

## 2022-02-01 PROCEDURE — 1036F TOBACCO NON-USER: CPT | Performed by: INTERNAL MEDICINE

## 2022-02-01 PROCEDURE — G8484 FLU IMMUNIZE NO ADMIN: HCPCS | Performed by: INTERNAL MEDICINE

## 2022-02-01 PROCEDURE — 81002 URINALYSIS NONAUTO W/O SCOPE: CPT | Performed by: INTERNAL MEDICINE

## 2022-02-01 PROCEDURE — 3017F COLORECTAL CA SCREEN DOC REV: CPT | Performed by: INTERNAL MEDICINE

## 2022-02-01 PROCEDURE — G8417 CALC BMI ABV UP PARAM F/U: HCPCS | Performed by: INTERNAL MEDICINE

## 2022-02-01 RX ORDER — NITROFURANTOIN 25; 75 MG/1; MG/1
100 CAPSULE ORAL 2 TIMES DAILY
Qty: 6 CAPSULE | Refills: 0 | Status: SHIPPED | OUTPATIENT
Start: 2022-02-01 | End: 2022-02-04

## 2022-02-01 ASSESSMENT — PATIENT HEALTH QUESTIONNAIRE - PHQ9
SUM OF ALL RESPONSES TO PHQ QUESTIONS 1-9: 0
2. FEELING DOWN, DEPRESSED OR HOPELESS: 0
1. LITTLE INTEREST OR PLEASURE IN DOING THINGS: 0
SUM OF ALL RESPONSES TO PHQ9 QUESTIONS 1 & 2: 0
SUM OF ALL RESPONSES TO PHQ QUESTIONS 1-9: 0

## 2022-02-01 NOTE — TELEPHONE ENCOUNTER
See Emily Banegas report from today    Low Battery Indicator    ILR Alert for Symptom Activated Transmission & Low Battery Indicator    Implant Indication: Palpitations  Low Battery Detected on 1/31/2022); device may reach EOS w/in 1 month of this date  Arrhythmias: None  Presenting Rhythm: NSR    Spoke to [address] [place] [address] regarding low battery. She is requesting it be explanted when it reaches EOS.

## 2022-02-01 NOTE — PROGRESS NOTES
2/1/22    Remington Kaur, a female of 64 y.o. presents today for Hearing Problem, Other (pt states numbess in right cheek ), Chest Pain, and Other (pt states pain in uterus )    At last appointment, started on Tegretol for possible trigeminal neuralgia. She was referred to neurologist as well. She is having lower abdomen pain. She has been having this issue for several months. She is not having any changes in her bowels. Allergies   Allergen Reactions    Fentanyl      Current Outpatient Medications on File Prior to Visit   Medication Sig Dispense Refill    carBAMazepine (TEGRETOL-XR) 100 MG extended release tablet Take 1 tablet by mouth 2 times daily 60 tablet 3    Handicap Placard MISC by Does not apply route 1 each 0    SYNTHROID 88 MCG tablet Take 1 tablet by mouth Daily 30 tablet 5    metoprolol succinate (TOPROL XL) 25 MG extended release tablet Take 1 tablet by mouth daily 90 tablet 3    vitamin D 25 MCG (1000 UT) CAPS Take by mouth Pt taking 2000 daily      ALPRAZolam (XANAX) 1 MG tablet Take 1 mg by mouth three times daily.  naproxen (NAPROSYN) 500 MG tablet Take 1 tablet by mouth 2 times daily (with meals) (Patient not taking: Reported on 11/11/2021) 60 tablet 0     No current facility-administered medications on file prior to visit.      Patient Active Problem List    Diagnosis Date Noted    Hypothyroidism     Thyroid disease     Abdominal pain 09/05/2020    Obesity with body mass index (BMI) of 30.0 to 39.9 06/28/2020    NAFLD (nonalcoholic fatty liver disease)     Biliary colic 78/95/4672    Spinal stenosis, lumbar 01/14/2020    Thyroid cancer ([facility] [address] 01/17/2018    Weight gain 01/17/2018    Dizziness 01/17/2018    Palpitations     Chest pain     Dyspnea on exertion 08/25/2016    Intermittent palpitations 08/25/2016    Essential hypertension 08/25/2016    Morbid obesity due to excess calories ([address] 08/25/2016    SVT (supraventricular tachycardia) ([facility] [address] 07/24/2016    Anxiety 07/24/2016    BPPV (benign paroxysmal positional vertigo) 06/27/2016       Review of Systems  Constitutional:Negative for activity change, appetite change, chills, fatigue and fever. Respiratory: Negative for choking, chest tightness, shortness of breath and wheezing. Cardiovascular: Negative for chest pain, palpitations and leg swelling. Gastrointestinal: Negative for abdominal distention, constipation, diarrhea, nausea and vomiting. Musculoskeletal: Negative for arthralgias, back pain, gait problem and joint swelling. Neurological: Negative for dizziness, weakness,numbness and headaches. /84   Pulse 92   Temp 97.4 °F (36.3 °C) (Temporal)   Resp 18   Ht 5' 4\" (1.626 m)   Wt 207 lb (93.9 kg)   LMP 01/01/2013   SpO2 97%   BMI 35.53 kg/m²      Physical Exam   Constitutional:  Oriented to person, place, and time. Appears well-developed and well-nourished. No acute distress. HENT: No sinus tenderness or lymphadenopathy  Head: Normocephalic and atraumatic. Eyes: Eyes exhibits no discharge. No scleral icterus present. Neck: No tracheal deviation present. No thyromegaly present. Cardiovascular: Normal rate, regular rhythm, normal heart sounds and intact distal pulses. Exam reveals no gallop nor friction rub. No murmur heard. Pulmonary: Effort normal and breath sounds normal. No respiratory distress. No wheezes or rales. Abdomen: No signs of rigidity rebound or organomegaly  Musculoskeletal:  No tenderness to palpation  Neurological:Alert and oriented to person, place, and time. Skin: No diaphoresis. Psychiatric: Normal mood and affect. Behavior is Normal.     ASSESSMENT AND PLAN:    Assessment     Diagnoses and all orders for this visit:  Abdominal pain, unspecified abdominal location  -     CBC; Future  -     Comprehensive Metabolic Panel;  Future  -     CT ABDOMEN PELVIS WO CONTRAST Additional Contrast? None; Future  Postoperative hypothyroidism  - TSH without Reflex; Future      Plan    1. Obtain blood work including TSH rule out any metabolic causes to her symptoms  2. Defer any changes to Tegretol due to upcoming neurology appointment  3. I will obtain CAT scan of the abdomen to rule out intra-abdominal or pelvic pathology to her lower abdominal pain  4. I will also obtain a urinalysis today    Return if symptoms worsen or fail to improve.         Electronically signed by Shawn Spaulding DO on 2/1/2022 at 12:31 PM    Shawn pSaulding DO

## 2022-02-04 ENCOUNTER — TELEPHONE (OUTPATIENT)
Dept: PRIMARY CARE CLINIC | Age: 57
End: 2022-02-04

## 2022-02-04 RX ORDER — CIPROFLOXACIN 500 MG/1
500 TABLET, FILM COATED ORAL 2 TIMES DAILY
Qty: 20 TABLET | Refills: 0 | Status: SHIPPED
Start: 2022-02-04 | End: 2022-02-07 | Stop reason: ALTCHOICE

## 2022-02-07 ENCOUNTER — APPOINTMENT (OUTPATIENT)
Dept: CT IMAGING | Age: 57
End: 2022-02-07
Payer: COMMERCIAL

## 2022-02-07 ENCOUNTER — HOSPITAL ENCOUNTER (EMERGENCY)
Age: 57
Discharge: HOME | End: 2022-02-08
Attending: EMERGENCY MEDICINE
Payer: COMMERCIAL

## 2022-02-07 ENCOUNTER — TELEPHONE (OUTPATIENT)
Dept: PRIMARY CARE CLINIC | Age: 57
End: 2022-02-07

## 2022-02-07 VITALS
RESPIRATION RATE: 20 BRPM | OXYGEN SATURATION: 98 % | BODY MASS INDEX: 33.32 KG/M2 | HEART RATE: 88 BPM | TEMPERATURE: 98 F | WEIGHT: 200 LBS | SYSTOLIC BLOOD PRESSURE: 150 MMHG | DIASTOLIC BLOOD PRESSURE: 96 MMHG | HEIGHT: 65 IN

## 2022-02-07 DIAGNOSIS — E86.0 DEHYDRATION, MILD: ICD-10-CM

## 2022-02-07 DIAGNOSIS — R03.0 ELEVATED BLOOD PRESSURE READING: ICD-10-CM

## 2022-02-07 DIAGNOSIS — R10.9 CHRONIC ABDOMINAL PAIN: ICD-10-CM

## 2022-02-07 DIAGNOSIS — G89.29 CHRONIC ABDOMINAL PAIN: ICD-10-CM

## 2022-02-07 DIAGNOSIS — K59.00 CONSTIPATION, UNSPECIFIED CONSTIPATION TYPE: ICD-10-CM

## 2022-02-07 DIAGNOSIS — R10.31 RIGHT LOWER QUADRANT ABDOMINAL PAIN: Primary | ICD-10-CM

## 2022-02-07 LAB
ALBUMIN SERPL-MCNC: 4.4 G/DL (ref 3.5–5.2)
ALP BLD-CCNC: 70 U/L (ref 35–104)
ALT SERPL-CCNC: 9 U/L (ref 0–32)
ANION GAP SERPL CALCULATED.3IONS-SCNC: 12 MMOL/L (ref 7–16)
AST SERPL-CCNC: 13 U/L (ref 0–31)
BACTERIA: ABNORMAL /HPF
BASOPHILS ABSOLUTE: 0.04 E9/L (ref 0–0.2)
BASOPHILS RELATIVE PERCENT: 0.8 % (ref 0–2)
BILIRUB SERPL-MCNC: 0.5 MG/DL (ref 0–1.2)
BILIRUBIN URINE: NEGATIVE
BLOOD, URINE: ABNORMAL
BUN BLDV-MCNC: 16 MG/DL (ref 6–20)
CALCIUM SERPL-MCNC: 9.5 MG/DL (ref 8.6–10.2)
CHLORIDE BLD-SCNC: 103 MMOL/L (ref 98–107)
CLARITY: CLEAR
CO2: 28 MMOL/L (ref 22–29)
COLOR: YELLOW
CREAT SERPL-MCNC: 1 MG/DL (ref 0.5–1)
EOSINOPHILS ABSOLUTE: 0.12 E9/L (ref 0.05–0.5)
EOSINOPHILS RELATIVE PERCENT: 2.3 % (ref 0–6)
GFR AFRICAN AMERICAN: >60
GFR NON-AFRICAN AMERICAN: 57 ML/MIN/1.73
GLUCOSE BLD-MCNC: 101 MG/DL (ref 74–99)
GLUCOSE URINE: NEGATIVE MG/DL
HCT VFR BLD CALC: 39.4 % (ref 34–48)
HEMOGLOBIN: 13.4 G/DL (ref 11.5–15.5)
IMMATURE GRANULOCYTES #: 0.01 E9/L
IMMATURE GRANULOCYTES %: 0.2 % (ref 0–5)
KETONES, URINE: NEGATIVE MG/DL
LACTIC ACID: 1.4 MMOL/L (ref 0.5–2.2)
LEUKOCYTE ESTERASE, URINE: ABNORMAL
LIPASE: 24 U/L (ref 13–60)
LYMPHOCYTES ABSOLUTE: 2.27 E9/L (ref 1.5–4)
LYMPHOCYTES RELATIVE PERCENT: 42.6 % (ref 20–42)
MCH RBC QN AUTO: 30 PG (ref 26–35)
MCHC RBC AUTO-ENTMCNC: 34 % (ref 32–34.5)
MCV RBC AUTO: 88.1 FL (ref 80–99.9)
MONOCYTES ABSOLUTE: 0.43 E9/L (ref 0.1–0.95)
MONOCYTES RELATIVE PERCENT: 8.1 % (ref 2–12)
NEUTROPHILS ABSOLUTE: 2.46 E9/L (ref 1.8–7.3)
NEUTROPHILS RELATIVE PERCENT: 46 % (ref 43–80)
NITRITE, URINE: POSITIVE
PDW BLD-RTO: 11.9 FL (ref 11.5–15)
PH UA: 7.5 (ref 5–9)
PLATELET # BLD: 258 E9/L (ref 130–450)
PMV BLD AUTO: 9.2 FL (ref 7–12)
POTASSIUM SERPL-SCNC: 3.8 MMOL/L (ref 3.5–5)
PROTEIN UA: NEGATIVE MG/DL
RBC # BLD: 4.47 E12/L (ref 3.5–5.5)
RBC UA: ABNORMAL /HPF (ref 0–2)
SODIUM BLD-SCNC: 143 MMOL/L (ref 132–146)
SPECIFIC GRAVITY UA: 1.02 (ref 1–1.03)
TOTAL PROTEIN: 7.4 G/DL (ref 6.4–8.3)
UROBILINOGEN, URINE: 0.2 E.U./DL
WBC # BLD: 5.3 E9/L (ref 4.5–11.5)
WBC UA: ABNORMAL /HPF (ref 0–5)

## 2022-02-07 PROCEDURE — 2580000003 HC RX 258: Performed by: EMERGENCY MEDICINE

## 2022-02-07 PROCEDURE — 96360 HYDRATION IV INFUSION INIT: CPT

## 2022-02-07 PROCEDURE — 85025 COMPLETE CBC W/AUTO DIFF WBC: CPT

## 2022-02-07 PROCEDURE — 74177 CT ABD & PELVIS W/CONTRAST: CPT

## 2022-02-07 PROCEDURE — 6360000004 HC RX CONTRAST MEDICATION: Performed by: STUDENT IN AN ORGANIZED HEALTH CARE EDUCATION/TRAINING PROGRAM

## 2022-02-07 PROCEDURE — 81001 URINALYSIS AUTO W/SCOPE: CPT

## 2022-02-07 PROCEDURE — 2580000003 HC RX 258: Performed by: STUDENT IN AN ORGANIZED HEALTH CARE EDUCATION/TRAINING PROGRAM

## 2022-02-07 PROCEDURE — 99284 EMERGENCY DEPT VISIT MOD MDM: CPT

## 2022-02-07 PROCEDURE — 80053 COMPREHEN METABOLIC PANEL: CPT

## 2022-02-07 PROCEDURE — 83690 ASSAY OF LIPASE: CPT

## 2022-02-07 PROCEDURE — 36415 COLL VENOUS BLD VENIPUNCTURE: CPT

## 2022-02-07 PROCEDURE — 83605 ASSAY OF LACTIC ACID: CPT

## 2022-02-07 RX ORDER — SODIUM CHLORIDE 0.9 % (FLUSH) 0.9 %
10 SYRINGE (ML) INJECTION PRN
Status: COMPLETED | OUTPATIENT
Start: 2022-02-07 | End: 2022-02-07

## 2022-02-07 RX ORDER — DOCUSATE SODIUM 100 MG/1
100 CAPSULE, LIQUID FILLED ORAL 2 TIMES DAILY
Qty: 12 CAPSULE | Refills: 1 | Status: SHIPPED | OUTPATIENT
Start: 2022-02-07 | End: 2022-02-28

## 2022-02-07 RX ORDER — KETOROLAC TROMETHAMINE 30 MG/ML
30 INJECTION, SOLUTION INTRAMUSCULAR; INTRAVENOUS ONCE
Status: DISCONTINUED | OUTPATIENT
Start: 2022-02-07 | End: 2022-02-08 | Stop reason: HOSPADM

## 2022-02-07 RX ORDER — 0.9 % SODIUM CHLORIDE 0.9 %
1000 INTRAVENOUS SOLUTION INTRAVENOUS ONCE
Status: COMPLETED | OUTPATIENT
Start: 2022-02-07 | End: 2022-02-07

## 2022-02-07 RX ORDER — ONDANSETRON 4 MG/1
8 TABLET, ORALLY DISINTEGRATING ORAL EVERY 8 HOURS PRN
Qty: 10 TABLET | Refills: 0 | Status: SHIPPED | OUTPATIENT
Start: 2022-02-07 | End: 2022-02-28

## 2022-02-07 RX ORDER — DICYCLOMINE HYDROCHLORIDE 10 MG/1
20 CAPSULE ORAL
Qty: 15 CAPSULE | Refills: 0 | Status: SHIPPED | OUTPATIENT
Start: 2022-02-07 | End: 2022-02-07 | Stop reason: SDUPTHER

## 2022-02-07 RX ORDER — DICYCLOMINE HYDROCHLORIDE 10 MG/1
20 CAPSULE ORAL
Qty: 15 CAPSULE | Refills: 0 | Status: SHIPPED | OUTPATIENT
Start: 2022-02-07 | End: 2022-02-28

## 2022-02-07 RX ADMIN — SODIUM CHLORIDE 1000 ML: 9 INJECTION, SOLUTION INTRAVENOUS at 22:33

## 2022-02-07 RX ADMIN — IOPAMIDOL 75 ML: 755 INJECTION, SOLUTION INTRAVENOUS at 23:19

## 2022-02-07 RX ADMIN — SODIUM CHLORIDE, PRESERVATIVE FREE 10 ML: 5 INJECTION INTRAVENOUS at 23:19

## 2022-02-07 ASSESSMENT — PAIN SCALES - GENERAL: PAINLEVEL_OUTOF10: 7

## 2022-02-07 ASSESSMENT — PAIN DESCRIPTION - ONSET: ONSET: ON-GOING

## 2022-02-07 ASSESSMENT — PAIN DESCRIPTION - LOCATION: LOCATION: ABDOMEN

## 2022-02-07 ASSESSMENT — PAIN DESCRIPTION - PROGRESSION: CLINICAL_PROGRESSION: GRADUALLY WORSENING

## 2022-02-07 ASSESSMENT — PAIN DESCRIPTION - DESCRIPTORS: DESCRIPTORS: ACHING

## 2022-02-07 ASSESSMENT — PAIN DESCRIPTION - PAIN TYPE: TYPE: ACUTE PAIN

## 2022-02-07 ASSESSMENT — PAIN DESCRIPTION - FREQUENCY: FREQUENCY: CONTINUOUS

## 2022-02-07 NOTE — TELEPHONE ENCOUNTER
She should discontinue it, this is the second antibiotic over the past Ashley days.   I would like to monitor off of therapy altogether

## 2022-02-07 NOTE — Clinical Note
Rosie Royal was seen and treated in our emergency department on 2/7/2022. She may return to work on 02/09/2022. If you have any questions or concerns, please don't hesitate to call.       Mikala Juarez MD

## 2022-02-07 NOTE — TELEPHONE ENCOUNTER
----- Message from [employer] sent at 2/4/2022  9:58 AM EST -----  Subject: Message to Provider    QUESTIONS  Information for Provider? needs antibiotics changed. the one she is on is   making her really sick. please give pt a call.  ---------------------------------------------------------------------------  --------------  CALL BACK INFO  What is the best way for the office to contact you? OK to leave message on   voicemail, OK to respond with electronic message via [employer] portal (only   for patients who have registered [employer] account)  Preferred Call Back Phone Number? [phone]  ---------------------------------------------------------------------------  --------------  SCRIPT ANSWERS  Relationship to Patient?  Self

## 2022-02-08 PROCEDURE — 87088 URINE BACTERIA CULTURE: CPT

## 2022-02-08 NOTE — ED PROVIDER NOTES
HPI:  2/7/22, Time: 10:11 PM MICHELLE Casarez is a 64 y.o. female presenting to the ED for gradual onset RLQ abdominal pain, beginning 2-3 months ago. The complaint has been persistent, moderate in severity, and worsened by nothing. Pt. Has not had any hematemesis, no melena, no hematochezia, no fever/chills associated. Pt.rates her pain as 7/10 severity, non-radiating, no reported change in bladder habit patterns; however, the patient recently finished a course of Macrobid for UTI. Pt. States she has had some intermittent diarrhea ovrer the past 1-2 days. No relieving factors reported. Pt. Denies any upper abdominal pain, no chest pain/pressure/heavinesss reported, no shortness of breath. No other complaints. Review of Systems:   A complete review of systems was performed and pertinent positives and negatives are stated within HPI, all other systems reviewed and are negative.    --------------------------------------------- PAST HISTORY ---------------------------------------------  Past Medical History:  has a past medical history of Allergic rhinitis, Anxiety, Atrial fibrillation ([address] Depression, Headache, Hypertension, Hypothyroidism, NAFLD (nonalcoholic fatty liver disease), Obesity with body mass index (BMI) of 30.0 to 39.9, Osteoarthritis, Sleep apnea, Spinal stenosis, lumbar, and Thyroid disease. Past Surgical History:  has a past surgical history that includes ablation of dysrhythmic focus (1990); hysteroscopy (8/15/12); Colonoscopy; Endometrial ablation (2012); Thyroidectomy (Bilateral, 01/19/2017); Breast surgery; Hysterectomy, vaginal; other surgical history (12/23/2019); Insertable Cardiac Monitor (12/23/2019); Gallbladder surgery (07/2020); and Cholecystectomy. Social History:  reports that she has never smoked. She has never used smokeless tobacco. She reports that she does not drink alcohol and does not use drugs.     Family History: family history includes Arrhythmia in her mother; Atrial Fibrillation in her paternal grandfather; Breast Cancer in her paternal grandmother; Cancer in her father and maternal grandmother; Heart Disease in her maternal grandfather; High Blood Pressure in her father; High Cholesterol in her mother; Other in her daughter, paternal grandmother, and sister. The patients home medications have been reviewed.     Allergies: Fentanyl    -------------------------------------------------- RESULTS -------------------------------------------------  All laboratory and radiology results have been personally reviewed by myself   LABS:  Results for orders placed or performed during the hospital encounter of 02/07/22   CBC Auto Differential   Result Value Ref Range    WBC 5.3 4.5 - 11.5 E9/L    RBC 4.47 3.50 - 5.50 E12/L    Hemoglobin 13.4 11.5 - 15.5 g/dL    Hematocrit 39.4 34.0 - 48.0 %    MCV 88.1 80.0 - 99.9 fL    MCH 30.0 26.0 - 35.0 pg    MCHC 34.0 32.0 - 34.5 %    RDW 11.9 11.5 - 15.0 fL    Platelets 180 533 - 201 E9/L    MPV 9.2 7.0 - 12.0 fL    Neutrophils % 46.0 43.0 - 80.0 %    Immature Granulocytes % 0.2 0.0 - 5.0 %    Lymphocytes % 42.6 (H) 20.0 - 42.0 %    Monocytes % 8.1 2.0 - 12.0 %    Eosinophils % 2.3 0.0 - 6.0 %    Basophils % 0.8 0.0 - 2.0 %    Neutrophils Absolute 2.46 1.80 - 7.30 E9/L    Immature Granulocytes # 0.01 E9/L    Lymphocytes Absolute 2.27 1.50 - 4.00 E9/L    Monocytes Absolute 0.43 0.10 - 0.95 E9/L    Eosinophils Absolute 0.12 0.05 - 0.50 E9/L    Basophils Absolute 0.04 0.00 - 0.20 E9/L   Comprehensive Metabolic Panel   Result Value Ref Range    Sodium 143 132 - 146 mmol/L    Potassium 3.8 3.5 - 5.0 mmol/L    Chloride 103 98 - 107 mmol/L    CO2 28 22 - 29 mmol/L    Anion Gap 12 7 - 16 mmol/L    Glucose 101 (H) 74 - 99 mg/dL    BUN 16 6 - 20 mg/dL    CREATININE 1.0 0.5 - 1.0 mg/dL    GFR Non-African American 57 >=60 mL/min/1.73    GFR African American >60     Calcium 9.5 8.6 - 10.2 mg/dL    Total Protein 7.4 6.4 - 8.3 g/dL    Albumin 4.4 3.5 - 5.2 g/dL    Total Bilirubin 0.5 0.0 - 1.2 mg/dL    Alkaline Phosphatase 70 35 - 104 U/L    ALT 9 0 - 32 U/L    AST 13 0 - 31 U/L   Lipase   Result Value Ref Range    Lipase 24 13 - 60 U/L   Lactic Acid, Plasma   Result Value Ref Range    Lactic Acid 1.4 0.5 - 2.2 mmol/L   Urinalysis   Result Value Ref Range    Color, UA Yellow Straw/Yellow    Clarity, UA Clear Clear    Glucose, Ur Negative Negative mg/dL    Bilirubin Urine Negative Negative    Ketones, Urine Negative Negative mg/dL    Specific Gravity, UA 1.020 1.005 - 1.030    Blood, Urine TRACE-INTACT Negative    pH, UA 7.5 5.0 - 9.0    Protein, UA Negative Negative mg/dL    Urobilinogen, Urine 0.2 <2.0 E.U./dL    Nitrite, Urine POSITIVE (A) Negative    Leukocyte Esterase, Urine TRACE (A) Negative   Microscopic Urinalysis   Result Value Ref Range    WBC, UA 0-1 0 - 5 /HPF    RBC, UA 0-1 0 - 2 /HPF    Bacteria, UA RARE (A) None Seen /HPF       RADIOLOGY:  Interpreted by Radiologist.  CT ABDOMEN PELVIS W IV CONTRAST Additional Contrast? None   Final Result   Normal appearing appendix. Colonic fecal retention. Stable 2.0 cm enhancing lesion inferior portion of the liver unchanged   compared to 10/04/2021. Cholecystectomy. ------------------------- NURSING NOTES AND VITALS REVIEWED ---------------------------    The nursing notes within the ED encounter and vital signs as below have been reviewed.    BP (!) 150/96   Pulse 88   Temp 98 °F (36.7 °C) (Temporal)   Resp 20   Ht 5' 5\" (1.651 m)   Wt 200 lb (90.7 kg)   LMP 01/01/2013   SpO2 98%   BMI 33.28 kg/m²   Oxygen Saturation Interpretation: Normal    ---------------------------------------------------PHYSICAL EXAM--------------------------------------    Constitutional/General: Alert and oriented x3, well appearing, non toxic in mild distress  Head: Normocephalic and atraumatic  Eyes: PERRL, EOMI  Mouth: Oropharynx clear, handling secretions, no trismus  Neck: Supple, full ROM, no JVD. Trachea midline  Pulmonary: Lungs clear to auscultation bilaterally, no wheezes, rales, or rhonchi. Not in respiratory distress  Cardiovascular:  Regular rate and rhythm, no murmurs, gallops, or rubs. 2+ distal pulses  Abdomen: Soft, non distended, mild tenderness to palpation RLQ, no rebound tenderness, no guarding, no rigidity, no organomegaly, no masses, normal BS  Extremities: Moves all extremities x 4. Warm and well perfused  Skin: warm and dry without rash  Neurologic: GCS 15, CN's 2-12 grossly intact, no rebound, no guarding, no rigidity, normal BS. Psych: Normal Affect    ------------------------------ ED COURSE/MEDICAL DECISION MAKING----------------------  Medications   ketorolac (TORADOL) injection 30 mg (has no administration in time range)   0.9 % sodium chloride bolus (0 mLs IntraVENous Stopped 2/7/22 2339)   iopamidol (ISOVUE-370) 76 % injection 75 mL (75 mLs IntraVENous Given 2/7/22 2319)   sodium chloride flush 0.9 % injection 10 mL (10 mLs IntraVENous Given 2/7/22 2319)       ED COURSE:     Medical Decision Making:   Differential Diagnoses:  Appendicitis, UTI/Cystitis, Ureteral Calculus, Meseteric Adenitis, SBO, IBS, to name a few. Patient's urine showed no pyuria and no bacteriuria but did show positive nitrites; again she just finished a course of Macrobid for UTI. Urine culture was sent. Patient's CT abdominal study showed a normal appendix with colonic fecal retention. Patient has no evidence of any visceral perforation, no bowel obstruction, no inflammatory findings. Patient is referred to her PCP for outpatient follow-up. She states she also has a gastroenterologist whom she is seen previously and she reportedly has a upcoming colonoscopy pending. Patient understands she is to get immediate medical attention if any new/worsening symptoms occur. At this time the patient is without objective evidence of an acute process requiring hospitalization or inpatient management. They have remained hemodynamically stable throughout their entire ED visit and are stable for discharge with outpatient follow-up. The plan has been discussed in detail and they are aware of the specific conditions for emergent return, as well as the importance of follow-up. Counseling: The emergency provider has spoken with the patient and spouse/SO and discussed todays results, in addition to providing specific details for the plan of care and counseling regarding the diagnosis and prognosis. Questions are answered at this time and they are agreeable with the plan.      --------------------------------- IMPRESSION AND DISPOSITION ---------------------------------    IMPRESSION  1. Right lower quadrant abdominal pain    2. Constipation, unspecified constipation type    3. Dehydration, mild    4. Chronic abdominal pain        DISPOSITION  Disposition: Discharge to home  Patient condition is stable      NOTE: This report was transcribed using voice recognition software.  Every effort was made to ensure accuracy; however, inadvertent computerized transcription errors may be present        Kathleen Donovan MD  02/07/22 4763

## 2022-02-08 NOTE — TELEPHONE ENCOUNTER
Previous Messages       ----- Message -----   From: Angel Casas RN   Sent: 2/1/2022   9:58 AM EST   To: Stephanie Zimmerman MD     Hi Dr Davon Gonzalez,   Just an FYI, OK to schedule explant when ILR reaches EOS. Thanks   MD Angel Lyle RN  Sure. Thanks.

## 2022-02-11 LAB — URINE CULTURE, ROUTINE: NORMAL

## 2022-02-17 ENCOUNTER — HOSPITAL ENCOUNTER (OUTPATIENT)
Age: 57
Discharge: HOME | End: 2022-02-19

## 2022-02-17 PROCEDURE — 88305 TISSUE EXAM BY PATHOLOGIST: CPT

## 2022-02-18 ENCOUNTER — TELEPHONE (OUTPATIENT)
Dept: NON INVASIVE DIAGNOSTICS | Age: 57
End: 2022-02-18

## 2022-02-18 NOTE — TELEPHONE ENCOUNTER
Low Battery Indicator    ILR Alert: Battery End of Service Alert triggered 2/17/2022    Implant Indication: Palpitations  Arrhythmias: None  Symptom Transmissions: None  Presenting Rhythm: NSR    Additional Notes:  Patient requesting device be removed per telephone note in EPIC. Already reviewed with Dr Evelyn Bailey (see above).  Will forward to scheduling to schedule explant

## 2022-02-18 NOTE — TELEPHONE ENCOUNTER
Patient scheduled for procedure on 03/01/2022 with CK at 9:30, patient to arrive at 8:30. No restrictions and patient is vaccinated. She verbalized understanding.

## 2022-02-24 DIAGNOSIS — E03.9 HYPOTHYROIDISM (ACQUIRED): ICD-10-CM

## 2022-02-24 RX ORDER — LEVOTHYROXINE SODIUM 88 MCG
TABLET ORAL
Qty: 30 TABLET | Refills: 5 | Status: SHIPPED
Start: 2022-02-24 | End: 2022-08-15

## 2022-02-28 ENCOUNTER — TELEPHONE (OUTPATIENT)
Dept: CARDIAC CATH/INVASIVE PROCEDURES | Age: 57
End: 2022-02-28

## 2022-02-28 NOTE — TELEPHONE ENCOUNTER
Reminded patient of scheduled procedure on 3/1 . Instructions given and COVID questionnaire completed.

## 2022-03-01 ENCOUNTER — HOSPITAL ENCOUNTER (OUTPATIENT)
Dept: CARDIAC CATH/INVASIVE PROCEDURES | Age: 57
Discharge: HOME | End: 2022-03-01
Payer: COMMERCIAL

## 2022-03-01 VITALS
RESPIRATION RATE: 18 BRPM | TEMPERATURE: 99.7 F | DIASTOLIC BLOOD PRESSURE: 84 MMHG | HEART RATE: 69 BPM | WEIGHT: 190 LBS | SYSTOLIC BLOOD PRESSURE: 126 MMHG | BODY MASS INDEX: 31.65 KG/M2 | OXYGEN SATURATION: 99 % | HEIGHT: 65 IN

## 2022-03-01 PROCEDURE — 33286 RMVL SUBQ CAR RHYTHM MNTR: CPT | Performed by: INTERNAL MEDICINE

## 2022-03-01 PROCEDURE — 6360000002 HC RX W HCPCS

## 2022-03-01 PROCEDURE — 2580000003 HC RX 258

## 2022-03-01 PROCEDURE — 2500000003 HC RX 250 WO HCPCS

## 2022-03-01 PROCEDURE — 33286 RMVL SUBQ CAR RHYTHM MNTR: CPT

## 2022-03-01 NOTE — OP NOTE
[facility]) Physicians- The Heart and [address] Electrophysiology  Procedure Report  PATIENT: [facility] RECORD NUMBER: [id number]  DATE OF PROCEDURE:  3/1/2022  ATTENDING ELECTROPHYSIOLOGIST:  Ericka Pritchett MD  REFERRING PHYSICIAN: Dr. Carrillo Noble:   1.  Abbott Confirm Insertable Cardiac Monitor Explantation      INDICATION: Palpitations     PROCEDURE PERFORMED BY: Ericka Pritchett MD     ASSISTANT: None      ESTIMATED BLOOD LOSS: Minimal     PROCEDURE TIME: 30 minutes      COMPLICATIONS: None immediately apparent     DESCRIPTION OF PROCEDURE: The risks, benefits, and alternatives to the procedure were discussed with the patient, and informed consent was obtained. The patient was brought to the Electrophysiology lab and after postioning the patient and prepping and draping a sterile field, the region of the prior placed ICM, was liberally infiltrated with 15 ml of 2% Lidocaine. Using the surgical blade, a small incision was made at the old surgical scar of the pocket. Using blunt dissection , electrocautery and manual manipulation, the device was successfully removed out of the pocket. Hemostasis was achieved with manual pressure. The pocket was flushed with antibiotic solution. The incision was closed in 2 layers including subcutaneous and subcuticular tissues using 3-0 and 4-0 Vicryl, respectively. The wound was dressed with steri-strips and an op site dressing. The patient was transferred to the recovery room in stable condition.      SUMMARY:   1. Successful explantation of an Abbott Confirm insertable cardiac monitor.      RECOMMENDATIONS:   1. Discharge to home. 2. Resume all medications as taking at home. 3. Follow-up in the office in 2 weeks for postoperative incision check.   See discharge instructions for details.     Ericka Pritchett MD  Cardiac Electrophysiology  [facility]) Physicians  The Heart and Vascular [place]: [place] [facility]  10:58 AM  3/1/2022

## 2022-03-01 NOTE — H&P
[facility] CARDIOLOGY  CARDIAC ELECTROPHYSIOLOGY DEPARTMENT/DIVISION OF CARDIOLOGY  H&P  PATIENT: Liban Pastrana  MEDICAL RECORD NUMBER: [id number]  DATE OF SERVICE:  12/23/2019  ATTENDING ELECTROPHYSIOLOGIST:  Justino Lopez MD  REFERRING PHYSICIAN: Dandre Slater DO  CHIEF COMPLAINT: Palpitations    HPI: This is a 64 y.o. female with a history of SVT, anxiety, depression, thyroid cancer s/p complete thyroidectomy, obesity and HTN. Ms. Lilian Segura reported having a ablation 28 years ago with Dr. Ruthann Purcell, details unknown and she was is unsure of the ablated rhythm. She reported palpitations which she believed her palpitations were caused after her hysterectomy in 2018. She wore two heart monitors in early 2019 that were unremarkable and showed symptoms correlated with NSR. She subsequently underwent Abbott Confirm loop recorder implantation on 12/23/19. She was found to have EOS on 2/17/22 and opts for ILR explantation after risks, benefits and alternatives were discussed.      Patient Active Problem List    Diagnosis Date Noted    Status post placement of implantable loop recorder     Hypothyroidism     Thyroid disease      Overview Note:     PTC microcarcinoma (0.5 cm) treated by thyroidectomy and BERG 100 mci      Abdominal pain 09/05/2020    Obesity with body mass index (BMI) of 30.0 to 39.9 06/28/2020    NAFLD (nonalcoholic fatty liver disease)      Overview Note:     By CT      Biliary colic 78/10/4477    Spinal stenosis, lumbar 01/14/2020     Overview Note:     Severe central stenosis by CT of abdomen      Thyroid cancer ([address] 01/17/2018    Weight gain 01/17/2018    Dizziness 01/17/2018    Palpitations     Chest pain     Dyspnea on exertion 08/25/2016    Intermittent palpitations 08/25/2016    Essential hypertension 08/25/2016    Morbid obesity due to excess calories ([address] 08/25/2016    SVT (supraventricular tachycardia) ([address] 07/24/2016    Anxiety 07/24/2016    BPPV (benign paroxysmal positional vertigo) 06/27/2016       Past Medical History:   Diagnosis Date    Allergic rhinitis     Anxiety     [address]rial [address] [address]     Depression     Headache     Hypertension     Hypothyroidism     NAFLD (nonalcoholic fatty liver disease)     By CT    Obesity with body mass index (BMI) of 30.0 to 39.9 06/28/2020    Osteoarthritis     Sleep apnea     does not use C-Pap    Spinal stenosis, lumbar 01/14/2020    Severe central stenosis by CT of abdomen, L1-L2    Thyroid disease     PTC microcarcinoma (0.5 cm) treated by thyroidectomy and BERG 100 mci       Family History   Problem Relation Age of Onset    High Cholesterol Mother         low blood pressure    Arrhythmia Mother     High Blood Pressure Father     Cancer Father         leukemia, lung cancer +smoker, brain cancer ataxia    Other Sister         nephrectomy due to a benign tumor    Breast Cancer Paternal Grandmother     Other Paternal Grandmother         bowel    Other Daughter         IGG immunodeficiency    Cancer Maternal Grandmother         lung, cervical    Heart Disease Maternal Grandfather     Atrial Fibrillation Paternal Grandfather      There is no family history of sudden cardiac arrest    Social History     Tobacco Use    Smoking status: Never Smoker    Smokeless tobacco: Never Used   Substance Use Topics    Alcohol use: No       Current Outpatient Medications   Medication Sig Dispense Refill    SYNTHROID 88 MCG tablet TAKE ONE TABLET BY MOUTH ONCE DAILY 30 tablet 5    metoprolol succinate (TOPROL XL) 25 MG extended release tablet Take 1 tablet by mouth daily 90 tablet 3    ALPRAZolam (XANAX) 1 MG tablet Take 0.5 mg by mouth 4 times daily.  Handicap Placard MISC by Does not apply route 1 each 0     No current facility-administered medications for this encounter.         Allergies   Allergen Reactions    Fentanyl     No Known Allergies Other (See Comments)       ROS: Constitutional: Negative for fever, activity change and appetite change. HENT: Negative for epistaxis. Eyes: Negative for diploplia, blurred vision. Respiratory: Negative for cough, chest tightness, shortness of breath and wheezing. Cardiovascular: pertinent positives in HPI  Gastrointestinal: Negative for abdominal pain and blood in stool. Genitourinary: Negative for hematuria and difficulty urinating. Musculoskeletal: Negative for myalgias and gait problem. Skin: Negative for color change and rash. Neurological: Negative for syncope and light-headedness. Psychiatric/Behavioral: Negative for confusion and agitation. The patient is not nervous/anxious. Heme: no bleeding disorders, no melena or hematochezia  All other review of systems are negative     PHYSICAL EXAM:  Vitals:    03/01/22 0936   BP: 126/84   Pulse: 69   Resp: 18   Temp: 99.7 °F (37.6 °C)   TempSrc: Temporal   SpO2: 99%   Weight: 190 lb (86.2 kg)   Height: 5' 5\" (1.651 m)     Constitutional: Oriented to person, place, and time. Well-developed and cooperative. Head: Normocephalic and atraumatic. Eyes: Conjunctivae are normal. Pupils are equal, round, and reactive to light. Neck: No hepatojugular reflux and no JVD present. Carotid bruit is not present. Cardiovascular: S1 normal, S2 normal and intact distal pulses. A regular rhythm present. PMI is not displaced. Pulmonary/Chest: Effort normal and breath sounds normal. No respiratory distress. Abdominal: Soft. Normal appearance and bowel sounds are normal.  No abdominal bruit and no pulsatile midline mass. There is no hepatomegaly. No tenderness. Musculoskeletal: Normal range of motion of all extremities, no muscle weakness. Neurological: Alert and oriented to person, place, and time. Gait normal.   Skin: Skin is warm and dry. No bruising, no ecchymosis and no rash noted. Extremity: No clubbing or cyanosis. No edema. Psychiatric: Normal mood and affect.  Thought content normal.  ILR site: well healed. No erosion, infection or migration. Data:    No results for input(s): WBC, HGB, HCT, PLT in the last 72 hours. No results for input(s): NA, K, CL, CO2, BUN, CREATININE, GLU, CALCIUM in the last 72 hours. No results for input(s): INR in the last 72 hours. No results for input(s): TSH in the last 72 hours. Lab Results   Component Value Date    MG 1.7 2021       EC/10/19: NSR, rate: 79 bpm    Last Echo: 2019:        Event monitor 2019:      Larisa Blend 3/2019  -  no significant rhythm issues that correlate with her symptoms           Assessment:     1. ILR in situ  - DOI:  19.   - Indication: palpitations  - Found to have EOS on 22  - Opts for ILR explantation. Risks, benefits and alternatives were discussed. Risk including but not limited to bleeding and infection. She verbalizes understanding and agrees to proceed with the procedure. 2. Palpitations  - occurring intermittently--can last seconds or hours. - prior MCOT's this year correlate with SR  - Toprol XL 50 mg BID  - No significant arrhythmias seen thus far. 3. H/O SVT  - prior ablation ~ 28 years ago  - ? Rhythm ablated--Dr. General Najera    4. Thyroid CA  - occurred   - s/p complete thyroidectomy  - on Synthroid  Lab Results   Component Value Date    TSH 2.290 2022      5. HTN    6. Obesity  - Body mass index is 31.62 kg/m². 7. Anxiety/Depression  - on Xanax    Recommendations:    1. Will proceed with ILR explantation. 2. Follow up after the procedure. Encouraged the patient to call the office for any questions or concerns. Thank you very much to allowing me to participate in the patient's care.     Hilary Felton MD  Cardiac Electrophysiology  [address]  The Heart and Vascular [place]: [facility] [facility]  10:55 AM  3/1/2022

## 2022-03-05 RX ORDER — CARBAMAZEPINE 100 MG/1
TABLET, EXTENDED RELEASE ORAL
Qty: 60 TABLET | Refills: 3 | OUTPATIENT
Start: 2022-03-05

## 2022-03-07 ENCOUNTER — TELEPHONE (OUTPATIENT)
Dept: CARDIAC CATH/INVASIVE PROCEDURES | Age: 57
End: 2022-03-07

## 2022-03-07 NOTE — TELEPHONE ENCOUNTER
I called Jamilah Rosario to see how she's doing since her Linq removal on 3/1/22. There was no answer. I left a message instructing her to remove the opsite dressing tomorrow, to report any bleeding, drainage, or swelling from the site. I also [facility] [address] [facility] at 8:00 am.  I left my number if she wishes to call me back  ([phone]).

## 2022-03-15 ENCOUNTER — OFFICE VISIT (OUTPATIENT)
Dept: CARDIOLOGY CLINIC | Age: 57
End: 2022-03-15
Payer: COMMERCIAL

## 2022-03-15 ENCOUNTER — NURSE ONLY (OUTPATIENT)
Dept: NON INVASIVE DIAGNOSTICS | Age: 57
End: 2022-03-15

## 2022-03-15 VITALS
SYSTOLIC BLOOD PRESSURE: 118 MMHG | RESPIRATION RATE: 12 BRPM | HEART RATE: 82 BPM | HEIGHT: 65 IN | WEIGHT: 212 LBS | BODY MASS INDEX: 35.32 KG/M2 | DIASTOLIC BLOOD PRESSURE: 82 MMHG

## 2022-03-15 DIAGNOSIS — I47.1 SVT (SUPRAVENTRICULAR TACHYCARDIA) (HCC): Primary | ICD-10-CM

## 2022-03-15 PROCEDURE — 99214 OFFICE O/P EST MOD 30 MIN: CPT | Performed by: INTERNAL MEDICINE

## 2022-03-15 PROCEDURE — 1036F TOBACCO NON-USER: CPT | Performed by: INTERNAL MEDICINE

## 2022-03-15 PROCEDURE — G8427 DOCREV CUR MEDS BY ELIG CLIN: HCPCS | Performed by: INTERNAL MEDICINE

## 2022-03-15 PROCEDURE — 3017F COLORECTAL CA SCREEN DOC REV: CPT | Performed by: INTERNAL MEDICINE

## 2022-03-15 PROCEDURE — G8484 FLU IMMUNIZE NO ADMIN: HCPCS | Performed by: INTERNAL MEDICINE

## 2022-03-15 PROCEDURE — G8417 CALC BMI ABV UP PARAM F/U: HCPCS | Performed by: INTERNAL MEDICINE

## 2022-03-15 NOTE — PROGRESS NOTES
CHIEF COMPLAINT: CP/ARAYA/Palpitations/HTN    HISTORY OF PRESENT ILLNESS: Patient is a 64 y.o. female seen at the request of Fatou Samuel DO    Patient seen in follow up. Hx of palpitations. Some ARAYA and some chest symptoms. Some palpitations and tachycardia. Past Medical History:   Diagnosis Date    Allergic rhinitis     Anxiety     Atrial fibrillation (HCC)     Depression     Headache     Hypertension     Hypothyroidism     NAFLD (nonalcoholic fatty liver disease)     By CT    Obesity with body mass index (BMI) of 30.0 to 39.9 06/28/2020    Osteoarthritis     Sleep apnea     does not use C-Pap    Spinal stenosis, lumbar 01/14/2020    Severe central stenosis by CT of abdomen, L1-L2    Thyroid disease     PTC microcarcinoma (0.5 cm) treated by thyroidectomy and BERG 100 mci       Patient Active Problem List   Diagnosis    BPPV (benign paroxysmal positional vertigo)    SVT (supraventricular tachycardia) (HCC)    Anxiety    Dyspnea on exertion    Intermittent palpitations    Essential hypertension    Morbid obesity due to excess calories (HCC)    Palpitations    Chest pain    Thyroid cancer ([address]    Weight gain    Dizziness    Biliary colic    Obesity with body mass index (BMI) of 30.0 to 39.9    Spinal stenosis, lumbar    NAFLD (nonalcoholic fatty liver disease)    Abdominal pain    Hypothyroidism    Thyroid disease    Status post placement of implantable loop recorder     Allergies   Allergen Reactions    Fentanyl     No Known Allergies Other (See Comments)       Current Outpatient Medications   Medication Sig Dispense Refill    SYNTHROID 88 MCG tablet TAKE ONE TABLET BY MOUTH ONCE DAILY 30 tablet 5    Handicap Placard MISC by Does not apply route 1 each 0    metoprolol succinate (TOPROL XL) 25 MG extended release tablet Take 1 tablet by mouth daily 90 tablet 3    ALPRAZolam (XANAX) 1 MG tablet Take 0.5 mg by mouth 4 times daily.         No current facility-administered medications for this visit. Social History     Socioeconomic History    Marital status:      Spouse name: Maurisio Potts Number of children: 3    Years of education: Not on file    Highest education level: Not on file   Occupational History    Occupation: homemaker   Tobacco Use    Smoking status: Never Smoker    Smokeless tobacco: Never Used   Vaping Use    Vaping Use: Never used   Substance and Sexual Activity    Alcohol use: No    Drug use: No    Sexual activity: Yes     Partners: Male   Other Topics Concern    Not on file   Social History Narrative    Lives in a house with  who is disabled and one child. Homeschools her daughter. 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[place] [address], [place] [address] Strain: Low Risk     Difficulty of Paying Living Expenses: Not hard at all   Food Insecurity: No Food Insecurity    Worried About [address] [employer] in the Last Year: Never true    [facility] in the Last Year: Never true   Transportation Needs:     Lack of Transportation (Medical): Not on file    Lack of Transportation (Non-Medical):  Not on file   Physical Activity:     Days of Exercise per Week: Not on file    Minutes of Exercise per Session: Not on file   Stress:     Feeling of Stress : Not on file   Social Connections:     Frequency of Communication with Friends and Family: Not on file    Frequency of Social Gatherings with Friends and Family: Not on file    Attends [employer] Services: Not on file    Active Member of Clubs or Organizations: Not on file    Attends Club or Organization Meetings: Not on file    Marital Status: Not on file   Intimate Partner Violence:     Fear of Current or Ex-Partner: Not on file    Emotionally Abused: Not on file    Physically Abused: Not on file    Sexually Abused: Not on file   Housing Stability:     Unable to Pay for Housing in the Last Year: Not on file  Number of Places Lived in the Last Year: Not on file    Unstable Housing in the Last Year: Not on file       Family History   Problem Relation Age of Onset    High Cholesterol Mother         low blood pressure    Arrhythmia Mother     High Blood Pressure Father     Cancer Father         leukemia, lung cancer +smoker, brain cancer ataxia    Other Sister         nephrectomy due to a benign tumor    Breast Cancer Paternal Grandmother     Other Paternal Grandmother         bowel    Other Daughter         IGG immunodeficiency    Cancer Maternal Grandmother         lung, cervical    Heart Disease Maternal Grandfather     Atrial Fibrillation Paternal Grandfather      Review of Systems:  Heart: as above   Lungs: as above   Eyes: denies changes in vision or discharge. Ears: denies changes in hearing or pain. Nose: denies epistaxis or masses   Throat: denies sore throat or trouble swallowing. Neuro: denies numbness, tingling, tremors. Skin: denies rashes or itching. : denies hematuria, dysuria   GI: denies vomiting, diarrhea   Psych: denies mood changed, anxiety, depression. All other systems negative. Physical Exam   /82   Pulse 82   Resp 12   Ht 5' 5\" (1.651 m)   Wt 212 lb (96.2 kg)   LMP 01/01/2013   BMI 35.28 kg/m²   Constitutional: Oriented to person, place, and time. Well-developed and well-nourished. No distress. Head: Normocephalic and atraumatic. Eyes: EOM are normal. Pupils are equal, round, and reactive to light. Neck: Normal range of motion. Neck supple. No hepatojugular reflux and no JVD present. Carotid bruit is not present. No tracheal deviation present. No thyromegaly present. Cardiovascular: Normal rate, regular rhythm, normal heart sounds and intact distal pulses. Exam reveals no gallop and no friction rub. No murmur heard. Pulmonary/Chest: Effort normal and breath sounds normal. No respiratory distress. No wheezes. No rales. No tenderness.    Abdominal: Soft. Bowel sounds are normal. No distension and no mass. No tenderness. No rebound and no guarding. Musculoskeletal: Normal range of motion. Tender lump left lateral arm. Lymphadenopathy:   No cervical adenopathy. No groin adenopathy. Neurological: Alert and oriented to person, place, and time. Skin: Skin is warm and dry. No rash noted. Not diaphoretic. No erythema. Psychiatric: Normal mood and affect. Behavior is normal.     EKG:  normal sinus rhythm, nonspecific ST and T waves changes. ASSESSMENT AND PLAN:  Patient Active Problem List   Diagnosis    BPPV (benign paroxysmal positional vertigo)    SVT (supraventricular tachycardia) (HCC)    Anxiety    Dyspnea on exertion    Intermittent palpitations    Essential hypertension    Morbid obesity due to excess calories (HCC)    Palpitations    Chest pain    Thyroid cancer ([address]    Weight gain    Dizziness    Biliary colic    Obesity with body mass index (BMI) of 30.0 to 39.9    Spinal stenosis, lumbar    NAFLD (nonalcoholic fatty liver disease)    Abdominal pain    Hypothyroidism    Thyroid disease    Status post placement of implantable loop recorder     1. CP/ARAYA: Stress. 2. Palpitations/Hx of SVT ablation:  Echo normal 7/16.      30 day monitor unremarkable aside from PVC's and PAC's 9/16.      Pharm stress normal 11/1/16. Still with intermittent episodes with symptoms.      Continue Toprol. Following with EP. Had LINQ. 3. Hypothyroid: Following with Endo. 4. HTN: Observe. 5. OLU    6. Orthostatic symptoms: Hose and hydration. Ventura Harper D.O.   Cardiologist  Cardiology, [facility]

## 2022-03-16 ENCOUNTER — TELEPHONE (OUTPATIENT)
Dept: PRIMARY CARE CLINIC | Age: 57
End: 2022-03-16

## 2022-03-16 NOTE — PROGRESS NOTES
Wound check: Steri strips removed. Incision without redness, edema or drainage. Plan: Follow up as needed.      Alfonso Mejia

## 2022-03-22 ENCOUNTER — OFFICE VISIT (OUTPATIENT)
Dept: ENDOCRINOLOGY | Age: 57
End: 2022-03-22
Payer: COMMERCIAL

## 2022-03-22 VITALS
RESPIRATION RATE: 18 BRPM | HEIGHT: 65 IN | SYSTOLIC BLOOD PRESSURE: 135 MMHG | BODY MASS INDEX: 35.16 KG/M2 | WEIGHT: 211 LBS | OXYGEN SATURATION: 98 % | DIASTOLIC BLOOD PRESSURE: 83 MMHG | HEART RATE: 75 BPM

## 2022-03-22 DIAGNOSIS — E55.9 VITAMIN D DEFICIENCY: ICD-10-CM

## 2022-03-22 DIAGNOSIS — C73 THYROID CANCER (HCC): ICD-10-CM

## 2022-03-22 DIAGNOSIS — E03.9 HYPOTHYROIDISM (ACQUIRED): Primary | ICD-10-CM

## 2022-03-22 DIAGNOSIS — E89.0 POSTOPERATIVE HYPOTHYROIDISM: ICD-10-CM

## 2022-03-22 DIAGNOSIS — R73.01 IMPAIRED FASTING GLUCOSE: ICD-10-CM

## 2022-03-22 PROCEDURE — G8427 DOCREV CUR MEDS BY ELIG CLIN: HCPCS | Performed by: INTERNAL MEDICINE

## 2022-03-22 PROCEDURE — 99214 OFFICE O/P EST MOD 30 MIN: CPT | Performed by: INTERNAL MEDICINE

## 2022-03-22 PROCEDURE — 76536 US EXAM OF HEAD AND NECK: CPT | Performed by: INTERNAL MEDICINE

## 2022-03-22 PROCEDURE — G8417 CALC BMI ABV UP PARAM F/U: HCPCS | Performed by: INTERNAL MEDICINE

## 2022-03-22 PROCEDURE — G8484 FLU IMMUNIZE NO ADMIN: HCPCS | Performed by: INTERNAL MEDICINE

## 2022-03-22 PROCEDURE — 1036F TOBACCO NON-USER: CPT | Performed by: INTERNAL MEDICINE

## 2022-03-22 PROCEDURE — 3017F COLORECTAL CA SCREEN DOC REV: CPT | Performed by: INTERNAL MEDICINE

## 2022-03-22 NOTE — PROGRESS NOTES
[address] [place] [address] Department of Endocrinology Diabetes and Metabolism   [address] [facility] [address]   Phone: [phone]  Fax: [phone]    Date of Service: 3/22/2022  Primary Care Physician: Ulises Pardo DO  Provider: Sarah Browne MD     Reason for the visit:  Papillary thyroid cancer, postsurgical hypothyroidism     History of Present Illness: The history is provided by the patient. No  was used. Accuracy of the patient data is excellent. Aryan Delgado is a very pleasant 64 y.o. female seen today for management of thyroid cancer     Pt s/p total thyroidectomy in 1/2017   unifocal 4 mm papillary carcinoma, encapsulated. Margins uninvolved by carcinoma. Angioinvasion (vascular invasion)  not identified . Lymphatic Invasion  not identified. Perineural Invasion  not identified   Extrathyroidal Extension  is not identified  Pathologic Staging: pT 1a pNx pM x     PTC Stage I, E6aI4T6,     Excellent response to therapy (total thyroidectomy, 100mCi BERG)  09/04/18 US thyroid: no change in the adenopathy that was seen on the 5/17/18 US.  02/11/20 US thyroid: no change in the appearance of the lymph nodes   05/28/19 Tg <0.1, Tg Ab <0.9  07/22/20 Tg <0.1, Tg Ab <0.9  7/2021 - Tg level <0.1, Tg-Ab negative      The patient is currently on Synthroid 88 mcg daily. Patient takes Synthroid in the morning at empty stomach, wait one hour before eating , avoid multivitamins containing calcium  or iron with it. Lab Results   Component Value Date/Time    TSH 2.290 02/01/2022 01:35 PM    T4FREE 1.33 11/05/2021 10:15 AM    R2AZJWR 7.3 06/22/2021 10:40 AM    FT3 2.3 05/11/2017 02:00 PM    B6SAZOH 79.34 (L) 06/22/2021 10:40 AM    TPOABS 0.4 05/28/2019 12:38 PM    THGAB <0.9 07/20/2021 01:57 PM       Aryan Delgado denies any new lumps, bumps in the neck, voice change or shortness of breath. No family history of thyroid cancer.  No prior history of radiation to head or neck region. Pt with h/o cardiac arrhythmia s/p ablation    PAST MEDICAL HISTORY   Past Medical History:   Diagnosis Date    Allergic rhinitis     Anxiety     Atrial fibrillation (HCC)     Depression     Headache     Hypertension     Hypothyroidism     NAFLD (nonalcoholic fatty liver disease)     By CT    Obesity with body mass index (BMI) of 30.0 to 39.9 06/28/2020    Osteoarthritis     Sleep apnea     does not use C-Pap    Spinal stenosis, lumbar 01/14/2020    Severe central stenosis by CT of abdomen, L1-L2    Thyroid disease     PTC microcarcinoma (0.5 cm) treated by thyroidectomy and BERG 100 mci       PAST SURGICAL HISTORY   Past Surgical History:   Procedure Laterality Date    ABLATION OF DYSRHYTHMIC FOCUS  1990    BREAST SURGERY      cysts removed    CHOLECYSTECTOMY      COLONOSCOPY      ENDOMETRIAL ABLATION  2012    GALLBLADDER SURGERY  07/2020    removal     HYSTERECTOMY, VAGINAL      HYSTEROSCOPY  08/15/2012    dilatation & curettage, novasure ablation    INSERTABLE CARDIAC MONITOR  12/23/2019    [facility] Confirm Linq     (Dr. Nik Ceballos)      INSERTABLE CARDIAC MONITOR N/A 03/01/2022    Loop explant by Dr. Yareli Thapa  12/23/2019    Dr. Nik Ceballos- Loop Recorder implant, Confirm    THYROIDECTOMY Bilateral 01/19/2017    Dr. Skylar Sparks   Tobacco:   reports that she has never smoked. She has never used smokeless tobacco.  Alcohol:   reports no history of alcohol use. Drugs:   reports no history of drug use.     FAMILY HISTORY   Family History   Problem Relation Age of Onset    High Cholesterol Mother         low blood pressure    Arrhythmia Mother     High Blood Pressure Father     Cancer Father         leukemia, lung cancer +smoker, brain cancer ataxia    Other Sister         nephrectomy due to a benign tumor    Breast Cancer Paternal Grandmother     Other Paternal Grandmother         bowel    Other Daughter         IGG immunodeficiency    Cancer Maternal Grandmother         lung, cervical    Heart Disease Maternal Grandfather     Atrial Fibrillation Paternal Grandfather        ALLERGIES AND DRUG REACTIONS   Allergies   Allergen Reactions    Fentanyl     No Known Allergies Other (See Comments)       CURRENT MEDICATIONS   Current Outpatient Medications   Medication Sig Dispense Refill    SYNTHROID 88 MCG tablet TAKE ONE TABLET BY MOUTH ONCE DAILY 30 tablet 5    Handicap Placard MISC by Does not apply route 1 each 0    metoprolol succinate (TOPROL XL) 25 MG extended release tablet Take 1 tablet by mouth daily 90 tablet 3    ALPRAZolam (XANAX) 1 MG tablet Take 0.5 mg by mouth 4 times daily. No current facility-administered medications for this visit. Review of Systems  Constitutional: No fever, no chills, no diaphoresis, no generalized weakness. HEENT: No blurred vision, No sore throat, no ear pain, no hair loss  Neck: denied any neck swelling, difficulty swallowing,   Cadrdiopulomary: No CP, SOB or palpitation, No orthopnea or PND. No cough or wheezing. GI: No N/V/D, no constipation, No abdominal pain, no melena or hematochezia   : Denied any dysuria, hematuria, flank pain, discharge, or incontinence. Skin: denied any rash, ulcer, Hirsute, or hyperpigmentation. MSK: denied any joint deformity, joint pain/swelling, muscle pain, or back pain.   Neuro: no numbess, no tingling, no weakness,     OBJECTIVE    /83   Pulse 75   Resp 18   Ht 5' 5\" (1.651 m)   Wt 211 lb (95.7 kg)   LMP 01/01/2013   SpO2 98%   BMI 35.11 kg/m²   BP Readings from Last 4 Encounters:   03/22/22 135/83   03/15/22 118/82   03/01/22 126/84   02/28/22 128/77     Wt Readings from Last 6 Encounters:   03/22/22 211 lb (95.7 kg)   03/15/22 212 lb (96.2 kg)   03/01/22 190 lb (86.2 kg)   02/28/22 205 lb (93 kg)   02/24/22 200 lb (90.7 kg)   02/07/22 200 lb (90.7 kg)       Physical examination:  General: awake alert, oriented x3, no abnormal position or movements. HEENT: normocephalic non traumatic  Neck: supple, s/p thyroidectomy  Pulm: Clear equal air entry no added sounds, no wheezing or rhonchi    CVS: S1 + S2, no murmur, no heave. Dorsalis pedis pulse palpable   Abd: soft lax, no tenderness, no organomegaly, audible bowel sounds.   Skin: warm, no lesions, no rash  MSK: No local spine tenderness   Neuro: CN intact, sensation notmal , muscle power normal. No tremors   Psych: normal mood, and affect    Review of Laboratory Data:  I have reviewed the following:  Lab Results   Component Value Date/Time    WBC 5.3 02/07/2022 10:32 PM    RBC 4.47 02/07/2022 10:32 PM    HGB 13.4 02/07/2022 10:32 PM    HCT 39.4 02/07/2022 10:32 PM    MCV 88.1 02/07/2022 10:32 PM    MCH 30.0 02/07/2022 10:32 PM    MCHC 34.0 02/07/2022 10:32 PM    RDW 11.9 02/07/2022 10:32 PM     02/07/2022 10:32 PM    MPV 9.2 02/07/2022 10:32 PM      Lab Results   Component Value Date/Time     02/07/2022 10:32 PM    K 3.8 02/07/2022 10:32 PM    K 3.4 (L) 07/07/2021 09:13 PM    CO2 28 02/07/2022 10:32 PM    BUN 16 02/07/2022 10:32 PM    CREATININE 1.0 02/07/2022 10:32 PM    CALCIUM 9.5 02/07/2022 10:32 PM    LABGLOM 57 02/07/2022 10:32 PM    GFRAA >60 02/07/2022 10:32 PM      Lab Results   Component Value Date/Time    TSH 2.290 02/01/2022 01:35 PM    T4FREE 1.33 11/05/2021 10:15 AM    P1PAQXH 7.3 06/22/2021 10:40 AM    FT3 2.3 05/11/2017 02:00 PM    B5FEZKJ 79.34 (L) 06/22/2021 10:40 AM    TPOABS 0.4 05/28/2019 12:38 PM    THGAB <0.9 07/20/2021 01:57 PM     Lab Results   Component Value Date    LABA1C 5.5 02/01/2022    GLUCOSE 101 02/07/2022    GLUCOSE 93 06/13/2011    LABCREA 142 02/20/2018     Lab Results   Component Value Date    TRIG 69 06/22/2021    HDL 61 06/22/2021    LDLCALC 131 06/22/2021    CHOL 206 06/22/2021     Lab Results   Component Value Date    VITD25 24 07/20/2021    VITD25 24 07/20/2021       ASSESSMENT & RECOMMENDATIONS   ni [age] y. o.-old female seen in for following issues     Papillary thyroid cancer   · S/p total thyroidectomy and BERG ablation in 2017   · Excellent response to surgery. There was no high risk features identified in her pathology. · There was no evidence of thyroid cancer recurrence in ultrasounds, and Tg level has been undetectable    · Due for Tg level now   · Thyroid US in few weeks     Post surgical hypothyroidism:  · Continue Synthroid 88 mcg daily   · TFt again in 6 months   · With underlying cardiac arrhythmia, will avoid TSH suppression in the pt     I personally reviewed external notes from PCP and other patient's care team providers, and personally interpreted labs associated with the above diagnosis. I also ordered labs to further assess and manage the above addressed medical conditions. Return in about 1 year (around 3/22/2023) for hypothyroidism, thyroid cancer, VitD deficiency. The above issues were reviewed with the patient who understood and agreed with the plan. Thank you for allowing us to participate in the care of this patient. Please do not hesitate to contact us with any additional questions. Diagnosis Orders   1. Hypothyroidism (acquired)  TSH    T4, Free   2. Thyroid cancer (HCC)  Thyroglobulin    US THYROID    TSH    T4, Free   3. Postoperative hypothyroidism     4. Vitamin D deficiency  Vitamin D 25 Hydroxy    Basic Metabolic Panel   5. Impaired fasting glucose  Basic Metabolic Panel    Hemoglobin A1C     Shara Garcia MD  Endocrinologist, [facility])   [address] [place] [address], [address] [place] [address],[facility] [address] [id number]   Phone: [phone]  Fax: [phone]  ----------------------------  An electronic signature was used to authenticate this note.  Artemio Celaya MD on 3/22/2022 at 1:53 PM

## 2022-03-24 ENCOUNTER — OFFICE VISIT (OUTPATIENT)
Dept: NEUROLOGY | Age: 57
End: 2022-03-24
Payer: COMMERCIAL

## 2022-03-24 VITALS
SYSTOLIC BLOOD PRESSURE: 136 MMHG | TEMPERATURE: 97.9 F | HEART RATE: 80 BPM | BODY MASS INDEX: 34.64 KG/M2 | DIASTOLIC BLOOD PRESSURE: 82 MMHG | RESPIRATION RATE: 16 BRPM | WEIGHT: 207.9 LBS | OXYGEN SATURATION: 97 % | HEIGHT: 65 IN

## 2022-03-24 DIAGNOSIS — R20.0 RIGHT FACIAL NUMBNESS: Primary | ICD-10-CM

## 2022-03-24 PROCEDURE — G8427 DOCREV CUR MEDS BY ELIG CLIN: HCPCS | Performed by: NURSE PRACTITIONER

## 2022-03-24 PROCEDURE — 3017F COLORECTAL CA SCREEN DOC REV: CPT | Performed by: NURSE PRACTITIONER

## 2022-03-24 PROCEDURE — 1036F TOBACCO NON-USER: CPT | Performed by: NURSE PRACTITIONER

## 2022-03-24 PROCEDURE — 99202 OFFICE O/P NEW SF 15 MIN: CPT | Performed by: NURSE PRACTITIONER

## 2022-03-24 PROCEDURE — G8417 CALC BMI ABV UP PARAM F/U: HCPCS | Performed by: NURSE PRACTITIONER

## 2022-03-24 PROCEDURE — G8484 FLU IMMUNIZE NO ADMIN: HCPCS | Performed by: NURSE PRACTITIONER

## 2022-03-24 NOTE — PROGRESS NOTES
[address] [facility]. Elvi Alarcon M.D., F.A.C.P. Lidia Kussmaul, DNP, APRN, CNS  [facility]. Loretta Reyez, MSN, APRN-FNP-C  Nino Molina MSN, APRN, FNP-C  Elora Frankel MSPAS, PA-C  [address] MSN, APRN, FNP-C  [address], [address]  [place], [address] [address]  Phone: [phone]  Fax: [phone]       Jose Enrique Forrest is a 64 y.o. right handed female     Patient presents to neurology clinic today for intermittent numbness of her right jaw. Patient with her  who provides no additional history. Patient is a good historian.     Past Medical History:     Past Medical History:   Diagnosis Date    Allergic rhinitis     Anxiety     Atrial fibrillation (HCC)     Depression     Headache     Hypertension     Hypothyroidism     NAFLD (nonalcoholic fatty liver disease)     By CT    Obesity with body mass index (BMI) of 30.0 to 39.9 06/28/2020    Osteoarthritis     Sleep apnea     does not use C-Pap    Spinal stenosis, lumbar 01/14/2020    Severe central stenosis by CT of abdomen, L1-L2    Thyroid disease     PTC microcarcinoma (0.5 cm) treated by thyroidectomy and BERG 100 mci       Past Surgical History:       Past Surgical History:   Procedure Laterality Date    ABLATION OF DYSRHYTHMIC FOCUS  1990    BREAST SURGERY      cysts removed    CHOLECYSTECTOMY      COLONOSCOPY      ENDOMETRIAL ABLATION  2012    GALLBLADDER SURGERY  07/2020    removal     HYSTERECTOMY, VAGINAL      HYSTEROSCOPY  08/15/2012    dilatation & curettage, novasure ablation    INSERTABLE CARDIAC MONITOR  12/23/2019    [facility] Confirm Linq     (Dr. Tiffanie Thakkar)      INSERTABLE CARDIAC MONITOR N/A 03/01/2022    Loop explant by Dr. Kimberly Sales  12/23/2019    Dr. Tiffanie Thakkar- Loop Recorder implant, Confirm    THYROIDECTOMY Bilateral 01/19/2017    Dr. Madhav Dowd       Allergies:       Fentanyl and No known allergies    Medications:     Prior to Admission medications    Medication Sig Start Date End Date Taking? Authorizing Provider   SYNTHROID 88 MCG tablet TAKE ONE TABLET BY MOUTH ONCE DAILY 2/24/22   Melissa Mcgovern MD   Handicap Placard MISC by Does not apply route 11/9/21   Kevin Isbell, DO   metoprolol succinate (TOPROL XL) 25 MG extended release tablet Take 1 tablet by mouth daily 9/8/21   Cherisevioletta Ozuna, DO   ALPRAZolam Marguarite Pedlar) 1 MG tablet Take 0.5 mg by mouth 4 times daily. Historical Provider, MD       Social History:        reports that she has never smoked. She has never used smokeless tobacco. She reports that she does not drink alcohol and does not use drugs. Patient is  and has one 22-year-old daughter. Patient has been unemployed for 19 years. Review of Systems:     (+) numbness and tingling of R face  No chest pain or palpitations  No SOB  No vertigo, lightheadedness or loss of consciousness  No falls, tripping or stumbling  No incontinence of bowels or bladder  No itching or bruising appreciated  No numbness, tingling or focal arm/leg weakness    ROS is otherwise negative    Family History:     Family History   Problem Relation Age of Onset    High Cholesterol Mother         low blood pressure    Arrhythmia Mother     High Blood Pressure Father     Cancer Father         leukemia, lung cancer +smoker, brain cancer ataxia    Other Sister         nephrectomy due to a benign tumor    Breast Cancer Paternal Grandmother     Other Paternal Grandmother         bowel    Other Daughter         IGG immunodeficiency    Cancer Maternal Grandmother         lung, cervical    Heart Disease Maternal Grandfather     Atrial Fibrillation Paternal Grandfather         History of Present Illness:     Patient presents today with longstanding right cheek numbness. Patient has had right cheek and jaw numbness since November 2020. Patient states that is intermittent and does not occur all day but she has no warning that it is coming.   This began around the time of her gallbladder removed. Patient also developed loss of sense of taste but not sense of smell. Patient has been evaluated by  ENT who found no abnormalities on exam.  Patient also complains of seeing green and yellow spots in her vision intermittently. She has been evaluated by eye doctor and her eyes were normal per patient. Patient denies numbness and tingling to forehead or left side of her face, speech or swallowing issues, difficulty chewing, ptosis, shortness of breath. In the past patient has had at least a quarter in length so she was unable to have MRI brain completed in the past.  Patient denies trauma, infection, or other source of sensory changes. Patient reports very broken sleep and waking tired obtaining about 6 hours nightly. Patient eats 3 meals a day and does not drink caffeine. Patient drinks about 3 bottles of water daily. Patient reports moderate stress level. Patient does not exercise. Objective:       /82   Pulse 80   Temp 97.9 °F (36.6 °C) (Infrared)   Resp 16   Ht 5' 5\" (1.651 m)   Wt 207 lb 14.4 oz (94.3 kg)   LMP 01/01/2013   SpO2 97%   BMI 34.60 kg/m²     General appearance: alert, appears stated age, cooperative and in no distress  Head: normocephalic, without obvious abnormality, atraumatic   Right sinus and bilateral occipital tenderness on exam  Eyes: conjunctivae/corneas clear; no drainage  Neck: supple, symmetrical, trachea midline and thyroid not enlarged  Back: symmetric, no curvature.  ROM normal.  Trapezius muscle tenderness bilaterally  Lungs: clear to auscultation bilaterally  Heart: regular rate and rhythm  Abdomen: soft, non-tender; bowel sounds normal  Extremities: normal, atraumatic, no cyanosis or edema  Pulses: 2+ and symmetric  Skin:  color, texture, turgor normal--no rashes or lesions      Mental Status: alert and oriented x 4, follows commands well, conversant and pleasant    Appropriate attention/concentration  Intact fundus of knowledge  Memories intact    Speech: no dysarthria  Language: no aphasias---reading, writing, repetition, and object identification intact    Cranial Nerves:  I: smell intact   II: visual acuity     II: visual fields Full to finger counting    II: pupils PERRL   III,VII: ptosis None   III,IV,VI: extraocular muscles  EOMI without nystagmus   V: mastication Normal   V: facial light touch sensation  Normal--currently   V,VII: corneal reflex     VII: facial muscle function - upper  Normal   VII: facial muscle function - lower Normal   VIII: hearing Normal   IX: soft palate elevation  Normal   IX,X: gag reflex    XI: trapezius strength  5/5   XI: sternocleidomastoid strength 5/5   XI: neck extension strength  5/5   XII: tongue strength  Normal     Motor:  Strong bilateral hand   5/5 throughout  Normal bulk and tone  No drift   No abnormal movements    Sensory:  LT and PP normal  Vibration normal    Coordination:   FN, FFM and BRANDEE normal  HS normal    Gait:  Normal    DTR:   Right Brachioradialis reflex 1+  Left Brachioradialis reflex 1+  Right Biceps reflex 1+  Left Biceps reflex 1+  Right Triceps reflex 1+  Left Triceps reflex 1+  Right Quadriceps reflex 1+  Left Quadriceps reflex 1+  Right Achilles reflex 1+  Left Achilles reflex 1+    No Babinskis  No Michel's    No other pathological reflexes    Laboratory/Radiology:  ry/Radiology:     CBC:   Lab Results   Component Value Date    WBC 5.3 02/07/2022    RBC 4.47 02/07/2022    HGB 13.4 02/07/2022    HCT 39.4 02/07/2022    MCV 88.1 02/07/2022    MCH 30.0 02/07/2022    MCHC 34.0 02/07/2022    RDW 11.9 02/07/2022     02/07/2022    MPV 9.2 02/07/2022     CMP:    Lab Results   Component Value Date     02/07/2022    K 3.8 02/07/2022    K 3.4 07/07/2021     02/07/2022    CO2 28 02/07/2022    BUN 16 02/07/2022    CREATININE 1.0 02/07/2022    GFRAA >60 02/07/2022    LABGLOM 57 02/07/2022    GLUCOSE 101 02/07/2022    PROT 7.4 02/07/2022    LABALBU 4.4 02/07/2022    CALCIUM 9.5 02/07/2022    BILITOT 0.5 02/07/2022    ALKPHOS 70 02/07/2022    AST 13 02/07/2022    ALT 9 02/07/2022       All labs and images were personally reviewed at the time of this visit    Assessment:     R facial numbness and tingling   This began November 2020 around the time her gallbladder was removed   Patient lost her sense of taste but not her smell    Patient was evaluated by ENT and optometry/ophthalmologist in the past   Previously unable to have MRI brain due to Linq and loop recorder   TSH, A1c, LFTs, electrolytes and CBC in February were within normal   MRI brain ordered to rule out intracranial pathology   Also differentials include trigeminal neuralgia, vitamin deficiencies, or ocular migraines    History of headache   Mainly to occipital area and neck   These occur 1-3 times weekly   Currently on metoprolol 25 mg daily   Patient is not sure whether numbness vision changes occur in the setting of these headaches or not--- will monitor at home    Plan:     MRI brain without contrast ordered  B12, folate, CRP and ESR ordered  EMANI, Sjogren's, heavy metal, zinc and lead also ordered  Return office once above is completed  Call with any issues      Mariana Garcia, APRN - NP-C  2:03 PM  3/24/2022    I spent 25 minutes with this patient obtaining the HPI and discussing the exam with greater than 50% of the time providing counseling and education on medications and other treatment plans. All questions were answered prior to leaving my office.

## 2022-03-25 DIAGNOSIS — R20.0 RIGHT FACIAL NUMBNESS: ICD-10-CM

## 2022-03-27 ENCOUNTER — TELEPHONE (OUTPATIENT)
Dept: ENDOCRINOLOGY | Age: 57
End: 2022-03-27

## 2022-03-27 NOTE — TELEPHONE ENCOUNTER
Notify pt,  I have reviewed your recent results    Excellent!  Thyroid tumor marker was undetectable

## 2022-03-28 DIAGNOSIS — R20.0 RIGHT FACIAL NUMBNESS: ICD-10-CM

## 2022-03-28 LAB
C-REACTIVE PROTEIN: 0.3 MG/DL (ref 0–0.4)
FOLATE: 18.5 NG/ML (ref 4.8–24.2)
SEDIMENTATION RATE, ERYTHROCYTE: 20 MM/HR (ref 0–20)
VITAMIN B-12: 259 PG/ML (ref 211–946)

## 2022-03-29 LAB
ANTI-NUCLEAR ANTIBODY (ANA): NEGATIVE
ENA TO SSA (RO) ANTIBODY: NEGATIVE
ENA TO SSB (LA) ANTIBODY: NEGATIVE

## 2022-03-31 LAB
LEAD BLOOD: 1 UG/DL (ref 0–4)
ZINC: 56.5 UG/DL (ref 60–120)

## 2022-04-07 ENCOUNTER — OFFICE VISIT (OUTPATIENT)
Dept: FAMILY MEDICINE CLINIC | Age: 57
End: 2022-04-07
Payer: COMMERCIAL

## 2022-04-07 VITALS
SYSTOLIC BLOOD PRESSURE: 142 MMHG | TEMPERATURE: 98.3 F | DIASTOLIC BLOOD PRESSURE: 86 MMHG | HEIGHT: 65 IN | HEART RATE: 86 BPM | BODY MASS INDEX: 36.15 KG/M2 | OXYGEN SATURATION: 98 % | WEIGHT: 217 LBS

## 2022-04-07 DIAGNOSIS — J01.90 ACUTE BACTERIAL SINUSITIS: Primary | ICD-10-CM

## 2022-04-07 DIAGNOSIS — B96.89 ACUTE BACTERIAL SINUSITIS: Primary | ICD-10-CM

## 2022-04-07 PROCEDURE — G8417 CALC BMI ABV UP PARAM F/U: HCPCS | Performed by: FAMILY MEDICINE

## 2022-04-07 PROCEDURE — 1036F TOBACCO NON-USER: CPT | Performed by: FAMILY MEDICINE

## 2022-04-07 PROCEDURE — 99213 OFFICE O/P EST LOW 20 MIN: CPT | Performed by: FAMILY MEDICINE

## 2022-04-07 PROCEDURE — G8427 DOCREV CUR MEDS BY ELIG CLIN: HCPCS | Performed by: FAMILY MEDICINE

## 2022-04-07 PROCEDURE — 3017F COLORECTAL CA SCREEN DOC REV: CPT | Performed by: FAMILY MEDICINE

## 2022-04-07 RX ORDER — AZELASTINE 1 MG/ML
1 SPRAY, METERED NASAL 2 TIMES DAILY
Qty: 60 ML | Refills: 1 | Status: SHIPPED | OUTPATIENT
Start: 2022-04-07

## 2022-04-07 RX ORDER — AMOXICILLIN AND CLAVULANATE POTASSIUM 875; 125 MG/1; MG/1
1 TABLET, FILM COATED ORAL 2 TIMES DAILY
Qty: 20 TABLET | Refills: 0 | Status: SHIPPED | OUTPATIENT
Start: 2022-04-07 | End: 2022-04-17

## 2022-04-07 ASSESSMENT — ENCOUNTER SYMPTOMS
VOMITING: 0
DIARRHEA: 0
SHORTNESS OF BREATH: 0
PHOTOPHOBIA: 0
SINUS PRESSURE: 1
BLOOD IN STOOL: 0
NAUSEA: 0
BACK PAIN: 0
ABDOMINAL PAIN: 0
COUGH: 0
SINUS PAIN: 1
CONSTIPATION: 0
SORE THROAT: 0
FACIAL SWELLING: 1

## 2022-04-07 NOTE — PROGRESS NOTES
Steve Soni (:  1965) is a 64 y.o. female,Established patient, here for evaluation of the following chief complaint(s):  Sinusitis (states was in dentist and was told sinus infection)         ASSESSMENT/PLAN:  1. Acute bacterial sinusitis  -     amoxicillin-clavulanate (AUGMENTIN) 875-125 MG per tablet; Take 1 tablet by mouth 2 times daily for 10 days, Disp-20 tablet, R-0Normal  -     azelastine (ASTELIN) 0.1 % nasal spray; 1 spray by Nasal route 2 times daily Use in each nostril as directed, Disp-60 mL, R-1Normal  At this time we will treat symptomatically. Patient will follow with PCP in 1 to 2 weeks to ensure resolution. Red flags discussed with patient. These occur she is to go directly to the nearest emergency department. Patient voiced understanding. No follow-ups on file. Subjective   SUBJECTIVE/OBJECTIVE:  HPI  Patient presents today for several week history of worsening sinus congestion, right-sided sinus/tooth/jaw pain. Patient also states intermittent right-sided facial numbness. She has seen a neurologist who is performing a large work-up in this regard. She went to the dentist the other day who did an x-ray and said that her maxillary sinus on the right was completely opacified and that she needed to be evaluated and put on antibiotics. Could not get in with her PCP according to patient. Review of Systems   Constitutional: Negative for chills and fever. HENT: Positive for congestion, dental problem, facial swelling, sinus pressure and sinus pain. Negative for hearing loss, nosebleeds and sore throat. Eyes: Negative for photophobia. Respiratory: Negative for cough and shortness of breath. Cardiovascular: Negative for chest pain, palpitations and leg swelling. Gastrointestinal: Negative for abdominal pain, blood in stool, constipation, diarrhea, nausea and vomiting. Endocrine: Negative for polydipsia.    Genitourinary: Negative for dysuria, frequency, disease)     By CT    Obesity with body mass index (BMI) of 30.0 to 39.9 06/28/2020    Osteoarthritis     Sleep apnea     does not use C-Pap    Spinal stenosis, lumbar 01/14/2020    Severe central stenosis by CT of abdomen, L1-L2    Thyroid disease     PTC microcarcinoma (0.5 cm) treated by thyroidectomy and BERG 100 mci     Past Surgical History:   Procedure Laterality Date    ABLATION OF DYSRHYTHMIC FOCUS  1990    BREAST SURGERY      cysts removed    CHOLECYSTECTOMY      COLONOSCOPY      ENDOMETRIAL ABLATION  2012    GALLBLADDER SURGERY  07/2020    removal     HYSTERECTOMY, VAGINAL      HYSTEROSCOPY  08/15/2012    dilatation & curettage, novasure ablation    INSERTABLE CARDIAC MONITOR  12/23/2019    [facility] Confirm Linq     (Dr. Dash Metcalf)      INSERTABLE CARDIAC MONITOR N/A 03/01/2022    Loop explant by Dr. Mallory Wise  12/23/2019    Dr. Dash Metcalf- Loop Recorder implant, Confirm    THYROIDECTOMY Bilateral 01/19/2017    Dr. Cristobal So History     Socioeconomic History    Marital status:      Spouse name: Masha Crowder Number of children: 3    Years of education: Not on file    Highest education level: Not on file   Occupational History    Occupation: homemaker   Tobacco Use    Smoking status: Never Smoker    Smokeless tobacco: Never Used   Vaping Use    Vaping Use: Never used   Substance and Sexual Activity    Alcohol use: No    Drug use: No    Sexual activity: Yes     Partners: Male   Other Topics Concern    Not on file   Social History Narrative    Lives in a house with  who is disabled and one child. Homeschools her daughter.     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[address] [place] [address], [address] Strain: Low Risk     Difficulty of Paying Living Expenses: Not hard at all   Food Insecurity: No Food Insecurity    Worried About [facility] in the Last Year: Never true    [address] [facility] in the Last Year: Never true   Transportation Needs:     Lack of Transportation (Medical): Not on file    Lack of Transportation (Non-Medical): Not on file   Physical Activity:     Days of Exercise per Week: Not on file    Minutes of Exercise per Session: Not on file   Stress:     Feeling of Stress : Not on file   Social Connections:     Frequency of Communication with Friends and Family: Not on file    Frequency of Social Gatherings with Friends and Family: Not on file    Attends Taoism Services: Not on file    Active Member of [address] [place] [address] [employer] or Organizations: Not on file    Attends Club or Organization Meetings: Not on file    Marital Status: Not on file   Intimate Partner Violence:     Fear of Current or Ex-Partner: Not on file    Emotionally Abused: Not on file    Physically Abused: Not on file    Sexually Abused: Not on file   Housing Stability:     Unable to Pay for Housing in the Last Year: Not on file    Number of [place] in the Last Year: Not on file    Unstable Housing in the Last Year: Not on file     Family History   Problem Relation Age of Onset    High Cholesterol Mother         low blood pressure    Arrhythmia Mother     High Blood Pressure Father     Cancer Father         leukemia, lung cancer +smoker, brain cancer ataxia    Other Sister         nephrectomy due to a benign tumor    Breast Cancer Paternal Grandmother     Other Paternal Grandmother         bowel    Other Daughter         IGG immunodeficiency    Cancer Maternal Grandmother         lung, cervical    Heart Disease Maternal Grandfather     Atrial Fibrillation Paternal Grandfather       There are no preventive care reminders to display for this patient. There are no preventive care reminders to display for this patient. There are no preventive care reminders to display for this patient.    Health Maintenance Due   Topic    DTaP/Tdap/Td vaccine (1 - Tdap)    Shingles Vaccine (1 of 2)      Health Maintenance   Topic Date Due    DTaP/Tdap/Td vaccine (1 - Tdap) Never done    Shingles Vaccine (1 of 2) Never done    COVID-19 Vaccine (3 - Booster for Pfizer series) 12/06/2021    Breast cancer screen  07/22/2022    Flu vaccine (Season Ended) 09/01/2022    Depression Screen  02/01/2023    TSH testing  02/01/2023    Potassium monitoring  02/07/2023    Creatinine monitoring  02/07/2023    Diabetes screen  02/01/2025    Lipid screen  06/22/2026    Colorectal Cancer Screen  02/17/2032    Hepatitis C screen  Completed    HIV screen  Completed    Hepatitis A vaccine  Aged Out    Hepatitis B vaccine  Aged Out    Hib vaccine  Aged Out    Meningococcal (ACWY) vaccine  Aged Out    Pneumococcal 0-64 years Vaccine  Aged Out      There are no preventive care reminders to display for this patient. There are no preventive care reminders to display for this patient. BP (!) 142/86   Pulse 86   Temp 98.3 °F (36.8 °C)   Ht 5' 5\" (1.651 m)   Wt 217 lb (98.4 kg)   LMP 01/01/2013   SpO2 98%   BMI 36.11 kg/m²     Objective   Physical Exam  Vitals reviewed. Constitutional:       Appearance: She is obese. HENT:      Head: Normocephalic and atraumatic. Right Ear: A middle ear effusion is present. Tympanic membrane is bulging. Left Ear: A middle ear effusion is present. Tympanic membrane is bulging. Eyes:      General: No scleral icterus. Conjunctiva/sclera: Conjunctivae normal.      Pupils: Pupils are equal, round, and reactive to light. Neck:      Thyroid: No thyromegaly. Cardiovascular:      Rate and Rhythm: Normal rate and regular rhythm. Heart sounds: Normal heart sounds. No murmur heard. Pulmonary:      Effort: Pulmonary effort is normal.      Breath sounds: Normal breath sounds. No rales. Abdominal:      General: Bowel sounds are normal. There is no distension. Palpations: Abdomen is soft. Tenderness: There is no abdominal tenderness.    Musculoskeletal:         General: Normal range of motion. Cervical back: Neck supple. Lymphadenopathy:      Cervical: No cervical adenopathy. Skin:     General: Skin is warm and dry. Findings: No erythema or rash. Neurological:      Mental Status: She is alert and oriented to person, place, and time. Cranial Nerves: No cranial nerve deficit. Psychiatric:         Judgment: Judgment normal.                  An electronic signature was used to authenticate this note.     --Luz Parks, DO

## 2022-04-23 LAB
ARSENIC BLOOD: <10 UG/L
LEAD LEVEL BLOOD: <2 UG/DL
MERCURY BLOOD: <2.5 UG/L

## 2022-04-26 ENCOUNTER — OFFICE VISIT (OUTPATIENT)
Dept: NEUROLOGY | Age: 57
End: 2022-04-26
Payer: COMMERCIAL

## 2022-04-26 VITALS
HEART RATE: 86 BPM | WEIGHT: 217 LBS | SYSTOLIC BLOOD PRESSURE: 134 MMHG | TEMPERATURE: 97.4 F | HEIGHT: 65 IN | DIASTOLIC BLOOD PRESSURE: 86 MMHG | BODY MASS INDEX: 36.15 KG/M2 | OXYGEN SATURATION: 99 %

## 2022-04-26 DIAGNOSIS — G50.1 ATYPICAL FACIAL PAIN: Primary | ICD-10-CM

## 2022-04-26 PROCEDURE — 99213 OFFICE O/P EST LOW 20 MIN: CPT | Performed by: NURSE PRACTITIONER

## 2022-04-26 PROCEDURE — 3017F COLORECTAL CA SCREEN DOC REV: CPT | Performed by: NURSE PRACTITIONER

## 2022-04-26 PROCEDURE — 1036F TOBACCO NON-USER: CPT | Performed by: NURSE PRACTITIONER

## 2022-04-26 PROCEDURE — G8417 CALC BMI ABV UP PARAM F/U: HCPCS | Performed by: NURSE PRACTITIONER

## 2022-04-26 PROCEDURE — G8427 DOCREV CUR MEDS BY ELIG CLIN: HCPCS | Performed by: NURSE PRACTITIONER

## 2022-04-26 RX ORDER — CARBAMAZEPINE 100 MG/1
100 TABLET, EXTENDED RELEASE ORAL 2 TIMES DAILY
Qty: 60 TABLET | Refills: 0 | Status: SHIPPED | OUTPATIENT
Start: 2022-04-26 | End: 2022-05-26

## 2022-04-26 NOTE — PROGRESS NOTES
[address] [facility]. Latasha Ellison M.D., F.A.C.P. Sadi Andino, ERICH, APRN, CNS  Natty Villaseñor. Maikel Gutierrez, MSN, APRN-FNP-C  Chase Rosenbaum MSN, APRN, FNP-C  Sheng ENRIQUEZ PA-C  [address] MSN, APRN, FNP-C  [address], [facility]  [place], [address]  Phone: [phone]  Fax: [phone]       Kimberlyn Jain is a 64 y.o. right handed female     Patient presents to neurology clinic today for intermittent numbness of her right jaw. Patient presents alone is deemed a decent historian. Patient presents today with longstanding right cheek numbness. Patient has had right cheek and jaw numbness since November 2020. Patient states that is intermittent and does not occur all day but she has no warning that it is coming. This began around the time of her gallbladder removed. Patient also developed loss of sense of taste but not sense of smell. Patient has been evaluated by  ENT who found no abnormalities on exam.  Patient also complains of seeing green and yellow spots in her vision intermittently. She has been evaluated by eye doctor and her eyes were normal per patient. Patient denies numbness and tingling to forehead or left side of her face, speech or swallowing issues, difficulty chewing, ptosis, shortness of breath. In the past patient has had at least a quarter in length so she was unable to have MRI brain completed in the past.  Patient denies trauma, infection, or other source of sensory changes. Patient presented to urgent care on 4/7 with complaints of worsening sinus congestion, right-sided sinus/tooth/jaw pain in addition to intermittent right-sided facial numbness. Patient also had been seen by the dentist who performed the x-ray which showed maxillary sinus on the right was completely opacified recently completed a course of antibiotics. Patient completed 10-day course of Augmentin and Astelin.   Patient found to have middle ear effusion bilaterally and tympanic membrane bulging per urgent care note. Patient's labs were completed which showed a low zinc level. MRI brain showed chronic sinusitis; no acute pathology noted. Patient continues to complain of intermittent right jaw numbness but denies stabbing, shooting, electric pain sensation. Patient reports very broken sleep and waking tired obtaining about 6 hours nightly. Patient eats 3 meals a day and does not drink caffeine. Patient drinks about 3 bottles of water daily. Patient reports moderate stress level. Patient does not exercise. Objective:       /86   Pulse 86   Temp 97.4 °F (36.3 °C) (Temporal)   Ht 5' 5\" (1.651 m)   Wt 217 lb (98.4 kg)   LMP 01/01/2013   SpO2 99%   BMI 36.11 kg/m²     General appearance: alert, appears stated age, cooperative and in no distress  Head: normocephalic, without obvious abnormality, atraumatic   bilateral occipital tenderness on exam  Eyes: conjunctivae/corneas clear; no drainage  Neck: supple, symmetrical, trachea midline and thyroid not enlarged  Back: symmetric, no curvature.  ROM normal.  Trapezius muscle tenderness bilaterally  Lungs: clear to auscultation bilaterally  Heart: regular rate and rhythm  Abdomen: soft, non-tender; bowel sounds normal  Extremities: normal, atraumatic, no cyanosis or edema  Pulses: 2+ and symmetric  Skin:  color, texture, turgor normal--no rashes or lesions      Mental Status: alert and oriented x 4, follows commands well, conversant and pleasant    Appropriate attention/concentration  Intact fundus of knowledge  Memories intact    Speech: no dysarthria  Language: no aphasias---reading, writing, repetition, and object identification intact    Cranial Nerves:  I: smell intact   II: visual acuity     II: visual fields Full to finger counting    II: pupils PERRL   III,VII: ptosis None   III,IV,VI: extraocular muscles  EOMI without nystagmus   V: mastication Normal   V: facial light touch sensation Normal--currently   V,VII: corneal reflex     VII: facial muscle function - upper  Normal   VII: facial muscle function - lower Normal   VIII: hearing Normal   IX: soft palate elevation  Normal   IX,X: gag reflex    XI: trapezius strength  5/5   XI: sternocleidomastoid strength 5/5   XI: neck extension strength  5/5   XII: tongue strength  Normal     Motor:  Strong bilateral hand   5/5 throughout  Normal bulk and tone  No drift   No abnormal movements    Sensory:  LT felt more to the left than the right  PP normal  Vibration normal    Coordination:   FN, FFM and BRANDEE normal  HS normal    Gait:  Normal    DTR:   Right Brachioradialis reflex 1+  Left Brachioradialis reflex 1+  Right Biceps reflex 1+  Left Biceps reflex 1+  Right Triceps reflex 1+  Left Triceps reflex 1+  Right Quadriceps reflex 1+  Left Quadriceps reflex 1+  Right Achilles reflex 1+  Left Achilles reflex 1+    No Babinskis  No Michel's    No other pathological reflexes    Laboratory/Radiology:  ry/Radiology:     CBC:   Lab Results   Component Value Date    WBC 5.3 02/07/2022    RBC 4.47 02/07/2022    HGB 13.4 02/07/2022    HCT 39.4 02/07/2022    MCV 88.1 02/07/2022    MCH 30.0 02/07/2022    MCHC 34.0 02/07/2022    RDW 11.9 02/07/2022     02/07/2022    MPV 9.2 02/07/2022     CMP:    Lab Results   Component Value Date     02/07/2022    K 3.8 02/07/2022    K 3.4 07/07/2021     02/07/2022    CO2 28 02/07/2022    BUN 16 02/07/2022    CREATININE 1.0 02/07/2022    GFRAA >60 02/07/2022    LABGLOM 57 02/07/2022    GLUCOSE 101 02/07/2022    PROT 7.4 02/07/2022    LABALBU 4.4 02/07/2022    CALCIUM 9.5 02/07/2022    BILITOT 0.5 02/07/2022    ALKPHOS 70 02/07/2022    AST 13 02/07/2022    ALT 9 02/07/2022     MRI brain  No acute intracranial pathology. Chronic sinusitis.     All labs and images were personally reviewed at the time of this visit    Assessment:     R facial numbness and tingling   This began November 2020 around the time her gallbladder was removed   Patient lost her sense of taste but not her smell    Patient was evaluated by ENT and optometry/ophthalmologist in the past   Previously unable to have MRI brain due to Linq and loop recorder   TSH, A1c, LFTs, electrolytes and CBC in February were within normal   [facility] brain completed at [facility] ruled out intracranial pathology   Labs completed were unrevealing with exception of low zinc level   Atypical facial pain from chronic sinusitis versus dental work; other differentials include trigeminal neuralgia or ocular migraines   Will try low-dose Tegretol and monitor effectiveness    History of headache   Mainly to occipital area and neck   These occur 1-3 times weekly   Currently on metoprolol 25 mg daily   Patient says that numbness and vision changes do not occur in the setting of these headaches    Plan: Will trial low-dose Tegretol  Zinc supplementation recommended  If not helpful can try Trileptal or gabapentin for atypical facial pain  Return office once above is completed  Call with any issues      BEATRICE Pfeiffer  9:14 AM  4/26/2022    I spent 25 minutes with this patient obtaining the HPI and discussing the exam with greater than 50% of the time providing counseling and education on medications and other treatment plans. All questions were answered prior to leaving my office.

## 2022-05-02 ENCOUNTER — OFFICE VISIT (OUTPATIENT)
Dept: FAMILY MEDICINE CLINIC | Age: 57
End: 2022-05-02
Payer: COMMERCIAL

## 2022-05-02 ENCOUNTER — APPOINTMENT (OUTPATIENT)
Dept: GENERAL RADIOLOGY | Age: 57
End: 2022-05-02
Payer: COMMERCIAL

## 2022-05-02 ENCOUNTER — HOSPITAL ENCOUNTER (EMERGENCY)
Age: 57
Discharge: HOME | End: 2022-05-02
Attending: STUDENT IN AN ORGANIZED HEALTH CARE EDUCATION/TRAINING PROGRAM
Payer: COMMERCIAL

## 2022-05-02 VITALS
SYSTOLIC BLOOD PRESSURE: 145 MMHG | OXYGEN SATURATION: 99 % | RESPIRATION RATE: 16 BRPM | HEART RATE: 95 BPM | DIASTOLIC BLOOD PRESSURE: 90 MMHG | TEMPERATURE: 97.5 F

## 2022-05-02 VITALS
TEMPERATURE: 97.2 F | WEIGHT: 214.6 LBS | OXYGEN SATURATION: 98 % | DIASTOLIC BLOOD PRESSURE: 80 MMHG | SYSTOLIC BLOOD PRESSURE: 124 MMHG | HEART RATE: 92 BPM | BODY MASS INDEX: 35.71 KG/M2

## 2022-05-02 DIAGNOSIS — R07.9 CHEST PAIN, UNSPECIFIED TYPE: Primary | ICD-10-CM

## 2022-05-02 DIAGNOSIS — R07.89 ATYPICAL CHEST PAIN: Primary | ICD-10-CM

## 2022-05-02 LAB
ALBUMIN SERPL-MCNC: 4.4 G/DL (ref 3.5–5.2)
ALP BLD-CCNC: 74 U/L (ref 35–104)
ALT SERPL-CCNC: 11 U/L (ref 0–32)
ANION GAP SERPL CALCULATED.3IONS-SCNC: 12 MMOL/L (ref 7–16)
AST SERPL-CCNC: 14 U/L (ref 0–31)
BASOPHILS ABSOLUTE: 0.04 E9/L (ref 0–0.2)
BASOPHILS RELATIVE PERCENT: 0.6 % (ref 0–2)
BILIRUB SERPL-MCNC: 0.5 MG/DL (ref 0–1.2)
BUN BLDV-MCNC: 17 MG/DL (ref 6–20)
CALCIUM SERPL-MCNC: 9.4 MG/DL (ref 8.6–10.2)
CHLORIDE BLD-SCNC: 103 MMOL/L (ref 98–107)
CO2: 25 MMOL/L (ref 22–29)
CREAT SERPL-MCNC: 0.7 MG/DL (ref 0.5–1)
EOSINOPHILS ABSOLUTE: 0.08 E9/L (ref 0.05–0.5)
EOSINOPHILS RELATIVE PERCENT: 1.2 % (ref 0–6)
GFR AFRICAN AMERICAN: >60
GFR NON-AFRICAN AMERICAN: >60 ML/MIN/1.73
GLUCOSE BLD-MCNC: 98 MG/DL (ref 74–99)
HCT VFR BLD CALC: 38.8 % (ref 34–48)
HEMOGLOBIN: 12.8 G/DL (ref 11.5–15.5)
IMMATURE GRANULOCYTES #: 0.01 E9/L
IMMATURE GRANULOCYTES %: 0.1 % (ref 0–5)
LIPASE: 19 U/L (ref 13–60)
LYMPHOCYTES ABSOLUTE: 1.99 E9/L (ref 1.5–4)
LYMPHOCYTES RELATIVE PERCENT: 29.7 % (ref 20–42)
MCH RBC QN AUTO: 29.7 PG (ref 26–35)
MCHC RBC AUTO-ENTMCNC: 33 % (ref 32–34.5)
MCV RBC AUTO: 90 FL (ref 80–99.9)
MONOCYTES ABSOLUTE: 0.48 E9/L (ref 0.1–0.95)
MONOCYTES RELATIVE PERCENT: 7.2 % (ref 2–12)
NEUTROPHILS ABSOLUTE: 4.11 E9/L (ref 1.8–7.3)
NEUTROPHILS RELATIVE PERCENT: 61.2 % (ref 43–80)
PDW BLD-RTO: 11.8 FL (ref 11.5–15)
PLATELET # BLD: 318 E9/L (ref 130–450)
PMV BLD AUTO: 9.4 FL (ref 7–12)
POTASSIUM REFLEX MAGNESIUM: 4.5 MMOL/L (ref 3.5–5)
RBC # BLD: 4.31 E12/L (ref 3.5–5.5)
SODIUM BLD-SCNC: 140 MMOL/L (ref 132–146)
TOTAL PROTEIN: 7.6 G/DL (ref 6.4–8.3)
TROPONIN, HIGH SENSITIVITY: <6 NG/L (ref 0–9)
WBC # BLD: 6.7 E9/L (ref 4.5–11.5)

## 2022-05-02 PROCEDURE — 93005 ELECTROCARDIOGRAM TRACING: CPT | Performed by: PHYSICIAN ASSISTANT

## 2022-05-02 PROCEDURE — 71046 X-RAY EXAM CHEST 2 VIEWS: CPT

## 2022-05-02 PROCEDURE — 93000 ELECTROCARDIOGRAM COMPLETE: CPT

## 2022-05-02 PROCEDURE — 83690 ASSAY OF LIPASE: CPT

## 2022-05-02 PROCEDURE — 85025 COMPLETE CBC W/AUTO DIFF WBC: CPT

## 2022-05-02 PROCEDURE — 80053 COMPREHEN METABOLIC PANEL: CPT

## 2022-05-02 PROCEDURE — 99285 EMERGENCY DEPT VISIT HI MDM: CPT

## 2022-05-02 PROCEDURE — 99214 OFFICE O/P EST MOD 30 MIN: CPT

## 2022-05-02 PROCEDURE — 36415 COLL VENOUS BLD VENIPUNCTURE: CPT

## 2022-05-02 PROCEDURE — 84484 ASSAY OF TROPONIN QUANT: CPT

## 2022-05-02 ASSESSMENT — ENCOUNTER SYMPTOMS
SORE THROAT: 0
NAUSEA: 0
SHORTNESS OF BREATH: 0
VOMITING: 0
EYE PAIN: 0
BACK PAIN: 0
DIARRHEA: 0
ABDOMINAL PAIN: 0
SINUS PAIN: 0

## 2022-05-02 ASSESSMENT — PAIN - FUNCTIONAL ASSESSMENT: PAIN_FUNCTIONAL_ASSESSMENT: 0-10

## 2022-05-02 ASSESSMENT — PAIN DESCRIPTION - LOCATION: LOCATION: CHEST

## 2022-05-02 ASSESSMENT — PAIN SCALES - GENERAL: PAINLEVEL_OUTOF10: 7

## 2022-05-02 NOTE — ED PROVIDER NOTES
Chief Complaint   Patient presents with    Chest Pain     radaites through into back, onset this AM, gradually worsening        69-year-old female with past medical history of SVT post ablation, hypertension, hypothyroidism presenting today with 2 days of right-sided chest pain. It is exacerbated by movement, nonradiating, nonpleuritic, improved with ibuprofen. Not associated with shortness of breath, lightheadedness, dizziness, headaches, numbness, tingling, weakness. She has never had this before. She never had a heart attack or stroke in the past.  She notes that she has recently gotten a puppy and notices that it hurts the side of her chest whenever she picks him up. No other acute complaints. Review of Systems   Constitutional: Negative for chills and fever. HENT: Negative for ear pain, sinus pain and sore throat. Eyes: Negative for pain. Respiratory: Negative for shortness of breath. Cardiovascular: Positive for chest pain. Gastrointestinal: Negative for abdominal pain, diarrhea, nausea and vomiting. Genitourinary: Negative for flank pain and pelvic pain. Musculoskeletal: Negative for back pain and neck pain. Skin: Negative for rash. Neurological: Negative for seizures and headaches. Hematological: Negative for adenopathy. All other systems reviewed and are negative. Physical Exam  Vitals and nursing note reviewed. Constitutional:       Appearance: She is well-developed. HENT:      Head: Normocephalic and atraumatic. Right Ear: External ear normal.      Left Ear: External ear normal.      Nose: Nose normal.      Mouth/Throat:      Mouth: Mucous membranes are moist.      Pharynx: Oropharynx is clear. Eyes:      Pupils: Pupils are equal, round, and reactive to light. Cardiovascular:      Rate and Rhythm: Normal rate and regular rhythm. Heart sounds: Normal heart sounds. No murmur heard.       Pulmonary:      Effort: Pulmonary effort is normal. Breath sounds: Normal breath sounds. Abdominal:      General: Bowel sounds are normal.      Palpations: Abdomen is soft. Tenderness: There is no abdominal tenderness. There is no guarding or rebound. Musculoskeletal:         General: Tenderness present. No swelling. Cervical back: Normal range of motion and neck supple. Comments: TTP to right pec and upper back muscles, worsened with range of motion   Skin:     General: Skin is warm and dry. Neurological:      Mental Status: She is alert and oriented to person, place, and time. Cranial Nerves: No cranial nerve deficit. Motor: No weakness. Procedures     EKG: This EKG is signed and interpreted by me. Rate: 82  Rhythm: Sinus rhythm  Interpretation: no acute changes, no ST elevations or acute changes, intervals normal  Comparison: stable as compared to patient's most recent EKG      MDM  Number of Diagnoses or Management Options  Atypical chest pain  Diagnosis management comments: 27-year-old female with past medical history of SVT post ablation, hypertension, hypothyroidism presenting today with 2 days of right-sided chest pain. She was hemodynamically stable on arrival to emergency department. EKG was very reassuring with no acute ST elevations or other changes that was unchanged from prior studies. Lab work was reassuring as well, troponin within normal limits, no electrolyte abnormalities or evidence of leukocytosis or anemia. Chest x-ray did demonstrate atelectasis versus pneumonia in the left lower lung, the patient has not been experiencing shortness of breath, pleuritic chest pain, cough, fevers, chills or anything else that would suggest an infectious etiology. The pain was reproducible with range of motion and palpation, likely musculoskeletal.  Patient was educated on best management for with Tylenol, ibuprofen, ice, heat, stretching.   She was given strict return precautions verbalized understanding of the plan.                  --------------------------------------------- PAST HISTORY ---------------------------------------------  Past Medical History:  has a past medical history of Allergic rhinitis, Anxiety, Atrial fibrillation ([facility] [address] Depression, Headache, Hypertension, Hypothyroidism, NAFLD (nonalcoholic fatty liver disease), Obesity with body mass index (BMI) of 30.0 to 39.9, Osteoarthritis, Sleep apnea, Spinal stenosis, lumbar, and Thyroid disease. Past Surgical History:  has a past surgical history that includes ablation of dysrhythmic focus (1990); hysteroscopy (08/15/2012); Colonoscopy; Endometrial ablation (2012); Thyroidectomy (Bilateral, 01/19/2017); Breast surgery; Hysterectomy, vaginal; other surgical history (12/23/2019); Insertable Cardiac Monitor (12/23/2019); Gallbladder surgery (07/2020); Cholecystectomy; and Insertable Cardiac Monitor (N/A, 03/01/2022). Social History:  reports that she has never smoked. She has never used smokeless tobacco. She reports that she does not drink alcohol and does not use drugs. Family History: family history includes Arrhythmia in her mother; Atrial Fibrillation in her paternal grandfather; Breast Cancer in her paternal grandmother; Cancer in her father and maternal grandmother; Heart Disease in her maternal grandfather; High Blood Pressure in her father; High Cholesterol in her mother; Other in her daughter, paternal grandmother, and sister. The patients home medications have been reviewed.     Allergies: Prednisone, Fentanyl, and No known allergies    -------------------------------------------------- RESULTS -------------------------------------------------  Labs:  Results for orders placed or performed during the hospital encounter of 05/02/22   CBC with Auto Differential   Result Value Ref Range    WBC 6.7 4.5 - 11.5 E9/L    RBC 4.31 3.50 - 5.50 E12/L    Hemoglobin 12.8 11.5 - 15.5 g/dL    Hematocrit 38.8 34.0 - 48.0 %    MCV 90.0 80.0 - 99.9 fL MCH 29.7 26.0 - 35.0 pg    MCHC 33.0 32.0 - 34.5 %    RDW 11.8 11.5 - 15.0 fL    Platelets 154 879 - 112 E9/L    MPV 9.4 7.0 - 12.0 fL    Neutrophils % 61.2 43.0 - 80.0 %    Immature Granulocytes % 0.1 0.0 - 5.0 %    Lymphocytes % 29.7 20.0 - 42.0 %    Monocytes % 7.2 2.0 - 12.0 %    Eosinophils % 1.2 0.0 - 6.0 %    Basophils % 0.6 0.0 - 2.0 %    Neutrophils Absolute 4.11 1.80 - 7.30 E9/L    Immature Granulocytes # 0.01 E9/L    Lymphocytes Absolute 1.99 1.50 - 4.00 E9/L    Monocytes Absolute 0.48 0.10 - 0.95 E9/L    Eosinophils Absolute 0.08 0.05 - 0.50 E9/L    Basophils Absolute 0.04 0.00 - 0.20 E9/L   Comprehensive Metabolic Panel w/ Reflex to MG   Result Value Ref Range    Sodium 140 132 - 146 mmol/L    Potassium reflex Magnesium 4.5 3.5 - 5.0 mmol/L    Chloride 103 98 - 107 mmol/L    CO2 25 22 - 29 mmol/L    Anion Gap 12 7 - 16 mmol/L    Glucose 98 74 - 99 mg/dL    BUN 17 6 - 20 mg/dL    CREATININE 0.7 0.5 - 1.0 mg/dL    GFR Non-African American >60 >=60 mL/min/1.73    GFR African American >60     Calcium 9.4 8.6 - 10.2 mg/dL    Total Protein 7.6 6.4 - 8.3 g/dL    Albumin 4.4 3.5 - 5.2 g/dL    Total Bilirubin 0.5 0.0 - 1.2 mg/dL    Alkaline Phosphatase 74 35 - 104 U/L    ALT 11 0 - 32 U/L    AST 14 0 - 31 U/L   Troponin   Result Value Ref Range    Troponin, High Sensitivity <6 0 - 9 ng/L   Lipase   Result Value Ref Range    Lipase 19 13 - 60 U/L   EKG 12 Lead   Result Value Ref Range    Ventricular Rate 82 BPM    Atrial Rate 82 BPM    P-R Interval 178 ms    QRS Duration 80 ms    Q-T Interval 384 ms    QTc Calculation (Bazett) 448 ms    P Axis 44 degrees    R Axis 70 degrees    T Axis 60 degrees       Radiology:  XR CHEST (2 VW)   Final Result   Rule out atelectasis versus pneumonia in the left lower lung field.   Rule out   small left pleural effusion versus pleural thickening/reaction.             ------------------------- NURSING NOTES AND VITALS REVIEWED ---------------------------  Date / Time Roomed: 5/2/2022  6:41 PM  ED Bed Assignment:  [id number]    The nursing notes within the ED encounter and vital signs as below have been reviewed. BP (!) 145/90   Pulse 95   Temp 97.5 °F (36.4 °C)   Resp 16   LMP 01/01/2013   SpO2 99%   Oxygen Saturation Interpretation: Normal      ------------------------------------------ PROGRESS NOTES ------------------------------------------  I have spoken with the patient and discussed todays results, in addition to providing specific details for the plan of care and counseling regarding the diagnosis and prognosis. Their questions are answered at this time and they are agreeable with the plan. I discussed at length with them reasons for immediate return here for re evaluation. They will followup with primary care by calling their office tomorrow. --------------------------------- ADDITIONAL PROVIDER NOTES ---------------------------------  At this time the patient is without objective evidence of an acute process requiring hospitalization or inpatient management. They have remained hemodynamically stable throughout their entire ED visit and are stable for discharge with outpatient follow-up. The plan has been discussed in detail and they are aware of the specific conditions for emergent return, as well as the importance of follow-up. Discharge Medication List as of 5/2/2022  8:41 PM          Diagnosis:  1. Atypical chest pain        Disposition:  Patient's disposition: Discharge to home  Patient's condition is stable.            Figueroa Rcok DO  Resident  05/02/22 2221

## 2022-05-02 NOTE — ED NOTES
Department of Emergency Medicine    FIRST PROVIDER TRIAGE NOTE             Independent MLP           5/2/22  5:11 PM EDT    Date of Encounter: 5/2/22   MRN: [id number]    Vitals:    05/02/22 1710 05/02/22 1711   BP:  (!) 145/90   Pulse:  95   Resp: 16    Temp: 97.5 °F (36.4 °C)    SpO2:  99%      HPI: Beltran Sung is a 64 y.o. female who presents to the ED for Chest Pain (radaites through into back, onset this AM, gradually worsening )    ROS: Negative for sob,vomiting or diarrhea. Physical Exam:   Gen Appearance/Constitutional: alert  CV: regular rate     Initial Plan of Care: All treatment areas with department are currently occupied. Plan to order/Initiate the following while awaiting opening in ED: labs, EKG and imaging studies.     Initial Plan of Care: Initiate Treatment-Testing, Proceed toTreatment Area When Bed Available for ED Attending/MLP to Continue Care    Electronically signed by Claudene Ribas, PA-C   DD: 5/2/22       Claudene Ribas, PA-C  05/02/22 1712

## 2022-05-02 NOTE — PROGRESS NOTES
Chief Complaint:       Chest Pain    History of Present Illness   Source of history provided by:  patient. Tricia Ewing is a 64 y.o. old female who presents to walk-in with complaints of chest pain x since waking up this morning. Pain has been worsening since onset. States the pain is located over the entire front of her chest and does radiate through to her back. States the pain does not radiate to the left/right shoulder or jaw. Pain is not related to exertion. Pt has not had pain like this before. The CP does not  cause SOB. Denies any N/V, diaphoresis, HA, fever, cough, or recent illness. No dizziness, syncope, palpitations, or edema. Denies any hx of asthma, COPD, or tobacco use. Does of history of arhythmias and ablation. No previous history of MI. Review of Systems    Unless otherwise stated in this report or unable to obtain because of the patient's clinical or mental status as evidenced by the medical record, this patients's positive and negative responses for Review of Systems, constitutional, psych, eyes, ENT, cardiovascular, respiratory, gastrointestinal, neurological, genitourinary, musculoskeletal, integument systems and systems related to the presenting problem are either stated in the preceding or were negative for the symptoms and/or complaints related to the medical problem. Physical Exam   Vital Signs:  /80 (Site: Right Upper Arm, Position: Sitting)   Pulse 92   Temp 97.2 °F (36.2 °C) (Temporal)   Wt 214 lb 9.6 oz (97.3 kg)   LMP 01/01/2013   SpO2 98%   BMI 35.71 kg/m²    Oxygen Saturation Interpretation: Normal.    General Appearance/Constitutional:  Alert, development consistent with age, NAD. HEENT:  NC/NT. Airway patent. Neck:  Normal ROM. Supple. Lungs:  CTAB without wheezing, rales, or rhonchi. Heart:  Regular rate and rhythm, normal heart sounds, without pathological murmurs, ectopy, gallops, or rubs.    Chest:  Symmetrical without visible rash or tenderness. Back:  No costovertebral tenderness. Skin:  Normal turgor. Warm, dry, without visible rash, unless noted elsewhere. Neurological:  Oriented. Motor functions intact. Psychiatric:  Pleasant and appropriate. Mood and affect are normal.    Test Results Section   (All laboratory and radiology results have been personally reviewed by myself)  Labs:  No results found for this visit on 05/02/22. Imaging: All Radiology results interpreted by Radiologist unless otherwise noted. EKG #1:  Interpreted by this provider unless otherwise noted. Time:  16:53    Rate: 84  Rhythm: Sinus  Interpretation: Normal sinus rythym  Comparison: 3/15, low voltage, unchanged pattern    Assessment / Plan   Impression(s):  Nanci Byrd was seen today for chest pain. Diagnoses and all orders for this visit:    Chest pain, unspecified type  -     EKG 12 lead      Vitals reviewed which are stable. EKG performed in office which revealed normal sinus rhythm without ST elevation, T inversion, or the presence of Q waves. However, pt advised that I feel she needs a comprehensive cardiopulmonary workup including blood work and imagning that is unable to be performed in an urgent care setting. Pt advised to go straight to the ED for further evaluation and management. EMS was offered to transport patient to ED, however, patient refused and her  will take her. Pt agreed with this care plan and agreed to go immediately by private vehicle. Pt left our office in stable condition. Further disposition to follow. All questions answered. Electronically signed by MARGOT Scott   DD: 5/2/22    **This report was transcribed using voice recognition software. Every effort was made to ensure accuracy; however, inadvertent computerized transcription errors may be present.

## 2022-05-03 LAB
EKG ATRIAL RATE: 82 BPM
EKG P AXIS: 44 DEGREES
EKG P-R INTERVAL: 178 MS
EKG Q-T INTERVAL: 384 MS
EKG QRS DURATION: 80 MS
EKG QTC CALCULATION (BAZETT): 448 MS
EKG R AXIS: 70 DEGREES
EKG T AXIS: 60 DEGREES
EKG VENTRICULAR RATE: 82 BPM

## 2022-05-12 ENCOUNTER — TELEPHONE (OUTPATIENT)
Dept: ENDOCRINOLOGY | Age: 57
End: 2022-05-12

## 2022-05-13 NOTE — TELEPHONE ENCOUNTER
Notify pt,  I have reviewed your recent results    Excellent!  Thyroid US was negative for any evidence of recurrence

## 2022-07-21 DIAGNOSIS — B96.89 ACUTE BACTERIAL SINUSITIS: ICD-10-CM

## 2022-07-21 DIAGNOSIS — J01.90 ACUTE BACTERIAL SINUSITIS: ICD-10-CM

## 2022-07-21 RX ORDER — AZELASTINE HYDROCHLORIDE 137 UG/1
SPRAY, METERED NASAL
Qty: 60 ML | Refills: 1 | OUTPATIENT
Start: 2022-07-21

## 2022-07-30 ENCOUNTER — APPOINTMENT (OUTPATIENT)
Dept: CT IMAGING | Age: 57
End: 2022-07-30
Payer: COMMERCIAL

## 2022-07-30 ENCOUNTER — HOSPITAL ENCOUNTER (EMERGENCY)
Age: 57
Discharge: HOME | End: 2022-07-30
Attending: EMERGENCY MEDICINE
Payer: COMMERCIAL

## 2022-07-30 VITALS
HEART RATE: 70 BPM | SYSTOLIC BLOOD PRESSURE: 126 MMHG | DIASTOLIC BLOOD PRESSURE: 81 MMHG | OXYGEN SATURATION: 99 % | HEIGHT: 65 IN | WEIGHT: 215 LBS | BODY MASS INDEX: 35.82 KG/M2 | TEMPERATURE: 97.5 F | RESPIRATION RATE: 18 BRPM

## 2022-07-30 DIAGNOSIS — K57.32 SIGMOID DIVERTICULITIS: Primary | ICD-10-CM

## 2022-07-30 DIAGNOSIS — R31.9 HEMATURIA, UNSPECIFIED TYPE: ICD-10-CM

## 2022-07-30 LAB
ALBUMIN SERPL-MCNC: 4.2 G/DL (ref 3.5–5.2)
ALP BLD-CCNC: 75 U/L (ref 35–104)
ALT SERPL-CCNC: 12 U/L (ref 0–32)
ANION GAP SERPL CALCULATED.3IONS-SCNC: 9 MMOL/L (ref 7–16)
AST SERPL-CCNC: 14 U/L (ref 0–31)
BACTERIA: ABNORMAL /HPF
BASOPHILS ABSOLUTE: 0.04 E9/L (ref 0–0.2)
BASOPHILS RELATIVE PERCENT: 0.6 % (ref 0–2)
BILIRUB SERPL-MCNC: 0.4 MG/DL (ref 0–1.2)
BILIRUBIN URINE: NEGATIVE
BLOOD, URINE: ABNORMAL
BUN BLDV-MCNC: 23 MG/DL (ref 6–20)
CALCIUM SERPL-MCNC: 9.4 MG/DL (ref 8.6–10.2)
CHLORIDE BLD-SCNC: 105 MMOL/L (ref 98–107)
CLARITY: CLEAR
CO2: 26 MMOL/L (ref 22–29)
COLOR: YELLOW
CREAT SERPL-MCNC: 0.8 MG/DL (ref 0.5–1)
EOSINOPHILS ABSOLUTE: 0.08 E9/L (ref 0.05–0.5)
EOSINOPHILS RELATIVE PERCENT: 1.2 % (ref 0–6)
GFR AFRICAN AMERICAN: >60
GFR NON-AFRICAN AMERICAN: >60 ML/MIN/1.73
GLUCOSE BLD-MCNC: 101 MG/DL (ref 74–99)
GLUCOSE URINE: NEGATIVE MG/DL
HCT VFR BLD CALC: 40.1 % (ref 34–48)
HEMOGLOBIN: 13.3 G/DL (ref 11.5–15.5)
IMMATURE GRANULOCYTES #: 0.02 E9/L
IMMATURE GRANULOCYTES %: 0.3 % (ref 0–5)
KETONES, URINE: NEGATIVE MG/DL
LACTIC ACID: 1.3 MMOL/L (ref 0.5–2.2)
LEUKOCYTE ESTERASE, URINE: ABNORMAL
LYMPHOCYTES ABSOLUTE: 1.53 E9/L (ref 1.5–4)
LYMPHOCYTES RELATIVE PERCENT: 23.9 % (ref 20–42)
MCH RBC QN AUTO: 29.7 PG (ref 26–35)
MCHC RBC AUTO-ENTMCNC: 33.2 % (ref 32–34.5)
MCV RBC AUTO: 89.5 FL (ref 80–99.9)
MONOCYTES ABSOLUTE: 0.46 E9/L (ref 0.1–0.95)
MONOCYTES RELATIVE PERCENT: 7.2 % (ref 2–12)
NEUTROPHILS ABSOLUTE: 4.28 E9/L (ref 1.8–7.3)
NEUTROPHILS RELATIVE PERCENT: 66.8 % (ref 43–80)
NITRITE, URINE: NEGATIVE
PDW BLD-RTO: 11.9 FL (ref 11.5–15)
PH UA: 5.5 (ref 5–9)
PLATELET # BLD: 267 E9/L (ref 130–450)
PMV BLD AUTO: 9.8 FL (ref 7–12)
POTASSIUM SERPL-SCNC: 4.4 MMOL/L (ref 3.5–5)
PROTEIN UA: NEGATIVE MG/DL
RBC # BLD: 4.48 E12/L (ref 3.5–5.5)
RBC UA: ABNORMAL /HPF (ref 0–2)
SODIUM BLD-SCNC: 140 MMOL/L (ref 132–146)
SPECIFIC GRAVITY UA: 1.02 (ref 1–1.03)
TOTAL PROTEIN: 7.2 G/DL (ref 6.4–8.3)
TROPONIN, HIGH SENSITIVITY: <6 NG/L (ref 0–9)
UROBILINOGEN, URINE: 0.2 E.U./DL
WBC # BLD: 6.4 E9/L (ref 4.5–11.5)
WBC UA: ABNORMAL /HPF (ref 0–5)

## 2022-07-30 PROCEDURE — 83605 ASSAY OF LACTIC ACID: CPT

## 2022-07-30 PROCEDURE — 2580000003 HC RX 258: Performed by: EMERGENCY MEDICINE

## 2022-07-30 PROCEDURE — 74176 CT ABD & PELVIS W/O CONTRAST: CPT

## 2022-07-30 PROCEDURE — 93005 ELECTROCARDIOGRAM TRACING: CPT | Performed by: EMERGENCY MEDICINE

## 2022-07-30 PROCEDURE — 85025 COMPLETE CBC W/AUTO DIFF WBC: CPT

## 2022-07-30 PROCEDURE — 81001 URINALYSIS AUTO W/SCOPE: CPT

## 2022-07-30 PROCEDURE — 36415 COLL VENOUS BLD VENIPUNCTURE: CPT

## 2022-07-30 PROCEDURE — 84484 ASSAY OF TROPONIN QUANT: CPT

## 2022-07-30 PROCEDURE — 87088 URINE BACTERIA CULTURE: CPT

## 2022-07-30 PROCEDURE — 80053 COMPREHEN METABOLIC PANEL: CPT

## 2022-07-30 PROCEDURE — 99284 EMERGENCY DEPT VISIT MOD MDM: CPT

## 2022-07-30 RX ORDER — CEFDINIR 300 MG/1
300 CAPSULE ORAL 2 TIMES DAILY
Qty: 20 CAPSULE | Refills: 0 | Status: SHIPPED | OUTPATIENT
Start: 2022-07-30 | End: 2022-08-09

## 2022-07-30 RX ORDER — METRONIDAZOLE 500 MG/1
500 TABLET ORAL 3 TIMES DAILY
Qty: 30 TABLET | Refills: 0 | Status: SHIPPED | OUTPATIENT
Start: 2022-07-30 | End: 2022-08-09

## 2022-07-30 RX ORDER — 0.9 % SODIUM CHLORIDE 0.9 %
1000 INTRAVENOUS SOLUTION INTRAVENOUS ONCE
Status: COMPLETED | OUTPATIENT
Start: 2022-07-30 | End: 2022-07-30

## 2022-07-30 RX ADMIN — SODIUM CHLORIDE 1000 ML: 9 INJECTION, SOLUTION INTRAVENOUS at 08:27

## 2022-07-30 ASSESSMENT — PAIN DESCRIPTION - ORIENTATION: ORIENTATION: OTHER (COMMENT)

## 2022-07-30 ASSESSMENT — PAIN DESCRIPTION - LOCATION: LOCATION: FLANK

## 2022-07-30 ASSESSMENT — PAIN SCALES - GENERAL: PAINLEVEL_OUTOF10: 7

## 2022-07-30 ASSESSMENT — PAIN DESCRIPTION - FREQUENCY: FREQUENCY: CONTINUOUS

## 2022-07-30 ASSESSMENT — PAIN DESCRIPTION - PAIN TYPE: TYPE: ACUTE PAIN

## 2022-07-30 ASSESSMENT — PAIN DESCRIPTION - DESCRIPTORS: DESCRIPTORS: SORE

## 2022-07-30 NOTE — ED PROVIDER NOTES
HPI:  7/30/22, Time: 8:19 AM EDT         Ritesh Andersen is a 64 y.o. female presenting to the ED for bilateral flank pain, worse on right, with nausea and dysuria, beginning last night. The complaint has been persistent, moderate in severity, and worsened by nothing. Patient denies fever/chills, sore throat, cough, congestion, chest pain, shortness of breath, edema, headache, visual disturbances, focal paresthesias, focal weakness, vomiting, diarrhea, constipation, hematuria, trauma, neck or back pain, rash or other complaints. Denies history of kidney stones. Declines medicine for pain or nausea, but will accept IV fluids. Doesn't want IV contrast.         ROS:   A complete review of systems was performed and all pertinent positives and negatives are stated within HPI, all other systems reviewed and are negative.      --------------------------------------------- PAST HISTORY ---------------------------------------------  Past Medical History:  has a past medical history of Allergic rhinitis, Anxiety, Atrial fibrillation ([address] Depression, Headache, Hypertension, Hypothyroidism, NAFLD (nonalcoholic fatty liver disease), Obesity with body mass index (BMI) of 30.0 to 39.9, Osteoarthritis, Sleep apnea, Spinal stenosis, lumbar, and Thyroid disease. Past Surgical History:  has a past surgical history that includes ablation of dysrhythmic focus (1990); hysteroscopy (08/15/2012); Colonoscopy; Endometrial ablation (2012); Thyroidectomy (Bilateral, 01/19/2017); Breast surgery; Hysterectomy, vaginal; other surgical history (12/23/2019); Insertable Cardiac Monitor (12/23/2019); Gallbladder surgery (07/2020); Cholecystectomy; and Insertable Cardiac Monitor (N/A, 03/01/2022). Social History:  reports that she has never smoked. She has never used smokeless tobacco. She reports that she does not drink alcohol and does not use drugs.     Family History: family history includes Arrhythmia in her mother; Atrial Fibrillation in her paternal grandfather; Breast Cancer in her paternal grandmother; Cancer in her father and maternal grandmother; Heart Disease in her maternal grandfather; High Blood Pressure in her father; High Cholesterol in her mother; Other in her daughter, paternal grandmother, and sister. The patients home medications have been reviewed. Allergies: Prednisone and Fentanyl        ----------------------------------------PHYSICAL EXAM--------------------------------------  Constitutional:  Well developed, well nourished, obese, no acute distress, non-toxic appearance   Eyes:  PERRL, conjunctiva normal, EOMI  HENT:  Atraumatic, external ears normal, nose normal, oropharynx moist. Neck- normal range of motion, no nuchal rigidity   Respiratory:  No respiratory distress, normal breath sounds, no rales, no wheezing   Cardiovascular:  Normal rate, normal rhythm, no murmurs, no gallops, no rubs. Radial and DP pulses 2+ bilaterally. Compartments are soft. She is warm and well perfused. Cap refill less than 3 seconds. GI:  Soft, nondistended, normal bowel sounds, nontender, no organomegaly, no mass, no rebound, no guarding   :  Bilateral costovertebral angle tenderness   Musculoskeletal:  No edema, no tenderness, no deformities. Back- no tenderness  Integument:  Well hydrated, no rash. Adequate perfusion. Lymphatic:  No cervical lymphadenopathy noted   Neurologic:  Alert & oriented x 3, CN 2-12 normal,no focal deficits noted. Normal gait. Psychiatric:  Speech and behavior appropriate       -------------------------------------------------- RESULTS -------------------------------------------------  I have personally reviewed all laboratory and imaging results for this patient. Results are listed below.      LABS:  Results for orders placed or performed during the hospital encounter of 07/30/22   Urinalysis with Microscopic   Result Value Ref Range    Color, UA Yellow Straw/Yellow    Clarity, UA Clear Clear    Glucose, Ur Negative Negative mg/dL    Bilirubin Urine Negative Negative    Ketones, Urine Negative Negative mg/dL    Specific Gravity, UA 1.025 1.005 - 1.030    Blood, Urine SMALL (A) Negative    pH, UA 5.5 5.0 - 9.0    Protein, UA Negative Negative mg/dL    Urobilinogen, Urine 0.2 <2.0 E.U./dL    Nitrite, Urine Negative Negative    Leukocyte Esterase, Urine SMALL (A) Negative    WBC, UA 1-3 0 - 5 /HPF    RBC, UA 1-3 0 - 2 /HPF    Bacteria, UA NONE SEEN None Seen /HPF   CBC with Auto Differential   Result Value Ref Range    WBC 6.4 4.5 - 11.5 E9/L    RBC 4.48 3.50 - 5.50 E12/L    Hemoglobin 13.3 11.5 - 15.5 g/dL    Hematocrit 40.1 34.0 - 48.0 %    MCV 89.5 80.0 - 99.9 fL    MCH 29.7 26.0 - 35.0 pg    MCHC 33.2 32.0 - 34.5 %    RDW 11.9 11.5 - 15.0 fL    Platelets 563 825 - 916 E9/L    MPV 9.8 7.0 - 12.0 fL    Neutrophils % 66.8 43.0 - 80.0 %    Immature Granulocytes % 0.3 0.0 - 5.0 %    Lymphocytes % 23.9 20.0 - 42.0 %    Monocytes % 7.2 2.0 - 12.0 %    Eosinophils % 1.2 0.0 - 6.0 %    Basophils % 0.6 0.0 - 2.0 %    Neutrophils Absolute 4.28 1.80 - 7.30 E9/L    Immature Granulocytes # 0.02 E9/L    Lymphocytes Absolute 1.53 1.50 - 4.00 E9/L    Monocytes Absolute 0.46 0.10 - 0.95 E9/L    Eosinophils Absolute 0.08 0.05 - 0.50 E9/L    Basophils Absolute 0.04 0.00 - 0.20 E9/L   Comprehensive Metabolic Panel   Result Value Ref Range    Sodium 140 132 - 146 mmol/L    Potassium 4.4 3.5 - 5.0 mmol/L    Chloride 105 98 - 107 mmol/L    CO2 26 22 - 29 mmol/L    Anion Gap 9 7 - 16 mmol/L    Glucose 101 (H) 74 - 99 mg/dL    BUN 23 (H) 6 - 20 mg/dL    Creatinine 0.8 0.5 - 1.0 mg/dL    GFR Non-African American >60 >=60 mL/min/1.73    GFR African American >60     Calcium 9.4 8.6 - 10.2 mg/dL    Total Protein 7.2 6.4 - 8.3 g/dL    Albumin 4.2 3.5 - 5.2 g/dL    Total Bilirubin 0.4 0.0 - 1.2 mg/dL    Alkaline Phosphatase 75 35 - 104 U/L    ALT 12 0 - 32 U/L    AST 14 0 - 31 U/L   Lactic Acid   Result Value Ref Range    Lactic Acid 1.3 0.5 - 2.2 mmol/L   Troponin   Result Value Ref Range    Troponin, High Sensitivity <6 0 - 9 ng/L   EKG 12 Lead   Result Value Ref Range    Ventricular Rate 64 BPM    Atrial Rate 64 BPM    P-R Interval 204 ms    QRS Duration 78 ms    Q-T Interval 432 ms    QTc Calculation (Bazett) 445 ms    P Axis 68 degrees    R Axis 70 degrees    T Axis 37 degrees       RADIOLOGY:  Interpreted by Radiologist.  CT ABDOMEN PELVIS WO CONTRAST Additional Contrast? None   Final Result   Mild amount of nonspecific wall prominence in the distal sigmoid and rectum. No urinary track abnormality/calculi are noted. EKG Interpretation  Time: 09:07 AM EDT  Rhythm: normal sinus   Rate: 64  Axis: normal  Conduction: normal  ST Segments: no acute change  T Waves: no acute change  Clinical Impression: no acute changes and low voltage EKG  Comparison to prior EKG: stable as compared to patient's most recent EKG      ------------------------- NURSING NOTES AND VITALS REVIEWED ---------------------------  The nursing notes within the ED encounter and vital signs as below have been reviewed by myself. /81   Pulse 70   Temp 97.5 °F (36.4 °C) (Temporal)   Resp 18   Ht 5' 5\" (1.651 m)   Wt 215 lb (97.5 kg)   LMP 01/01/2013   SpO2 99%   BMI 35.78 kg/m²   Oxygen Saturation Interpretation: Normal      The patients available past medical records and past encounters were reviewed. ------------------------------ ED COURSE/MEDICAL DECISION MAKING----------------------  Medications   0.9 % sodium chloride bolus (0 mLs IntraVENous Stopped 7/30/22 1044)           Procedures:   none      Medical Decision Making:    Mild hematuria on UA without bacteria, will send for culture. It is of kidney stone on CAT scan. Some inflammation surrounding the sigmoid colon. Could potentially be consistent with sigmoid colitis versus diverticulitis. No evidence of diverticulosis on scan.   Will send home with Omnicef and Flagyl in the event this is early diverticulitis. Follow-up with PCP. She appears well, nontoxic, abdomen soft nontender and neurovascularly intact and ambulatory upon discharge. Patient was explicitly instructed on specific signs and symptoms on which to return to the emergency room for. Patient was instructed to return to the ER for any new or worsening symptoms. Additional discharge instructions were given verbally. All questions were answered. Patient is comfortable and agreeable with discharge plan. Patient in no acute distress and non-toxic in appearance. This patient's ED course included: re-evaluation prior to disposition and a personal history and physicial eaxmination    This patient has remained hemodynamically stable and improved during their ED course. Re-Evaluations:  Time: 11:37 AM EDT   Re-evaluation. Patients symptoms are improving  Repeat physical examination is improved      Consultations:   none    Critical Care: none    Diane OWENS DO, am the Primary Provider of Record    Counseling: The emergency provider has spoken with the patient and spouse/SO and discussed todays results, in addition to providing specific details for the plan of care and counseling regarding the diagnosis and prognosis. Questions are answered at this time and they are agreeable with the plan.    --------------------------- IMPRESSION AND DISPOSITION ---------------------------------    IMPRESSION  1. Sigmoid diverticulitis    2.  Hematuria, unspecified type        DISPOSITION  Disposition: Discharge to home  Patient condition is stable             Horace Amaro DO  07/30/22 1130

## 2022-08-01 LAB
EKG ATRIAL RATE: 64 BPM
EKG P AXIS: 68 DEGREES
EKG P-R INTERVAL: 204 MS
EKG Q-T INTERVAL: 432 MS
EKG QRS DURATION: 78 MS
EKG QTC CALCULATION (BAZETT): 445 MS
EKG R AXIS: 70 DEGREES
EKG T AXIS: 37 DEGREES
EKG VENTRICULAR RATE: 64 BPM
URINE CULTURE, ROUTINE: NORMAL

## 2022-08-13 DIAGNOSIS — E03.9 HYPOTHYROIDISM (ACQUIRED): ICD-10-CM

## 2022-08-15 RX ORDER — METOPROLOL SUCCINATE 25 MG/1
TABLET, EXTENDED RELEASE ORAL
Qty: 90 TABLET | Refills: 3 | Status: SHIPPED | OUTPATIENT
Start: 2022-08-15

## 2022-08-15 RX ORDER — LEVOTHYROXINE SODIUM 88 MCG
TABLET ORAL
Qty: 30 TABLET | Refills: 5 | Status: SHIPPED | OUTPATIENT
Start: 2022-08-15

## 2022-08-19 ENCOUNTER — OFFICE VISIT (OUTPATIENT)
Dept: FAMILY MEDICINE CLINIC | Age: 57
End: 2022-08-19
Payer: COMMERCIAL

## 2022-08-19 VITALS
OXYGEN SATURATION: 97 % | DIASTOLIC BLOOD PRESSURE: 80 MMHG | BODY MASS INDEX: 37.34 KG/M2 | SYSTOLIC BLOOD PRESSURE: 136 MMHG | HEART RATE: 84 BPM | WEIGHT: 224.4 LBS | TEMPERATURE: 99.5 F

## 2022-08-19 DIAGNOSIS — R05.9 COUGH: Primary | ICD-10-CM

## 2022-08-19 LAB
Lab: NORMAL
PERFORMING INSTRUMENT: NORMAL
QC PASS/FAIL: NORMAL
SARS-COV-2, POC: NORMAL

## 2022-08-19 PROCEDURE — 1036F TOBACCO NON-USER: CPT | Performed by: FAMILY MEDICINE

## 2022-08-19 PROCEDURE — G8427 DOCREV CUR MEDS BY ELIG CLIN: HCPCS | Performed by: FAMILY MEDICINE

## 2022-08-19 PROCEDURE — 99213 OFFICE O/P EST LOW 20 MIN: CPT | Performed by: FAMILY MEDICINE

## 2022-08-19 PROCEDURE — 87426 SARSCOV CORONAVIRUS AG IA: CPT | Performed by: FAMILY MEDICINE

## 2022-08-19 PROCEDURE — G8417 CALC BMI ABV UP PARAM F/U: HCPCS | Performed by: FAMILY MEDICINE

## 2022-08-19 PROCEDURE — 3017F COLORECTAL CA SCREEN DOC REV: CPT | Performed by: FAMILY MEDICINE

## 2022-08-19 RX ORDER — BROMPHENIRAMINE MALEATE, PSEUDOEPHEDRINE HYDROCHLORIDE, AND DEXTROMETHORPHAN HYDROBROMIDE 2; 30; 10 MG/5ML; MG/5ML; MG/5ML
5 SYRUP ORAL 4 TIMES DAILY PRN
Qty: 237 ML | Refills: 2 | Status: SHIPPED | OUTPATIENT
Start: 2022-08-19

## 2022-08-19 RX ORDER — CEFDINIR 300 MG/1
300 CAPSULE ORAL 2 TIMES DAILY
Qty: 14 CAPSULE | Refills: 0 | Status: SHIPPED | OUTPATIENT
Start: 2022-08-19 | End: 2022-08-26

## 2022-08-19 ASSESSMENT — ENCOUNTER SYMPTOMS
SORE THROAT: 1
GASTROINTESTINAL NEGATIVE: 1
COUGH: 1
TROUBLE SWALLOWING: 0
EYES NEGATIVE: 1
RHINORRHEA: 1

## 2022-08-19 NOTE — PROGRESS NOTES
Macie Lopez (:  1965) is a 64 y.o. female,Established patient, here for evaluation of the following chief complaint(s):  Cough (X1 day, declines covid test), Drainage, Congestion, Otalgia, and Pharyngitis         ASSESSMENT/PLAN:  1. Cough  -     POCT COVID-19, Antigen  -     cefdinir (OMNICEF) 300 MG capsule; Take 1 capsule by mouth 2 times daily for 7 days, Disp-14 capsule, R-0Normal  -     brompheniramine-pseudoephedrine-DM (BROMFED DM) 2-30-10 MG/5ML syrup; Take 5 mLs by mouth 4 times daily as needed for Cough, Disp-237 mL, R-2Normal  Is COVID-negative. Deferred send out test.  Treat empirically. Red flags discussed with patient if these occur he is to go directly to the emergency department. No follow-ups on file. Subjective   SUBJECTIVE/OBJECTIVE:  HPI  Patient presents today for less than 24 hours worth of sore throat, ear pain, congestion, drainage, and nonproductive cough. Denies any fever or chills. Daughter is sick with similar symptoms for longer amount of time.  is also sick for the same period of time. Denies any recent travel or sick contact abdomen daughter. Denies any chest pain or shortness of breath. Denies any nausea vomiting or diarrhea. Review of Systems   Constitutional:  Negative for fever. HENT:  Positive for congestion, ear pain, postnasal drip, rhinorrhea and sore throat. Negative for trouble swallowing. Eyes: Negative. Respiratory:  Positive for cough. Cardiovascular: Negative. Gastrointestinal: Negative. Musculoskeletal:  Negative for neck pain. Skin:  Negative for rash. Neurological:  Negative for headaches. Hematological:  Negative for adenopathy. All other systems reviewed and are negative.        Current Outpatient Medications:     cefdinir (OMNICEF) 300 MG capsule, Take 1 capsule by mouth 2 times daily for 7 days, Disp: 14 capsule, Rfl: 0    brompheniramine-pseudoephedrine-DM (BROMFED DM) 2-30-10 MG/5ML syrup, Take 5 mLs by mouth 4 times daily as needed for Cough, Disp: 237 mL, Rfl: 2    SYNTHROID 88 MCG tablet, TAKE ONE TABLET BY MOUTH ONCE DAILY, Disp: 30 tablet, Rfl: 5    metoprolol succinate (TOPROL XL) 25 MG extended release tablet, TAKE ONE TABLET BY MOUTH ONCE DAILY, Disp: 90 tablet, Rfl: 3    carBAMazepine (TEGRETOL-XR) 100 MG extended release tablet, Take 1 tablet by mouth 2 times daily, Disp: 60 tablet, Rfl: 0    azelastine (ASTELIN) 0.1 % nasal spray, 1 spray by Nasal route 2 times daily Use in each nostril as directed (Patient not taking: Reported on 7/30/2022), Disp: 60 mL, Rfl: 1    Handicap Placard MISC, by Does not apply route, Disp: 1 each, Rfl: 0    ALPRAZolam (XANAX) 1 MG tablet, Take 0.5 mg by mouth 4 times daily.  , Disp: , Rfl:    Patient Active Problem List   Diagnosis    BPPV (benign paroxysmal positional vertigo)    SVT (supraventricular tachycardia) (HCC)    Anxiety    Dyspnea on exertion    Intermittent palpitations    Essential hypertension    Morbid obesity due to excess calories (HCC)    Palpitations    Chest pain    Thyroid cancer (HCC)    Weight gain    Dizziness    Biliary colic    Obesity with body mass index (BMI) of 30.0 to 39.9    Spinal stenosis, lumbar    NAFLD (nonalcoholic fatty liver disease)    Abdominal pain    Hypothyroidism    Thyroid disease    Status post placement of implantable loop recorder    Acute bacterial sinusitis     Past Medical History:   Diagnosis Date    Allergic rhinitis     Anxiety     Atrial fibrillation (HCC)     Depression     Headache     Hypertension     Hypothyroidism     NAFLD (nonalcoholic fatty liver disease)     By CT    Obesity with body mass index (BMI) of 30.0 to 39.9 06/28/2020    Osteoarthritis     Sleep apnea     does not use C-Pap    Spinal stenosis, lumbar 01/14/2020    Severe central stenosis by CT of abdomen, L1-L2    Thyroid disease     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 Serjio Giordano)      [address] N/A 03/01/2022    Loop explant by Dr. Nasima Latham  12/23/2019    Dr. Serjio Giordano- Loop Recorder implant, Confirm    THYROIDECTOMY Bilateral 01/19/2017    Dr. John Vo History    Marital status:      Spouse name: Sushila Merritt    Number of children: 3    Years of education: Not on file    Highest education level: Not on file   Occupational History    Occupation: homemaker   Tobacco Use    Smoking status: Never    Smokeless tobacco: Never   Vaping Use    Vaping Use: Never used   Substance and Sexual Activity    Alcohol use: No    Drug use: No    Sexual activity: Yes     Partners: Male   Other Topics Concern    Not on file   Social History Narrative    Lives in a house with  who is disabled and one child. Homeschools her daughter.     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 Strain: Low Risk     Difficulty of Paying Living Expenses: Not hard at all   Food Insecurity: No Food Insecurity    Worried About Running Out of Food in the Last Year: Never true    Ran Out of Food in the Last Year: Never true   Transportation Needs: Not on file   Physical Activity: Not on file   Stress: Not on file   Social Connections: Not on file   Intimate Partner Violence: Not on file   Housing Stability: Not on file     Family History   Problem Relation Age of Onset    High Cholesterol Mother         low blood pressure    Arrhythmia Mother     High Blood Pressure Father     Cancer Father leukemia, lung cancer +smoker, brain cancer ataxia    Other Sister         nephrectomy due to a benign tumor    Breast Cancer Paternal Grandmother     Other Paternal Grandmother         bowel    Other Daughter         IGG immunodeficiency    Cancer Maternal Grandmother         lung, cervical    Heart Disease Maternal Grandfather     Atrial Fibrillation Paternal Grandfather       There are no preventive care reminders to display for this patient. There are no preventive care reminders to display for this patient. There are no preventive care reminders to display for this patient. Health Maintenance Due   Topic    DTaP/Tdap/Td vaccine (1 - Tdap)    Shingles vaccine (1 of 2)      Health Maintenance   Topic Date Due    DTaP/Tdap/Td vaccine (1 - Tdap) Never done    Shingles vaccine (1 of 2) Never done    COVID-19 Vaccine (3 - Booster for Pfizer series) 12/06/2021    Breast cancer screen  07/22/2022    Flu vaccine (1) 09/01/2022    Depression Screen  02/01/2023    Diabetes screen  02/01/2025    Lipids  06/22/2026    Colorectal Cancer Screen  02/17/2032    Hepatitis C screen  Completed    HIV screen  Completed    Hepatitis A vaccine  Aged Out    Hepatitis B vaccine  Aged Out    Hib vaccine  Aged Out    Meningococcal (ACWY) vaccine  Aged Out    Pneumococcal 0-64 years Vaccine  Aged Out      There are no preventive care reminders to display for this patient. There are no preventive care reminders to display for this patient. /80 (Site: Right Upper Arm)   Pulse 84   Temp 99.5 °F (37.5 °C) (Temporal)   Wt 224 lb 6.4 oz (101.8 kg)   LMP 01/01/2013   SpO2 97%   BMI 37.34 kg/m²     Objective   Physical Exam  Vitals reviewed. Constitutional:       Appearance: She is well-developed. She is obese. She is ill-appearing. HENT:      Head: Normocephalic and atraumatic. Right Ear: External ear normal. A middle ear effusion is present. Tympanic membrane is bulging.       Left Ear: External ear normal. A middle ear effusion is present. Tympanic membrane is bulging. Nose: Nose normal.   Eyes:      Conjunctiva/sclera: Conjunctivae normal.      Pupils: Pupils are equal, round, and reactive to light. Cardiovascular:      Rate and Rhythm: Normal rate and regular rhythm. Heart sounds: Normal heart sounds. Pulmonary:      Effort: Pulmonary effort is normal.      Breath sounds: Decreased breath sounds and wheezing present. Abdominal:      General: Bowel sounds are normal.      Palpations: Abdomen is soft. Musculoskeletal:         General: Normal range of motion. Cervical back: Normal range of motion and neck supple. Skin:     General: Skin is warm and dry. Findings: No erythema. Neurological:      Mental Status: She is alert and oriented to person, place, and time. Cranial Nerves: No cranial nerve deficit. Psychiatric:         Behavior: Behavior normal.         Judgment: Judgment normal.                An electronic signature was used to authenticate this note.     --Fannie Parks, DO

## 2022-10-07 ENCOUNTER — APPOINTMENT (OUTPATIENT)
Dept: GENERAL RADIOLOGY | Age: 57
End: 2022-10-07
Payer: COMMERCIAL

## 2022-10-07 ENCOUNTER — HOSPITAL ENCOUNTER (EMERGENCY)
Age: 57
Discharge: HOME | End: 2022-10-07
Attending: STUDENT IN AN ORGANIZED HEALTH CARE EDUCATION/TRAINING PROGRAM
Payer: COMMERCIAL

## 2022-10-07 VITALS
SYSTOLIC BLOOD PRESSURE: 146 MMHG | HEART RATE: 80 BPM | OXYGEN SATURATION: 99 % | DIASTOLIC BLOOD PRESSURE: 82 MMHG | TEMPERATURE: 98.1 F | RESPIRATION RATE: 16 BRPM

## 2022-10-07 DIAGNOSIS — N30.00 ACUTE CYSTITIS WITHOUT HEMATURIA: ICD-10-CM

## 2022-10-07 DIAGNOSIS — R10.9 FLANK PAIN: Primary | ICD-10-CM

## 2022-10-07 LAB
ALBUMIN SERPL-MCNC: 4.3 G/DL (ref 3.5–5.2)
ALP BLD-CCNC: 73 U/L (ref 35–104)
ALT SERPL-CCNC: 13 U/L (ref 0–32)
ANION GAP SERPL CALCULATED.3IONS-SCNC: 11 MMOL/L (ref 7–16)
AST SERPL-CCNC: 13 U/L (ref 0–31)
BACTERIA: ABNORMAL /HPF
BASOPHILS ABSOLUTE: 0.05 E9/L (ref 0–0.2)
BASOPHILS RELATIVE PERCENT: 0.8 % (ref 0–2)
BILIRUB SERPL-MCNC: 0.6 MG/DL (ref 0–1.2)
BILIRUBIN URINE: NEGATIVE
BLOOD, URINE: ABNORMAL
BUN BLDV-MCNC: 23 MG/DL (ref 6–20)
CALCIUM SERPL-MCNC: 9.8 MG/DL (ref 8.6–10.2)
CHLORIDE BLD-SCNC: 103 MMOL/L (ref 98–107)
CLARITY: CLEAR
CO2: 25 MMOL/L (ref 22–29)
COLOR: YELLOW
CREAT SERPL-MCNC: 0.8 MG/DL (ref 0.5–1)
EKG ATRIAL RATE: 77 BPM
EKG P AXIS: 21 DEGREES
EKG P-R INTERVAL: 170 MS
EKG Q-T INTERVAL: 396 MS
EKG QRS DURATION: 86 MS
EKG QTC CALCULATION (BAZETT): 448 MS
EKG R AXIS: 10 DEGREES
EKG T AXIS: 32 DEGREES
EKG VENTRICULAR RATE: 77 BPM
EOSINOPHILS ABSOLUTE: 0.17 E9/L (ref 0.05–0.5)
EOSINOPHILS RELATIVE PERCENT: 2.8 % (ref 0–6)
EPITHELIAL CELLS, UA: ABNORMAL /HPF
GFR AFRICAN AMERICAN: >60
GFR NON-AFRICAN AMERICAN: >60 ML/MIN/1.73
GLUCOSE BLD-MCNC: 101 MG/DL (ref 74–99)
GLUCOSE URINE: NEGATIVE MG/DL
HCT VFR BLD CALC: 39.6 % (ref 34–48)
HEMOGLOBIN: 13.4 G/DL (ref 11.5–15.5)
IMMATURE GRANULOCYTES #: 0.02 E9/L
IMMATURE GRANULOCYTES %: 0.3 % (ref 0–5)
KETONES, URINE: NEGATIVE MG/DL
LACTIC ACID: 1.6 MMOL/L (ref 0.5–2.2)
LEUKOCYTE ESTERASE, URINE: ABNORMAL
LIPASE: 29 U/L (ref 13–60)
LYMPHOCYTES ABSOLUTE: 2.05 E9/L (ref 1.5–4)
LYMPHOCYTES RELATIVE PERCENT: 33.7 % (ref 20–42)
MCH RBC QN AUTO: 29.4 PG (ref 26–35)
MCHC RBC AUTO-ENTMCNC: 33.8 % (ref 32–34.5)
MCV RBC AUTO: 86.8 FL (ref 80–99.9)
MONOCYTES ABSOLUTE: 0.55 E9/L (ref 0.1–0.95)
MONOCYTES RELATIVE PERCENT: 9 % (ref 2–12)
MUCUS: PRESENT /LPF
NEUTROPHILS ABSOLUTE: 3.25 E9/L (ref 1.8–7.3)
NEUTROPHILS RELATIVE PERCENT: 53.4 % (ref 43–80)
NITRITE, URINE: NEGATIVE
PDW BLD-RTO: 12.7 FL (ref 11.5–15)
PH UA: 5.5 (ref 5–9)
PLATELET # BLD: 282 E9/L (ref 130–450)
PMV BLD AUTO: 9.3 FL (ref 7–12)
POTASSIUM REFLEX MAGNESIUM: 4.8 MMOL/L (ref 3.5–5)
PROTEIN UA: NEGATIVE MG/DL
RBC # BLD: 4.56 E12/L (ref 3.5–5.5)
RBC UA: ABNORMAL /HPF (ref 0–2)
SODIUM BLD-SCNC: 139 MMOL/L (ref 132–146)
SPECIFIC GRAVITY UA: >=1.03 (ref 1–1.03)
TOTAL PROTEIN: 7.4 G/DL (ref 6.4–8.3)
TROPONIN, HIGH SENSITIVITY: 7 NG/L (ref 0–9)
UROBILINOGEN, URINE: 0.2 E.U./DL
WBC # BLD: 6.1 E9/L (ref 4.5–11.5)
WBC UA: >20 /HPF (ref 0–5)

## 2022-10-07 PROCEDURE — 93005 ELECTROCARDIOGRAM TRACING: CPT | Performed by: STUDENT IN AN ORGANIZED HEALTH CARE EDUCATION/TRAINING PROGRAM

## 2022-10-07 PROCEDURE — 81001 URINALYSIS AUTO W/SCOPE: CPT

## 2022-10-07 PROCEDURE — 2580000003 HC RX 258: Performed by: STUDENT IN AN ORGANIZED HEALTH CARE EDUCATION/TRAINING PROGRAM

## 2022-10-07 PROCEDURE — 85025 COMPLETE CBC W/AUTO DIFF WBC: CPT

## 2022-10-07 PROCEDURE — 36415 COLL VENOUS BLD VENIPUNCTURE: CPT

## 2022-10-07 PROCEDURE — 71046 X-RAY EXAM CHEST 2 VIEWS: CPT

## 2022-10-07 PROCEDURE — 84484 ASSAY OF TROPONIN QUANT: CPT

## 2022-10-07 PROCEDURE — 99285 EMERGENCY DEPT VISIT HI MDM: CPT

## 2022-10-07 PROCEDURE — 83605 ASSAY OF LACTIC ACID: CPT

## 2022-10-07 PROCEDURE — 87088 URINE BACTERIA CULTURE: CPT

## 2022-10-07 PROCEDURE — 80053 COMPREHEN METABOLIC PANEL: CPT

## 2022-10-07 PROCEDURE — 83690 ASSAY OF LIPASE: CPT

## 2022-10-07 RX ORDER — 0.9 % SODIUM CHLORIDE 0.9 %
1000 INTRAVENOUS SOLUTION INTRAVENOUS ONCE
Status: COMPLETED | OUTPATIENT
Start: 2022-10-07 | End: 2022-10-07

## 2022-10-07 RX ORDER — CEFDINIR 300 MG/1
300 CAPSULE ORAL 2 TIMES DAILY
Qty: 14 CAPSULE | Refills: 0 | Status: SHIPPED | OUTPATIENT
Start: 2022-10-07 | End: 2022-10-14

## 2022-10-07 RX ORDER — ONDANSETRON 2 MG/ML
4 INJECTION INTRAMUSCULAR; INTRAVENOUS ONCE
Status: DISCONTINUED | OUTPATIENT
Start: 2022-10-07 | End: 2022-10-07 | Stop reason: HOSPADM

## 2022-10-07 RX ORDER — KETOROLAC TROMETHAMINE 30 MG/ML
15 INJECTION, SOLUTION INTRAMUSCULAR; INTRAVENOUS ONCE
Status: DISCONTINUED | OUTPATIENT
Start: 2022-10-07 | End: 2022-10-07 | Stop reason: HOSPADM

## 2022-10-07 RX ADMIN — SODIUM CHLORIDE 1000 ML: 9 INJECTION, SOLUTION INTRAVENOUS at 06:54

## 2022-10-07 ASSESSMENT — ENCOUNTER SYMPTOMS
DIARRHEA: 0
NAUSEA: 0
BACK PAIN: 0
EYE DISCHARGE: 0
WHEEZING: 0
EYE REDNESS: 0
SORE THROAT: 0
SINUS PRESSURE: 0
EYE PAIN: 0
COUGH: 0
SHORTNESS OF BREATH: 0
ABDOMINAL DISTENTION: 0
VOMITING: 0

## 2022-10-07 NOTE — ED PROVIDER NOTES
Constitutional:       General: She is not in acute distress. Appearance: Normal appearance. HENT:      Head: Normocephalic and atraumatic. Nose: No congestion or rhinorrhea. Mouth/Throat:      Mouth: Mucous membranes are moist.      Pharynx: Oropharynx is clear. Eyes:      Extraocular Movements: Extraocular movements intact. Pupils: Pupils are equal, round, and reactive to light. Cardiovascular:      Rate and Rhythm: Normal rate and regular rhythm. Heart sounds: No murmur heard. No gallop. Pulmonary:      Effort: Pulmonary effort is normal. No respiratory distress. Breath sounds: No wheezing, rhonchi or rales. Abdominal:      General: Abdomen is flat. Palpations: Abdomen is soft. There is no mass. Tenderness: There is no abdominal tenderness. There is right CVA tenderness. There is no guarding. Hernia: No hernia is present. Musculoskeletal:         General: No swelling, tenderness or signs of injury. Normal range of motion. Cervical back: Normal range of motion. No rigidity. No muscular tenderness. Skin:     General: Skin is warm and dry. Capillary Refill: Capillary refill takes less than 2 seconds. Neurological:      General: No focal deficit present. Mental Status: She is alert and oriented to person, place, and time. Mental status is at baseline. Psychiatric:         Mood and Affect: Mood normal.         Behavior: Behavior normal.        Procedures   EKG #1:  Interpreted by emergency department physician unless otherwise noted. Time:  622    Rate: 77  Rhythm: Sinus. Interpretation: EKG obtained demonstrated normal sinus rhythm, rate 77, normal axis, , no acute ST segment changes. .  Comparison: stable as compared to patient's most recent EKG.         MDM  Number of Diagnoses or Management Options  Acute cystitis without hematuria  Flank pain  Diagnosis management comments: Patient is a 80-year-old female past medical history of SVT, hypertension as well as hypothyroidism. Patient presents with chief complaint of right-sided flank pain as well as dysuria. Vital signs stable presentation. On physical exam heart regular rate and rhythm, lungs clear to auscultation bilaterally, abdomen soft nontender no rigidity rebound or guarding. There is mild right-sided flank tenderness. EKG obtained demonstrated no acute ischemic changes. Laboratory work obtained CBC unremarkable, CMP unremarkable, lipase 29, lactic acid 1.6, troponin was obtained and was 7 urinalysis was obtained and was indicative of infection with small leukocyte esterase greater than 20 WBCs. Patient was offered Toradol and Zofran however she did decline. Patient was given IV fluids. Chest x-ray obtained demonstrated no acute abnormalities. Findings consistent with right flank pain in the setting of UTI. Patient is overall well-appearing there is no blood in her urine there is no reported history of kidney stones. Vital signs stable patient afebrile she is not tachycardic. Decision made to discharge patient. Patient given prescription for cefdinir and instructed increase fluids. Patient also instructed to follow-up with primary care doctor and urology as she states that she has been getting urinary tract infections about every 6 months. Patient also instructed that if she noted any new worrisome symptoms she should return to the emergency department for evaluation. Plan of care discussed with patient including discharge, all questions were answered, patient was in agreement plan of care and discharged home in stable condition.        Amount and/or Complexity of Data Reviewed  Clinical lab tests: ordered and reviewed  Tests in the radiology section of CPT®: ordered and reviewed  Decide to obtain previous medical records or to obtain history from someone other than the patient: yes    Risk of Complications, Morbidity, and/or Mortality  Presenting problems: moderate  Diagnostic procedures: moderate  Management options: moderate    Patient Progress  Patient progress: stable           --------------------------------------------- PAST HISTORY ---------------------------------------------  Past Medical History:  has a past medical history of Allergic rhinitis, Anxiety, Atrial fibrillation ([facility] [address] Depression, Headache, Hypertension, Hypothyroidism, NAFLD (nonalcoholic fatty liver disease), Obesity with body mass index (BMI) of 30.0 to 39.9, Osteoarthritis, Sleep apnea, Spinal stenosis, lumbar, and Thyroid disease. Past Surgical History:  has a past surgical history that includes ablation of dysrhythmic focus (1990); hysteroscopy (08/15/2012); Colonoscopy; Endometrial ablation (2012); Thyroidectomy (Bilateral, 01/19/2017); Breast surgery; Hysterectomy, vaginal; other surgical history (12/23/2019); Insertable Cardiac Monitor (12/23/2019); Gallbladder surgery (07/2020); Cholecystectomy; and Insertable Cardiac Monitor (N/A, 03/01/2022). Social History:  reports that she has never smoked. She has never used smokeless tobacco. She reports that she does not drink alcohol and does not use drugs. Family History: family history includes Arrhythmia in her mother; Atrial Fibrillation in her paternal grandfather; Breast Cancer in her paternal grandmother; Cancer in her father and maternal grandmother; Heart Disease in her maternal grandfather; High Blood Pressure in her father; High Cholesterol in her mother; Other in her daughter, paternal grandmother, and sister. The patients home medications have been reviewed.     Allergies: Prednisone and Fentanyl    -------------------------------------------------- RESULTS -------------------------------------------------  Labs:  Results for orders placed or performed during the hospital encounter of 10/07/22   CBC with Auto Differential   Result Value Ref Range    WBC 6.1 4.5 - 11.5 E9/L    RBC 4.56 3.50 - 5.50 E12/L    Hemoglobin 13.4 11.5 - 15.5 g/dL    Hematocrit 39.6 34.0 - 48.0 %    MCV 86.8 80.0 - 99.9 fL    MCH 29.4 26.0 - 35.0 pg    MCHC 33.8 32.0 - 34.5 %    RDW 12.7 11.5 - 15.0 fL    Platelets 693 888 - 204 E9/L    MPV 9.3 7.0 - 12.0 fL    Neutrophils % 53.4 43.0 - 80.0 %    Immature Granulocytes % 0.3 0.0 - 5.0 %    Lymphocytes % 33.7 20.0 - 42.0 %    Monocytes % 9.0 2.0 - 12.0 %    Eosinophils % 2.8 0.0 - 6.0 %    Basophils % 0.8 0.0 - 2.0 %    Neutrophils Absolute 3.25 1.80 - 7.30 E9/L    Immature Granulocytes # 0.02 E9/L    Lymphocytes Absolute 2.05 1.50 - 4.00 E9/L    Monocytes Absolute 0.55 0.10 - 0.95 E9/L    Eosinophils Absolute 0.17 0.05 - 0.50 E9/L    Basophils Absolute 0.05 0.00 - 0.20 E9/L   Comprehensive Metabolic Panel w/ Reflex to MG   Result Value Ref Range    Sodium 139 132 - 146 mmol/L    Potassium reflex Magnesium 4.8 3.5 - 5.0 mmol/L    Chloride 103 98 - 107 mmol/L    CO2 25 22 - 29 mmol/L    Anion Gap 11 7 - 16 mmol/L    Glucose 101 (H) 74 - 99 mg/dL    BUN 23 (H) 6 - 20 mg/dL    Creatinine 0.8 0.5 - 1.0 mg/dL    GFR Non-African American >60 >=60 mL/min/1.73    GFR African American >60     Calcium 9.8 8.6 - 10.2 mg/dL    Total Protein 7.4 6.4 - 8.3 g/dL    Albumin 4.3 3.5 - 5.2 g/dL    Total Bilirubin 0.6 0.0 - 1.2 mg/dL    Alkaline Phosphatase 73 35 - 104 U/L    ALT 13 0 - 32 U/L    AST 13 0 - 31 U/L   Lactic Acid   Result Value Ref Range    Lactic Acid 1.6 0.5 - 2.2 mmol/L   Urinalysis with Microscopic   Result Value Ref Range    Color, UA Yellow Straw/Yellow    Clarity, UA Clear Clear    Glucose, Ur Negative Negative mg/dL    Bilirubin Urine Negative Negative    Ketones, Urine Negative Negative mg/dL    Specific Gravity, UA >=1.030 1.005 - 1.030    Blood, Urine TRACE-INTACT Negative    pH, UA 5.5 5.0 - 9.0    Protein, UA Negative Negative mg/dL    Urobilinogen, Urine 0.2 <2.0 E.U./dL    Nitrite, Urine Negative Negative    Leukocyte Esterase, Urine SMALL (A) Negative    Mucus, UA Present (A) None Seen /LPF WBC, UA >20 (A) 0 - 5 /HPF    RBC, UA 0-1 0 - 2 /HPF    Epithelial Cells, UA RARE /HPF    Bacteria, UA RARE (A) None Seen /HPF   Lipase   Result Value Ref Range    Lipase 29 13 - 60 U/L   Troponin   Result Value Ref Range    Troponin, High Sensitivity 7 0 - 9 ng/L   EKG 12 Lead   Result Value Ref Range    Ventricular Rate 77 BPM    Atrial Rate 77 BPM    P-R Interval 170 ms    QRS Duration 86 ms    Q-T Interval 396 ms    QTc Calculation (Bazett) 448 ms    P Axis 21 degrees    R Axis 10 degrees    T Axis 32 degrees       Radiology:  XR CHEST (2 VW)   Final Result   No acute process. ------------------------- NURSING NOTES AND VITALS REVIEWED ---------------------------  Date / Time Roomed:  10/7/2022  5:55 AM  ED Bed Assignment:  05/05    The nursing notes within the ED encounter and vital signs as below have been reviewed. BP (!) 146/82   Pulse 80   Temp 98.1 °F (36.7 °C) (Oral)   Resp 16   LMP 01/01/2013   SpO2 99%   Oxygen Saturation Interpretation: Normal      ------------------------------------------ PROGRESS NOTES ------------------------------------------  7:15 AM EDT  I have spoken with the patient and discussed todays results, in addition to providing specific details for the plan of care and counseling regarding the diagnosis and prognosis. Their questions are answered at this time and they are agreeable with the plan. I discussed at length with them reasons for immediate return here for re evaluation. They will followup with their primary care physician by calling their office on Monday.      --------------------------------- ADDITIONAL PROVIDER NOTES ---------------------------------  At this time the patient is without objective evidence of an acute process requiring hospitalization or inpatient management. They have remained hemodynamically stable throughout their entire ED visit and are stable for discharge with outpatient follow-up.      The plan has been discussed in detail and they are aware of the specific conditions for emergent return, as well as the importance of follow-up. New Prescriptions    CEFDINIR (OMNICEF) 300 MG CAPSULE    Take 1 capsule by mouth 2 times daily for 7 days       Diagnosis:  1. Flank pain    2. Acute cystitis without hematuria        Disposition:  Patient's disposition: Discharge to home  Patient's condition is stable.       DO Melanie Blanc,   Resident  10/07/22 [id number]

## 2022-10-07 NOTE — DISCHARGE INSTRUCTIONS
Increase fluids  Take all antibiotics as prescribed  Follow-up with urology  Follow-up with primary care doctor  If you notice any new worrisome symptoms please return to the emergency department for evaluation

## 2022-10-09 LAB — URINE CULTURE, ROUTINE: NORMAL

## 2022-11-15 NOTE — CARE COORDINATION
Date/Time:  9/4/2020 11:44 AM  Attempted to reach patient by telephone. Left HIPPA compliant message requesting a return call. Will attempt to reach patient again.
denies

## 2022-11-21 ENCOUNTER — OFFICE VISIT (OUTPATIENT)
Dept: NEUROLOGY | Age: 57
End: 2022-11-21
Payer: COMMERCIAL

## 2022-11-21 ENCOUNTER — TELEPHONE (OUTPATIENT)
Dept: ENDOCRINOLOGY | Age: 57
End: 2022-11-21

## 2022-11-21 VITALS
DIASTOLIC BLOOD PRESSURE: 80 MMHG | OXYGEN SATURATION: 98 % | BODY MASS INDEX: 36.61 KG/M2 | SYSTOLIC BLOOD PRESSURE: 128 MMHG | TEMPERATURE: 97.2 F | WEIGHT: 220 LBS | HEART RATE: 80 BPM

## 2022-11-21 DIAGNOSIS — E03.9 HYPOTHYROIDISM (ACQUIRED): Primary | ICD-10-CM

## 2022-11-21 DIAGNOSIS — G50.1 ATYPICAL FACIAL PAIN: Primary | ICD-10-CM

## 2022-11-21 PROCEDURE — G8484 FLU IMMUNIZE NO ADMIN: HCPCS | Performed by: CLINICAL NURSE SPECIALIST

## 2022-11-21 PROCEDURE — 3078F DIAST BP <80 MM HG: CPT | Performed by: CLINICAL NURSE SPECIALIST

## 2022-11-21 PROCEDURE — G8427 DOCREV CUR MEDS BY ELIG CLIN: HCPCS | Performed by: CLINICAL NURSE SPECIALIST

## 2022-11-21 PROCEDURE — 99215 OFFICE O/P EST HI 40 MIN: CPT | Performed by: CLINICAL NURSE SPECIALIST

## 2022-11-21 PROCEDURE — 3017F COLORECTAL CA SCREEN DOC REV: CPT | Performed by: CLINICAL NURSE SPECIALIST

## 2022-11-21 PROCEDURE — 3074F SYST BP LT 130 MM HG: CPT | Performed by: CLINICAL NURSE SPECIALIST

## 2022-11-21 PROCEDURE — G8417 CALC BMI ABV UP PARAM F/U: HCPCS | Performed by: CLINICAL NURSE SPECIALIST

## 2022-11-21 PROCEDURE — 1036F TOBACCO NON-USER: CPT | Performed by: CLINICAL NURSE SPECIALIST

## 2022-11-21 RX ORDER — ESCITALOPRAM OXALATE 10 MG/1
TABLET, FILM COATED ORAL
COMMUNITY
Start: 2022-11-08

## 2022-11-21 NOTE — TELEPHONE ENCOUNTER
Pt states she is having periods of intense sweating and thinks she needs her thyroid levels checked.  Please advise if you want to order labs

## 2022-11-21 NOTE — PROGRESS NOTES
Doretta Claude is a 62 y.o. right handed female     Patient presents to neurology clinic today for intermittent numbness of her right jaw. Recently evaluated by her group in the office as well as during her hospitalization    MRI of her brain in March 2022 was unrevealing other than chronic sinusitis    She does report facial pains occurring only after her sinus infection and have been waxing and waning ever since some months she does better than others cold air seems to aggravate it.     She followed with ear nose and throat for around the same time when she had a sinus infection she lost the ability to taste and smell  Which she too states waxes and wanes    No abnormalities found by ENT    She tried Tegretol with minimal improvement  Discussed alternative options but both agreed elected to hold off at this time     is here with her both appear to be excellent historians      Objective:     /80 (Site: Right Upper Arm, Position: Sitting)   Pulse 80   Temp 97.2 °F (36.2 °C)   Wt 220 lb (99.8 kg)   LMP 01/01/2013   SpO2 98%   BMI 36.61 kg/m²     General appearance: alert, appears stated age, cooperative and in no distress  Head: normocephalic, without obvious abnormality, atraumatic  Extremities: normal, atraumatic, no cyanosis or edema  Pulses: 2+ and symmetric  Skin:  no rashes or lesions      Mental Status: alert and oriented x 4, follows commands well, conversant and pleasant    Speech: no dysarthria  Language: Appropriate without aphasias    Cranial Nerves:  I: smell intact   II: visual acuity     II: visual fields Full to finger counting    II: pupils PERRL   III,VII: ptosis None   III,IV,VI: extraocular muscles  EOMI without nystagmus   V: mastication Normal   V: facial light touch sensation  Normal--currently   V,VII: corneal reflex     VII: facial muscle function - upper  Normal   VII: facial muscle function - lower Normal   VIII: hearing Normal   IX: soft palate elevation  Normal IX,X: gag reflex    XI: trapezius strength  5/5   XI: sternocleidomastoid strength 5/5   XI: neck extension strength  5/5   XII: tongue strength  Normal     Motor:  5/5 throughout  Normal bulk and tone  No drift     Sensory:  LT and PP normal     Coordination:   FN, FFM and BRANDEE normal    Gait:  Normal    DTR:   Right Brachioradialis reflex 1+  Left Brachioradialis reflex 1+  Right Biceps reflex 1+  Left Biceps reflex 1+  Right Quadriceps reflex 1+  Left Quadriceps reflex 1+  Right Achilles reflex 1+  Left Achilles reflex 1+    No Michel's    Laboratory/Radiology:       CBC:   Lab Results   Component Value Date/Time    WBC 6.1 10/07/2022 06:25 AM    RBC 4.56 10/07/2022 06:25 AM    HGB 13.4 10/07/2022 06:25 AM    HCT 39.6 10/07/2022 06:25 AM    MCV 86.8 10/07/2022 06:25 AM    MCH 29.4 10/07/2022 06:25 AM    MCHC 33.8 10/07/2022 06:25 AM    RDW 12.7 10/07/2022 06:25 AM     10/07/2022 06:25 AM    MPV 9.3 10/07/2022 06:25 AM     CMP:    Lab Results   Component Value Date/Time     10/07/2022 06:25 AM    K 4.8 10/07/2022 06:25 AM     10/07/2022 06:25 AM    CO2 25 10/07/2022 06:25 AM    BUN 23 10/07/2022 06:25 AM    CREATININE 0.8 10/07/2022 06:25 AM    GFRAA >60 10/07/2022 06:25 AM    LABGLOM >60 10/07/2022 06:25 AM    GLUCOSE 101 10/07/2022 06:25 AM    GLUCOSE 93 06/13/2011 01:08 PM    PROT 7.4 10/07/2022 06:25 AM    LABALBU 4.3 10/07/2022 06:25 AM    LABALBU 4.6 06/13/2011 01:08 PM    CALCIUM 9.8 10/07/2022 06:25 AM    BILITOT 0.6 10/07/2022 06:25 AM    ALKPHOS 73 10/07/2022 06:25 AM    AST 13 10/07/2022 06:25 AM    ALT 13 10/07/2022 06:25 AM      EMANI negative  ESR 20    Heavy metals negative  CRP negative  SSA/SSB negative    MRI brain March 2022  No acute intracranial pathology. Chronic sinusitis.     All labs and images were personally reviewed at the time of this visit    Assessment:     R facial numbness and tingling   Most likely an atypical facial pain   This began November 2020 around the time her gallbladder was removed but she also suffered a sinus infection prior to surgery   Patient lost her sense of taste but not her smell    Patient was evaluated by ENT and optometry/ophthalmologist in the past    Plan:      Will hold off on Trileptal addition or other medications at this time    All questions answered    Will call should issues arise or her symptoms become more severe we will consider medication intervention      BEATRICE Jordan  11:44 AM  11/21/2022

## 2022-11-30 DIAGNOSIS — E03.9 HYPOTHYROIDISM (ACQUIRED): ICD-10-CM

## 2022-11-30 LAB
T4 FREE: 1.16 NG/DL (ref 0.93–1.7)
TSH SERPL DL<=0.05 MIU/L-ACNC: 5.85 UIU/ML (ref 0.27–4.2)

## 2022-12-06 ENCOUNTER — OFFICE VISIT (OUTPATIENT)
Dept: CARDIOLOGY CLINIC | Age: 57
End: 2022-12-06
Payer: COMMERCIAL

## 2022-12-06 ENCOUNTER — TELEPHONE (OUTPATIENT)
Dept: ENDOCRINOLOGY | Age: 57
End: 2022-12-06

## 2022-12-06 VITALS
DIASTOLIC BLOOD PRESSURE: 84 MMHG | WEIGHT: 231 LBS | BODY MASS INDEX: 38.49 KG/M2 | SYSTOLIC BLOOD PRESSURE: 140 MMHG | RESPIRATION RATE: 18 BRPM | HEIGHT: 65 IN | HEART RATE: 75 BPM

## 2022-12-06 DIAGNOSIS — I47.1 SVT (SUPRAVENTRICULAR TACHYCARDIA) (HCC): Primary | ICD-10-CM

## 2022-12-06 DIAGNOSIS — E03.9 HYPOTHYROIDISM (ACQUIRED): Primary | ICD-10-CM

## 2022-12-06 PROCEDURE — G8427 DOCREV CUR MEDS BY ELIG CLIN: HCPCS | Performed by: INTERNAL MEDICINE

## 2022-12-06 PROCEDURE — 93000 ELECTROCARDIOGRAM COMPLETE: CPT | Performed by: INTERNAL MEDICINE

## 2022-12-06 PROCEDURE — G8417 CALC BMI ABV UP PARAM F/U: HCPCS | Performed by: INTERNAL MEDICINE

## 2022-12-06 PROCEDURE — 99214 OFFICE O/P EST MOD 30 MIN: CPT | Performed by: INTERNAL MEDICINE

## 2022-12-06 PROCEDURE — 3078F DIAST BP <80 MM HG: CPT | Performed by: INTERNAL MEDICINE

## 2022-12-06 PROCEDURE — 3017F COLORECTAL CA SCREEN DOC REV: CPT | Performed by: INTERNAL MEDICINE

## 2022-12-06 PROCEDURE — 1036F TOBACCO NON-USER: CPT | Performed by: INTERNAL MEDICINE

## 2022-12-06 PROCEDURE — G8484 FLU IMMUNIZE NO ADMIN: HCPCS | Performed by: INTERNAL MEDICINE

## 2022-12-06 PROCEDURE — 3074F SYST BP LT 130 MM HG: CPT | Performed by: INTERNAL MEDICINE

## 2022-12-06 RX ORDER — ALPRAZOLAM 0.25 MG/1
0.25 TABLET ORAL 4 TIMES DAILY PRN
COMMUNITY

## 2022-12-06 NOTE — PROGRESS NOTES
10 MG tablet  (Patient not taking: Reported on 12/6/2022)      brompheniramine-pseudoephedrine-DM (BROMFED DM) 2-30-10 MG/5ML syrup Take 5 mLs by mouth 4 times daily as needed for Cough (Patient not taking: No sig reported) 237 mL 2    carBAMazepine (TEGRETOL-XR) 100 MG extended release tablet Take 1 tablet by mouth 2 times daily (Patient not taking: Reported on 11/21/2022) 60 tablet 0    azelastine (ASTELIN) 0.1 % nasal spray 1 spray by Nasal route 2 times daily Use in each nostril as directed (Patient not taking: No sig reported) 60 mL 1    ALPRAZolam (XANAX) 1 MG tablet Take 0.5 mg by mouth 4 times daily. (Patient not taking: Reported on 12/6/2022)       No current facility-administered medications for this visit. Social History     Socioeconomic History    Marital status:      Spouse name: Jose Luis Hardin    Number of children: 3    Years of education: Not on file    Highest education level: Not on file   Occupational History    Occupation: homemaker   Tobacco Use    Smoking status: Never    Smokeless tobacco: Never   Vaping Use    Vaping Use: Never used   Substance and Sexual Activity    Alcohol use: No    Drug use: No    Sexual activity: Yes     Partners: Male   Other Topics Concern    Not on file   Social History Narrative    Lives in a house with  who is disabled and one child. Homeschools her daughter.     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     Social Determinants of Health     Financial Resource Strain: Not on file   Food Insecurity: Not on file   Transportation Needs: Not on file   Physical Activity: Not on file   Stress: Not on file   Social Connections: Not on file   Intimate Partner Violence: Not on file   Housing Stability: Not on file       Family History   Problem Relation Age of Onset    High Cholesterol Mother         low blood pressure    Arrhythmia Mother     High Blood Pressure Father     Cancer Father         leukemia, lung cancer +smoker, brain cancer ataxia    Other Sister         nephrectomy due to a benign tumor    Breast Cancer Paternal Grandmother     Other Paternal Grandmother         bowel    Other Daughter         IGG immunodeficiency    Cancer Maternal Grandmother         lung, cervical    Heart Disease Maternal Grandfather     Atrial Fibrillation Paternal Grandfather      Review of Systems:  Heart: as above   Lungs: as above   Eyes: denies changes in vision or discharge. Ears: denies changes in hearing or pain. Nose: denies epistaxis or masses   Throat: denies sore throat or trouble swallowing. Neuro: denies numbness, tingling, tremors. Skin: denies rashes or itching. : denies hematuria, dysuria   GI: denies vomiting, diarrhea   Psych: denies mood changed, anxiety, depression. All other systems negative. Physical Exam   BP (!) 140/84   Pulse 75   Resp 18   Ht 5' 5\" (1.651 m)   Wt 231 lb (104.8 kg)   LMP 01/01/2013   BMI 38.44 kg/m²   Constitutional: Oriented to person, place, and time. Well-developed and well-nourished. No distress. Head: Normocephalic and atraumatic. Eyes: EOM are normal. Pupils are equal, round, and reactive to light. Neck: Normal range of motion. Neck supple. No hepatojugular reflux and no JVD present. Carotid bruit is not present. No tracheal deviation present. No thyromegaly present. Cardiovascular: Normal rate, regular rhythm, normal heart sounds and intact distal pulses. Exam reveals no gallop and no friction rub. No murmur heard. Pulmonary/Chest: Effort normal and breath sounds normal. No respiratory distress. No wheezes. No rales. No tenderness. Abdominal: Soft. Bowel sounds are normal. No distension and no mass. No tenderness. No rebound and no guarding. Musculoskeletal: Normal range of motion. Tender lump left lateral arm. Lymphadenopathy:   No cervical adenopathy. No groin adenopathy. Neurological: Alert and oriented to person, place, and time. Skin: Skin is warm and dry.  No rash noted. Not diaphoretic. No erythema. Psychiatric: Normal mood and affect. Behavior is normal.     EKG:  normal sinus rhythm, nonspecific ST and T waves changes. ASSESSMENT AND PLAN:  Patient Active Problem List   Diagnosis    BPPV (benign paroxysmal positional vertigo)    SVT (supraventricular tachycardia) (HCC)    Anxiety    Dyspnea on exertion    Intermittent palpitations    Essential hypertension    Morbid obesity due to excess calories (HCC)    Palpitations    Chest pain    Thyroid cancer (HCC)    Weight gain    Dizziness    Biliary colic    Obesity with body mass index (BMI) of 30.0 to 39.9    Spinal stenosis, lumbar    NAFLD (nonalcoholic fatty liver disease)    Abdominal pain    Hypothyroidism    Thyroid disease    Status post placement of implantable loop recorder    Acute bacterial sinusitis     1. CP/ARAYA: Stress. 2. Palpitations/Hx of SVT ablation:  Echo normal 7/16.      30 day monitor unremarkable aside from PVC's and PAC's 9/16. Pharm stress normal 11/1/16. Still with intermittent episodes with symptoms. Continue Toprol. Following with EP. Had LINQ. 3. Hypothyroid: Following with Endo. 4. HTN: Observe. 5. OLU    6. Orthostatic symptoms: Stable. Khoa Huff D.O.   Cardiologist  Cardiology, [id number]

## 2022-12-06 NOTE — TELEPHONE ENCOUNTER
Endocrine staff,   Please notify pt that I have reviewed your recent results. Thyroid hormones are slightly below goal for thyroid cancer.  I recommend slight adjustment of Synthroid from 88 mcg 1 tab daily to 1 tab 5 days a week and 1.5 tablet on Saturday and Sunday     Recheck level before next visit

## 2022-12-15 NOTE — ED NOTES
Discharge instructions given and reviewed with patient and . RX given. Instructed to follow up with Dr Camille Cortez. Questions and concerns addressed. Pt departed ED ambulatory in no apparent distress with . Personal belongings taken.      Margy Pardo RN  12/03/18 3202 - cont Tamsulosin 0.4 mg po  - voiding now

## 2022-12-26 ENCOUNTER — APPOINTMENT (OUTPATIENT)
Dept: GENERAL RADIOLOGY | Age: 57
End: 2022-12-26
Payer: COMMERCIAL

## 2022-12-26 VITALS
SYSTOLIC BLOOD PRESSURE: 171 MMHG | WEIGHT: 231 LBS | OXYGEN SATURATION: 97 % | TEMPERATURE: 97.9 F | DIASTOLIC BLOOD PRESSURE: 96 MMHG | RESPIRATION RATE: 17 BRPM | BODY MASS INDEX: 38.49 KG/M2 | HEIGHT: 65 IN | HEART RATE: 86 BPM

## 2022-12-26 LAB
ALBUMIN SERPL-MCNC: 3.9 G/DL (ref 3.5–5.2)
ALP BLD-CCNC: 68 U/L (ref 35–104)
ALT SERPL-CCNC: 12 U/L (ref 0–32)
ANION GAP SERPL CALCULATED.3IONS-SCNC: 11 MMOL/L (ref 7–16)
AST SERPL-CCNC: 16 U/L (ref 0–31)
BASOPHILS ABSOLUTE: 0.06 E9/L (ref 0–0.2)
BASOPHILS RELATIVE PERCENT: 1 % (ref 0–2)
BILIRUB SERPL-MCNC: 0.3 MG/DL (ref 0–1.2)
BUN BLDV-MCNC: 19 MG/DL (ref 6–20)
CALCIUM SERPL-MCNC: 9 MG/DL (ref 8.6–10.2)
CHLORIDE BLD-SCNC: 107 MMOL/L (ref 98–107)
CO2: 22 MMOL/L (ref 22–29)
CREAT SERPL-MCNC: 1 MG/DL (ref 0.5–1)
EOSINOPHILS ABSOLUTE: 0.1 E9/L (ref 0.05–0.5)
EOSINOPHILS RELATIVE PERCENT: 1.7 % (ref 0–6)
GFR SERPL CREATININE-BSD FRML MDRD: >60 ML/MIN/1.73
GLUCOSE BLD-MCNC: 115 MG/DL (ref 74–99)
HCT VFR BLD CALC: 36.5 % (ref 34–48)
HEMOGLOBIN: 12.2 G/DL (ref 11.5–15.5)
IMMATURE GRANULOCYTES #: 0.01 E9/L
IMMATURE GRANULOCYTES %: 0.2 % (ref 0–5)
LYMPHOCYTES ABSOLUTE: 1.76 E9/L (ref 1.5–4)
LYMPHOCYTES RELATIVE PERCENT: 29.2 % (ref 20–42)
MAGNESIUM: 2 MG/DL (ref 1.6–2.6)
MCH RBC QN AUTO: 29.5 PG (ref 26–35)
MCHC RBC AUTO-ENTMCNC: 33.4 % (ref 32–34.5)
MCV RBC AUTO: 88.4 FL (ref 80–99.9)
MONOCYTES ABSOLUTE: 0.51 E9/L (ref 0.1–0.95)
MONOCYTES RELATIVE PERCENT: 8.5 % (ref 2–12)
NEUTROPHILS ABSOLUTE: 3.59 E9/L (ref 1.8–7.3)
NEUTROPHILS RELATIVE PERCENT: 59.4 % (ref 43–80)
PDW BLD-RTO: 12.5 FL (ref 11.5–15)
PLATELET # BLD: 247 E9/L (ref 130–450)
PMV BLD AUTO: 10 FL (ref 7–12)
POTASSIUM SERPL-SCNC: 4 MMOL/L (ref 3.5–5)
PRO-BNP: 167 PG/ML (ref 0–125)
RBC # BLD: 4.13 E12/L (ref 3.5–5.5)
SODIUM BLD-SCNC: 140 MMOL/L (ref 132–146)
TOTAL PROTEIN: 6.8 G/DL (ref 6.4–8.3)
TROPONIN, HIGH SENSITIVITY: <6 NG/L (ref 0–9)
WBC # BLD: 6 E9/L (ref 4.5–11.5)

## 2022-12-26 PROCEDURE — 99285 EMERGENCY DEPT VISIT HI MDM: CPT

## 2022-12-26 PROCEDURE — 84484 ASSAY OF TROPONIN QUANT: CPT

## 2022-12-26 PROCEDURE — 80053 COMPREHEN METABOLIC PANEL: CPT

## 2022-12-26 PROCEDURE — 83880 ASSAY OF NATRIURETIC PEPTIDE: CPT

## 2022-12-26 PROCEDURE — 71045 X-RAY EXAM CHEST 1 VIEW: CPT

## 2022-12-26 PROCEDURE — 93005 ELECTROCARDIOGRAM TRACING: CPT | Performed by: NURSE PRACTITIONER

## 2022-12-26 PROCEDURE — 83735 ASSAY OF MAGNESIUM: CPT

## 2022-12-26 PROCEDURE — 85025 COMPLETE CBC W/AUTO DIFF WBC: CPT

## 2022-12-26 ASSESSMENT — HEART SCORE: ECG: 0

## 2022-12-26 ASSESSMENT — PAIN - FUNCTIONAL ASSESSMENT: PAIN_FUNCTIONAL_ASSESSMENT: NONE - DENIES PAIN

## 2022-12-26 ASSESSMENT — LIFESTYLE VARIABLES: HOW OFTEN DO YOU HAVE A DRINK CONTAINING ALCOHOL: NEVER

## 2022-12-27 ENCOUNTER — HOSPITAL ENCOUNTER (EMERGENCY)
Age: 57
Discharge: HOME | End: 2022-12-27
Payer: COMMERCIAL

## 2022-12-27 DIAGNOSIS — R07.9 CHEST PAIN, UNSPECIFIED TYPE: Primary | ICD-10-CM

## 2022-12-27 LAB
EKG ATRIAL RATE: 77 BPM
EKG P AXIS: 55 DEGREES
EKG P-R INTERVAL: 206 MS
EKG Q-T INTERVAL: 400 MS
EKG QRS DURATION: 82 MS
EKG QTC CALCULATION (BAZETT): 452 MS
EKG R AXIS: 53 DEGREES
EKG T AXIS: 38 DEGREES
EKG VENTRICULAR RATE: 77 BPM
TROPONIN, HIGH SENSITIVITY: <6 NG/L (ref 0–9)

## 2022-12-27 PROCEDURE — 93010 ELECTROCARDIOGRAM REPORT: CPT | Performed by: INTERNAL MEDICINE

## 2022-12-27 RX ORDER — NAPROXEN 500 MG/1
500 TABLET ORAL 2 TIMES DAILY PRN
Qty: 28 TABLET | Refills: 0 | Status: SHIPPED | OUTPATIENT
Start: 2022-12-27

## 2022-12-27 NOTE — ED PROVIDER NOTES
Independent  HPI:  12/27/22, Time: 12:05 AM MICHELLE Ponce is a 62 y.o. female presenting to the ED for Chest Pain (Chest pain for 5 days worse today with activity. )  Patient presents with 5-day history of intermittent chest pain, patient reports noticing with activity and movement specifically movement to her left arm states the pain radiates into her neck and jaw on the left side. Patient currently chest pain-free but states she notices it when she moves her arm does report lifting up heavy packages during the holiday season. Patient denies any recent long travel as well as no unusual weakness or fatigue and no lower extremity swelling. Patient also denies any abdominal pain as well as no noted nausea, vomiting, diarrhea and no noted back or flank pain. Patient reports she is only on metoprolol because she had a run of SVT and is being monitored by Dr. Elsa Dangelo. Does report taking 4 baby aspirin prior to arrival denies any cardiac history for self as well as no recent long travel. Symptoms mild in severity and persistent     Review of Systems:   A complete review of systems was performed and pertinent positives and negatives are stated within HPI, all other systems reviewed and are negative.          --------------------------------------------- PAST HISTORY ---------------------------------------------  Past Medical History:  has a past medical history of Allergic rhinitis, Anxiety, Atrial fibrillation ([address] Depression, Headache, Hypertension, Hypothyroidism, NAFLD (nonalcoholic fatty liver disease), Obesity with body mass index (BMI) of 30.0 to 39.9, Osteoarthritis, Sleep apnea, Spinal stenosis, lumbar, and Thyroid disease. Past Surgical History:  has a past surgical history that includes ablation of dysrhythmic focus (1990); hysteroscopy (08/15/2012); Colonoscopy; Endometrial ablation (2012); Thyroidectomy (Bilateral, 01/19/2017); Breast surgery;  Hysterectomy, vaginal; other surgical history (12/23/2019); Insertable Cardiac Monitor (12/23/2019); Gallbladder surgery (07/2020); Cholecystectomy; and Insertable Cardiac Monitor (N/A, 03/01/2022). Social History:  reports that she has never smoked. She has never used smokeless tobacco. She reports that she does not drink alcohol and does not use drugs. Family History: family history includes Arrhythmia in her mother; Atrial Fibrillation in her paternal grandfather; Breast Cancer in her paternal grandmother; Cancer in her father and maternal grandmother; Heart Disease in her maternal grandfather; High Blood Pressure in her father; High Cholesterol in her mother; Other in her daughter, paternal grandmother, and sister. The patients home medications have been reviewed.     Allergies: Prednisone and Fentanyl    -------------------------------------------------- RESULTS -------------------------------------------------  All laboratory and radiology results have been personally reviewed by myself   LABS:  Results for orders placed or performed during the hospital encounter of 12/27/22   Troponin   Result Value Ref Range    Troponin, High Sensitivity <6 0 - 9 ng/L   CBC with Auto Differential   Result Value Ref Range    WBC 6.0 4.5 - 11.5 E9/L    RBC 4.13 3.50 - 5.50 E12/L    Hemoglobin 12.2 11.5 - 15.5 g/dL    Hematocrit 36.5 34.0 - 48.0 %    MCV 88.4 80.0 - 99.9 fL    MCH 29.5 26.0 - 35.0 pg    MCHC 33.4 32.0 - 34.5 %    RDW 12.5 11.5 - 15.0 fL    Platelets 632 617 - 282 E9/L    MPV 10.0 7.0 - 12.0 fL    Neutrophils % 59.4 43.0 - 80.0 %    Immature Granulocytes % 0.2 0.0 - 5.0 %    Lymphocytes % 29.2 20.0 - 42.0 %    Monocytes % 8.5 2.0 - 12.0 %    Eosinophils % 1.7 0.0 - 6.0 %    Basophils % 1.0 0.0 - 2.0 %    Neutrophils Absolute 3.59 1.80 - 7.30 E9/L    Immature Granulocytes # 0.01 E9/L    Lymphocytes Absolute 1.76 1.50 - 4.00 E9/L    Monocytes Absolute 0.51 0.10 - 0.95 E9/L    Eosinophils Absolute 0.10 0.05 - 0.50 E9/L    Basophils Absolute 0.06 0.00 - 0.20 E9/L   Comprehensive Metabolic Panel   Result Value Ref Range    Sodium 140 132 - 146 mmol/L    Potassium 4.0 3.5 - 5.0 mmol/L    Chloride 107 98 - 107 mmol/L    CO2 22 22 - 29 mmol/L    Anion Gap 11 7 - 16 mmol/L    Glucose 115 (H) 74 - 99 mg/dL    BUN 19 6 - 20 mg/dL    Creatinine 1.0 0.5 - 1.0 mg/dL    Est, Glom Filt Rate >60 >=60 mL/min/1.73    Calcium 9.0 8.6 - 10.2 mg/dL    Total Protein 6.8 6.4 - 8.3 g/dL    Albumin 3.9 3.5 - 5.2 g/dL    Total Bilirubin 0.3 0.0 - 1.2 mg/dL    Alkaline Phosphatase 68 35 - 104 U/L    ALT 12 0 - 32 U/L    AST 16 0 - 31 U/L   Brain Natriuretic Peptide   Result Value Ref Range    Pro- (H) 0 - 125 pg/mL   Magnesium   Result Value Ref Range    Magnesium 2.0 1.6 - 2.6 mg/dL   Troponin   Result Value Ref Range    Troponin, High Sensitivity <6 0 - 9 ng/L   EKG 12 Lead   Result Value Ref Range    Ventricular Rate 77 BPM    Atrial Rate 77 BPM    P-R Interval 206 ms    QRS Duration 82 ms    Q-T Interval 400 ms    QTc Calculation (Bazett) 452 ms    P Axis 55 degrees    R Axis 53 degrees    T Axis 38 degrees       RADIOLOGY:  Interpreted by Radiologist.  XR CHEST PORTABLE   Final Result   No acute process. ------------------------- NURSING NOTES AND VITALS REVIEWED ---------------------------   The nursing notes within the ED encounter and vital signs as below have been reviewed.    BP (!) 171/96   Pulse 86   Temp 97.9 °F (36.6 °C)   Resp 17   Ht 5' 5\" (1.651 m)   Wt 231 lb (104.8 kg)   LMP 01/01/2013   SpO2 97%   BMI 38.44 kg/m²   Oxygen Saturation Interpretation: Normal      ---------------------------------------------------PHYSICAL EXAM--------------------------------------    Overall nontoxic well appearing, non toxic in NAD  Head: Normocephalic and atraumatic  Eyes: PERRL, EOMI  Mouth: Oropharynx clear, handling secretions, no trismus  Neck: Supple, full ROM,   Pulmonary: Lungs clear to auscultation bilaterally, no wheezes, rales, or rhonchi. Not in respiratory distress  Cardiovascular:  Regular rate and rhythm, no murmurs, gallops, or rubs. 2+ distal pulses  Abdomen: Soft, non tender, non distended,   Extremities: Moves all extremities x 4. Warm and well perfused  Skin: warm and dry without rash  Neurologic: GCS 15,  Psych: Normal Affect      ------------------------------ ED COURSE/MEDICAL DECISION MAKING----------------------  Medications - No data to display      ED COURSE:       Medical Decision Making: Plan will be for labs will also obtain EKG as well as chest x-ray. Patient with troponin negative, will obtain repeat troponin. CBC negative, chemistry panel normal, proBNP essentially negative at 167, magnesium level normal at 2, chest x-ray completely unremarkable showing no acute process. Twelve-lead EKG as interpreted and reviewed by the emergency room attending showing a heart rate of 77, normal sinus rhythm. No acute ST elevation or depression noted. This was compared to previous EKG from October 7, 2022. I did also obtain a repeat troponin and that also is once again negative. Patient with a heart Score of 2, I did bring patient back into triage to reevaluate her. She is completely chest pain-free. Plan will be for discharge home, she was educated on strict return precautions, this could be a component of muscle skeletal as it only happens when she moves her arm and has been doing heavy lifting. Patient with low risk factors, but she still was educated on good follow-up with her cardiologist as well as strict return precautions and following up with her primary care doctor. Patient is neurovascularly and hemodynamically intact. Plan will be for discharge home with close follow-up. Counseling: The emergency provider has spoken with the patient and discussed todays results, in addition to providing specific details for the plan of care and counseling regarding the diagnosis and prognosis.   Questions are answered at this time and they are agreeable with the plan.      --------------------------------- IMPRESSION AND DISPOSITION ---------------------------------    IMPRESSION  1. Chest pain, unspecified type        DISPOSITION  Disposition: Discharge to home  Patient condition is good      NOTE: This report was transcribed using voice recognition software.  Every effort was made to ensure accuracy; however, inadvertent computerized transcription errors may be present      BEATRICE Espinal - CNP  12/27/22 4824

## 2022-12-27 NOTE — TELEPHONE ENCOUNTER
Pt called with questions about MyChart message. Gave instructions for new dosing. Pt verbalized understanding.  Please send updated script for new dosing

## 2022-12-27 NOTE — DISCHARGE INSTRUCTIONS
Today both cardiac enzymes were negative at 6 and EKG normal, please return back if you develop any return of chest pain, shortness of breath or any other new symptoms.   Follow  up with primary care doctor as well as cardiologist.

## 2022-12-27 NOTE — ED NOTES
Department of Emergency Medicine  FIRST PROVIDER TRIAGE NOTE             Independent ROLAND           12/26/22  7:37 PM EST    Date of Encounter: 12/26/22   MRN: [id number]      HPI: Salud Roberts is a 62 y.o. female who presents to the ED for Chest Pain (Chest pain for 5 days worse today with activity. )  Patient presents with 5-day history of intermittent chest pain, patient reports noticing with activity and movement specifically movement to her left arm states the pain radiates into her neck and jaw on the left side. Does report taking 4 baby aspirin prior to arrival denies any cardiac history for self as well as no recent long travel. ROS: Negative for urinary complaints or rash. PE: Gen Appearance/Constitutional: alert  HEENT: NC/NT. PERRLA,  Airway patent. Initial Plan of Care: All treatment areas with department are currently occupied. Plan to order/Initiate the following while awaiting opening in ED: labs, EKG, and imaging studies.   Initiate Treatment-Testing, Proceed toTreatment Area When Bed Available for ED Attending/ROLAND to Continue Care    Electronically signed by BEATRICE Sinclair CNP   DD: 12/26/22       BEATRICE Sinclair CNP  12/26/22 [id number] APRN - CNP  12/26/22 2000

## 2022-12-30 ENCOUNTER — HOSPITAL ENCOUNTER (EMERGENCY)
Age: 57
Discharge: LEFT BEFORE DISCHARGE | End: 2022-12-30

## 2022-12-30 VITALS
RESPIRATION RATE: 16 BRPM | SYSTOLIC BLOOD PRESSURE: 143 MMHG | OXYGEN SATURATION: 100 % | DIASTOLIC BLOOD PRESSURE: 88 MMHG | HEART RATE: 72 BPM | TEMPERATURE: 97.7 F

## 2022-12-30 NOTE — ED NOTES
3rd attempt for patient to triage with no response at this time.      Barbie Ramon RN  12/30/22 4834

## 2023-01-01 ENCOUNTER — APPOINTMENT (OUTPATIENT)
Dept: CT IMAGING | Age: 58
End: 2023-01-01
Payer: COMMERCIAL

## 2023-01-01 ENCOUNTER — HOSPITAL ENCOUNTER (EMERGENCY)
Age: 58
Discharge: HOME | End: 2023-01-01
Attending: EMERGENCY MEDICINE
Payer: COMMERCIAL

## 2023-01-01 VITALS
HEIGHT: 65 IN | BODY MASS INDEX: 36.65 KG/M2 | HEART RATE: 82 BPM | TEMPERATURE: 97.7 F | SYSTOLIC BLOOD PRESSURE: 164 MMHG | OXYGEN SATURATION: 100 % | RESPIRATION RATE: 16 BRPM | WEIGHT: 220 LBS | DIASTOLIC BLOOD PRESSURE: 87 MMHG

## 2023-01-01 DIAGNOSIS — M48.00 SPINAL STENOSIS, UNSPECIFIED SPINAL REGION: ICD-10-CM

## 2023-01-01 DIAGNOSIS — R10.84 GENERALIZED ABDOMINAL PAIN: Primary | ICD-10-CM

## 2023-01-01 DIAGNOSIS — D18.03 HEMANGIOMA OF LIVER: ICD-10-CM

## 2023-01-01 DIAGNOSIS — R11.0 NAUSEA: ICD-10-CM

## 2023-01-01 LAB
ALBUMIN SERPL-MCNC: 4.1 G/DL (ref 3.5–5.2)
ALP BLD-CCNC: 60 U/L (ref 35–104)
ALT SERPL-CCNC: 15 U/L (ref 0–32)
ANION GAP SERPL CALCULATED.3IONS-SCNC: 9 MMOL/L (ref 7–16)
AST SERPL-CCNC: 16 U/L (ref 0–31)
BACTERIA: ABNORMAL /HPF
BASOPHILS ABSOLUTE: 0.04 E9/L (ref 0–0.2)
BASOPHILS RELATIVE PERCENT: 0.7 % (ref 0–2)
BILIRUB SERPL-MCNC: 0.6 MG/DL (ref 0–1.2)
BILIRUBIN URINE: NEGATIVE
BLOOD, URINE: ABNORMAL
BUN BLDV-MCNC: 15 MG/DL (ref 6–20)
CALCIUM SERPL-MCNC: 9.1 MG/DL (ref 8.6–10.2)
CHLORIDE BLD-SCNC: 105 MMOL/L (ref 98–107)
CLARITY: CLEAR
CO2: 27 MMOL/L (ref 22–29)
COLOR: ABNORMAL
CREAT SERPL-MCNC: 0.7 MG/DL (ref 0.5–1)
EOSINOPHILS ABSOLUTE: 0.06 E9/L (ref 0.05–0.5)
EOSINOPHILS RELATIVE PERCENT: 1.1 % (ref 0–6)
GFR SERPL CREATININE-BSD FRML MDRD: >60 ML/MIN/1.73
GLUCOSE BLD-MCNC: 103 MG/DL (ref 74–99)
GLUCOSE URINE: NEGATIVE MG/DL
HCT VFR BLD CALC: 38.7 % (ref 34–48)
HEMOGLOBIN: 12.8 G/DL (ref 11.5–15.5)
IMMATURE GRANULOCYTES #: 0.01 E9/L
IMMATURE GRANULOCYTES %: 0.2 % (ref 0–5)
KETONES, URINE: NEGATIVE MG/DL
LACTIC ACID: 1.4 MMOL/L (ref 0.5–2.2)
LEUKOCYTE ESTERASE, URINE: ABNORMAL
LIPASE: 20 U/L (ref 13–60)
LYMPHOCYTES ABSOLUTE: 1.16 E9/L (ref 1.5–4)
LYMPHOCYTES RELATIVE PERCENT: 21.7 % (ref 20–42)
MCH RBC QN AUTO: 29.5 PG (ref 26–35)
MCHC RBC AUTO-ENTMCNC: 33.1 % (ref 32–34.5)
MCV RBC AUTO: 89.2 FL (ref 80–99.9)
MONOCYTES ABSOLUTE: 0.3 E9/L (ref 0.1–0.95)
MONOCYTES RELATIVE PERCENT: 5.6 % (ref 2–12)
NEUTROPHILS ABSOLUTE: 3.78 E9/L (ref 1.8–7.3)
NEUTROPHILS RELATIVE PERCENT: 70.7 % (ref 43–80)
NITRITE, URINE: NEGATIVE
PDW BLD-RTO: 12.3 FL (ref 11.5–15)
PH UA: 6 (ref 5–9)
PLATELET # BLD: 270 E9/L (ref 130–450)
PMV BLD AUTO: 9.5 FL (ref 7–12)
POTASSIUM SERPL-SCNC: 4 MMOL/L (ref 3.5–5)
PROTEIN UA: NEGATIVE MG/DL
RBC # BLD: 4.34 E12/L (ref 3.5–5.5)
RBC UA: ABNORMAL /HPF (ref 0–2)
SODIUM BLD-SCNC: 141 MMOL/L (ref 132–146)
SPECIFIC GRAVITY UA: 1.01 (ref 1–1.03)
TOTAL PROTEIN: 6.9 G/DL (ref 6.4–8.3)
UROBILINOGEN, URINE: 0.2 E.U./DL
WBC # BLD: 5.4 E9/L (ref 4.5–11.5)
WBC UA: ABNORMAL /HPF (ref 0–5)

## 2023-01-01 PROCEDURE — 83605 ASSAY OF LACTIC ACID: CPT

## 2023-01-01 PROCEDURE — 99285 EMERGENCY DEPT VISIT HI MDM: CPT

## 2023-01-01 PROCEDURE — 2580000003 HC RX 258: Performed by: EMERGENCY MEDICINE

## 2023-01-01 PROCEDURE — 2580000003 HC RX 258: Performed by: RADIOLOGY

## 2023-01-01 PROCEDURE — 85025 COMPLETE CBC W/AUTO DIFF WBC: CPT

## 2023-01-01 PROCEDURE — 74177 CT ABD & PELVIS W/CONTRAST: CPT

## 2023-01-01 PROCEDURE — 83690 ASSAY OF LIPASE: CPT

## 2023-01-01 PROCEDURE — 81001 URINALYSIS AUTO W/SCOPE: CPT

## 2023-01-01 PROCEDURE — 80053 COMPREHEN METABOLIC PANEL: CPT

## 2023-01-01 PROCEDURE — 6360000004 HC RX CONTRAST MEDICATION: Performed by: RADIOLOGY

## 2023-01-01 RX ORDER — SODIUM CHLORIDE 0.9 % (FLUSH) 0.9 %
10 SYRINGE (ML) INJECTION PRN
Status: COMPLETED | OUTPATIENT
Start: 2023-01-01 | End: 2023-01-01

## 2023-01-01 RX ORDER — ONDANSETRON 4 MG/1
4 TABLET, FILM COATED ORAL EVERY 8 HOURS PRN
Qty: 20 TABLET | Refills: 0 | Status: SHIPPED | OUTPATIENT
Start: 2023-01-01

## 2023-01-01 RX ORDER — SODIUM CHLORIDE 9 MG/ML
INJECTION, SOLUTION INTRAVENOUS ONCE
Status: COMPLETED | OUTPATIENT
Start: 2023-01-01 | End: 2023-01-01

## 2023-01-01 RX ORDER — OMEPRAZOLE 20 MG/1
20 CAPSULE, DELAYED RELEASE ORAL
Qty: 60 CAPSULE | Refills: 0 | Status: SHIPPED | OUTPATIENT
Start: 2023-01-01

## 2023-01-01 RX ADMIN — SODIUM CHLORIDE, PRESERVATIVE FREE 10 ML: 5 INJECTION INTRAVENOUS at 12:15

## 2023-01-01 RX ADMIN — IOPAMIDOL 75 ML: 755 INJECTION, SOLUTION INTRAVENOUS at 12:19

## 2023-01-01 RX ADMIN — SODIUM CHLORIDE: 9 INJECTION, SOLUTION INTRAVENOUS at 11:04

## 2023-01-01 ASSESSMENT — PAIN DESCRIPTION - DIRECTION: RADIATING_TOWARDS: RLQ

## 2023-01-01 ASSESSMENT — ENCOUNTER SYMPTOMS
WHEEZING: 0
BLOOD IN STOOL: 0
ABDOMINAL DISTENTION: 0
SORE THROAT: 0
SHORTNESS OF BREATH: 0
EYE PAIN: 0
ABDOMINAL PAIN: 1
VOMITING: 0
NAUSEA: 1
DIARRHEA: 1
RHINORRHEA: 0

## 2023-01-01 ASSESSMENT — PAIN DESCRIPTION - LOCATION: LOCATION: ABDOMEN

## 2023-01-01 ASSESSMENT — PAIN - FUNCTIONAL ASSESSMENT: PAIN_FUNCTIONAL_ASSESSMENT: 0-10

## 2023-01-01 NOTE — DISCHARGE INSTRUCTIONS
Call family doctor tomorrow and schedule a followup appointment to be seen in 2 days    Have your doctor obtain  finalized report of CT scan today and all results    Increase your fluid intake. Pedialyte or Gatorade    CT ABDOMEN PELVIS W IV CONTRAST Additional Contrast? None   Final Result   1. No acute process in abdomen or pelvis. 2.  Stable enhancing lesion involving inferior aspect of right hepatic lobe   likely represents a hepatic hemangioma. MRI with without contrast could be   helpful for further evaluation. 3.  Stable chronic disc/osteophyte complex at L1/L2 results in significant   central canal stenosis.

## 2023-01-01 NOTE — Clinical Note
Swathi Antonio was seen and treated in our emergency department on 1/1/2023. She may return to work on 01/04/2023. If you have any questions or concerns, please don't hesitate to call.       Ted Rinaldi, DO

## 2023-01-01 NOTE — H&P
[address]        Pt Name: Airam Meehan  MRN: [id number]  [place] 1965  Date of evaluation: 1/1/2023  Provider: Gen Alegre MD  PCP: Elysa Felty, MD  Note Started: 10:43 AM EST 1/1/23    CHIEF COMPLAINT       Chief Complaint   Patient presents with    Abdominal Pain     Mid upper abdomin radiating to RLQ    Nausea       HISTORY OF PRESENT ILLNESS: 1 or more Elements     History from : Patient and Significant Other     Limitations to history : None    Airam Meehan is a 62 y.o. female with a PMHx of HTN, Afib s/p ablation, NAFLD, hypothyroidism s/p thyroidectomy, who presents with abdominal pain. Patient reports this has been progressively getting worse for the past 3 to 5 days. Duration of pain intermittent, located mainly in the right lower quadrant w/ radiation to the left side. Patient noted 10 out of 10 pain worsened by eating and bending, alleviated with Zofran. Associated symptoms include fever, chills, shakiness, decreased appetite, nausea, no vomiting. Patient denies any chest pain, shortness of breath, lower extremity swelling. Patient has been dealing with abdominal discomfort for the past 3 months, has had recent visits to her GI doctor and PCP. Patient reports having colonoscopy and EGD done, reporting nothing was found during procedure. Patient reports of diverticulitis and colon cancer in the family. Nursing Notes were all reviewed and agreed with or any disagreements were addressed in the HPI. REVIEW OF SYSTEMS :      Review of Systems   Constitutional:  Positive for chills and fever. Negative for unexpected weight change. HENT:  Negative for congestion, rhinorrhea and sore throat. Eyes:  Negative for pain and visual disturbance. Respiratory:  Negative for shortness of breath and wheezing. Cardiovascular:  Negative for chest pain and palpitations. Gastrointestinal:  Positive for abdominal pain, diarrhea and nausea. Negative for abdominal distention, blood in stool and vomiting. Genitourinary:  Negative for dysuria and hematuria. Musculoskeletal:  Negative for arthralgias and myalgias. Skin:  Negative for rash. Neurological:  Negative for dizziness and headaches. Positives and Pertinent negatives as per HPI. SURGICAL HISTORY     Past Surgical History:   Procedure Laterality Date    ABLATION OF DYSRHYTHMIC FOCUS  1990    BREAST SURGERY      cysts removed    CHOLECYSTECTOMY      COLONOSCOPY      ENDOMETRIAL ABLATION  2012    GALLBLADDER SURGERY  07/2020    removal     HYSTERECTOMY, VAGINAL      HYSTEROSCOPY  08/15/2012    dilatation & curettage, novasure ablation    INSERTABLE CARDIAC MONITOR  12/23/2019    [facility] Confirm Linq     (Dr. Mini Peacock)      INSERTABLE CARDIAC MONITOR N/A 03/01/2022    Loop explant by Dr. Heriberto Franklin  12/23/2019    Dr. Mini Peacock- Loop Recorder implant, Confirm    THYROIDECTOMY Bilateral 01/19/2017    Dr. Mariano Connelly       Previous Medications    ALPRAZOLAM (XANAX) 0.25 MG TABLET    Take 0.25 mg by mouth 4 times daily as needed for Sleep. ALPRAZOLAM (XANAX) 1 MG TABLET    Take 0.5 mg by mouth 4 times daily.      AZELASTINE (ASTELIN) 0.1 % NASAL SPRAY    1 spray by Nasal route 2 times daily Use in each nostril as directed    BROMPHENIRAMINE-PSEUDOEPHEDRINE-DM (BROMFED DM) 2-30-10 MG/5ML SYRUP    Take 5 mLs by mouth 4 times daily as needed for Cough    CARBAMAZEPINE (TEGRETOL-XR) 100 MG EXTENDED RELEASE TABLET    Take 1 tablet by mouth 2 times daily    HANDICAP PLACARD MISC    by Does not apply route    LEXAPRO 10 MG TABLET        METOPROLOL SUCCINATE (TOPROL XL) 25 MG EXTENDED RELEASE TABLET    TAKE ONE TABLET BY MOUTH ONCE DAILY    NAPROXEN (NAPROSYN) 500 MG TABLET    Take 1 tablet by mouth 2 times daily as needed for Pain    SYNTHROID 88 MCG TABLET    1 tab Mon-Fri, 1.5 tab sat & sun ALLERGIES     Prednisone and Fentanyl    FAMILYHISTORY       Family History   Problem Relation Age of Onset    High Cholesterol Mother         low blood pressure    Arrhythmia Mother     High Blood Pressure Father     Cancer Father         leukemia, lung cancer +smoker, brain cancer ataxia    Other Sister         nephrectomy due to a benign tumor    Breast Cancer Paternal Grandmother     Other Paternal Grandmother         bowel    Other Daughter         IGG immunodeficiency    Cancer Maternal Grandmother         lung, cervical    Heart Disease Maternal Grandfather     Atrial Fibrillation Paternal Grandfather         SOCIAL HISTORY       Social History     Tobacco Use    Smoking status: Never    Smokeless tobacco: Never   Vaping Use    Vaping Use: Never used   Substance Use Topics    Alcohol use: No    Drug use: No       SCREENINGS        Kd Coma Scale  Eye Opening: Spontaneous  Best Verbal Response: Oriented  Best Motor Response: Obeys commands  Kd Coma Scale Score: 15                CIWA Assessment  BP: (!) 164/87  Heart Rate: 82           PHYSICAL EXAM  1 or more Elements     ED Triage Vitals [01/01/23 0952]   BP Temp Temp Source Heart Rate Resp SpO2 Height Weight   (!) 164/87 97.7 °F (36.5 °C) Oral 82 16 100 % 5' 5\" (1.651 m) 220 lb (99.8 kg)       Physical Exam  Vitals and nursing note reviewed. Constitutional:       General: She is not in acute distress. Appearance: Normal appearance. She is not ill-appearing or toxic-appearing. HENT:      Head: Normocephalic and atraumatic. Eyes:      General:         Right eye: No discharge. Left eye: No discharge. Cardiovascular:      Rate and Rhythm: Normal rate and regular rhythm. Heart sounds: No murmur heard. Pulmonary:      Effort: Pulmonary effort is normal.      Breath sounds: No wheezing. Abdominal:      General: Bowel sounds are normal.      Palpations: Abdomen is soft. Tenderness:  There is abdominal tenderness (mildly tender to palpation) in the right lower quadrant and left lower quadrant. There is no right CVA tenderness, left CVA tenderness, guarding or rebound. Negative signs include Kenyon's sign. Musculoskeletal:         General: No swelling. Normal range of motion. Right lower leg: No edema. Left lower leg: No edema. Skin:     General: Skin is warm and dry. Neurological:      General: No focal deficit present. Mental Status: She is oriented to person, place, and time. DIAGNOSTIC RESULTS   LABS:    Labs Reviewed   COMPREHENSIVE METABOLIC PANEL - Abnormal; Notable for the following components:       Result Value    Glucose 103 (*)     All other components within normal limits   CBC WITH AUTO DIFFERENTIAL - Abnormal; Notable for the following components:    Lymphocytes Absolute 1.16 (*)     All other components within normal limits   URINALYSIS WITH MICROSCOPIC - Abnormal; Notable for the following components:    Leukocyte Esterase, Urine SMALL (*)     Bacteria, UA RARE (*)     All other components within normal limits   LACTIC ACID   LIPASE       When ordered only abnormal lab results are displayed. All other labs were within normal range or not returned as of this dictation. EKG: n/a    RADIOLOGY:   Non-plain film images such as CT, Ultrasound and MRI are read by the radiologist. Plain radiographic images are visualized and preliminarily interpreted by the ED Provider with the below findings:    Preliminary imaging reviewed by us, confirmed by radiology. Interpretation per the Radiologist below, if available at the time of this note:    CT ABDOMEN PELVIS W IV CONTRAST Additional Contrast? None   Final Result   1. No acute process in abdomen or pelvis. 2.  Stable enhancing lesion involving inferior aspect of right hepatic lobe   likely represents a hepatic hemangioma. MRI with without contrast could be   helpful for further evaluation.       3.  Stable chronic disc/osteophyte complex at L1/L2 results in significant   central canal stenosis. XR CHEST PORTABLE    Result Date: 12/26/2022  EXAMINATION: ONE XRAY VIEW OF THE CHEST 12/26/2022 8:07 pm COMPARISON: 10/07/2022 HISTORY: ORDERING SYSTEM PROVIDED HISTORY: chest pain TECHNOLOGIST PROVIDED HISTORY: Reason for exam:->chest pain What reading provider will be dictating this exam?->CRC FINDINGS: The lungs are without acute focal process. There is no effusion or pneumothorax. The cardiomediastinal silhouette is without acute process. The osseous structures are without acute process. No acute process. No results found. PROCEDURES   Unless otherwise noted below, none     Procedures    CRITICAL CARE TIME   N/a    PAST MEDICAL HISTORY      has a past medical history of Allergic rhinitis, Anxiety, Atrial fibrillation ([facility] [address] Depression, Headache, Hypertension, Hypothyroidism, NAFLD (nonalcoholic fatty liver disease), Obesity with body mass index (BMI) of 30.0 to 39.9 (06/28/2020), Osteoarthritis, Sleep apnea, Spinal stenosis, lumbar (01/14/2020), and Thyroid disease. EMERGENCY DEPARTMENT COURSE and DIFFERENTIAL DIAGNOSIS/MDM:   Vitals:    Vitals:    01/01/23 0952   BP: (!) 164/87   Pulse: 82   Resp: 16   Temp: 97.7 °F (36.5 °C)   TempSrc: Oral   SpO2: 100%   Weight: 220 lb (99.8 kg)   Height: 5' 5\" (1.651 m)       Patient was given the following medications:  Medications   0.9 % sodium chloride infusion ( IntraVENous Stopped 1/1/23 1253)   iopamidol (ISOVUE-370) 76 % injection 75 mL (75 mLs IntraVENous Given 1/1/23 1219)   sodium chloride flush 0.9 % injection 10 mL (10 mLs IntraVENous Given 1/1/23 1215)             [unfilled]    Chronic Conditions: Hypothyroidism s/p thyroidectomy, A.  Fib s/p ablation, anxiety     CONSULTS: (Who and What was discussed)  None    Discussion with Other Profesionals : None    Social Determinants : None    Records Reviewed : None    CC/HPI Summary, DDx, ED Course, and Reassessment: This is a 70-year-old female coming in with a chief complaint of abdominal pain. This has been ongoing for the past 5 days, associated with chills, shakiness, decreased appetite related nausea without vomiting episodes. Has a history of C-scope and EGD without abnormal findings reported. Patient has family history of diverticulitis and colon cancer. Patient was worked up in the ER, CT of the abdomen found no acute process concerning for any differentials including diverticulitis, appendicitis, nephrolithiasis. Lipase came back normal making pancreatitis unlikely. Urinalysis came back fairly unremarkable, concerns for kidney pathology unlikely. On reassessment, patient was lying down comfortably. Patient did not require any nausea or pain medication during course. We discussed patient blood work, CT findings which included discussion about hepatic hemangioma, which is stable from last reading. We also discussed the central canal stenosis, in which patient receives therapy. We advised patient to follow-up with PCP, and GI doctor. Patient was given time for questions and verbalized understanding for plan going forward. Patient will be sent home on Prilosec 20 mg, and Zofran 4 mg. Patient was discharged home in stable condition. Disposition Considerations (tests considered but not done, Shared Decision Making, Pt Expectation of Test or Tx.):  We ordered basic blood work, lipase, UA, CT imaging to rule out ddx as above. Patient has had a total hysterectomy, so consideration for ObGyn pathology was unlikely. I am the Primary Clinician of Record. FINAL IMPRESSION      1. Generalized abdominal pain    2. Nausea    3. Hemangioma of liver    4.  Spinal stenosis, unspecified spinal region          DISPOSITION/PLAN     DISPOSITION Decision To Discharge 01/01/2023 02:22:38 PM      PATIENT REFERRED TO:  Bety Collins MD  [address] [place] [id number]  [phone]    Schedule an appointment as soon as possible for a visit in 3 days      Nearest Emergency Room    Go to   If symptoms worsen    DISCHARGE MEDICATIONS:  New Prescriptions    OMEPRAZOLE (PRILOSEC) 20 MG DELAYED RELEASE CAPSULE    Take 1 capsule by mouth 2 times daily (before meals)    ONDANSETRON (ZOFRAN) 4 MG TABLET    Take 1 tablet by mouth every 8 hours as needed for Nausea or Vomiting       DISCONTINUED MEDICATIONS:  Discontinued Medications    No medications on file              (Please note that portions of this note were completed with a voice recognition program.  Efforts were made to edit the dictations but occasionally words are mis-transcribed.)    Herbert Asencio MD (electronically signed)

## 2023-01-03 NOTE — ED PROVIDER NOTES
Aubrie Mares MD   Resident   Emergency Medicine      Attested   Date of Service:  1/1/2023  9:23 AM                 Attested            Attestation signed by Krunal Lopez DO at 1/1/2023  3:49 PM     ATTENDING PROVIDER ATTESTATION:      Zayra Coleman presented to the emergency department for evaluation of Abdominal Pain (Mid upper abdomin radiating to RLQ) and Nausea   and was initially evaluated by the Medical Resident. See Original ED Note for H&P and ED course above. I have reviewed and discussed the case, including pertinent history (medical, surgical, family and social) and exam findings with the Medical Resident assigned to Zayrarhoit Coleman. I have personally performed and/or participated in the history, exam, medical decision making, and procedures and agree with all pertinent clinical information. I have reviewed my findings and recommendations with the assigned Medical Resident, Zayra Coleman and members of family present at the time of disposition. My findings/plan: The primary encounter diagnosis was Generalized abdominal pain. Diagnoses of Nausea, Hemangioma of liver, and Spinal stenosis, unspecified spinal region were also pertinent to this visit.   Discharge Medication List as of 1/1/2023  2:24 PM              START taking these medications     Details   omeprazole (PRILOSEC) 20 MG delayed release capsule Take 1 capsule by mouth 2 times daily (before meals), Disp-60 capsule, R-0Normal       ondansetron (ZOFRAN) 4 MG tablet Take 1 tablet by mouth every 8 hours as needed for Nausea or Vomiting, Disp-20 tablet, R-0Normal              Krunal Lopez DO               Expand All Collapse All [address]          Pt Name: Deloris Ridley  MRN: [id number]  [place] 1965  Date of evaluation: 1/1/2023  Provider: Eun Mary MD  PCP: Ghassan Hebert MD  Note Started: 10:43 AM EST 1/1/23     CHIEF COMPLAINT             Chief Complaint   Patient presents with    Abdominal Pain       Mid upper abdomin radiating to RLQ    Nausea         HISTORY OF PRESENT ILLNESS: 1 or more Elements      History from : Patient and Significant Other      Limitations to history : None     Deloris Ridley is a 62 y.o. female with a PMHx of HTN, Afib s/p ablation, NAFLD, hypothyroidism s/p thyroidectomy, who presents with abdominal pain. Patient reports this has been progressively getting worse for the past 3 to 5 days. Duration of pain intermittent, located mainly in the right lower quadrant w/ radiation to the left side. Patient noted 10 out of 10 pain worsened by eating and bending, alleviated with Zofran. Associated symptoms include fever, chills, shakiness, decreased appetite, nausea, no vomiting. Patient denies any chest pain, shortness of breath, lower extremity swelling. Patient has been dealing with abdominal discomfort for the past 3 months, has had recent visits to her GI doctor and PCP.   Patient reports having colonoscopy and EGD done, reporting nothing was found during procedure. Patient reports of diverticulitis and colon cancer in the family. Nursing Notes were all reviewed and agreed with or any disagreements were addressed in the HPI. REVIEW OF SYSTEMS :       Review of Systems   Constitutional:  Positive for chills and fever. Negative for unexpected weight change. HENT:  Negative for congestion, rhinorrhea and sore throat. Eyes:  Negative for pain and visual disturbance. Respiratory:  Negative for shortness of breath and wheezing. Cardiovascular:  Negative for chest pain and palpitations. Gastrointestinal:  Positive for abdominal pain, diarrhea and nausea. Negative for abdominal distention, blood in stool and vomiting. Genitourinary:  Negative for dysuria and hematuria. Musculoskeletal:  Negative for arthralgias and myalgias. Skin:  Negative for rash. Neurological:  Negative for dizziness and headaches. Positives and Pertinent negatives as per HPI. SURGICAL HISTORY      Past Surgical History         Past Surgical History:   Procedure Laterality Date    ABLATION OF DYSRHYTHMIC FOCUS   1990    BREAST SURGERY         cysts removed    CHOLECYSTECTOMY        COLONOSCOPY        ENDOMETRIAL ABLATION   2012    GALLBLADDER SURGERY   07/2020     removal     HYSTERECTOMY, VAGINAL        HYSTEROSCOPY   08/15/2012     dilatation & curettage, novasure ablation    INSERTABLE CARDIAC MONITOR   12/23/2019     [facility] Confirm Linq     (Dr. Debra Galindo)      INSERTABLE CARDIAC MONITOR N/A 03/01/2022     Loop explant by Dr. Darien Beth   12/23/2019     Dr. Debra Galindo- Loop Recorder implant, Confirm    THYROIDECTOMY Bilateral 01/19/2017     Dr. Jayne Santizo             Previous Medications     ALPRAZOLAM (XANAX) 0.25 MG TABLET    Take 0.25 mg by mouth 4 times daily as needed for Sleep. ALPRAZOLAM (XANAX) 1 MG TABLET    Take 0.5 mg by mouth 4 times daily. AZELASTINE (ASTELIN) 0.1 % NASAL SPRAY    1 spray by Nasal route 2 times daily Use in each nostril as directed     BROMPHENIRAMINE-PSEUDOEPHEDRINE-DM (BROMFED DM) 2-30-10 MG/5ML SYRUP    Take 5 mLs by mouth 4 times daily as needed for Cough     CARBAMAZEPINE (TEGRETOL-XR) 100 MG EXTENDED RELEASE TABLET    Take 1 tablet by mouth 2 times daily     HANDICAP PLACARD MISC    by Does not apply route     LEXAPRO 10 MG TABLET         METOPROLOL SUCCINATE (TOPROL XL) 25 MG EXTENDED RELEASE TABLET    TAKE ONE TABLET BY MOUTH ONCE DAILY     NAPROXEN (NAPROSYN) 500 MG TABLET    Take 1 tablet by mouth 2 times daily as needed for Pain     SYNTHROID 88 MCG TABLET    1 tab Mon-Fri, 1.5 tab sat & sun         ALLERGIES     Prednisone and Fentanyl     FAMILYHISTORY        Family History         Family History   Problem Relation Age of Onset    High Cholesterol Mother           low blood pressure    Arrhythmia Mother      High Blood Pressure Father      Cancer Father           leukemia, lung cancer +smoker, brain cancer ataxia    Other Sister           nephrectomy due to a benign tumor    Breast Cancer Paternal Grandmother      Other Paternal Grandmother           bowel    Other Daughter           IGG immunodeficiency    Cancer Maternal Grandmother           lung, cervical    Heart Disease Maternal Grandfather      Atrial Fibrillation Paternal Grandfather              SOCIAL HISTORY        Social History           Tobacco Use    Smoking status: Never    Smokeless tobacco: Never   Vaping Use    Vaping Use: Never used   Substance Use Topics    Alcohol use: No    Drug use: No         SCREENINGS        Kd Coma Scale  Eye Opening: Spontaneous  Best Verbal Response: Oriented  Best Motor Response: Obeys commands  Kd Coma Scale Score: 15                CIWA Assessment  BP: (!) 164/87  Heart Rate: 82            PHYSICAL EXAM  1 or more Elements                ED Triage Vitals [01/01/23 0952]   BP Temp Temp Source Heart Rate Resp SpO2 Height Weight   (!) 164/87 97.7 °F (36.5 °C) Oral 82 16 100 % 5' 5\" (1.651 m) 220 lb (99.8 kg)         Physical Exam  Vitals and nursing note reviewed. Constitutional:       General: She is not in acute distress. Appearance: Normal appearance. She is not ill-appearing or toxic-appearing. HENT:      Head: Normocephalic and atraumatic. Eyes:      General:         Right eye: No discharge. Left eye: No discharge. Cardiovascular:      Rate and Rhythm: Normal rate and regular rhythm. Heart sounds: No murmur heard. Pulmonary:      Effort: Pulmonary effort is normal.      Breath sounds: No wheezing. Abdominal:      General: Bowel sounds are normal.      Palpations: Abdomen is soft. Tenderness: There is abdominal tenderness (mildly tender to palpation) in the right lower quadrant and left lower quadrant. There is no right CVA tenderness, left CVA tenderness, guarding or rebound. Negative signs include Kenyon's sign. Musculoskeletal:         General: No swelling. Normal range of motion. Right lower leg: No edema. Left lower leg: No edema. Skin:     General: Skin is warm and dry. Neurological:      General: No focal deficit present. Mental Status: She is oriented to person, place, and time. DIAGNOSTIC RESULTS   LABS:           Labs Reviewed   COMPREHENSIVE METABOLIC PANEL - Abnormal; Notable for the following components:       Result Value      Glucose 103 (*)       All other components within normal limits   CBC WITH AUTO DIFFERENTIAL - Abnormal; Notable for the following components:     Lymphocytes Absolute 1.16 (*)       All other components within normal limits   URINALYSIS WITH MICROSCOPIC - Abnormal; Notable for the following components:     Leukocyte Esterase, Urine SMALL (*)       Bacteria, UA RARE (*)       All other components within normal limits   LACTIC ACID   LIPASE         When ordered only abnormal lab results are displayed.  All other labs were within normal range or not returned as of this dictation. EKG: n/a     RADIOLOGY:   Non-plain film images such as CT, Ultrasound and MRI are read by the radiologist. Plain radiographic images are visualized and preliminarily interpreted by the ED Provider with the below findings:     Preliminary imaging reviewed by us, confirmed by radiology. Interpretation per the Radiologist below, if available at the time of this note:     CT ABDOMEN PELVIS W IV CONTRAST Additional Contrast? None   Final Result   1. No acute process in abdomen or pelvis. 2.  Stable enhancing lesion involving inferior aspect of right hepatic lobe   likely represents a hepatic hemangioma. MRI with without contrast could be   helpful for further evaluation. 3.  Stable chronic disc/osteophyte complex at L1/L2 results in significant   central canal stenosis. XR CHEST PORTABLE     Result Date: 12/26/2022  EXAMINATION: ONE XRAY VIEW OF THE CHEST 12/26/2022 8:07 pm COMPARISON: 10/07/2022 HISTORY: ORDERING SYSTEM PROVIDED HISTORY: chest pain TECHNOLOGIST PROVIDED HISTORY: Reason for exam:->chest pain What reading provider will be dictating this exam?->CRC FINDINGS: The lungs are without acute focal process. There is no effusion or pneumothorax. The cardiomediastinal silhouette is without acute process. The osseous structures are without acute process. No acute process. No results found. PROCEDURES   Unless otherwise noted below, none     Procedures     CRITICAL CARE TIME   N/a     PAST MEDICAL HISTORY      Past Medical History in prose (no negatives)    has a past medical history of Allergic rhinitis, Anxiety, Atrial fibrillation ([facility] [address] Depression, Headache, Hypertension, Hypothyroidism, NAFLD (nonalcoholic fatty liver disease), Obesity with body mass index (BMI) of 30.0 to 39.9 (06/28/2020), Osteoarthritis, Sleep apnea, Spinal stenosis, lumbar (01/14/2020), and Thyroid disease.          EMERGENCY DEPARTMENT COURSE and DIFFERENTIAL DIAGNOSIS/MDM:   Vitals:    Vitals       Vitals:     01/01/23 0952   BP: (!) 164/87   Pulse: 82   Resp: 16   Temp: 97.7 °F (36.5 °C)   TempSrc: Oral   SpO2: 100%   Weight: 220 lb (99.8 kg)   Height: 5' 5\" (1.651 m)            Patient was given the following medications:  Medications   0.9 % sodium chloride infusion ( IntraVENous Stopped 1/1/23 1253)   iopamidol (ISOVUE-370) 76 % injection 75 mL (75 mLs IntraVENous Given 1/1/23 1219)   sodium chloride flush 0.9 % injection 10 mL (10 mLs IntraVENous Given 1/1/23 1215)                [unfilled]     Chronic Conditions: Hypothyroidism s/p thyroidectomy, A. Fib s/p ablation, anxiety      CONSULTS: (Who and What was discussed)  None     Discussion with Other Profesionals : None     Social Determinants : None     Records Reviewed : None     CC/HPI Summary, DDx, ED Course, and Reassessment: This is a 59-year-old female coming in with a chief complaint of abdominal pain. This has been ongoing for the past 5 days, associated with chills, shakiness, decreased appetite related nausea without vomiting episodes. Has a history of C-scope and EGD without abnormal findings reported. Patient has family history of diverticulitis and colon cancer. Patient was worked up in the ER, CT of the abdomen found no acute process concerning for any differentials including diverticulitis, appendicitis, nephrolithiasis. Lipase came back normal making pancreatitis unlikely. Urinalysis came back fairly unremarkable, concerns for kidney pathology unlikely. On reassessment, patient was lying down comfortably. Patient did not require any nausea or pain medication during course. We discussed patient blood work, CT findings which included discussion about hepatic hemangioma, which is stable from last reading. We also discussed the central canal stenosis, in which patient receives therapy. We advised patient to follow-up with PCP, and GI doctor.   Patient was given time for questions and verbalized understanding for plan going forward. Patient will be sent home on Prilosec 20 mg, and Zofran 4 mg. Patient was discharged home in stable condition. Disposition Considerations (tests considered but not done, Shared Decision Making, Pt Expectation of Test or Tx.):  We ordered basic blood work, lipase, UA, CT imaging to rule out ddx as above. Patient has had a total hysterectomy, so consideration for ObGyn pathology was unlikely. I am the Primary Clinician of Record. FINAL IMPRESSION       1. Generalized abdominal pain    2. Nausea    3. Hemangioma of liver    4.  Spinal stenosis, unspecified spinal region           DISPOSITION/PLAN      DISPOSITION Decision To Discharge 01/01/2023 02:22:38 PM        PATIENT REFERRED TO:  Dwight Camejo MD  [address] [place] [address]  [phone]     Schedule an appointment as soon as possible for a visit in 3 days        Nearest Emergency Room     Go to   If symptoms worsen     DISCHARGE MEDICATIONS:       New Prescriptions     OMEPRAZOLE (PRILOSEC) 20 MG DELAYED RELEASE CAPSULE    Take 1 capsule by mouth 2 times daily (before meals)     ONDANSETRON (ZOFRAN) 4 MG TABLET    Take 1 tablet by mouth every 8 hours as needed for Nausea or Vomiting         DISCONTINUED MEDICATIONS:      Discontinued Medications     No medications on file               (Please note that portions of this note were completed with a voice recognition program.  Efforts were made to edit the dictations but occasionally words are mis-transcribed.)     Junior Socrates MD (electronically signed)                  Cosigned by: Chadd Sawant DO at 1/1/2023  3:49 PM     Revision History                      Routing History                   Chadd Sawant DO  01/03/23 [address] [facility],[address], DO  01/03/23 8791

## 2023-01-04 NOTE — ED NOTES
[address]          Pt Name: Elmira Rojas  MRN: [id number]  [place] 1965  Date of evaluation: 1/1/2023  Provider: Deuce Page MD  PCP: Eneida Raines MD  Note Started: 10:43 AM EST 1/1/23     CHIEF COMPLAINT             Chief Complaint   Patient presents with    Abdominal Pain       Mid upper abdomin radiating to RLQ    Nausea         HISTORY OF PRESENT ILLNESS: 1 or more Elements      History from : Patient and Significant Other      Limitations to history : None     Elmira Rojas is a 62 y.o. female with a PMHx of HTN, Afib s/p ablation, NAFLD, hypothyroidism s/p thyroidectomy, who presents with abdominal pain. Patient reports this has been progressively getting worse for the past 3 to 5 days. Duration of pain intermittent, located mainly in the right lower quadrant w/ radiation to the left side. Patient noted 10 out of 10 pain worsened by eating and bending, alleviated with Zofran. Associated symptoms include fever, chills, shakiness, decreased appetite, nausea, no vomiting. Patient denies any chest pain, shortness of breath, lower extremity swelling. Patient has been dealing with abdominal discomfort for the past 3 months, has had recent visits to her GI doctor and PCP. Patient reports having colonoscopy and EGD done, reporting nothing was found during procedure. Patient reports of diverticulitis and colon cancer in the family. Nursing Notes were all reviewed and agreed with or any disagreements were addressed in the HPI. REVIEW OF SYSTEMS :       Review of Systems   Constitutional:  Positive for chills and fever. Negative for unexpected weight change. HENT:  Negative for congestion, rhinorrhea and sore throat. Eyes:  Negative for pain and visual disturbance. Respiratory:  Negative for shortness of breath and wheezing.     Cardiovascular:  Negative for chest pain and palpitations. Gastrointestinal:  Positive for abdominal pain, diarrhea and nausea. Negative for abdominal distention, blood in stool and vomiting. Genitourinary:  Negative for dysuria and hematuria. Musculoskeletal:  Negative for arthralgias and myalgias. Skin:  Negative for rash. Neurological:  Negative for dizziness and headaches. Positives and Pertinent negatives as per HPI. SURGICAL HISTORY      Past Surgical History         Past Surgical History:   Procedure Laterality Date    ABLATION OF DYSRHYTHMIC FOCUS   1990    BREAST SURGERY         cysts removed    CHOLECYSTECTOMY        COLONOSCOPY        ENDOMETRIAL ABLATION   2012    GALLBLADDER SURGERY   07/2020     removal     HYSTERECTOMY, VAGINAL        HYSTEROSCOPY   08/15/2012     dilatation & curettage, novasure ablation    INSERTABLE CARDIAC MONITOR   12/23/2019     [facility] Confirm Linq     (Dr. Mable Aguilar)      INSERTABLE CARDIAC MONITOR N/A 03/01/2022     Loop explant by Dr. Rachael Ferrell   12/23/2019     Dr. Mable Aguilar- Loop Recorder implant, Confirm    THYROIDECTOMY Bilateral 01/19/2017     Dr. Ramirez Deem             Previous Medications     ALPRAZOLAM (XANAX) 0.25 MG TABLET    Take 0.25 mg by mouth 4 times daily as needed for Sleep. ALPRAZOLAM (XANAX) 1 MG TABLET    Take 0.5 mg by mouth 4 times daily.       AZELASTINE (ASTELIN) 0.1 % NASAL SPRAY    1 spray by Nasal route 2 times daily Use in each nostril as directed     BROMPHENIRAMINE-PSEUDOEPHEDRINE-DM (BROMFED DM) 2-30-10 MG/5ML SYRUP    Take 5 mLs by mouth 4 times daily as needed for Cough     CARBAMAZEPINE (TEGRETOL-XR) 100 MG EXTENDED RELEASE TABLET    Take 1 tablet by mouth 2 times daily     HANDICAP PLACARD MISC    by Does not apply route     LEXAPRO 10 MG TABLET         METOPROLOL SUCCINATE (TOPROL XL) 25 MG EXTENDED RELEASE TABLET    TAKE ONE TABLET BY MOUTH ONCE DAILY     NAPROXEN (NAPROSYN) 500 MG TABLET    Take 1 tablet by mouth 2 times daily as needed for Pain     SYNTHROID 88 MCG TABLET    1 tab Mon-Fri, 1.5 tab sat & sun         ALLERGIES     Prednisone and Fentanyl     FAMILYHISTORY        Family History         Family History   Problem Relation Age of Onset    High Cholesterol Mother           low blood pressure    Arrhythmia Mother      High Blood Pressure Father      Cancer Father           leukemia, lung cancer +smoker, brain cancer ataxia    Other Sister           nephrectomy due to a benign tumor    Breast Cancer Paternal Grandmother      Other Paternal Grandmother           bowel    Other Daughter           IGG immunodeficiency    Cancer Maternal Grandmother           lung, cervical    Heart Disease Maternal Grandfather      Atrial Fibrillation Paternal Grandfather              SOCIAL HISTORY        Social History           Tobacco Use    Smoking status: Never    Smokeless tobacco: Never   Vaping Use    Vaping Use: Never used   Substance Use Topics    Alcohol use: No    Drug use: No         SCREENINGS        [place] Coma Scale  Eye Opening: Spontaneous  Best Verbal Response: Oriented  Best Motor Response: Obeys commands  Kd Coma Scale Score: 15                CIWA Assessment  BP: (!) 164/87  Heart Rate: 82            PHYSICAL EXAM  1 or more Elements                ED Triage Vitals [01/01/23 0952]   BP Temp Temp Source Heart Rate Resp SpO2 Height Weight   (!) 164/87 97.7 °F (36.5 °C) Oral 82 16 100 % 5' 5\" (1.651 m) 220 lb (99.8 kg)         Physical Exam  Vitals and nursing note reviewed. Constitutional:       General: She is not in acute distress. Appearance: Normal appearance. She is not ill-appearing or toxic-appearing. HENT:      Head: Normocephalic and atraumatic. Eyes:      General:         Right eye: No discharge. Left eye: No discharge. Cardiovascular:      Rate and Rhythm: Normal rate and regular rhythm. Heart sounds: No murmur heard.   Pulmonary:      Effort: Pulmonary effort is normal.      Breath sounds: No wheezing. Abdominal:      General: Bowel sounds are normal.      Palpations: Abdomen is soft. Tenderness: There is abdominal tenderness (mildly tender to palpation) in the right lower quadrant and left lower quadrant. There is no right CVA tenderness, left CVA tenderness, guarding or rebound. Negative signs include Kenyon's sign. Musculoskeletal:         General: No swelling. Normal range of motion. Right lower leg: No edema. Left lower leg: No edema. Skin:     General: Skin is warm and dry. Neurological:      General: No focal deficit present. Mental Status: She is oriented to person, place, and time. DIAGNOSTIC RESULTS   LABS:           Labs Reviewed   COMPREHENSIVE METABOLIC PANEL - Abnormal; Notable for the following components:       Result Value      Glucose 103 (*)       All other components within normal limits   CBC WITH AUTO DIFFERENTIAL - Abnormal; Notable for the following components:     Lymphocytes Absolute 1.16 (*)       All other components within normal limits   URINALYSIS WITH MICROSCOPIC - Abnormal; Notable for the following components:     Leukocyte Esterase, Urine SMALL (*)       Bacteria, UA RARE (*)       All other components within normal limits   LACTIC ACID   LIPASE         When ordered only abnormal lab results are displayed. All other labs were within normal range or not returned as of this dictation. EKG: n/a     RADIOLOGY:   Non-plain film images such as CT, Ultrasound and MRI are read by the radiologist. Plain radiographic images are visualized and preliminarily interpreted by the ED Provider with the below findings:     Preliminary imaging reviewed by us, confirmed by radiology. Interpretation per the Radiologist below, if available at the time of this note:     CT ABDOMEN PELVIS W IV CONTRAST Additional Contrast? None   Final Result   1. No acute process in abdomen or pelvis.        2.  Stable enhancing lesion involving inferior aspect of right hepatic lobe   likely represents a hepatic hemangioma. MRI with without contrast could be   helpful for further evaluation. 3.  Stable chronic disc/osteophyte complex at L1/L2 results in significant   central canal stenosis. XR CHEST PORTABLE     Result Date: 12/26/2022  EXAMINATION: ONE XRAY VIEW OF THE CHEST 12/26/2022 8:07 pm COMPARISON: 10/07/2022 HISTORY: ORDERING SYSTEM PROVIDED HISTORY: chest pain TECHNOLOGIST PROVIDED HISTORY: Reason for exam:->chest pain What reading provider will be dictating this exam?->CRC FINDINGS: The lungs are without acute focal process. There is no effusion or pneumothorax. The cardiomediastinal silhouette is without acute process. The osseous structures are without acute process. No acute process. No results found. PROCEDURES   Unless otherwise noted below, none     Procedures     CRITICAL CARE TIME   N/a     PAST MEDICAL HISTORY      Past Medical History in prose (no negatives)    has a past medical history of Allergic rhinitis, Anxiety, Atrial fibrillation ([address] Depression, Headache, Hypertension, Hypothyroidism, NAFLD (nonalcoholic fatty liver disease), Obesity with body mass index (BMI) of 30.0 to 39.9 (06/28/2020), Osteoarthritis, Sleep apnea, Spinal stenosis, lumbar (01/14/2020), and Thyroid disease.          EMERGENCY DEPARTMENT COURSE and DIFFERENTIAL DIAGNOSIS/MDM:   Vitals:    Vitals       Vitals:     01/01/23 0952   BP: (!) 164/87   Pulse: 82   Resp: 16   Temp: 97.7 °F (36.5 °C)   TempSrc: Oral   SpO2: 100%   Weight: 220 lb (99.8 kg)   Height: 5' 5\" (1.651 m)            Patient was given the following medications:  Medications   0.9 % sodium chloride infusion ( IntraVENous Stopped 1/1/23 1253)   iopamidol (ISOVUE-370) 76 % injection 75 mL (75 mLs IntraVENous Given 1/1/23 1219)   sodium chloride flush 0.9 % injection 10 mL (10 mLs IntraVENous Given 1/1/23 1215)                [unfilled]     Chronic Conditions: Hypothyroidism s/p thyroidectomy, A. Fib s/p ablation, anxiety      CONSULTS: (Who and What was discussed)  None     Discussion with Other Profesionals : None     Social Determinants : None     Records Reviewed : None     CC/HPI Summary, DDx, ED Course, and Reassessment: This is a 72-year-old female coming in with a chief complaint of abdominal pain. This has been ongoing for the past 5 days, associated with chills, shakiness, decreased appetite related nausea without vomiting episodes. Has a history of C-scope and EGD without abnormal findings reported. Patient has family history of diverticulitis and colon cancer. Patient was worked up in the ER, CT of the abdomen found no acute process concerning for any differentials including diverticulitis, appendicitis, nephrolithiasis. Lipase came back normal making pancreatitis unlikely. Urinalysis came back fairly unremarkable, concerns for kidney pathology unlikely. On reassessment, patient was lying down comfortably. Patient did not require any nausea or pain medication during course. We discussed patient blood work, CT findings which included discussion about hepatic hemangioma, which is stable from last reading. We also discussed the central canal stenosis, in which patient receives therapy. We advised patient to follow-up with PCP, and GI doctor. Patient was given time for questions and verbalized understanding for plan going forward. Patient will be sent home on Prilosec 20 mg, and Zofran 4 mg. Patient was discharged home in stable condition. Disposition Considerations (tests considered but not done, Shared Decision Making, Pt Expectation of Test or Tx.):  We ordered basic blood work, lipase, UA, CT imaging to rule out ddx as above. Patient has had a total hysterectomy, so consideration for ObGyn pathology was unlikely. I am the Primary Clinician of Record. FINAL IMPRESSION       1. Generalized abdominal pain    2.  Nausea 3. Hemangioma of liver    4.  Spinal stenosis, unspecified spinal region           DISPOSITION/PLAN      DISPOSITION Decision To Discharge 01/01/2023 02:22:38 PM        PATIENT REFERRED TO:  Charo Estevez MD  [address] [place] [address] 25     Schedule an appointment as soon as possible for a visit in 3 days        Nearest Emergency Room     Go to   If symptoms worsen     DISCHARGE MEDICATIONS:       New Prescriptions     OMEPRAZOLE (PRILOSEC) 20 MG DELAYED RELEASE CAPSULE    Take 1 capsule by mouth 2 times daily (before meals)     ONDANSETRON (ZOFRAN) 4 MG TABLET    Take 1 tablet by mouth every 8 hours as needed for Nausea or Vomiting         DISCONTINUED MEDICATIONS:      Discontinued Medications     No medications on file               (Please note that portions of this note were completed with a voice recognition program.  Efforts were made to edit the dictations but occasionally words are mis-transcribed.)     Sharon Alvarez MD (electronically signed)     Sharon Alvarez MD  Resident  01/04/23 1836

## 2023-01-18 ENCOUNTER — TELEPHONE (OUTPATIENT)
Dept: ENDOCRINOLOGY | Age: 58
End: 2023-01-18

## 2023-01-18 DIAGNOSIS — E03.9 HYPOTHYROIDISM (ACQUIRED): Primary | ICD-10-CM

## 2023-01-18 NOTE — TELEPHONE ENCOUNTER
Patient called in and stated she was having a reaction to the change of the synthroid new regimen     Synthroid from 88 mcg 1 tab daily to 1 tab 5 days a week and 1.5 tablet on Saturday and Sunday   She feels the 1.5 on Saturday and Sunday was to much for her made her heart race, she wanted to know if she can do something else, maybe break  it up a little in the week.   Please advise

## 2023-01-20 DIAGNOSIS — C73 THYROID CANCER (HCC): Primary | ICD-10-CM

## 2023-01-20 RX ORDER — LEVOTHYROXINE SODIUM 0.1 MG/1
100 TABLET ORAL DAILY
Qty: 30 TABLET | Refills: 5 | Status: SHIPPED
Start: 2023-01-20 | End: 2023-01-20 | Stop reason: CLARIF

## 2023-01-21 RX ORDER — LEVOTHYROXINE SODIUM 100 MCG
100 TABLET ORAL DAILY
Qty: 30 TABLET | Refills: 5 | Status: SHIPPED | OUTPATIENT
Start: 2023-01-21

## 2023-03-15 ENCOUNTER — OFFICE VISIT (OUTPATIENT)
Dept: FAMILY MEDICINE CLINIC | Age: 58
End: 2023-03-15
Payer: COMMERCIAL

## 2023-03-15 VITALS
HEIGHT: 65 IN | TEMPERATURE: 97.8 F | BODY MASS INDEX: 39.5 KG/M2 | WEIGHT: 237.1 LBS | DIASTOLIC BLOOD PRESSURE: 80 MMHG | OXYGEN SATURATION: 97 % | SYSTOLIC BLOOD PRESSURE: 124 MMHG | HEART RATE: 88 BPM

## 2023-03-15 DIAGNOSIS — I47.1 SVT (SUPRAVENTRICULAR TACHYCARDIA) (HCC): ICD-10-CM

## 2023-03-15 DIAGNOSIS — R10.31 RLQ ABDOMINAL PAIN: Primary | ICD-10-CM

## 2023-03-15 DIAGNOSIS — C73 THYROID CANCER (HCC): ICD-10-CM

## 2023-03-15 DIAGNOSIS — R73.01 IMPAIRED FASTING GLUCOSE: ICD-10-CM

## 2023-03-15 DIAGNOSIS — I10 ESSENTIAL HYPERTENSION: ICD-10-CM

## 2023-03-15 DIAGNOSIS — E55.9 VITAMIN D DEFICIENCY: ICD-10-CM

## 2023-03-15 PROCEDURE — G8417 CALC BMI ABV UP PARAM F/U: HCPCS | Performed by: FAMILY MEDICINE

## 2023-03-15 PROCEDURE — G8427 DOCREV CUR MEDS BY ELIG CLIN: HCPCS | Performed by: FAMILY MEDICINE

## 2023-03-15 PROCEDURE — 3017F COLORECTAL CA SCREEN DOC REV: CPT | Performed by: FAMILY MEDICINE

## 2023-03-15 PROCEDURE — 3074F SYST BP LT 130 MM HG: CPT | Performed by: FAMILY MEDICINE

## 2023-03-15 PROCEDURE — 99204 OFFICE O/P NEW MOD 45 MIN: CPT | Performed by: FAMILY MEDICINE

## 2023-03-15 PROCEDURE — 3079F DIAST BP 80-89 MM HG: CPT | Performed by: FAMILY MEDICINE

## 2023-03-15 PROCEDURE — G8484 FLU IMMUNIZE NO ADMIN: HCPCS | Performed by: FAMILY MEDICINE

## 2023-03-15 PROCEDURE — 1036F TOBACCO NON-USER: CPT | Performed by: FAMILY MEDICINE

## 2023-03-15 SDOH — ECONOMIC STABILITY: HOUSING INSECURITY
IN THE LAST 12 MONTHS, WAS THERE A TIME WHEN YOU DID NOT HAVE A STEADY PLACE TO SLEEP OR SLEPT IN A SHELTER (INCLUDING NOW)?: NO

## 2023-03-15 SDOH — ECONOMIC STABILITY: FOOD INSECURITY: WITHIN THE PAST 12 MONTHS, THE FOOD YOU BOUGHT JUST DIDN'T LAST AND YOU DIDN'T HAVE MONEY TO GET MORE.: NEVER TRUE

## 2023-03-15 SDOH — ECONOMIC STABILITY: FOOD INSECURITY: WITHIN THE PAST 12 MONTHS, YOU WORRIED THAT YOUR FOOD WOULD RUN OUT BEFORE YOU GOT MONEY TO BUY MORE.: NEVER TRUE

## 2023-03-15 SDOH — ECONOMIC STABILITY: INCOME INSECURITY: HOW HARD IS IT FOR YOU TO PAY FOR THE VERY BASICS LIKE FOOD, HOUSING, MEDICAL CARE, AND HEATING?: NOT HARD AT ALL

## 2023-03-15 ASSESSMENT — PATIENT HEALTH QUESTIONNAIRE - PHQ9
SUM OF ALL RESPONSES TO PHQ QUESTIONS 1-9: 4
6. FEELING BAD ABOUT YOURSELF - OR THAT YOU ARE A FAILURE OR HAVE LET YOURSELF OR YOUR FAMILY DOWN: 0
SUM OF ALL RESPONSES TO PHQ QUESTIONS 1-9: 4
2. FEELING DOWN, DEPRESSED OR HOPELESS: 2
5. POOR APPETITE OR OVEREATING: 0
3. TROUBLE FALLING OR STAYING ASLEEP: 0
1. LITTLE INTEREST OR PLEASURE IN DOING THINGS: 2
4. FEELING TIRED OR HAVING LITTLE ENERGY: 0
9. THOUGHTS THAT YOU WOULD BE BETTER OFF DEAD, OR OF HURTING YOURSELF: 0
SUM OF ALL RESPONSES TO PHQ9 QUESTIONS 1 & 2: 4
10. IF YOU CHECKED OFF ANY PROBLEMS, HOW DIFFICULT HAVE THESE PROBLEMS MADE IT FOR YOU TO DO YOUR WORK, TAKE CARE OF THINGS AT HOME, OR GET ALONG WITH OTHER PEOPLE: 0
7. TROUBLE CONCENTRATING ON THINGS, SUCH AS READING THE NEWSPAPER OR WATCHING TELEVISION: 0
SUM OF ALL RESPONSES TO PHQ QUESTIONS 1-9: 4
8. MOVING OR SPEAKING SO SLOWLY THAT OTHER PEOPLE COULD HAVE NOTICED. OR THE OPPOSITE, BEING SO FIGETY OR RESTLESS THAT YOU HAVE BEEN MOVING AROUND A LOT MORE THAN USUAL: 0
SUM OF ALL RESPONSES TO PHQ QUESTIONS 1-9: 4

## 2023-03-15 ASSESSMENT — LIFESTYLE VARIABLES
HOW OFTEN DO YOU HAVE A DRINK CONTAINING ALCOHOL: NEVER
HOW MANY STANDARD DRINKS CONTAINING ALCOHOL DO YOU HAVE ON A TYPICAL DAY: PATIENT DOES NOT DRINK

## 2023-03-15 NOTE — PROGRESS NOTES
HPI:  The patient is a 62 y.o. female who presents today to establish care. The patient complains of lower R sided pelvic/abdominal pain x 1 year. It is an ache that comes and goes. She had colonoscopy and was wnl. She saw Dr. Suni Smith and he did not think it involved the bowel. She has had hysterectomy and denies any bladder symptoms.      Past Medical History:   Diagnosis Date    Allergic rhinitis     Anxiety     Atrial fibrillation (HCC)     Depression     Headache     Hypertension     Hypothyroidism     NAFLD (nonalcoholic fatty liver disease)     By CT    Obesity with body mass index (BMI) of 30.0 to 39.9 06/28/2020    Osteoarthritis     Sleep apnea     does not use C-Pap    Spinal stenosis, lumbar 01/14/2020    Severe central stenosis by CT of abdomen, L1-L2    Thyroid disease     PTC microcarcinoma (0.5 cm) treated by thyroidectomy and BERG 100 mci      Past Surgical History:   Procedure Laterality Date    ABLATION OF DYSRHYTHMIC FOCUS  1990    BREAST SURGERY      cysts removed    CHOLECYSTECTOMY      COLONOSCOPY      ENDOMETRIAL ABLATION  2012    GALLBLADDER SURGERY  07/2020    removal     HYSTERECTOMY, VAGINAL      HYSTEROSCOPY  08/15/2012    dilatation & curettage, novasure ablation    INSERTABLE CARDIAC MONITOR  12/23/2019    [facility] Confirm Linq     (Dr. Manasa Nicholas)      [address] N/A 03/01/2022    Loop explant by Dr. Brooklynn Nunez  12/23/2019    Dr. Manasa Nicholas- Loop Recorder implant, Confirm    THYROIDECTOMY Bilateral 01/19/2017    Dr. Vaishnavi Ozuna      Family History   Problem Relation Age of Onset    High Cholesterol Mother         low blood pressure    Arrhythmia Mother     High Blood Pressure Father     Cancer Father         leukemia, lung cancer +smoker, brain cancer ataxia    Other Sister         nephrectomy due to a benign tumor    Breast Cancer Paternal Grandmother     Other Paternal Grandmother         bowel    Other Daughter         IGG immunodeficiency    Cancer Maternal Grandmother         lung, cervical    Heart Disease Maternal Grandfather     Atrial Fibrillation Paternal Grandfather      Social History     Socioeconomic History    Marital status:      Spouse name: Danae Lr    Number of children: 3    Years of education: Not on file    Highest education level: Not on file   Occupational History    Occupation: homemaker   Tobacco Use    Smoking status: Never    Smokeless tobacco: Never   Vaping Use    Vaping Use: Never used   Substance and Sexual Activity    Alcohol use: No    Drug use: No    Sexual activity: Yes     Partners: Male   Other Topics Concern    Not on file   Social History Narrative    Lives in a house with  who is disabled and one child. Homeschools her daughter. 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[place] [address], [address] Strain: Low Risk     Difficulty of Paying Living Expenses: Not hard at all   Food Insecurity: No Food Insecurity    Worried About [address] in the Last Year: Never true    [address] in the Last Year: Never true   Transportation Needs: Unknown    Lack of Transportation (Medical): Not on file    Lack of Transportation (Non-Medical):  No   Physical Activity: Not on file   Stress: Not on file   Social Connections: Not on file   Intimate Partner Violence: Not on file   Housing Stability: Unknown    Unable to Pay for Housing in the Last Year: Not on file    Number of Places Lived in the Last Year: Not on file    Unstable Housing in the Last Year: No      Allergies   Allergen Reactions    Fentanyl Anaphylaxis     Other reaction(s): needed narcan after choly, Unknown    Prednisone Anxiety        Review of Systems:  Constitutional:  No fever, no fatigue, no chills, no headaches, no weight change  Dermatology:  No rash, no mole, no dry or sensitive skin  ENT:  No cough, no sore throat, no sinus pain, no runny nose, no ear pain  Cardiology:  No chest pain, no palpitations, no leg edema, no shortness of breath, no PND  Endocrinology:  No polydipsia, no polyuria, no cold intolerance, no heat intolerance, no polyphagia, no hair changes  Gastroenterology:  No dysphagia, no abdominal pain, no nausea, no vomiting, no constipation, no diarrhea, no heartburn  Female Reproductive:  No hot flashes, no abnormal vaginal discharge, no pain with menstruation, no pelvic pain  Musculoskeletal:  No joint pain, no leg cramps, no back pain, no muscle aches  Respiratory:  No shortness of breath, no orthopnea, no wheezing, no ARAYA, no hemoptysis  Urology:  No blood in the urine, no urinary frequency, no urinary incontinence, no urinary urgency, no nocturia, no dysuria  Neurology:  No numbness/tingling, no dizziness, no weakness  Psychology:  No depression, no sleep disturbances, no suicidal ideation, no anxiety  Physical Exam:  Vitals:    03/15/23 1259   BP: 124/80   Site: Left Upper Arm   Position: Sitting   Pulse: 88   Temp: 97.8 °F (36.6 °C)   SpO2: 97%   Weight: 237 lb 1.6 oz (107.5 kg)   Height: 5' 5\" (1.651 m)     General:  Patient alert and oriented x 3, NAD, pleasant  HEENT:  Atraumatic, normocephalic, PERRLA, EOMI, clear conjunctiva, TMs clear, nose-clear, throat - no erythema, tonsils- wnl  Neck:  Supple, no goiter, no carotid bruits, no lymphadenopathy  Lungs:  CTA B  Heart:  RRR, no murmurs, gallops or rubs  Abdomen:  Soft, NTND, + bowel sounds  Back: full ROM, no CVA tenderness  Extremities:  No clubbing, cyanosis or edema  Neuro:  CN II-XII grossly intact, 5/5 strength in bilateral upper and lower extremities, 2 + reflexes. Skin: unremarkable    Assessment/Plan:  Mickey Mckeon was seen today for new patient. Diagnoses and all orders for this visit:    RLQ abdominal pain  -     AFL - Dat Tejada MD, General Surgery, [place]    SVT (supraventricular tachycardia) ([facility]    Thyroid cancer ([facility] [address] [facility]  -     TSH;  Future    Essential hypertension  -     Lipid Panel; Future    Impaired fasting glucose  -     Hemoglobin A1C; Future    Vitamin D deficiency  -     Vitamin D 25 Hydroxy; Future    Ask radiology to look at CT to r/o hernia. As above. Call or go to ED immediately if symptoms worsen or persist.  No follow-ups on file. or sooner if necessary. Educational materials and/or home exercises printed for patient's review and were included in patient instructions on his/her After Visit Summary and given to patient at the end of visit. Counseled regarding above diagnosis, including possible risks and complications,  especially if left uncontrolled. Counseled regarding the possible side effects, risks, benefits and alternatives to treatment; patient and/or guardian verbalizes understanding, agrees, feels comfortable with and wishes to proceed with above treatment plan. Advised patient to call with any new medication issues, and read all Rx info from pharmacy to assure aware of all possible risks and side effects of medication before taking. Reviewed age and gender appropriate health screening exams and vaccinations. Advised patient regarding importance of keeping up with recommended health maintenance and to schedule as soon as possible if overdue, as this is important in assessing for undiagnosed pathology, especially cancer, as well as protecting against potentially harmful/life threatening disease. Patient and/or guardian verbalizes understanding and agrees with above counseling, assessment and plan. All questions answered. Larry Abreu MD  3/15/2023    I have personally reviewed and updated the chief complaint, HPI, Past Medical, Family and Social History, as well as the above Review of Systems.

## 2023-03-21 DIAGNOSIS — R73.01 IMPAIRED FASTING GLUCOSE: ICD-10-CM

## 2023-03-21 DIAGNOSIS — C73 THYROID CANCER (HCC): ICD-10-CM

## 2023-03-21 DIAGNOSIS — I10 ESSENTIAL HYPERTENSION: ICD-10-CM

## 2023-03-21 DIAGNOSIS — E55.9 VITAMIN D DEFICIENCY: ICD-10-CM

## 2023-03-21 LAB
CHOLESTEROL, TOTAL: 217 MG/DL (ref 0–199)
HBA1C MFR BLD: 5.5 % (ref 4–5.6)
HDLC SERPL-MCNC: 51 MG/DL
LDLC SERPL CALC-MCNC: 136 MG/DL (ref 0–99)
TRIGL SERPL-MCNC: 151 MG/DL (ref 0–149)
TSH SERPL-MCNC: 2.5 UIU/ML (ref 0.27–4.2)
VITAMIN D 25-HYDROXY: 13 NG/ML (ref 30–100)
VLDLC SERPL CALC-MCNC: 30 MG/DL

## 2023-05-04 NOTE — TELEPHONE ENCOUNTER
Ok for requested orders.    Aiden Garcia, CNP     The pt was notified, and the lab orders were mailed.

## 2023-05-15 DIAGNOSIS — E03.9 HYPOTHYROIDISM (ACQUIRED): ICD-10-CM

## 2023-05-15 LAB
T4 FREE SERPL-MCNC: 1.3 NG/DL (ref 0.93–1.7)
TSH SERPL-MCNC: 2.03 UIU/ML (ref 0.27–4.2)

## 2023-05-17 ENCOUNTER — TELEPHONE (OUTPATIENT)
Dept: FAMILY MEDICINE CLINIC | Age: 58
End: 2023-05-17

## 2023-05-17 DIAGNOSIS — M79.643 PAIN OF HAND, UNSPECIFIED LATERALITY: Primary | ICD-10-CM

## 2023-05-17 NOTE — TELEPHONE ENCOUNTER
Shellie Hooper called this morning wanting to have her SED rate checked for inflammation because she's been having a lot of pain in her knees and her hands-can that be ordered or does she need seen?

## 2023-05-21 ENCOUNTER — TELEPHONE (OUTPATIENT)
Dept: ENDOCRINOLOGY | Age: 58
End: 2023-05-21

## 2023-06-22 PROCEDURE — 99285 EMERGENCY DEPT VISIT HI MDM: CPT

## 2023-06-22 PROCEDURE — 93005 ELECTROCARDIOGRAM TRACING: CPT | Performed by: EMERGENCY MEDICINE

## 2023-06-22 ASSESSMENT — PAIN DESCRIPTION - LOCATION: LOCATION: CHEST

## 2023-06-22 ASSESSMENT — PAIN SCALES - GENERAL: PAINLEVEL_OUTOF10: 6

## 2023-06-22 ASSESSMENT — PAIN DESCRIPTION - DESCRIPTORS: DESCRIPTORS: STABBING

## 2023-06-22 ASSESSMENT — PAIN - FUNCTIONAL ASSESSMENT: PAIN_FUNCTIONAL_ASSESSMENT: 0-10

## 2023-06-22 ASSESSMENT — PAIN DESCRIPTION - ORIENTATION: ORIENTATION: LEFT

## 2023-06-23 ENCOUNTER — APPOINTMENT (OUTPATIENT)
Dept: GENERAL RADIOLOGY | Age: 58
End: 2023-06-23
Payer: COMMERCIAL

## 2023-06-23 ENCOUNTER — TELEPHONE (OUTPATIENT)
Dept: CARDIOLOGY CLINIC | Age: 58
End: 2023-06-23

## 2023-06-23 ENCOUNTER — HOSPITAL ENCOUNTER (EMERGENCY)
Age: 58
Discharge: HOME | End: 2023-06-23
Attending: EMERGENCY MEDICINE
Payer: COMMERCIAL

## 2023-06-23 VITALS
TEMPERATURE: 98.6 F | HEIGHT: 65 IN | SYSTOLIC BLOOD PRESSURE: 154 MMHG | DIASTOLIC BLOOD PRESSURE: 102 MMHG | HEART RATE: 82 BPM | OXYGEN SATURATION: 99 % | BODY MASS INDEX: 37.49 KG/M2 | RESPIRATION RATE: 16 BRPM | WEIGHT: 225 LBS

## 2023-06-23 DIAGNOSIS — R07.9 CHEST PAIN, UNSPECIFIED TYPE: Primary | ICD-10-CM

## 2023-06-23 DIAGNOSIS — R07.89 CHEST WALL PAIN: Primary | ICD-10-CM

## 2023-06-23 LAB
ANION GAP SERPL CALCULATED.3IONS-SCNC: 11 MMOL/L (ref 7–16)
BUN SERPL-MCNC: 14 MG/DL (ref 6–20)
CALCIUM SERPL-MCNC: 9.1 MG/DL (ref 8.6–10.2)
CHLORIDE SERPL-SCNC: 102 MMOL/L (ref 98–107)
CO2 SERPL-SCNC: 26 MMOL/L (ref 22–29)
CREAT SERPL-MCNC: 0.7 MG/DL (ref 0.5–1)
ERYTHROCYTE [DISTWIDTH] IN BLOOD BY AUTOMATED COUNT: 12.3 FL (ref 11.5–15)
GLUCOSE SERPL-MCNC: 105 MG/DL (ref 74–99)
HCT VFR BLD AUTO: 37.4 % (ref 34–48)
HGB BLD-MCNC: 12.4 G/DL (ref 11.5–15.5)
MCH RBC QN AUTO: 28.9 PG (ref 26–35)
MCHC RBC AUTO-ENTMCNC: 33.2 % (ref 32–34.5)
MCV RBC AUTO: 87.2 FL (ref 80–99.9)
PLATELET # BLD AUTO: 281 E9/L (ref 130–450)
PMV BLD AUTO: 9.4 FL (ref 7–12)
POTASSIUM SERPL-SCNC: 3.7 MMOL/L (ref 3.5–5)
RBC # BLD AUTO: 4.29 E12/L (ref 3.5–5.5)
REASON FOR REJECTION: NORMAL
REJECTED TEST: NORMAL
SODIUM SERPL-SCNC: 139 MMOL/L (ref 132–146)
TROPONIN, HIGH SENSITIVITY: 7 NG/L (ref 0–9)
TROPONIN, HIGH SENSITIVITY: <6 NG/L (ref 0–9)
WBC # BLD: 6.4 E9/L (ref 4.5–11.5)

## 2023-06-23 PROCEDURE — 80048 BASIC METABOLIC PNL TOTAL CA: CPT

## 2023-06-23 PROCEDURE — 71046 X-RAY EXAM CHEST 2 VIEWS: CPT

## 2023-06-23 PROCEDURE — 84484 ASSAY OF TROPONIN QUANT: CPT

## 2023-06-23 PROCEDURE — 85027 COMPLETE CBC AUTOMATED: CPT

## 2023-06-23 RX ORDER — REGADENOSON 0.08 MG/ML
0.4 INJECTION, SOLUTION INTRAVENOUS
OUTPATIENT
Start: 2023-06-23

## 2023-06-23 RX ORDER — NAPROXEN 500 MG/1
500 TABLET ORAL 2 TIMES DAILY
Qty: 14 TABLET | Refills: 0 | Status: SHIPPED | OUTPATIENT
Start: 2023-06-23 | End: 2023-06-30

## 2023-06-23 ASSESSMENT — ENCOUNTER SYMPTOMS
TROUBLE SWALLOWING: 0
NAUSEA: 0
VOMITING: 0
ABDOMINAL PAIN: 0
DIARRHEA: 0
RHINORRHEA: 0
VOICE CHANGE: 0
PHOTOPHOBIA: 0
SHORTNESS OF BREATH: 0
BACK PAIN: 0
WHEEZING: 0
COUGH: 0

## 2023-06-23 ASSESSMENT — PAIN SCALES - GENERAL: PAINLEVEL_OUTOF10: 5

## 2023-06-23 NOTE — TELEPHONE ENCOUNTER
Please arrange echo and pharm stress Dx CP. OV thereafter. Lanny Moran D.O.   Cardiologist  Cardiology, [address] [facility]

## 2023-06-23 NOTE — ED PROVIDER NOTES
coronary syndrome especially changes. My pression is costochondritis. I did visualize the area and take ibuprofen for the pain. I considered pulmonary embolism however the patient does not have hemoptysis, no estrogen pills, pleurisy likely due to muscle strain. She is not tachycardic not tachypneic and she is normal saturation on room air. She is low risk. I discussed the labs and imaging with the patient and gave her strict return precautions. She will follow-up with her primary care doctor. ED Course as of 06/23/23 1419 Fri Jun 23, 2023 0109 EKG: This EKG is signed and interpreted by the EP. Time: 21:26  Rate: 80  Rhythm: Sinus  Interpretation: no acute changes  Comparison: stable as compared to patient's most recent EKG   [CF]      ED Course User Index  [CF] Bonny Alejandro DO        ED Course as of 06/23/23 1424 Fri Jun 23, 2023 0109 EKG: This EKG is signed and interpreted by the EP. Time: 21:26  Rate: 80  Rhythm: Sinus  Interpretation: no acute changes  Comparison: stable as compared to patient's most recent EKG   [CF]      ED Course User Index  [CF] Bonny Alejandro DO       --------------------------------------------- PAST HISTORY ---------------------------------------------  Past Medical History:  has a past medical history of Allergic rhinitis, Anxiety, Atrial fibrillation ([address] Depression, Headache, Hypertension, Hypothyroidism, NAFLD (nonalcoholic fatty liver disease), Obesity with body mass index (BMI) of 30.0 to 39.9, Osteoarthritis, Sleep apnea, Spinal stenosis, lumbar, and Thyroid disease. Past Surgical History:  has a past surgical history that includes ablation of dysrhythmic focus (1990); hysteroscopy (08/15/2012); Colonoscopy; Endometrial ablation (2012); Thyroidectomy (Bilateral, 01/19/2017); Breast surgery; Hysterectomy, vaginal; other surgical history (12/23/2019); Insertable Cardiac Monitor (12/23/2019); Gallbladder surgery (07/2020);  Cholecystectomy; and

## 2023-06-23 NOTE — TELEPHONE ENCOUNTER
Patient states that she went to the ER last night for pain under her left breast, she was told chest X-ray showed her heart is enlarged and to f/u with cardiology, patient states she is still having pain today, please advise

## 2023-06-24 LAB
EKG ATRIAL RATE: 80 BPM
EKG P AXIS: 28 DEGREES
EKG P-R INTERVAL: 194 MS
EKG Q-T INTERVAL: 388 MS
EKG QRS DURATION: 82 MS
EKG QTC CALCULATION (BAZETT): 447 MS
EKG R AXIS: 16 DEGREES
EKG T AXIS: 39 DEGREES
EKG VENTRICULAR RATE: 80 BPM

## 2023-06-24 PROCEDURE — 93010 ELECTROCARDIOGRAM REPORT: CPT | Performed by: INTERNAL MEDICINE

## 2023-06-26 ENCOUNTER — OFFICE VISIT (OUTPATIENT)
Dept: PODIATRY | Age: 58
End: 2023-06-26
Payer: COMMERCIAL

## 2023-06-26 VITALS — BODY MASS INDEX: 37.49 KG/M2 | HEIGHT: 65 IN | WEIGHT: 225 LBS

## 2023-06-26 DIAGNOSIS — R26.2 DIFFICULTY WALKING: ICD-10-CM

## 2023-06-26 DIAGNOSIS — M79.672 LEFT FOOT PAIN: ICD-10-CM

## 2023-06-26 DIAGNOSIS — M72.2 PLANTAR FASCIITIS: Primary | ICD-10-CM

## 2023-06-26 DIAGNOSIS — M79.671 RIGHT FOOT PAIN: ICD-10-CM

## 2023-06-26 PROCEDURE — G8417 CALC BMI ABV UP PARAM F/U: HCPCS | Performed by: PODIATRIST

## 2023-06-26 PROCEDURE — 1036F TOBACCO NON-USER: CPT | Performed by: PODIATRIST

## 2023-06-26 PROCEDURE — G8427 DOCREV CUR MEDS BY ELIG CLIN: HCPCS | Performed by: PODIATRIST

## 2023-06-26 PROCEDURE — 99204 OFFICE O/P NEW MOD 45 MIN: CPT | Performed by: PODIATRIST

## 2023-06-26 PROCEDURE — 3017F COLORECTAL CA SCREEN DOC REV: CPT | Performed by: PODIATRIST

## 2023-06-26 PROCEDURE — 20550 NJX 1 TENDON SHEATH/LIGAMENT: CPT | Performed by: PODIATRIST

## 2023-06-26 RX ORDER — METHYLPREDNISOLONE ACETATE 40 MG/ML
40 INJECTION, SUSPENSION INTRA-ARTICULAR; INTRALESIONAL; INTRAMUSCULAR; SOFT TISSUE ONCE
Status: COMPLETED | OUTPATIENT
Start: 2023-06-26 | End: 2023-06-26

## 2023-06-26 RX ADMIN — METHYLPREDNISOLONE ACETATE 40 MG: 40 INJECTION, SUSPENSION INTRA-ARTICULAR; INTRALESIONAL; INTRAMUSCULAR; SOFT TISSUE at 16:28

## 2023-07-19 ENCOUNTER — TELEPHONE (OUTPATIENT)
Dept: CARDIOLOGY | Age: 58
End: 2023-07-19

## 2023-07-19 DIAGNOSIS — R07.9 CHEST PAIN, UNSPECIFIED TYPE: Primary | ICD-10-CM

## 2023-07-19 NOTE — TELEPHONE ENCOUNTER
Received call from Coordinator- Patient insurance denied Auth for Vidhi and Echo. Cancelled Vidhi 7/24 and Echo 7/28 on 7/19/2023. Insurance will not approve without patient receiving Reg. Stress test.  New order entered and patient scheduled for Reg.  Stress 8/1 @ 7:15a.  7/19/2023 @ 1:50p  [facility]

## 2023-07-19 NOTE — TELEPHONE ENCOUNTER
Patient is scheduled for stress test on 7/24 and echo on 7/28. [facility] is requesting a peer to peer at [phone] tracking # [id number]. Please advise.  Thank You

## 2023-07-28 ENCOUNTER — TELEPHONE (OUTPATIENT)
Dept: CARDIOLOGY | Age: 58
End: 2023-07-28

## 2023-07-28 NOTE — TELEPHONE ENCOUNTER
Spoke with patient and confirmed regular stress test appointment on August 1, 2023 at [facility]. Instructions for test,hold Toprol for 24 hrs, and COVID-19 preprocedure information reviewed with patient, questions answered. Patient verbalized understanding.

## 2023-08-01 ENCOUNTER — HOSPITAL ENCOUNTER (OUTPATIENT)
Dept: CARDIOLOGY | Age: 58
Discharge: HOME | End: 2023-08-01
Payer: COMMERCIAL

## 2023-08-01 ENCOUNTER — HOSPITAL ENCOUNTER (OUTPATIENT)
Dept: CARDIOLOGY | Age: 58
Discharge: HOME | End: 2023-08-01
Attending: INTERNAL MEDICINE
Payer: COMMERCIAL

## 2023-08-01 VITALS
WEIGHT: 230 LBS | SYSTOLIC BLOOD PRESSURE: 150 MMHG | DIASTOLIC BLOOD PRESSURE: 90 MMHG | BODY MASS INDEX: 38.32 KG/M2 | HEART RATE: 84 BPM | RESPIRATION RATE: 16 BRPM | HEIGHT: 65 IN

## 2023-08-01 DIAGNOSIS — R07.9 CHEST PAIN, UNSPECIFIED TYPE: ICD-10-CM

## 2023-08-01 LAB
LV EF: 60 %
LVEF MODALITY: NORMAL

## 2023-08-01 PROCEDURE — 93017 CV STRESS TEST TRACING ONLY: CPT

## 2023-08-01 PROCEDURE — 93306 TTE W/DOPPLER COMPLETE: CPT

## 2023-08-01 NOTE — PROCEDURES
[facility] - [facility], [address]  [phone]    Exercise Stress Study (non-nuclear)      Name: [facility] Account Number:  [de-identified]      :  1965          Sex: female         Date of Study:  2023    Height: 5' 5\" (165.1 cm)          Weight - Scale: 230 lb (104.3 kg)    Ordering Provider: Pb Grey DO          PCP: Jeison Campuzano MD    Cardiologist: Pb Grey DO              Interpreting Physician: Adalberto Chavez DO    Indication:   Detecting the presence and location of coronary artery disease    Clinical History:   Patient has no known history of coronary artery disease. Resting ECG:    Sinus rhythm, 84 bpm.  Normal axis. Nonspecific ST-T waves. Exercise: The patient exercised using a Misael protocol, completing 4:19 minutes and reaching an estimated work load of 0.21 metabolic equivalents (METS). Resting HR was 84. Peak exercise heart rate was 150 ( 92% of maximum predicted heart rate for age). Baseline /90. Peak exercise /70. The blood pressure response to exercise was blunted    Exercise was terminated due to dyspnea and heart rate attained. The patient experienced no chest pain with exercise. Exercise ECG:   The patient demonstrated no arrhythmias during exercise. With exercise, there were no ST segment changes of significance at the heart rate achieved. Alfaro treadmill score was 4.5 implying intermediate risk. Impression:    Exercise EKG was  negative. The patient experienced no chest pain with exercise. Alfaro treadmill score was 4.5 implying intermediate risk. Exercise capacity was average. Intermediate risk general exercise treadmill test.    Thank you for sending your patient to this [employer].     Electronically signed by Christi Lamebrt DO on 23 at 1:05 PM EDT

## 2023-08-02 ENCOUNTER — OFFICE VISIT (OUTPATIENT)
Dept: FAMILY MEDICINE CLINIC | Age: 58
End: 2023-08-02
Payer: COMMERCIAL

## 2023-08-02 ENCOUNTER — TELEPHONE (OUTPATIENT)
Dept: CARDIOLOGY CLINIC | Age: 58
End: 2023-08-02

## 2023-08-02 ENCOUNTER — TELEPHONE (OUTPATIENT)
Dept: PRIMARY CARE CLINIC | Age: 58
End: 2023-08-02

## 2023-08-02 VITALS
HEART RATE: 99 BPM | DIASTOLIC BLOOD PRESSURE: 72 MMHG | TEMPERATURE: 97 F | BODY MASS INDEX: 39.27 KG/M2 | SYSTOLIC BLOOD PRESSURE: 118 MMHG | WEIGHT: 236 LBS | OXYGEN SATURATION: 96 %

## 2023-08-02 DIAGNOSIS — J01.40 ACUTE NON-RECURRENT PANSINUSITIS: Primary | ICD-10-CM

## 2023-08-02 DIAGNOSIS — H69.83 DYSFUNCTION OF BOTH EUSTACHIAN TUBES: ICD-10-CM

## 2023-08-02 DIAGNOSIS — H60.503 ACUTE OTITIS EXTERNA OF BOTH EARS, UNSPECIFIED TYPE: ICD-10-CM

## 2023-08-02 PROCEDURE — G8417 CALC BMI ABV UP PARAM F/U: HCPCS | Performed by: EMERGENCY MEDICINE

## 2023-08-02 PROCEDURE — 3017F COLORECTAL CA SCREEN DOC REV: CPT | Performed by: EMERGENCY MEDICINE

## 2023-08-02 PROCEDURE — G8427 DOCREV CUR MEDS BY ELIG CLIN: HCPCS | Performed by: EMERGENCY MEDICINE

## 2023-08-02 PROCEDURE — 3074F SYST BP LT 130 MM HG: CPT | Performed by: EMERGENCY MEDICINE

## 2023-08-02 PROCEDURE — 1036F TOBACCO NON-USER: CPT | Performed by: EMERGENCY MEDICINE

## 2023-08-02 PROCEDURE — 4130F TOPICAL PREP RX AOE: CPT | Performed by: EMERGENCY MEDICINE

## 2023-08-02 PROCEDURE — 99213 OFFICE O/P EST LOW 20 MIN: CPT | Performed by: EMERGENCY MEDICINE

## 2023-08-02 PROCEDURE — 3078F DIAST BP <80 MM HG: CPT | Performed by: EMERGENCY MEDICINE

## 2023-08-02 RX ORDER — AMOXICILLIN AND CLAVULANATE POTASSIUM 875; 125 MG/1; MG/1
1 TABLET, FILM COATED ORAL 2 TIMES DAILY
Qty: 20 TABLET | Refills: 0 | Status: SHIPPED | OUTPATIENT
Start: 2023-08-02 | End: 2023-08-12

## 2023-08-02 RX ORDER — CEFDINIR 300 MG/1
300 CAPSULE ORAL 2 TIMES DAILY
Qty: 20 CAPSULE | Refills: 0 | Status: SHIPPED
Start: 2023-08-02 | End: 2023-08-02

## 2023-08-02 RX ORDER — ACETIC ACID 20.65 MG/ML
4 SOLUTION AURICULAR (OTIC) 3 TIMES DAILY
Qty: 15 ML | Refills: 0 | Status: SHIPPED | OUTPATIENT
Start: 2023-08-02 | End: 2023-08-09

## 2023-08-02 RX ORDER — GUAIFENESIN 600 MG/1
600 TABLET, EXTENDED RELEASE ORAL 2 TIMES DAILY
Qty: 30 TABLET | Refills: 0 | Status: SHIPPED | OUTPATIENT
Start: 2023-08-02 | End: 2023-08-17

## 2023-08-02 ASSESSMENT — ENCOUNTER SYMPTOMS
WHEEZING: 0
ABDOMINAL DISTENTION: 0
VOMITING: 0
EYE DISCHARGE: 0
SHORTNESS OF BREATH: 0
EYE PAIN: 0
EYE REDNESS: 0
BACK PAIN: 0
SINUS PRESSURE: 1
NAUSEA: 0
COUGH: 0
DIARRHEA: 0
SORE THROAT: 0

## 2023-08-02 NOTE — TELEPHONE ENCOUNTER
----- Message from Ry Garcia DO sent at 8/2/2023  9:18 AM EDT -----  Please notify patient that their stress results are okay.

## 2023-08-02 NOTE — PROGRESS NOTES
Chief Complaint:   Congestion, Drainage, and Otalgia (Bilateral, worse in left)      History of Present Illness   HPI:  Marisela Strong is a 62 y.o. female who presents to [address] [place], [place] [address] today for sinus and ear pressure    Prior to Visit Medications    Medication Sig Taking? Authorizing Provider   cefdinir (OMNICEF) 300 MG capsule Take 1 capsule by mouth 2 times daily for 10 days Yes Jesus Martinez DO   guaiFENesin (MUCINEX) 600 MG extended release tablet Take 1 tablet by mouth 2 times daily for 15 days Yes Jesus Martinez DO   acetic acid (VOSOL) 2 % otic solution Place 4 drops into both ears 3 times daily for 7 days Yes Jesus Martinez DO   naproxen (NAPROSYN) 500 MG tablet Take 1 tablet by mouth 2 times daily for 7 days  Thuy Alvarez DO   SYNTHROID 100 MCG tablet Take 1 tablet by mouth daily  Fidel Joseph MD   metoprolol succinate (TOPROL XL) 25 MG extended release tablet TAKE ONE TABLET BY MOUTH ONCE DAILY  Chuck Nicholas,    Handicap Placard MISC by Does not apply route  Faith Munoz,    ALPRAZolam Olita Maya) 1 MG tablet Take 0.5 tablets by mouth 4 times daily. Historical Provider, MD       Review of Systems   Review of Systems   Constitutional:  Negative for chills and fever. HENT:  Positive for congestion, ear pain and sinus pressure. Negative for sore throat. Eyes:  Negative for pain, discharge and redness. Respiratory:  Negative for cough, shortness of breath and wheezing. Cardiovascular:  Negative for chest pain. Gastrointestinal:  Negative for abdominal distention, diarrhea, nausea and vomiting. Genitourinary:  Negative for dysuria and frequency. Musculoskeletal:  Negative for arthralgias and back pain. Skin:  Negative for rash and wound. Neurological:  Negative for weakness and headaches. Hematological:  Negative for adenopathy. Psychiatric/Behavioral: Negative. All other systems reviewed and are negative.     Patient's medical, social, and family history

## 2023-08-02 NOTE — TELEPHONE ENCOUNTER
----- Message from Williams Reid DO sent at 8/2/2023  9:17 AM EDT -----  Please notify patient that their echo results are normal.

## 2023-09-06 RX ORDER — METOPROLOL SUCCINATE 25 MG/1
TABLET, EXTENDED RELEASE ORAL
Qty: 90 TABLET | Refills: 3 | Status: SHIPPED | OUTPATIENT
Start: 2023-09-06

## 2023-09-11 ENCOUNTER — TELEPHONE (OUTPATIENT)
Dept: FAMILY MEDICINE CLINIC | Age: 58
End: 2023-09-11

## 2023-09-11 NOTE — TELEPHONE ENCOUNTER
Pt called today, said she has been having symptoms recently that are concerning her. Pt said last week, she started to have a motorlike sound in  her left ear, which went away. Now said she feels like she has overall internal tremors that are not seen externally, and also had instances of her muscles locking up. Pls advise.

## 2023-09-13 ENCOUNTER — OFFICE VISIT (OUTPATIENT)
Dept: FAMILY MEDICINE CLINIC | Age: 58
End: 2023-09-13
Payer: COMMERCIAL

## 2023-09-13 VITALS
HEART RATE: 81 BPM | TEMPERATURE: 97.6 F | OXYGEN SATURATION: 97 % | DIASTOLIC BLOOD PRESSURE: 86 MMHG | SYSTOLIC BLOOD PRESSURE: 134 MMHG | BODY MASS INDEX: 40.97 KG/M2 | WEIGHT: 245.9 LBS | HEIGHT: 65 IN | RESPIRATION RATE: 17 BRPM

## 2023-09-13 DIAGNOSIS — M62.838 MUSCLE SPASM: ICD-10-CM

## 2023-09-13 DIAGNOSIS — I10 ESSENTIAL HYPERTENSION: ICD-10-CM

## 2023-09-13 DIAGNOSIS — R43.2 LOSS OF TASTE: ICD-10-CM

## 2023-09-13 DIAGNOSIS — C73 THYROID CANCER (HCC): ICD-10-CM

## 2023-09-13 DIAGNOSIS — E55.9 VITAMIN D DEFICIENCY: ICD-10-CM

## 2023-09-13 DIAGNOSIS — E55.9 VITAMIN D DEFICIENCY: Primary | ICD-10-CM

## 2023-09-13 LAB
ALBUMIN SERPL-MCNC: 4.4 G/DL (ref 3.5–5.2)
ALP BLD-CCNC: 77 U/L (ref 35–104)
ALT SERPL-CCNC: 13 U/L (ref 0–32)
ANION GAP SERPL CALCULATED.3IONS-SCNC: 17 MMOL/L (ref 7–16)
AST SERPL-CCNC: 21 U/L (ref 0–31)
BILIRUB SERPL-MCNC: 0.7 MG/DL (ref 0–1.2)
BUN BLDV-MCNC: 18 MG/DL (ref 6–20)
CALCIUM SERPL-MCNC: 9 MG/DL (ref 8.6–10.2)
CHLORIDE BLD-SCNC: 106 MMOL/L (ref 98–107)
CHOLESTEROL: 216 MG/DL
CO2: 20 MMOL/L (ref 22–29)
CREAT SERPL-MCNC: 0.7 MG/DL (ref 0.5–1)
FOLATE: >20 NG/ML (ref 4.8–24.2)
GFR SERPL CREATININE-BSD FRML MDRD: >60 ML/MIN/1.73M2
GLUCOSE BLD-MCNC: 88 MG/DL (ref 74–99)
HCT VFR BLD CALC: 40 % (ref 34–48)
HDLC SERPL-MCNC: 57 MG/DL
HEMOGLOBIN: 12.7 G/DL (ref 11.5–15.5)
LDL CHOLESTEROL: 143 MG/DL
MAGNESIUM: 2.3 MG/DL (ref 1.6–2.6)
MCH RBC QN AUTO: 29.1 PG (ref 26–35)
MCHC RBC AUTO-ENTMCNC: 31.8 G/DL (ref 32–34.5)
MCV RBC AUTO: 91.5 FL (ref 80–99.9)
PDW BLD-RTO: 13.2 % (ref 11.5–15)
PLATELET # BLD: 288 K/UL (ref 130–450)
PMV BLD AUTO: 9.9 FL (ref 7–12)
POTASSIUM SERPL-SCNC: 5 MMOL/L (ref 3.5–5)
RBC # BLD: 4.37 M/UL (ref 3.5–5.5)
SODIUM BLD-SCNC: 143 MMOL/L (ref 132–146)
T4 FREE: 1.6 NG/DL (ref 0.9–1.7)
TOTAL PROTEIN: 7.2 G/DL (ref 6.4–8.3)
TRIGL SERPL-MCNC: 79 MG/DL
TSH SERPL DL<=0.05 MIU/L-ACNC: 1.56 UIU/ML (ref 0.27–4.2)
VITAMIN B-12: 276 PG/ML (ref 211–946)
VITAMIN D 25-HYDROXY: 12.4 NG/ML (ref 30–100)
VLDLC SERPL CALC-MCNC: 16 MG/DL
WBC # BLD: 5.7 K/UL (ref 4.5–11.5)

## 2023-09-13 PROCEDURE — 3017F COLORECTAL CA SCREEN DOC REV: CPT | Performed by: FAMILY MEDICINE

## 2023-09-13 PROCEDURE — 1036F TOBACCO NON-USER: CPT | Performed by: FAMILY MEDICINE

## 2023-09-13 PROCEDURE — G8417 CALC BMI ABV UP PARAM F/U: HCPCS | Performed by: FAMILY MEDICINE

## 2023-09-13 PROCEDURE — G8427 DOCREV CUR MEDS BY ELIG CLIN: HCPCS | Performed by: FAMILY MEDICINE

## 2023-09-13 PROCEDURE — 3079F DIAST BP 80-89 MM HG: CPT | Performed by: FAMILY MEDICINE

## 2023-09-13 PROCEDURE — 99214 OFFICE O/P EST MOD 30 MIN: CPT | Performed by: FAMILY MEDICINE

## 2023-09-13 PROCEDURE — 3075F SYST BP GE 130 - 139MM HG: CPT | Performed by: FAMILY MEDICINE

## 2023-10-06 ENCOUNTER — TELEPHONE (OUTPATIENT)
Dept: FAMILY MEDICINE CLINIC | Age: 58
End: 2023-10-06

## 2023-10-06 RX ORDER — FLUCONAZOLE 150 MG/1
150 TABLET ORAL ONCE
Qty: 1 TABLET | Refills: 0 | Status: SHIPPED | OUTPATIENT
Start: 2023-10-06 | End: 2023-10-06

## 2023-10-06 NOTE — TELEPHONE ENCOUNTER
Patient called and stated that she is having burning in her vaginal region and she believes it is a yeast infection, could you send her something to the [address] [place] [address] on [address] Héctor Hernandez in [place] if possible Topical Clindamycin Counseling: Patient counseled that this medication may cause skin irritation or allergic reactions.  In the event of skin irritation, the patient was advised to reduce the amount of the drug applied or use it less frequently.   The patient verbalized understanding of the proper use and possible adverse effects of clindamycin.  All of the patient's questions and concerns were addressed.

## 2023-10-20 ENCOUNTER — TELEPHONE (OUTPATIENT)
Dept: FAMILY MEDICINE CLINIC | Age: 58
End: 2023-10-20

## 2023-10-20 DIAGNOSIS — R59.9 ENLARGED LYMPH NODES: Primary | ICD-10-CM

## 2023-10-20 NOTE — TELEPHONE ENCOUNTER
Pt would like a mammogram ordered and an ultrasound to check both breast and the lymph nodes under both arms. She wants to it be faxed [employer] in [place]. Please advise.

## 2023-10-23 ENCOUNTER — NURSE ONLY (OUTPATIENT)
Dept: FAMILY MEDICINE CLINIC | Age: 58
End: 2023-10-23
Payer: COMMERCIAL

## 2023-10-23 ENCOUNTER — TELEPHONE (OUTPATIENT)
Dept: FAMILY MEDICINE CLINIC | Age: 58
End: 2023-10-23

## 2023-10-23 DIAGNOSIS — R30.0 DYSURIA: Primary | ICD-10-CM

## 2023-10-23 LAB
BILIRUBIN, POC: NORMAL
BLOOD URINE, POC: NORMAL
CLARITY, POC: NORMAL
COLOR, POC: NORMAL
GLUCOSE URINE, POC: NORMAL
KETONES, POC: NORMAL
LEUKOCYTE EST, POC: NORMAL
NITRITE, POC: NORMAL
PH, POC: 5.5
PROTEIN, POC: NORMAL
SPECIFIC GRAVITY, POC: 1.02
UROBILINOGEN, POC: NORMAL

## 2023-10-23 PROCEDURE — 81002 URINALYSIS NONAUTO W/O SCOPE: CPT | Performed by: FAMILY MEDICINE

## 2023-10-23 RX ORDER — NITROFURANTOIN 25; 75 MG/1; MG/1
100 CAPSULE ORAL 2 TIMES DAILY
Qty: 10 CAPSULE | Refills: 0 | Status: SHIPPED | OUTPATIENT
Start: 2023-10-23 | End: 2023-10-28

## 2023-10-25 ENCOUNTER — TELEPHONE (OUTPATIENT)
Dept: FAMILY MEDICINE CLINIC | Age: 58
End: 2023-10-25

## 2023-10-25 DIAGNOSIS — R10.31 RLQ ABDOMINAL PAIN: Primary | ICD-10-CM

## 2023-10-25 DIAGNOSIS — R10.9 ABDOMINAL PAIN, UNSPECIFIED ABDOMINAL LOCATION: ICD-10-CM

## 2023-10-25 NOTE — TELEPHONE ENCOUNTER
Jorge Alberto Gimenez called and said she was to have a follow up for her stomach problem and her tremors but there is nothing available until November 14th-she said that a CT scan was the next step and wanted to see if she could be seen sooner because she is still having the same issues and nothing is helping-do you want to see her sooner?

## 2023-10-26 DIAGNOSIS — R59.9 ENLARGED LYMPH NODES: ICD-10-CM

## 2023-10-26 DIAGNOSIS — R92.2 DENSE BREASTS: ICD-10-CM

## 2023-10-26 DIAGNOSIS — Z12.31 ENCOUNTER FOR SCREENING MAMMOGRAM FOR MALIGNANT NEOPLASM OF BREAST: Primary | ICD-10-CM

## 2023-10-26 DIAGNOSIS — R92.30 DENSE BREASTS: ICD-10-CM

## 2023-11-02 DIAGNOSIS — R59.9 ENLARGEMENT OF LYMPH NODE: Primary | ICD-10-CM

## 2023-11-02 DIAGNOSIS — R92.2 DENSE BREAST: ICD-10-CM

## 2023-11-02 DIAGNOSIS — R92.30 DENSE BREAST: ICD-10-CM

## 2023-11-14 ENCOUNTER — OFFICE VISIT (OUTPATIENT)
Dept: FAMILY MEDICINE CLINIC | Age: 58
End: 2023-11-14
Payer: COMMERCIAL

## 2023-11-14 ENCOUNTER — TELEPHONE (OUTPATIENT)
Dept: FAMILY MEDICINE CLINIC | Age: 58
End: 2023-11-14

## 2023-11-14 VITALS
HEART RATE: 87 BPM | DIASTOLIC BLOOD PRESSURE: 83 MMHG | TEMPERATURE: 97 F | HEIGHT: 65 IN | OXYGEN SATURATION: 98 % | WEIGHT: 247.9 LBS | SYSTOLIC BLOOD PRESSURE: 151 MMHG | BODY MASS INDEX: 41.3 KG/M2 | RESPIRATION RATE: 16 BRPM

## 2023-11-14 DIAGNOSIS — R10.13 EPIGASTRIC ABDOMINAL PAIN: Primary | ICD-10-CM

## 2023-11-14 PROCEDURE — G8484 FLU IMMUNIZE NO ADMIN: HCPCS | Performed by: FAMILY MEDICINE

## 2023-11-14 PROCEDURE — 99213 OFFICE O/P EST LOW 20 MIN: CPT | Performed by: FAMILY MEDICINE

## 2023-11-14 PROCEDURE — 1036F TOBACCO NON-USER: CPT | Performed by: FAMILY MEDICINE

## 2023-11-14 PROCEDURE — 3079F DIAST BP 80-89 MM HG: CPT | Performed by: FAMILY MEDICINE

## 2023-11-14 PROCEDURE — 3017F COLORECTAL CA SCREEN DOC REV: CPT | Performed by: FAMILY MEDICINE

## 2023-11-14 PROCEDURE — G8417 CALC BMI ABV UP PARAM F/U: HCPCS | Performed by: FAMILY MEDICINE

## 2023-11-14 PROCEDURE — G8427 DOCREV CUR MEDS BY ELIG CLIN: HCPCS | Performed by: FAMILY MEDICINE

## 2023-11-14 PROCEDURE — 3077F SYST BP >= 140 MM HG: CPT | Performed by: FAMILY MEDICINE

## 2023-11-14 RX ORDER — FAMOTIDINE 20 MG/1
20 TABLET, FILM COATED ORAL EVERY EVENING
Qty: 60 TABLET | Refills: 2 | Status: SHIPPED
Start: 2023-11-14 | End: 2023-11-14 | Stop reason: SDUPTHER

## 2023-11-14 RX ORDER — FAMOTIDINE 20 MG/1
20 TABLET, FILM COATED ORAL 2 TIMES DAILY
Qty: 60 TABLET | Refills: 2 | Status: SHIPPED | OUTPATIENT
Start: 2023-11-14

## 2023-11-14 NOTE — PROGRESS NOTES
erythema  Neck:  Supple, no goiter, no carotid bruits, no lymphadenopathy  Lungs:  CTA B  Heart:  RRR, no murmurs, gallops or rubs  Abdomen:  Soft/nt/nd, + bowel sounds  Extremities:  No clubbing, cyanosis or edema  Skin: unremarkable    Assessment/Plan:      Pedro Pablo Louis was seen today for abdominal pain. Diagnoses and all orders for this visit:    Epigastric abdominal pain  -     [facility]FL - Mark Quevedo MD, General Surgery, [place]    Other orders  -     Discontinue: famotidine (PEPCID) 20 MG tablet; Take 1 tablet by mouth every evening    CT was already ordered. As above. Call or go to ED immediately if symptoms worsen or persist.  No follow-ups on file. , or sooner if necessary. Educational materials and/or home exercises printed for patient's review and were included in patient instructions on his/her After Visit Summary and given to patient at the end of visit. Counseled regarding above diagnosis, including possible risks and complications,  especially if left uncontrolled. Counseled regarding the possible side effects, risks, benefits and alternatives to treatment; patient and/or guardian verbalizes understanding, agrees, feels comfortable with and wishes to proceed with above treatment plan. Advised patient to call with any new medication issues, and read all Rx info from pharmacy to assure aware of all possible risks and side effects of medication before taking. Reviewed age and gender appropriate health screening exams and vaccinations. Advised patient regarding importance of keeping up with recommended health maintenance and to schedule as soon as possible if overdue, as this is important in assessing for undiagnosed pathology, especially cancer, as well as protecting against potentially harmful/life threatening disease. Patient and/or guardian verbalizes understanding and agrees with above counseling, assessment and plan. All questions answered.     Alistair Nash,

## 2023-11-14 NOTE — TELEPHONE ENCOUNTER
Pharmacist wants to make sure that you are lowering the dosage on the patients famotidine, used to be 2 per day and now it is one per day.  She wants to make sure that is correct, please advise

## 2023-11-25 ENCOUNTER — HOSPITAL ENCOUNTER (EMERGENCY)
Age: 58
Discharge: HOME | End: 2023-11-25
Payer: COMMERCIAL

## 2023-11-25 ENCOUNTER — APPOINTMENT (OUTPATIENT)
Dept: CT IMAGING | Age: 58
End: 2023-11-25
Payer: COMMERCIAL

## 2023-11-25 VITALS
HEART RATE: 86 BPM | SYSTOLIC BLOOD PRESSURE: 162 MMHG | WEIGHT: 230 LBS | BODY MASS INDEX: 38.27 KG/M2 | TEMPERATURE: 98.3 F | DIASTOLIC BLOOD PRESSURE: 92 MMHG | OXYGEN SATURATION: 98 % | RESPIRATION RATE: 16 BRPM

## 2023-11-25 DIAGNOSIS — K52.9 ENTERITIS: ICD-10-CM

## 2023-11-25 DIAGNOSIS — R10.9 ABDOMINAL PAIN, UNSPECIFIED ABDOMINAL LOCATION: Primary | ICD-10-CM

## 2023-11-25 DIAGNOSIS — R03.0 ELEVATED BLOOD PRESSURE READING: ICD-10-CM

## 2023-11-25 LAB
ALBUMIN SERPL-MCNC: 4.1 G/DL (ref 3.5–5.2)
ALP SERPL-CCNC: 74 U/L (ref 35–104)
ALT SERPL-CCNC: 17 U/L (ref 0–32)
ANION GAP SERPL CALCULATED.3IONS-SCNC: 11 MMOL/L (ref 7–16)
AST SERPL-CCNC: 17 U/L (ref 0–31)
BASOPHILS # BLD: 0.04 K/UL (ref 0–0.2)
BASOPHILS NFR BLD: 1 % (ref 0–2)
BILIRUB DIRECT SERPL-MCNC: <0.2 MG/DL (ref 0–0.3)
BILIRUB INDIRECT SERPL-MCNC: NORMAL MG/DL (ref 0–1)
BILIRUB SERPL-MCNC: 0.5 MG/DL (ref 0–1.2)
BUN SERPL-MCNC: 16 MG/DL (ref 6–20)
CALCIUM SERPL-MCNC: 8.7 MG/DL (ref 8.6–10.2)
CHLORIDE SERPL-SCNC: 106 MMOL/L (ref 98–107)
CO2 SERPL-SCNC: 24 MMOL/L (ref 22–29)
CREAT SERPL-MCNC: 0.7 MG/DL (ref 0.5–1)
EOSINOPHIL # BLD: 0.07 K/UL (ref 0.05–0.5)
EOSINOPHILS RELATIVE PERCENT: 1 % (ref 0–6)
ERYTHROCYTE [DISTWIDTH] IN BLOOD BY AUTOMATED COUNT: 12.1 % (ref 11.5–15)
GFR SERPL CREATININE-BSD FRML MDRD: >60 ML/MIN/1.73M2
GLUCOSE SERPL-MCNC: 95 MG/DL (ref 74–99)
HCT VFR BLD AUTO: 40.9 % (ref 34–48)
HGB BLD-MCNC: 13.8 G/DL (ref 11.5–15.5)
IMM GRANULOCYTES # BLD AUTO: <0.03 K/UL (ref 0–0.58)
IMM GRANULOCYTES NFR BLD: 0 % (ref 0–5)
LACTATE BLDV-SCNC: 1.7 MMOL/L (ref 0.5–2.2)
LIPASE SERPL-CCNC: 17 U/L (ref 13–60)
LYMPHOCYTES NFR BLD: 1.36 K/UL (ref 1.5–4)
LYMPHOCYTES RELATIVE PERCENT: 25 % (ref 20–42)
MCH RBC QN AUTO: 29.4 PG (ref 26–35)
MCHC RBC AUTO-ENTMCNC: 33.7 G/DL (ref 32–34.5)
MCV RBC AUTO: 87.2 FL (ref 80–99.9)
MONOCYTES NFR BLD: 0.4 K/UL (ref 0.1–0.95)
MONOCYTES NFR BLD: 7 % (ref 2–12)
NEUTROPHILS NFR BLD: 66 % (ref 43–80)
NEUTS SEG NFR BLD: 3.67 K/UL (ref 1.8–7.3)
PLATELET # BLD AUTO: 280 K/UL (ref 130–450)
PMV BLD AUTO: 10.1 FL (ref 7–12)
POTASSIUM SERPL-SCNC: 4.6 MMOL/L (ref 3.5–5)
PROT SERPL-MCNC: 6.9 G/DL (ref 6.4–8.3)
RBC # BLD AUTO: 4.69 M/UL (ref 3.5–5.5)
SODIUM SERPL-SCNC: 141 MMOL/L (ref 132–146)
WBC OTHER # BLD: 5.6 K/UL (ref 4.5–11.5)

## 2023-11-25 PROCEDURE — 99285 EMERGENCY DEPT VISIT HI MDM: CPT

## 2023-11-25 PROCEDURE — 2580000003 HC RX 258: Performed by: NURSE PRACTITIONER

## 2023-11-25 PROCEDURE — 83605 ASSAY OF LACTIC ACID: CPT

## 2023-11-25 PROCEDURE — 82248 BILIRUBIN DIRECT: CPT

## 2023-11-25 PROCEDURE — 85025 COMPLETE CBC W/AUTO DIFF WBC: CPT

## 2023-11-25 PROCEDURE — 6360000004 HC RX CONTRAST MEDICATION: Performed by: RADIOLOGY

## 2023-11-25 PROCEDURE — 83690 ASSAY OF LIPASE: CPT

## 2023-11-25 PROCEDURE — 80053 COMPREHEN METABOLIC PANEL: CPT

## 2023-11-25 PROCEDURE — 74177 CT ABD & PELVIS W/CONTRAST: CPT

## 2023-11-25 RX ORDER — 0.9 % SODIUM CHLORIDE 0.9 %
1000 INTRAVENOUS SOLUTION INTRAVENOUS ONCE
Status: COMPLETED | OUTPATIENT
Start: 2023-11-25 | End: 2023-11-25

## 2023-11-25 RX ORDER — METOCLOPRAMIDE HYDROCHLORIDE 5 MG/ML
10 INJECTION INTRAMUSCULAR; INTRAVENOUS ONCE
Status: DISCONTINUED | OUTPATIENT
Start: 2023-11-25 | End: 2023-11-25 | Stop reason: HOSPADM

## 2023-11-25 RX ADMIN — IOPAMIDOL 75 ML: 755 INJECTION, SOLUTION INTRAVENOUS at 13:46

## 2023-11-25 RX ADMIN — SODIUM CHLORIDE 1000 ML: 9 INJECTION, SOLUTION INTRAVENOUS at 12:16

## 2023-11-25 ASSESSMENT — PAIN DESCRIPTION - PAIN TYPE: TYPE: ACUTE PAIN

## 2023-11-25 ASSESSMENT — PAIN SCALES - GENERAL: PAINLEVEL_OUTOF10: 6

## 2023-11-25 ASSESSMENT — PAIN DESCRIPTION - DESCRIPTORS: DESCRIPTORS: DISCOMFORT;ACHING

## 2023-11-25 ASSESSMENT — PAIN DESCRIPTION - LOCATION: LOCATION: ABDOMEN

## 2023-11-25 ASSESSMENT — PAIN DESCRIPTION - FREQUENCY: FREQUENCY: CONTINUOUS

## 2023-11-25 ASSESSMENT — PAIN - FUNCTIONAL ASSESSMENT
PAIN_FUNCTIONAL_ASSESSMENT: PREVENTS OR INTERFERES SOME ACTIVE ACTIVITIES AND ADLS
PAIN_FUNCTIONAL_ASSESSMENT: 0-10

## 2023-11-25 ASSESSMENT — PAIN DESCRIPTION - ORIENTATION: ORIENTATION: RIGHT;LEFT;MID;LOWER;UPPER

## 2023-11-25 NOTE — ED NOTES
Resting on cot without signs of distress. Apologized for wait. Aware of wait for CT results. Comfort measures offered.       Candice Toure RN  11/25/23 0011

## 2023-11-25 NOTE — ED PROVIDER NOTES
Independent [facility]IR[facility] Visit. [address] ENCOUNTER        Pt Name: Khari Miller  MRN: [id number]  [facility] 1965  Date of evaluation: 11/25/2023  Provider: BEATRICE Marsh CNP  PCP: Jayme Brewster MD  Note Started: 11:53 AM EST 11/25/23    CHIEF COMPLAINT       Chief Complaint   Patient presents with    Abdominal Pain     ONSET 3 weeks ago. N/D. Saw PCP twice, has outpatient CT scheduled for 3 weeks from now. Having sulfur belches. HISTORY OF PRESENT ILLNESS: 1 or more Elements   History From: patient & patient's medical record. Khari Miller is a 62 y.o. female who presents to the ED for 6 months + worth of abdominal pain, belching, nausea, bloating, diarrhea. Pain is around the umbilicus and in the lower quadrant. Pain does not seem to radiate. Patient states she saw her primary care provider on 11/14/2023. She was ordered a CT which was ordered as an outpatient and is still 2 to 3 weeks away. She states that the pain was ongoing and she was experiencing belching with a foul smell this morning which is what prompted her to come to the emergency department. Does take pepcid 20mg bid from her primary care provider. Reports history of H pilori. Has an appointmen with Dr. Sj Baker 11/28/23. Patient has no other reported complaints. Denies any change in appetite. Denies any hematemesis or coffee-ground emesis. Denies any rectal bleeding. Denies any dysuria, urinary urgency, urinary frequency, hematuria, urinary incontinence. Denies shortness of breath or chest pain. Denies palpitations, lightheadedness, dizziness, syncope, near syncope. Nursing Notes were all reviewed and agreed with or any disagreements were addressed in the HPI. REVIEW OF EXTERNAL NOTE :       11/14/2023 PCP office note reviewed    REVIEW OF SYSTEMS :           Positives and Pertinent negatives as per HPI.      SURGICAL lb)         Patient was given the following medications:  Medications   sodium chloride 0.9 % bolus 1,000 mL (0 mLs IntraVENous Stopped 11/25/23 1316)   iopamidol (ISOVUE-370) 76 % injection 75 mL (75 mLs IntraVENous Given 11/25/23 1346)         Medical Decision Making/Differential Diagnosis:    CC/HPI Summary, Social Determinants of health, Records Reviewed, DDx, testing done/not done, ED Course, Reassessment, disposition considerations/shared decision making with patient, consults, disposition:      ED Course as of 11/26/23 0001   Sat Nov 25, 2023   1458 Delay in radiology read at this time awaiting report from radiologist.  [SH]      ED Course User Index  [SH] Jose De Jesus Pollard, APRN - CNP        Medical Decision Making  Amount and/or Complexity of Data Reviewed  Labs: ordered. Radiology: ordered. Risk  Prescription drug management. presents to the ED for 6 months + worth of abdominal pain, belching, nausea, bloating, diarrhea. Pain is around the umbilicus and in the lower quadrant. Pain does not seem to radiate. Patient states she saw her primary care provider on 11/14/2023. She was ordered a CT which was ordered as an outpatient and is still 2 to 3 weeks away. She states that the pain was ongoing and she was experiencing belching with a foul smell this morning which is what prompted her to come to the emergency department. Does take pepcid 20mg bid from her primary care provider. Reports history of H maribeth. Has an appointmen with Dr. Sia Peterson 11/28/23. Patient has no other reported complaints. Denies any change in appetite. Denies any hematemesis or coffee-ground emesis. Denies any rectal bleeding. Denies any dysuria, urinary urgency, urinary frequency, hematuria, urinary incontinence. Denies shortness of breath or chest pain. Denies palpitations, lightheadedness, dizziness, syncope, near syncope.      Physical examination with Diffuse tenderness to palpation around the umbilicus and just

## 2023-11-25 NOTE — ED NOTES
After updating vitals manually, I repeated BP automatically. 188/92. Pt.  Tolerated well     Ifrah Batch  11/25/23 1626

## 2023-11-25 NOTE — ED NOTES
NP aware of BP. OK to DC. Discharge instructions given. Patient states verbal understanding. Ambulatory to exit.        Rosette Olivarez RN  11/25/23 1640 October 25, 2019      Lucia Covington  [address] [place] [address]        Dear Lucia Covington,    Here are the results of the lab tests from your clinic visit. The results are normal.  You do not need additional tests or follow-up.     If you have any questions, please call the clinic or make an appointment at [phone].    Thank you!      Please see below for your test results.    Resulted Orders   HIV Ag/Ab Screen [facility] (Spazzles)   Result Value Ref Range    HIV Antigen/Antibody Negative Negative    Narrative    Test performed by:  [facility]  [address] [place] [address]  Method is Abbott HIV Ag/Ab for the detection of HIV p24 antigen, HIV-1   antibodies and HIV-2 antibodies.       If you have any questions, please call the clinic to make an appointment.    Sincerely,    Fely Pittman MD

## 2023-11-29 DIAGNOSIS — R59.9 ENLARGEMENT OF LYMPH NODE: ICD-10-CM

## 2023-11-29 DIAGNOSIS — R92.2 DENSE BREAST: ICD-10-CM

## 2023-11-29 DIAGNOSIS — R92.30 DENSE BREAST: ICD-10-CM

## 2023-12-05 ENCOUNTER — TELEPHONE (OUTPATIENT)
Dept: CARDIOLOGY CLINIC | Age: 58
End: 2023-12-05

## 2023-12-05 NOTE — TELEPHONE ENCOUNTER
Patient states that her bp has been elevated for a couple of months and every time she sees her pcp or goes to the hospital she is instructed to contact us regarding her bp, she is checking her bp at home:    148/91  131/94  140/92  136/92  128/94

## 2023-12-05 NOTE — TELEPHONE ENCOUNTER
Increase metoprolol to 25 mg twice a day. Giovani Buchanan D.O.   Cardiologist  Cardiology, [address] [facility]

## 2023-12-06 ENCOUNTER — PREP FOR PROCEDURE (OUTPATIENT)
Dept: SURGERY | Age: 58
End: 2023-12-06

## 2023-12-06 RX ORDER — SODIUM CHLORIDE 9 MG/ML
25 INJECTION, SOLUTION INTRAVENOUS PRN
Status: CANCELLED | OUTPATIENT
Start: 2023-12-06

## 2023-12-06 RX ORDER — SODIUM CHLORIDE 0.9 % (FLUSH) 0.9 %
5-40 SYRINGE (ML) INJECTION EVERY 12 HOURS SCHEDULED
Status: CANCELLED | OUTPATIENT
Start: 2023-12-06

## 2023-12-06 RX ORDER — SODIUM CHLORIDE 0.9 % (FLUSH) 0.9 %
5-40 SYRINGE (ML) INJECTION PRN
Status: CANCELLED | OUTPATIENT
Start: 2023-12-06

## 2023-12-06 RX ORDER — SODIUM CHLORIDE 9 MG/ML
INJECTION, SOLUTION INTRAVENOUS CONTINUOUS
Status: CANCELLED | OUTPATIENT
Start: 2023-12-06

## 2023-12-07 ENCOUNTER — ANESTHESIA EVENT (OUTPATIENT)
Dept: ENDOSCOPY | Age: 58
End: 2023-12-07
Payer: COMMERCIAL

## 2023-12-08 ENCOUNTER — HOSPITAL ENCOUNTER (OUTPATIENT)
Age: 58
Setting detail: OUTPATIENT SURGERY
Discharge: HOME | End: 2023-12-08
Attending: SURGERY | Admitting: SURGERY
Payer: COMMERCIAL

## 2023-12-08 ENCOUNTER — ANESTHESIA (OUTPATIENT)
Dept: ENDOSCOPY | Age: 58
End: 2023-12-08
Payer: COMMERCIAL

## 2023-12-08 VITALS
HEIGHT: 65 IN | OXYGEN SATURATION: 100 % | DIASTOLIC BLOOD PRESSURE: 81 MMHG | RESPIRATION RATE: 16 BRPM | WEIGHT: 230 LBS | HEART RATE: 67 BPM | BODY MASS INDEX: 38.32 KG/M2 | TEMPERATURE: 97 F | SYSTOLIC BLOOD PRESSURE: 147 MMHG

## 2023-12-08 DIAGNOSIS — R19.7 ACUTE DIARRHEA: ICD-10-CM

## 2023-12-08 DIAGNOSIS — K21.9 GASTROESOPHAGEAL REFLUX DISEASE, UNSPECIFIED WHETHER ESOPHAGITIS PRESENT: ICD-10-CM

## 2023-12-08 PROCEDURE — 7100000011 HC PHASE II RECOVERY - ADDTL 15 MIN: Performed by: SURGERY

## 2023-12-08 PROCEDURE — 3609010300 HC COLONOSCOPY W/BIOPSY SINGLE/MULTIPLE: Performed by: SURGERY

## 2023-12-08 PROCEDURE — 88305 TISSUE EXAM BY PATHOLOGIST: CPT

## 2023-12-08 PROCEDURE — 6360000002 HC RX W HCPCS: Performed by: NURSE ANESTHETIST, CERTIFIED REGISTERED

## 2023-12-08 PROCEDURE — 2580000003 HC RX 258: Performed by: SURGERY

## 2023-12-08 PROCEDURE — 6360000002 HC RX W HCPCS

## 2023-12-08 PROCEDURE — 2709999900 HC NON-CHARGEABLE SUPPLY: Performed by: SURGERY

## 2023-12-08 PROCEDURE — 3700000000 HC ANESTHESIA ATTENDED CARE: Performed by: SURGERY

## 2023-12-08 PROCEDURE — 3700000001 HC ADD 15 MINUTES (ANESTHESIA): Performed by: SURGERY

## 2023-12-08 PROCEDURE — 7100000010 HC PHASE II RECOVERY - FIRST 15 MIN: Performed by: SURGERY

## 2023-12-08 PROCEDURE — 3609012400 HC EGD TRANSORAL BIOPSY SINGLE/MULTIPLE: Performed by: SURGERY

## 2023-12-08 RX ORDER — SODIUM CHLORIDE 9 MG/ML
INJECTION, SOLUTION INTRAVENOUS CONTINUOUS
Status: DISCONTINUED | OUTPATIENT
Start: 2023-12-08 | End: 2023-12-08 | Stop reason: HOSPADM

## 2023-12-08 RX ORDER — SODIUM CHLORIDE 0.9 % (FLUSH) 0.9 %
5-40 SYRINGE (ML) INJECTION PRN
Status: DISCONTINUED | OUTPATIENT
Start: 2023-12-08 | End: 2023-12-08 | Stop reason: HOSPADM

## 2023-12-08 RX ORDER — SODIUM CHLORIDE 0.9 % (FLUSH) 0.9 %
5-40 SYRINGE (ML) INJECTION EVERY 12 HOURS SCHEDULED
Status: DISCONTINUED | OUTPATIENT
Start: 2023-12-08 | End: 2023-12-08 | Stop reason: HOSPADM

## 2023-12-08 RX ORDER — SODIUM CHLORIDE 9 MG/ML
25 INJECTION, SOLUTION INTRAVENOUS PRN
Status: DISCONTINUED | OUTPATIENT
Start: 2023-12-08 | End: 2023-12-08 | Stop reason: HOSPADM

## 2023-12-08 RX ORDER — MIDAZOLAM HYDROCHLORIDE 1 MG/ML
INJECTION INTRAMUSCULAR; INTRAVENOUS PRN
Status: DISCONTINUED | OUTPATIENT
Start: 2023-12-08 | End: 2023-12-08 | Stop reason: SDUPTHER

## 2023-12-08 RX ORDER — PROPOFOL 10 MG/ML
INJECTION, EMULSION INTRAVENOUS PRN
Status: DISCONTINUED | OUTPATIENT
Start: 2023-12-08 | End: 2023-12-08 | Stop reason: SDUPTHER

## 2023-12-08 RX ADMIN — SODIUM CHLORIDE: 9 INJECTION, SOLUTION INTRAVENOUS at 10:08

## 2023-12-08 RX ADMIN — MIDAZOLAM 1 MG: 1 INJECTION INTRAMUSCULAR; INTRAVENOUS at 10:35

## 2023-12-08 RX ADMIN — SODIUM CHLORIDE: 9 INJECTION, SOLUTION INTRAVENOUS at 10:28

## 2023-12-08 RX ADMIN — PROPOFOL 580 MG: 10 INJECTION, EMULSION INTRAVENOUS at 10:35

## 2023-12-08 ASSESSMENT — PAIN DESCRIPTION - DESCRIPTORS: DESCRIPTORS: SORE

## 2023-12-08 ASSESSMENT — ENCOUNTER SYMPTOMS: SHORTNESS OF BREATH: 1

## 2023-12-08 ASSESSMENT — PAIN SCALES - GENERAL
PAINLEVEL_OUTOF10: 0
PAINLEVEL_OUTOF10: 0

## 2023-12-08 ASSESSMENT — PAIN - FUNCTIONAL ASSESSMENT: PAIN_FUNCTIONAL_ASSESSMENT: 0-10

## 2023-12-08 NOTE — PROGRESS NOTES
Patient states she only took part of colonoscopy prep. Patient states she was only able to drink one bottle of magnesium citrate as she had an allergic reaction to it. Patient states she became really dizzy, nauseous, and got a headache. Patient states she called Dr. Lauren Reddy last night and notified him of the allergic reaction to the magnesium citrate and she states Dr. Lauren Reddy told her to take a benadryl. Patient states she took a benadryl and then went to bed. Patient states her bowel movements are liquid with a slight brown color to them. Endo notified and they are going to notify Dr. Lauren Reddy.   Electronically signed by Yoana Amado RN on 12/8/2023 at 10:00 AM

## 2023-12-08 NOTE — PROCEDURES
[address], [address]                                 PROCEDURE NOTE    PATIENT NAME: Alize Riggins                 :        1965  MED REC NO:   [id number]                            ROOM:  ACCOUNT NO:   [de-identified]                           ADMIT DATE: 2023  PROVIDER:     Natalia Cruz MD    DATE OF PROCEDURE:  2023    SURGEON:  Natalia Cruz MD    PROCEDURE:  Consisted of an upper endoscopy and colonoscopy with both  biopsies of antral area and of the cecal area. POSTPROCEDURE DIAGNOSES:  Gastritis, mild, and spastic colitis. BLOOD LOSS:  Negligible. ANESTHESIA:  Procedure was carried out under [facility]. DESCRIPTION OF THE PROCEDURE:  Oropharyngeal area was anesthetized with  Cetacaine spray. The endoscope was introduced under direct vision  through the three portions of the esophagus. GE junction was patent,  fairly competent. Within the stomach, there were mild inflammatory  changes in the antral region, mostly due to bile reflux. Endoscopy was  advanced through the pylorus into first, second and third portions of  the duodenum. Bile was abundant, and ampulla was well visualized. Endoscope was pulled back. Sites of biopsies in the antral area were  hemostatically adequate. Endoscope was retroflexed confirming adequacy  of GE junction _____. Endoscopy was terminated. The endoscope was  pulled back in the oropharyngeal area revealing good functioning vocal  cords. After which the patient was turned to the side. Rectal  examination was performed which revealed adequate anal tone. No masses  or rectal ampulla. Endoscope was then introduced and progressed without  difficulty. There was significant spasticity in the sigmoid area.    Descending, transverse and ascending colon did not exhibit any mucosal  pathology, possibly mild inflammatory changes around the

## 2023-12-08 NOTE — ANESTHESIA POSTPROCEDURE EVALUATION
Department of Anesthesiology  Postprocedure Note    Patient: Kelley Fuentes  MRN: [id number]  YOB: 1965  Date of evaluation: 12/8/2023      Procedure Summary       Date: 12/08/23 Room / Location: [facility] 01 / [facility]    Anesthesia Start: 0791 Anesthesia Stop: 1119    Procedures:       EGD BIOPSY      COLONOSCOPY WITH BIOPSY Diagnosis:       Acute diarrhea      Gastroesophageal reflux disease, unspecified whether esophagitis present      (Acute diarrhea [R19.7])      (Gastroesophageal reflux disease, unspecified whether esophagitis present [K21.9])    Surgeons: Arjun Emerson MD Responsible Provider: Lindalee Schaumann, MD    Anesthesia Type: MAC ASA Status: 4            Anesthesia Type: No value filed.     Elton Phase I: Elton Score: 10    Elton Phase II: Elton Score: 10      Anesthesia Post Evaluation    Patient location during evaluation: PACU  Patient participation: complete - patient participated  Level of consciousness: awake  Pain score: 3  Airway patency: patent  Nausea & Vomiting: no nausea and no vomiting  Complications: no  Cardiovascular status: blood pressure returned to baseline  Respiratory status: acceptable  Hydration status: euvolemic

## 2023-12-08 NOTE — BRIEF OP NOTE
Brief Postoperative Note      Patient: Pelon Pham  YOB: 1965  MRN: [id number]    Date of Procedure: 12/8/2023    Pre-Op Diagnosis Codes:     * Acute diarrhea [R19.7]     * Gastroesophageal reflux disease, unspecified whether esophagitis present [K21.9]    Post-Op Diagnosis: Same       Procedure(s):  EGD BIOPSY  COLONOSCOPY WITH BIOPSY    Surgeon(s):  Janny Skaggs MD    Assistant:  * No surgical staff found *    Anesthesia: Monitor Anesthesia Care    Estimated Blood Loss (mL): Minimal    Complications: None    Specimens:   ID Type Source Tests Collected by Time Destination   A : EGD STOMACH ANTRUM BIOPSY R/O H PYLORI Tissue Stomach SURGICAL PATHOLOGY Janny Skaggs MD 12/8/2023 1048    B : CECUM BIOPSY Tissue Colon SURGICAL PATHOLOGY Janny Skaggs MD 12/8/2023 1114        Implants:  * No implants in log *      Drains: * No LDAs found *    Findings: bile reflux gastritis antral biopsies and spastic colitis cecal biopsies      Electronically signed by Janny Skaggs MD on 12/8/2023 at 11:24 AM

## 2023-12-08 NOTE — DISCHARGE INSTR - DIET

## 2023-12-13 ENCOUNTER — TELEPHONE (OUTPATIENT)
Dept: FAMILY MEDICINE CLINIC | Age: 58
End: 2023-12-13

## 2023-12-13 LAB — SURGICAL PATHOLOGY REPORT: NORMAL

## 2023-12-13 NOTE — TELEPHONE ENCOUNTER
Patient called and stated that the CT scan that you ordered for her abdomen with french has been denied by her insurance and she wants to know if there is anything that you need to put different to make it go through

## 2024-01-03 ENCOUNTER — TELEPHONE (OUTPATIENT)
Dept: CARDIOLOGY CLINIC | Age: 59
End: 2024-01-03

## 2024-01-03 DIAGNOSIS — C73 THYROID CANCER (HCC): ICD-10-CM

## 2024-01-03 NOTE — TELEPHONE ENCOUNTER
Patient has been taking metoprolol 25mg bid since 12/5, she states it is making her very tired and her bp is still elevated, at pcp today bp was 132/92, she is monitoring bp at home but not sure if machine is accurate, please advise

## 2024-01-04 RX ORDER — LEVOTHYROXINE SODIUM 100 MCG
100 TABLET ORAL DAILY
Qty: 30 TABLET | Refills: 5 | OUTPATIENT
Start: 2024-01-04

## 2024-01-05 NOTE — TELEPHONE ENCOUNTER
Watch sodium, Log BP 3 times per week. Update us with BP's in 1 wk.     Abigail Villgeas D.O.  Cardiologist  Cardiology, [facility]

## 2024-01-11 DIAGNOSIS — C73 THYROID CANCER (HCC): ICD-10-CM

## 2024-01-12 DIAGNOSIS — C73 THYROID CANCER (HCC): ICD-10-CM

## 2024-01-12 DIAGNOSIS — C73 THYROID CANCER (HCC): Primary | ICD-10-CM

## 2024-01-12 RX ORDER — LEVOTHYROXINE SODIUM 100 MCG
100 TABLET ORAL DAILY
Qty: 30 TABLET | Refills: 0 | Status: SHIPPED | OUTPATIENT
Start: 2024-01-12

## 2024-01-12 RX ORDER — LEVOTHYROXINE SODIUM 100 MCG
100 TABLET ORAL DAILY
Qty: 30 TABLET | Refills: 5 | OUTPATIENT
Start: 2024-01-12

## 2024-01-18 ENCOUNTER — OFFICE VISIT (OUTPATIENT)
Dept: ENDOCRINOLOGY | Age: 59
End: 2024-01-18

## 2024-01-18 ENCOUNTER — FOLLOWUP TELEPHONE ENCOUNTER (OUTPATIENT)
Dept: ENDOCRINOLOGY | Age: 59
End: 2024-01-18

## 2024-01-18 VITALS
RESPIRATION RATE: 18 BRPM | BODY MASS INDEX: 40.8 KG/M2 | SYSTOLIC BLOOD PRESSURE: 127 MMHG | OXYGEN SATURATION: 97 % | HEIGHT: 64 IN | DIASTOLIC BLOOD PRESSURE: 81 MMHG | HEART RATE: 79 BPM | WEIGHT: 239 LBS

## 2024-01-18 DIAGNOSIS — E66.01 MORBID OBESITY (HCC): Primary | ICD-10-CM

## 2024-01-18 DIAGNOSIS — E03.9 HYPOTHYROIDISM (ACQUIRED): ICD-10-CM

## 2024-01-18 DIAGNOSIS — C73 THYROID CANCER (HCC): ICD-10-CM

## 2024-01-18 RX ORDER — TIRZEPATIDE 2.5 MG/.5ML
2.5 INJECTION, SOLUTION SUBCUTANEOUS WEEKLY
Qty: 2 ML | Refills: 5 | Status: SHIPPED | OUTPATIENT
Start: 2024-01-18

## 2024-01-18 NOTE — TELEPHONE ENCOUNTER
Met with patient in endo office for weight loss counseling. Patient reports weight gain following total thyroidectomy in 2017, difficulty losing weight since that time. Per recall, diet is very low calorie. Patient reports extreme fatigue, does not exercise for this reason. She is [place] and naturally follows a mediterranean style diet. Encouraged slowly increasing calorie intake with goal of 1051-1413 kcals/day. Explained need for increased physical activity to promote weight loss but to wait until her energy improves. Provided diet guidelines and food lists. Follow up scheduled for 2/22/24    SAMANTHA Guerrero

## 2024-01-18 NOTE — PROGRESS NOTES
I also ordered labs to further assess and manage the above addressed medical conditions.     Return in about 8 months (around 9/18/2024).    The above issues were reviewed with the patient who understood and agreed with the plan.    Thank you for allowing us to participate in the care of this patient. Please do not hesitate to contact us with any additional questions.    Diagnosis Orders   1. Morbid obesity (HCC)  Tirzepatide (MOUNJARO) 2.5 MG/0.5ML SOPN SC injection      2. Hypothyroidism (acquired)  TSH    T4, Free    Thyroglobulin      3. Thyroid cancer (HCC)  TSH    T4, Free    Thyroglobulin    US THYROID          Ismael Cordero MD  Endocrinologist, [address] [address]   Phone: [phone]  Fax: [phone]  ----------------------------  An electronic signature was used to authenticate this note. Ismael Cordero MD on 1/18/2024 at 9:40 AM

## 2024-01-22 ENCOUNTER — TELEPHONE (OUTPATIENT)
Dept: FAMILY MEDICINE CLINIC | Age: 59
End: 2024-01-22

## 2024-01-22 ENCOUNTER — TELEPHONE (OUTPATIENT)
Dept: CARDIOLOGY CLINIC | Age: 59
End: 2024-01-22

## 2024-01-22 RX ORDER — AMLODIPINE BESYLATE 5 MG/1
5 TABLET ORAL DAILY
Qty: 30 TABLET | Refills: 5 | Status: SHIPPED
Start: 2024-01-22 | End: 2024-01-23

## 2024-01-22 RX ORDER — AMLODIPINE BESYLATE 5 MG/1
5 TABLET ORAL DAILY
COMMUNITY
End: 2024-01-22 | Stop reason: SDUPTHER

## 2024-01-22 NOTE — TELEPHONE ENCOUNTER
Pt called in wondering if she could be seen when  comes in on 01/24/2024 at 2:30 regarding her high BP.    Please advise.

## 2024-01-22 NOTE — TELEPHONE ENCOUNTER
Patient states her bp is still elevated, she is taking metoprolol 25mg daily instead of BID due to fatigue, she takes meds with dinner and checks bp in the evening:     160/85  164/88  153/92  148/91  147/87  146/85

## 2024-01-23 ENCOUNTER — OFFICE VISIT (OUTPATIENT)
Dept: FAMILY MEDICINE CLINIC | Age: 59
End: 2024-01-23
Payer: COMMERCIAL

## 2024-01-23 VITALS
BODY MASS INDEX: 40.82 KG/M2 | WEIGHT: 239.1 LBS | TEMPERATURE: 96.8 F | HEIGHT: 64 IN | HEART RATE: 82 BPM | OXYGEN SATURATION: 97 % | SYSTOLIC BLOOD PRESSURE: 128 MMHG | RESPIRATION RATE: 16 BRPM | DIASTOLIC BLOOD PRESSURE: 83 MMHG

## 2024-01-23 DIAGNOSIS — I10 ESSENTIAL HYPERTENSION: Primary | ICD-10-CM

## 2024-01-23 PROCEDURE — G8484 FLU IMMUNIZE NO ADMIN: HCPCS | Performed by: FAMILY MEDICINE

## 2024-01-23 PROCEDURE — 3079F DIAST BP 80-89 MM HG: CPT | Performed by: FAMILY MEDICINE

## 2024-01-23 PROCEDURE — 3074F SYST BP LT 130 MM HG: CPT | Performed by: FAMILY MEDICINE

## 2024-01-23 PROCEDURE — 99213 OFFICE O/P EST LOW 20 MIN: CPT | Performed by: FAMILY MEDICINE

## 2024-01-23 PROCEDURE — 1036F TOBACCO NON-USER: CPT | Performed by: FAMILY MEDICINE

## 2024-01-23 PROCEDURE — G8427 DOCREV CUR MEDS BY ELIG CLIN: HCPCS | Performed by: FAMILY MEDICINE

## 2024-01-23 PROCEDURE — 3017F COLORECTAL CA SCREEN DOC REV: CPT | Performed by: FAMILY MEDICINE

## 2024-01-23 PROCEDURE — G8417 CALC BMI ABV UP PARAM F/U: HCPCS | Performed by: FAMILY MEDICINE

## 2024-01-23 RX ORDER — ENALAPRIL MALEATE 2.5 MG/1
2.5 TABLET ORAL DAILY
Qty: 30 TABLET | Refills: 3 | Status: SHIPPED | OUTPATIENT
Start: 2024-01-23

## 2024-01-23 ASSESSMENT — PATIENT HEALTH QUESTIONNAIRE - PHQ9
SUM OF ALL RESPONSES TO PHQ QUESTIONS 1-9: 0
2. FEELING DOWN, DEPRESSED OR HOPELESS: 0
1. LITTLE INTEREST OR PLEASURE IN DOING THINGS: 0
SUM OF ALL RESPONSES TO PHQ QUESTIONS 1-9: 0
SUM OF ALL RESPONSES TO PHQ9 QUESTIONS 1 & 2: 0
SUM OF ALL RESPONSES TO PHQ QUESTIONS 1-9: 0
SUM OF ALL RESPONSES TO PHQ QUESTIONS 1-9: 0

## 2024-01-23 NOTE — PROGRESS NOTES
Hypertension:  Patient is here for follow up chronic hypertension.  This is  generally controlled on current medication regimen. Takes meds as directed and tolerates them well.  Most recent labs reviewed with patient and are not remarkable.  No symptoms from htn standpoint per ROS.  Patient is  compliant with lifestyle modifications.  Patient does not smoke.  Comorbid conditions include obesity.  Pt states her bp has been running high and she has been having a HA almost everyday. Pt denies blurred vision.    Pt is fasting    HM reviewed.     Patient's past medical, surgical, social and/or family history reviewed, updated in chart, and are non-contributory (unless otherwise stated).  Medications and allergies also reviewed and updated in chart.        Review of Systems:  Constitutional:  No fever, no fatigue, no chills, no headaches, no weight change  Dermatology:  No rash, no mole, no dry or sensitive skin  ENT:  No cough, no sore throat, no sinus pain, no runny nose, no ear pain  Cardiology:  No chest pain, no palpitations, no leg edema, no shortness of breath, no PND  Gastroenterology:  No dysphagia, no abdominal pain, no nausea, no vomiting, no constipation, no diarrhea, no heartburn  Musculoskeletal:  No joint pain, no leg cramps, no back pain, no muscle aches  Respiratory:  No shortness of breath, no orthopnea, no wheezing, no ARAYA, no hemoptysis  Urology:  No blood in the urine, no urinary frequency, no urinary incontinence, no urinary urgency, no nocturia, no dysuria  Vitals:    01/23/24 1023   BP: 128/83   Pulse: 82   Resp: 16   Temp: 96.8 °F (36 °C)   TempSrc: Temporal   SpO2: 97%   Weight: 108.5 kg (239 lb 1.6 oz)   Height: 1.626 m (5' 4\")       General:  Patient alert and oriented x 3, NAD, pleasant  HEENT:  Atraumatic, normocephalic, PERRLA, EOMI, clear conjunctiva, TMs clear, nose-clear, throat - no erythema  Neck:  Supple, no goiter, no carotid bruits, no lymphadenopathy  Lungs:  CTA B  Heart:

## 2024-01-30 ENCOUNTER — TELEPHONE (OUTPATIENT)
Dept: CARDIOLOGY CLINIC | Age: 59
End: 2024-01-30

## 2024-01-30 ENCOUNTER — TELEPHONE (OUTPATIENT)
Dept: ADMINISTRATIVE | Age: 59
End: 2024-01-30

## 2024-01-30 NOTE — TELEPHONE ENCOUNTER
Try hydralazine 25 mg twice a day.    Abigail Villegas D.O.  Cardiologist  Cardiology, [facility]

## 2024-01-30 NOTE — TELEPHONE ENCOUNTER
Pt called, BP is running high since medication given to her last week; Dr Villegas pt; unable to reach office staff due to patient care.  LVM sending PE.  Giles

## 2024-01-30 NOTE — TELEPHONE ENCOUNTER
Patient was prescribed Norvasc 5mg daily on 1/22, she called today stating she took for 3days but it gave her severe headaches and body aches, her bp remained elevated so she stopped it, she states systolic bp is 140-150, please advise

## 2024-01-31 RX ORDER — HYDRALAZINE HYDROCHLORIDE 25 MG/1
25 TABLET, FILM COATED ORAL 2 TIMES DAILY
Qty: 60 TABLET | Refills: 5 | Status: SHIPPED | OUTPATIENT
Start: 2024-01-31

## 2024-01-31 RX ORDER — HYDRALAZINE HYDROCHLORIDE 25 MG/1
25 TABLET, FILM COATED ORAL 2 TIMES DAILY
COMMUNITY
End: 2024-01-31 | Stop reason: SDUPTHER

## 2024-02-02 RX ORDER — FAMOTIDINE 20 MG/1
20 TABLET, FILM COATED ORAL 2 TIMES DAILY
Qty: 60 TABLET | Refills: 2 | Status: SHIPPED | OUTPATIENT
Start: 2024-02-02

## 2024-02-06 NOTE — PROGRESS NOTES
exam is unremarkable in office today.  EKG with no significant changes.      PAST MEDICAL HISTORY:  SVT/palpitations:  S/p SVT ablation >20 years ago by Dr. Palladino  2 heart monitors early 2019 unremarkable and showed NSR with symptoms  ILR: DOI 12/2019> explanted 3/1/2022: No significant arrhythmias.  Symptoms correlating with SR  Hypertension>> has been unable to tolerate twice daily metoprolol dosing, enalapril, Norvasc, and hydralazine  Hypothyroidism on HRT and follows with Dr. Cordero  BPPV  OLU  Thyroid cancer s/p complete thyroidectomy>>Goal to keep her TSH between 0.3-2.0 per endocrinology who she follows  NAFLD on CT  Biliary colic  Anxiety/depression  Lumbar stenosis  Morbid obesity BMI 41    Cardiac testing:  TTE 2/2014: EF 65%.  Normal diastolic filling pattern.  Normal RV size and function.  Trace TR.  Trace MR.  TTE 7/2016: EF 65%.  Borderline concentric LVH.  Normal diastolic filling pattern.  Normal RV size and function.  Trace TR.  PASP 32 mmHg.  NM stress 11/2016: No evidence of at risk stress-induced ischemia.  EF 70%.  NM stress 2/2021: Myocardial perfusion imaging normal.  EF 58%.  NWMA.  Low risk.  TTE 8/1/2023: EF 60%.  NWMA.  Mild concentric LVH.  Normal diastolic filling pattern.  Normal RV size and function.  Trace MR.  Trace TR RVSP 25 mmHg.  Exercise only stress 8/1/2023: 6.9 METS.  92% MPHR.  Baseline /90, peak exercise /70.  Exercise terminated due to dyspnea and heart rate attained.  No chest pain with exercise.  No ST segment changes or significance of heart rate during exercise.  Alfaro treadmill score 4.5 implying intermediate risk.  Exercise EKG negative.  Exercise capacity average.  Overall intermediate risk general exercise treadmill stress.      Problem List  Patient Active Problem List    Diagnosis Date Noted    Acute bacterial sinusitis 04/07/2022    Status post placement of implantable loop recorder     Hypothyroidism     Thyroid disease      PTC microcarcinoma

## 2024-02-07 ENCOUNTER — TELEPHONE (OUTPATIENT)
Dept: CARDIOLOGY CLINIC | Age: 59
End: 2024-02-07

## 2024-02-07 ENCOUNTER — OFFICE VISIT (OUTPATIENT)
Dept: CARDIOLOGY CLINIC | Age: 59
End: 2024-02-07
Payer: COMMERCIAL

## 2024-02-07 VITALS
WEIGHT: 241.6 LBS | SYSTOLIC BLOOD PRESSURE: 132 MMHG | RESPIRATION RATE: 18 BRPM | HEART RATE: 66 BPM | BODY MASS INDEX: 41.25 KG/M2 | HEIGHT: 64 IN | DIASTOLIC BLOOD PRESSURE: 60 MMHG

## 2024-02-07 DIAGNOSIS — I10 ESSENTIAL HYPERTENSION: Primary | ICD-10-CM

## 2024-02-07 PROCEDURE — G8427 DOCREV CUR MEDS BY ELIG CLIN: HCPCS

## 2024-02-07 PROCEDURE — G8417 CALC BMI ABV UP PARAM F/U: HCPCS

## 2024-02-07 PROCEDURE — 93000 ELECTROCARDIOGRAM COMPLETE: CPT | Performed by: INTERNAL MEDICINE

## 2024-02-07 PROCEDURE — 99213 OFFICE O/P EST LOW 20 MIN: CPT

## 2024-02-07 PROCEDURE — 3017F COLORECTAL CA SCREEN DOC REV: CPT

## 2024-02-07 PROCEDURE — 1036F TOBACCO NON-USER: CPT

## 2024-02-07 PROCEDURE — 3075F SYST BP GE 130 - 139MM HG: CPT

## 2024-02-07 PROCEDURE — 3078F DIAST BP <80 MM HG: CPT

## 2024-02-07 PROCEDURE — G8484 FLU IMMUNIZE NO ADMIN: HCPCS

## 2024-02-07 RX ORDER — HYDROCHLOROTHIAZIDE 25 MG/1
6.25 TABLET ORAL EVERY MORNING
Qty: 90 TABLET | Refills: 1 | Status: SHIPPED | OUTPATIENT
Start: 2024-02-07

## 2024-02-07 NOTE — PATIENT INSTRUCTIONS
Continue metoprolol.    Start HCTZ 6.25 mg daily.     Continue to monitor BP morning and evening.    Continue to avoid salt and keep up with hydration.     Follow up with Dr Villegas in 6 months.

## 2024-02-07 NOTE — TELEPHONE ENCOUNTER
----- Message from Abigail Villegas DO sent at 2/7/2024 10:15 AM EST -----  Please notify patient that their EKGresults are okay.

## 2024-02-12 DIAGNOSIS — C73 THYROID CANCER (HCC): ICD-10-CM

## 2024-02-12 RX ORDER — LEVOTHYROXINE SODIUM 100 MCG
100 TABLET ORAL DAILY
Qty: 30 TABLET | Refills: 3 | Status: SHIPPED | OUTPATIENT
Start: 2024-02-12

## 2024-02-13 ENCOUNTER — OFFICE VISIT (OUTPATIENT)
Dept: FAMILY MEDICINE CLINIC | Age: 59
End: 2024-02-13
Payer: COMMERCIAL

## 2024-02-13 VITALS
HEART RATE: 75 BPM | HEIGHT: 64 IN | BODY MASS INDEX: 41.35 KG/M2 | SYSTOLIC BLOOD PRESSURE: 138 MMHG | DIASTOLIC BLOOD PRESSURE: 104 MMHG | OXYGEN SATURATION: 98 % | RESPIRATION RATE: 16 BRPM | TEMPERATURE: 97.7 F | WEIGHT: 242.2 LBS

## 2024-02-13 DIAGNOSIS — T88.7XXA MEDICATION SIDE EFFECT: ICD-10-CM

## 2024-02-13 DIAGNOSIS — I10 ESSENTIAL HYPERTENSION: Primary | ICD-10-CM

## 2024-02-13 PROCEDURE — 3017F COLORECTAL CA SCREEN DOC REV: CPT | Performed by: FAMILY MEDICINE

## 2024-02-13 PROCEDURE — 3080F DIAST BP >= 90 MM HG: CPT | Performed by: FAMILY MEDICINE

## 2024-02-13 PROCEDURE — 3075F SYST BP GE 130 - 139MM HG: CPT | Performed by: FAMILY MEDICINE

## 2024-02-13 PROCEDURE — G8484 FLU IMMUNIZE NO ADMIN: HCPCS | Performed by: FAMILY MEDICINE

## 2024-02-13 PROCEDURE — 99213 OFFICE O/P EST LOW 20 MIN: CPT | Performed by: FAMILY MEDICINE

## 2024-02-13 PROCEDURE — 1036F TOBACCO NON-USER: CPT | Performed by: FAMILY MEDICINE

## 2024-02-13 PROCEDURE — G8417 CALC BMI ABV UP PARAM F/U: HCPCS | Performed by: FAMILY MEDICINE

## 2024-02-13 PROCEDURE — G8427 DOCREV CUR MEDS BY ELIG CLIN: HCPCS | Performed by: FAMILY MEDICINE

## 2024-02-13 NOTE — PROGRESS NOTES
Hypertension:  Patient is here for follow up chronic hypertension.  This is not generally controlled on current medication regimen. Takes meds as directed and tolerates them well.  Most recent labs reviewed with patient and are remarkable.  No symptoms from htn standpoint per ROS.  Patient is not compliant with lifestyle modifications.  Patient does not smoke.  Pt is fasting    HM reviewed    Patient's past medical, surgical, social and/or family history reviewed, updated in chart, and are non-contributory (unless otherwise stated).  Medications and allergies also reviewed and updated in chart.        Review of Systems:  Constitutional:  No fever, no fatigue, no chills, no headaches, no weight change  Dermatology:  No rash, no mole, no dry or sensitive skin  ENT:  No cough, no sore throat, no sinus pain, no runny nose, no ear pain  Cardiology:  No chest pain, no palpitations, no leg edema, no shortness of breath, no PND  Gastroenterology:  No dysphagia, no abdominal pain, no nausea, no vomiting, no constipation, no diarrhea, no heartburn  Musculoskeletal:  No joint pain, no leg cramps, no back pain, no muscle aches  Respiratory:  No shortness of breath, no orthopnea, no wheezing, no ARAYA, no hemoptysis  Urology:  No blood in the urine, no urinary frequency, no urinary incontinence, no urinary urgency, no nocturia, no dysuria  Vitals:    02/13/24 1035 02/13/24 1039   BP: (!) 138/104 (!) 138/104   Pulse: 75    Resp: 16    Temp: 97.7 °F (36.5 °C)    TempSrc: Temporal    SpO2: 98%    Weight: 109.9 kg (242 lb 3.2 oz)    Height: 1.626 m (5' 4\")        General:  Patient alert and oriented x 3, NAD, pleasant  HEENT:  Atraumatic, normocephalic, PERRLA, EOMI, clear conjunctiva, TMs clear, nose-clear, throat - no erythema  Neck:  Supple, no goiter, no carotid bruits, no lymphadenopathy  Lungs:  CTA B  Heart:  RRR, no murmurs, gallops or rubs  Abdomen:  Soft/nt/nd, + bowel sounds  Extremities:  No clubbing, cyanosis or

## 2024-02-28 ENCOUNTER — TELEPHONE (OUTPATIENT)
Dept: CARDIOLOGY CLINIC | Age: 59
End: 2024-02-28

## 2024-02-28 RX ORDER — NIFEDIPINE 30 MG/1
30 TABLET, EXTENDED RELEASE ORAL DAILY
Qty: 30 TABLET | Refills: 3 | Status: SHIPPED | OUTPATIENT
Start: 2024-02-28

## 2024-02-28 NOTE — TELEPHONE ENCOUNTER
That is the lowest dose.    Abigail Villegas D.O.  Cardiologist  Cardiology, [facility]

## 2024-02-28 NOTE — TELEPHONE ENCOUNTER
Start procardia 30 mg daily. Update us in 2 weeks if BP is still high.    Abigail Villegas D.O.  Cardiologist  Cardiology, [facility]

## 2024-02-28 NOTE — TELEPHONE ENCOUNTER
Patient states that she had to d/c HCTZ last week because it caused her back pain,nausea and diarrhea, her bp was 156/92 last night, please advise

## 2024-02-28 NOTE — TELEPHONE ENCOUNTER
Patient states that she is becoming very sensitive to meds and would like to start the lowest dose possible

## 2024-04-03 ENCOUNTER — TELEPHONE (OUTPATIENT)
Dept: FAMILY MEDICINE CLINIC | Age: 59
End: 2024-04-03

## 2024-04-03 RX ORDER — METRONIDAZOLE 250 MG/1
250 TABLET ORAL 3 TIMES DAILY
Qty: 30 TABLET | Refills: 0 | Status: SHIPPED | OUTPATIENT
Start: 2024-04-03 | End: 2024-04-13

## 2024-04-03 NOTE — TELEPHONE ENCOUNTER
Pt called in stating that she is having diarrhea and bowel pain. States that she has had a \"bowel infection \" in the past.  Advised pt that if anything worsens or there is blood in stool to report to the ER.

## 2024-06-03 DIAGNOSIS — C73 THYROID CANCER (HCC): ICD-10-CM

## 2024-06-03 DIAGNOSIS — E03.9 HYPOTHYROIDISM (ACQUIRED): ICD-10-CM

## 2024-06-03 LAB
T4 FREE: 1.5 NG/DL (ref 0.9–1.7)
TSH SERPL DL<=0.05 MIU/L-ACNC: 1.58 UIU/ML (ref 0.27–4.2)

## 2024-06-04 ENCOUNTER — TELEPHONE (OUTPATIENT)
Dept: ENDOCRINOLOGY | Age: 59
End: 2024-06-04

## 2024-06-05 DIAGNOSIS — C73 THYROID CANCER (HCC): ICD-10-CM

## 2024-06-05 LAB
THYROGLOBULIN AB: <0.9 IU/ML (ref 0–4)
THYROGLOBULIN BY LC-MS/MS, SERUM/PLASMA: ABNORMAL NG/ML (ref 1.3–31.8)
THYROGLOBULIN: <0.1 NG/ML (ref 1.3–31.8)

## 2024-06-05 RX ORDER — LEVOTHYROXINE SODIUM 100 MCG
100 TABLET ORAL DAILY
Qty: 30 TABLET | Refills: 3 | Status: SHIPPED | OUTPATIENT
Start: 2024-06-05

## 2024-06-06 NOTE — TELEPHONE ENCOUNTER
----- Message from Sherry Villarreal sent at 6/6/2024  2:53 PM CDT -----  Type:  Patient Returning Call    Who Called:     Who Left Message for Patient:     Does the patient know what this is regarding?: missed call     Best Call Back Number:   [phone]    Additional Information:   Patient notifed on results and states understanding.

## 2024-06-14 ENCOUNTER — TELEPHONE (OUTPATIENT)
Dept: ENDOCRINOLOGY | Age: 59
End: 2024-06-14

## 2024-06-14 NOTE — TELEPHONE ENCOUNTER
----- Message from BEATRICE Platt - CNS sent at 6/6/2024 11:45 AM EDT -----  Please call pt and inform her that tumor markers are low.  This is an excellent result.  Will continue to obesrve

## 2024-06-18 ENCOUNTER — TELEPHONE (OUTPATIENT)
Dept: FAMILY MEDICINE CLINIC | Age: 59
End: 2024-06-18

## 2024-06-18 RX ORDER — AMOXICILLIN 500 MG/1
2000 TABLET, FILM COATED ORAL ONCE
Qty: 4 TABLET | Refills: 1 | Status: SHIPPED | OUTPATIENT
Start: 2024-06-18 | End: 2024-06-18

## 2024-06-18 NOTE — TELEPHONE ENCOUNTER
Mago called and said that she is having a dental procedure tomorrow and needs an antibiotic (ampicillin) called in today so she can be ready for her procedure- tomorrow at 12:00

## 2024-06-19 RX ORDER — AMOXICILLIN 500 MG/1
CAPSULE ORAL
Qty: 4 CAPSULE | Refills: 1 | OUTPATIENT
Start: 2024-06-19

## 2024-06-20 RX ORDER — NIFEDIPINE 30 MG/1
30 TABLET, EXTENDED RELEASE ORAL DAILY
Qty: 30 TABLET | Refills: 5 | Status: SHIPPED | OUTPATIENT
Start: 2024-06-20

## 2024-06-26 ENCOUNTER — TELEPHONE (OUTPATIENT)
Dept: NON INVASIVE DIAGNOSTICS | Age: 59
End: 2024-06-26

## 2024-06-26 NOTE — TELEPHONE ENCOUNTER
I returned patient's call. She was questioning if she had a pacemaker implanted in her chest. I informed her the report page states pacemaker interrogation-that is a universal heading. She had an implantable loop recorder. She asked about AF episodes. I told her part of the interrogation is to look for AF. I briefly looked through the reports and did not see any AF episodes. She will call Dr. Peralta coordinator for a letter for her insurance.    Mariana Montoya RN, BSN  [facility] [place]   [facility]

## 2024-06-26 NOTE — TELEPHONE ENCOUNTER
Can we provide the patient with a letter stating that she was never diagnosed with a-fib, please advise

## 2024-06-27 ENCOUNTER — TELEPHONE (OUTPATIENT)
Dept: FAMILY MEDICINE CLINIC | Age: 59
End: 2024-06-27

## 2024-06-27 RX ORDER — AMOXICILLIN 500 MG/1
2000 TABLET, FILM COATED ORAL ONCE
Qty: 4 TABLET | Refills: 1 | Status: CANCELLED | OUTPATIENT
Start: 2024-06-27 | End: 2024-06-27

## 2024-06-27 NOTE — TELEPHONE ENCOUNTER
Mago needs 500mg of Amoxicillin sent to [facility] for her dental procedure she is having tomorrow

## 2024-06-28 NOTE — TELEPHONE ENCOUNTER
Okay to give letter. She had SVT, which was treated with ablation in past. She has not had afib to this point.     Abigail Villegas D.O.  Cardiologist  Cardiology, [facility]

## 2024-07-01 RX ORDER — AMOXICILLIN 500 MG/1
2000 TABLET, FILM COATED ORAL ONCE
Qty: 4 TABLET | Refills: 1 | Status: SHIPPED | OUTPATIENT
Start: 2024-07-01 | End: 2024-07-01

## 2024-07-03 RX ORDER — AMOXICILLIN 500 MG/1
CAPSULE ORAL
Qty: 4 CAPSULE | Refills: 1 | OUTPATIENT
Start: 2024-07-03

## 2024-07-24 ENCOUNTER — HOSPITAL ENCOUNTER (EMERGENCY)
Age: 59
Discharge: HOME | End: 2024-07-24
Attending: STUDENT IN AN ORGANIZED HEALTH CARE EDUCATION/TRAINING PROGRAM
Payer: COMMERCIAL

## 2024-07-24 ENCOUNTER — APPOINTMENT (OUTPATIENT)
Dept: GENERAL RADIOLOGY | Age: 59
End: 2024-07-24
Payer: COMMERCIAL

## 2024-07-24 VITALS
RESPIRATION RATE: 16 BRPM | HEART RATE: 69 BPM | SYSTOLIC BLOOD PRESSURE: 152 MMHG | DIASTOLIC BLOOD PRESSURE: 84 MMHG | HEIGHT: 66 IN | OXYGEN SATURATION: 100 % | BODY MASS INDEX: 38.57 KG/M2 | WEIGHT: 240 LBS | TEMPERATURE: 97.8 F

## 2024-07-24 DIAGNOSIS — R07.9 CHEST PAIN, UNSPECIFIED TYPE: Primary | ICD-10-CM

## 2024-07-24 LAB
ALBUMIN SERPL-MCNC: 4.4 G/DL (ref 3.5–5.2)
ALP SERPL-CCNC: 83 U/L (ref 35–104)
ALT SERPL-CCNC: 18 U/L (ref 0–32)
ANION GAP SERPL CALCULATED.3IONS-SCNC: 12 MMOL/L (ref 7–16)
AST SERPL-CCNC: 17 U/L (ref 0–31)
BASOPHILS # BLD: 0.06 K/UL (ref 0–0.2)
BASOPHILS NFR BLD: 1 % (ref 0–2)
BILIRUB SERPL-MCNC: 0.4 MG/DL (ref 0–1.2)
BNP SERPL-MCNC: <36 PG/ML (ref 0–125)
BUN SERPL-MCNC: 22 MG/DL (ref 6–20)
CALCIUM SERPL-MCNC: 9.3 MG/DL (ref 8.6–10.2)
CHLORIDE SERPL-SCNC: 102 MMOL/L (ref 98–107)
CO2 SERPL-SCNC: 24 MMOL/L (ref 22–29)
CREAT SERPL-MCNC: 0.9 MG/DL (ref 0.5–1)
D-DIMER QUANTITATIVE: <200 NG/ML DDU (ref 0–230)
EOSINOPHIL # BLD: 0.14 K/UL (ref 0.05–0.5)
EOSINOPHILS RELATIVE PERCENT: 2 % (ref 0–6)
ERYTHROCYTE [DISTWIDTH] IN BLOOD BY AUTOMATED COUNT: 12.4 % (ref 11.5–15)
GFR, ESTIMATED: 77 ML/MIN/1.73M2
GLUCOSE SERPL-MCNC: 104 MG/DL (ref 74–99)
HCT VFR BLD AUTO: 40.5 % (ref 34–48)
HGB BLD-MCNC: 13.2 G/DL (ref 11.5–15.5)
IMM GRANULOCYTES # BLD AUTO: 0.03 K/UL (ref 0–0.58)
IMM GRANULOCYTES NFR BLD: 0 % (ref 0–5)
LACTATE BLDV-SCNC: 1.5 MMOL/L (ref 0.5–2.2)
LIPASE SERPL-CCNC: 28 U/L (ref 13–60)
LYMPHOCYTES NFR BLD: 2.13 K/UL (ref 1.5–4)
LYMPHOCYTES RELATIVE PERCENT: 27 % (ref 20–42)
MAGNESIUM SERPL-MCNC: 2 MG/DL (ref 1.6–2.6)
MCH RBC QN AUTO: 29 PG (ref 26–35)
MCHC RBC AUTO-ENTMCNC: 32.6 G/DL (ref 32–34.5)
MCV RBC AUTO: 89 FL (ref 80–99.9)
MONOCYTES NFR BLD: 0.61 K/UL (ref 0.1–0.95)
MONOCYTES NFR BLD: 8 % (ref 2–12)
NEUTROPHILS NFR BLD: 62 % (ref 43–80)
NEUTS SEG NFR BLD: 4.89 K/UL (ref 1.8–7.3)
PLATELET # BLD AUTO: 326 K/UL (ref 130–450)
PMV BLD AUTO: 9.2 FL (ref 7–12)
POTASSIUM SERPL-SCNC: 4.4 MMOL/L (ref 3.5–5)
PROT SERPL-MCNC: 7.6 G/DL (ref 6.4–8.3)
RBC # BLD AUTO: 4.55 M/UL (ref 3.5–5.5)
SODIUM SERPL-SCNC: 138 MMOL/L (ref 132–146)
TROPONIN I SERPL HS-MCNC: <6 NG/L (ref 0–9)
WBC OTHER # BLD: 7.9 K/UL (ref 4.5–11.5)

## 2024-07-24 PROCEDURE — 93005 ELECTROCARDIOGRAM TRACING: CPT | Performed by: STUDENT IN AN ORGANIZED HEALTH CARE EDUCATION/TRAINING PROGRAM

## 2024-07-24 PROCEDURE — 83735 ASSAY OF MAGNESIUM: CPT

## 2024-07-24 PROCEDURE — 99284 EMERGENCY DEPT VISIT MOD MDM: CPT

## 2024-07-24 PROCEDURE — 85025 COMPLETE CBC W/AUTO DIFF WBC: CPT

## 2024-07-24 PROCEDURE — 83690 ASSAY OF LIPASE: CPT

## 2024-07-24 PROCEDURE — 6370000000 HC RX 637 (ALT 250 FOR IP): Performed by: STUDENT IN AN ORGANIZED HEALTH CARE EDUCATION/TRAINING PROGRAM

## 2024-07-24 PROCEDURE — 83880 ASSAY OF NATRIURETIC PEPTIDE: CPT

## 2024-07-24 PROCEDURE — 83605 ASSAY OF LACTIC ACID: CPT

## 2024-07-24 PROCEDURE — 87086 URINE CULTURE/COLONY COUNT: CPT

## 2024-07-24 PROCEDURE — 85379 FIBRIN DEGRADATION QUANT: CPT

## 2024-07-24 PROCEDURE — 80053 COMPREHEN METABOLIC PANEL: CPT

## 2024-07-24 PROCEDURE — 84484 ASSAY OF TROPONIN QUANT: CPT

## 2024-07-24 RX ORDER — PANTOPRAZOLE SODIUM 40 MG/1
40 TABLET, DELAYED RELEASE ORAL
Qty: 14 TABLET | Refills: 0 | Status: SHIPPED | OUTPATIENT
Start: 2024-07-24 | End: 2024-08-07

## 2024-07-24 RX ORDER — ASPIRIN 81 MG/1
324 TABLET, CHEWABLE ORAL ONCE
Status: COMPLETED | OUTPATIENT
Start: 2024-07-24 | End: 2024-07-24

## 2024-07-24 RX ADMIN — ALUMINUM HYDROXIDE, MAGNESIUM HYDROXIDE, AND SIMETHICONE: 1200; 120; 1200 SUSPENSION ORAL at 19:11

## 2024-07-24 RX ADMIN — ASPIRIN 81 MG CHEWABLE TABLET 324 MG: 81 TABLET CHEWABLE at 19:10

## 2024-07-24 ASSESSMENT — ENCOUNTER SYMPTOMS
CHEST TIGHTNESS: 1
COUGH: 0
BLOOD IN STOOL: 0
ABDOMINAL PAIN: 1
CONSTIPATION: 0
BACK PAIN: 1
VOMITING: 0
DIARRHEA: 1
SHORTNESS OF BREATH: 0
NAUSEA: 0

## 2024-07-24 NOTE — ED PROVIDER NOTES
Never   Vaping Use    Vaping Use: Never used   Substance Use Topics    Alcohol use: No    Drug use: No       SCREENINGS        [place] Coma Scale  Eye Opening: Spontaneous  Best Verbal Response: Oriented  Best Motor Response: Obeys commands  Kd Coma Scale Score: 15                CIWA Assessment  BP: (!) 152/84  Pulse: 69           PHYSICAL EXAM  1 or more Elements     Physical Exam  Vitals and nursing note reviewed.   Constitutional:       General: She is not in acute distress.     Appearance: She is not toxic-appearing.   HENT:      Head: Normocephalic and atraumatic.      Right Ear: External ear normal.      Left Ear: External ear normal.      Nose: Nose normal. No rhinorrhea.      Mouth/Throat:      Mouth: Mucous membranes are moist.      Pharynx: Oropharynx is clear.   Eyes:      Extraocular Movements: Extraocular movements intact.      Conjunctiva/sclera: Conjunctivae normal.      Pupils: Pupils are equal, round, and reactive to light.   Cardiovascular:      Rate and Rhythm: Normal rate and regular rhythm.      Pulses: Normal pulses.      Heart sounds: Normal heart sounds.   Pulmonary:      Effort: Pulmonary effort is normal. No respiratory distress.      Breath sounds: Normal breath sounds. No wheezing or rales.   Abdominal:      General: There is no distension.      Palpations: Abdomen is soft.      Tenderness: There is no abdominal tenderness. There is no right CVA tenderness, left CVA tenderness, guarding or rebound.   Musculoskeletal:         General: No tenderness. Normal range of motion.      Cervical back: Normal range of motion and neck supple.      Right lower leg: No edema.      Left lower leg: No edema.   Skin:     General: Skin is warm and dry.      Capillary Refill: Capillary refill takes less than 2 seconds.      Coloration: Skin is not jaundiced or pale.   Neurological:      General: No focal deficit present.      Mental Status: She is alert and oriented to person, place, and time.

## 2024-07-25 LAB
EKG ATRIAL RATE: 90 BPM
EKG P AXIS: 39 DEGREES
EKG P-R INTERVAL: 176 MS
EKG Q-T INTERVAL: 374 MS
EKG QRS DURATION: 72 MS
EKG QTC CALCULATION (BAZETT): 457 MS
EKG R AXIS: 14 DEGREES
EKG T AXIS: 77 DEGREES
EKG VENTRICULAR RATE: 90 BPM

## 2024-07-25 PROCEDURE — 93010 ELECTROCARDIOGRAM REPORT: CPT | Performed by: INTERNAL MEDICINE

## 2024-07-25 NOTE — DISCHARGE INSTR - COC
Virus Vaccine 10/03/2019       Active Problems:  Patient Active Problem List   Diagnosis Code    BPPV (benign paroxysmal positional vertigo) H81.10    SVT (supraventricular tachycardia) (HCC) I47.10    Anxiety F41.9    Dyspnea on exertion R06.09    Intermittent palpitations R00.2    Essential hypertension I10    Morbid obesity due to excess calories (HCC) E66.01    Palpitations R00.2    Chest pain R07.9    Thyroid cancer (HCC) C73    Weight gain R63.5    Dizziness R42    Biliary colic K80.50    Obesity with body mass index (BMI) of 30.0 to 39.9 E66.9    Spinal stenosis, lumbar M48.061    NAFLD (nonalcoholic fatty liver disease) K76.0    Abdominal pain R10.9    Hypothyroidism E03.9    Thyroid disease E07.9    Status post placement of implantable loop recorder Z95.818    Acute bacterial sinusitis J01.90, B96.89       Isolation/Infection:   Isolation            No Isolation          Patient Infection Status       None to display                     Nurse Assessment:  Last Vital Signs: BP (!) 152/84   Pulse 69   Temp 97.8 °F (36.6 °C) (Temporal)   Resp 16   Ht 1.676 m (5' 6\")   Wt 108.9 kg (240 lb)   LMP 01/01/2013   SpO2 100%   BMI 38.74 kg/m²     Last documented pain score (0-10 scale): Pain Level: 6  Last Weight:   Wt Readings from Last 1 Encounters:   07/24/24 108.9 kg (240 lb)     Mental Status:  {IP PT MENTAL STATUS:83428}    IV Access:  { MANDY IV ACCESS:940008670}    Nursing Mobility/ADLs:  Walking   {CHP DME ADLs:267321924}  Transfer  {CHP DME ADLs:170128974}  Bathing  {CHP DME ADLs:646335319}  Dressing  {CHP DME ADLs:606033949}  Toileting  {CHP DME ADLs:773807468}  Feeding  {CHP DME ADLs:308511413}  Med Admin  {CHP DME ADLs:020323635}  Med Delivery   { MANDY MED Delivery:415822864}    Wound Care Documentation and Therapy:        Elimination:  Continence:   Bowel: {YES / NO:19727}  Bladder: {YES / NO:19727}  Urinary Catheter: {Urinary Catheter:814231570}   Colostomy/Ileostomy/Ileal Conduit: {YES /

## 2024-07-26 LAB
MICROORGANISM SPEC CULT: ABNORMAL
MICROORGANISM SPEC CULT: ABNORMAL
SERVICE CMNT-IMP: ABNORMAL
SPECIMEN DESCRIPTION: ABNORMAL

## 2024-08-12 ENCOUNTER — OFFICE VISIT (OUTPATIENT)
Dept: CARDIOLOGY CLINIC | Age: 59
End: 2024-08-12
Payer: COMMERCIAL

## 2024-08-12 VITALS
WEIGHT: 245 LBS | OXYGEN SATURATION: 96 % | DIASTOLIC BLOOD PRESSURE: 72 MMHG | HEART RATE: 75 BPM | SYSTOLIC BLOOD PRESSURE: 140 MMHG | BODY MASS INDEX: 39.37 KG/M2 | HEIGHT: 66 IN | RESPIRATION RATE: 16 BRPM

## 2024-08-12 DIAGNOSIS — I47.10 SVT (SUPRAVENTRICULAR TACHYCARDIA) (HCC): Primary | ICD-10-CM

## 2024-08-12 PROCEDURE — 3017F COLORECTAL CA SCREEN DOC REV: CPT | Performed by: INTERNAL MEDICINE

## 2024-08-12 PROCEDURE — 99214 OFFICE O/P EST MOD 30 MIN: CPT | Performed by: INTERNAL MEDICINE

## 2024-08-12 PROCEDURE — 93000 ELECTROCARDIOGRAM COMPLETE: CPT | Performed by: INTERNAL MEDICINE

## 2024-08-12 PROCEDURE — 3077F SYST BP >= 140 MM HG: CPT | Performed by: INTERNAL MEDICINE

## 2024-08-12 PROCEDURE — 3078F DIAST BP <80 MM HG: CPT | Performed by: INTERNAL MEDICINE

## 2024-08-12 PROCEDURE — G8417 CALC BMI ABV UP PARAM F/U: HCPCS | Performed by: INTERNAL MEDICINE

## 2024-08-12 PROCEDURE — G8427 DOCREV CUR MEDS BY ELIG CLIN: HCPCS | Performed by: INTERNAL MEDICINE

## 2024-08-12 PROCEDURE — 1036F TOBACCO NON-USER: CPT | Performed by: INTERNAL MEDICINE

## 2024-08-12 RX ORDER — ERGOCALCIFEROL 1.25 MG/1
50000 CAPSULE ORAL WEEKLY
COMMUNITY

## 2024-08-12 NOTE — PROGRESS NOTES
CHIEF COMPLAINT: CP/ARAYA/Palpitations/HTN    HISTORY OF PRESENT ILLNESS: Patient is a 58 y.o. female seen at the request of Kat Villarreal MD    Patient seen in follow up.    No CP or SOB.     Rare palpitations.      Past Medical History:   Diagnosis Date    Allergic rhinitis     Anxiety     Atrial fibrillation (HCC)     Depression     H. pylori infection     Headache     Hypertension     Hypothyroidism     IBS (irritable bowel syndrome)     NAFLD (nonalcoholic fatty liver disease)     By CT    Obesity with body mass index (BMI) of 30.0 to 39.9 06/28/2020    Osteoarthritis     Sleep apnea     does not use C-Pap    Spinal stenosis, lumbar 01/14/2020    Severe central stenosis by CT of abdomen, L1-L2    Thyroid disease     PTC microcarcinoma (0.5 cm) treated by thyroidectomy and BERG 100 mci       Patient Active Problem List   Diagnosis    BPPV (benign paroxysmal positional vertigo)    SVT (supraventricular tachycardia) (HCC)    Anxiety    Dyspnea on exertion    Intermittent palpitations    Essential hypertension    Morbid obesity due to excess calories (HCC)    Palpitations    Chest pain    Thyroid cancer (HCC)    Weight gain    Dizziness    Biliary colic    Obesity with body mass index (BMI) of 30.0 to 39.9    Spinal stenosis, lumbar    NAFLD (nonalcoholic fatty liver disease)    Abdominal pain    Hypothyroidism    Thyroid disease    Status post placement of implantable loop recorder    Acute bacterial sinusitis     Allergies   Allergen Reactions    Fentanyl Anaphylaxis     Other reaction(s): needed narcan after choly, Unknown    Prednisone Anxiety       Current Outpatient Medications   Medication Sig Dispense Refill    vitamin D (ERGOCALCIFEROL) 1.25 MG (06686 UT) CAPS capsule Take 1 capsule by mouth once a week      cyanocobalamin 1000 MCG tablet Take 1 tablet by mouth daily      SYNTHROID 100 MCG tablet TAKE ONE TABLET BY MOUTH ONCE DAILY 30 tablet 3    diclofenac sodium (VOLTAREN) 1 % GEL Apply 2 g  No

## 2024-08-13 ENCOUNTER — HOSPITAL ENCOUNTER (EMERGENCY)
Age: 59
Discharge: HOME | End: 2024-08-13
Attending: EMERGENCY MEDICINE
Payer: COMMERCIAL

## 2024-08-13 ENCOUNTER — APPOINTMENT (OUTPATIENT)
Dept: CT IMAGING | Age: 59
End: 2024-08-13
Payer: COMMERCIAL

## 2024-08-13 ENCOUNTER — APPOINTMENT (OUTPATIENT)
Dept: GENERAL RADIOLOGY | Age: 59
End: 2024-08-13
Payer: COMMERCIAL

## 2024-08-13 ENCOUNTER — TELEPHONE (OUTPATIENT)
Dept: CARDIOLOGY CLINIC | Age: 59
End: 2024-08-13

## 2024-08-13 VITALS
RESPIRATION RATE: 18 BRPM | DIASTOLIC BLOOD PRESSURE: 81 MMHG | TEMPERATURE: 98.1 F | SYSTOLIC BLOOD PRESSURE: 151 MMHG | HEART RATE: 79 BPM | BODY MASS INDEX: 39.37 KG/M2 | OXYGEN SATURATION: 100 % | HEIGHT: 66 IN | WEIGHT: 245 LBS

## 2024-08-13 DIAGNOSIS — R51.9 NONINTRACTABLE HEADACHE, UNSPECIFIED CHRONICITY PATTERN, UNSPECIFIED HEADACHE TYPE: ICD-10-CM

## 2024-08-13 DIAGNOSIS — R00.2 PALPITATIONS: Primary | ICD-10-CM

## 2024-08-13 LAB
ALBUMIN SERPL-MCNC: 4 G/DL (ref 3.5–5.2)
ALP SERPL-CCNC: 76 U/L (ref 35–104)
ALT SERPL-CCNC: 15 U/L (ref 0–32)
ANION GAP SERPL CALCULATED.3IONS-SCNC: 10 MMOL/L (ref 7–16)
AST SERPL-CCNC: 15 U/L (ref 0–31)
BASOPHILS # BLD: 0.04 K/UL (ref 0–0.2)
BASOPHILS NFR BLD: 1 % (ref 0–2)
BILIRUB SERPL-MCNC: 0.5 MG/DL (ref 0–1.2)
BUN SERPL-MCNC: 21 MG/DL (ref 6–20)
CALCIUM SERPL-MCNC: 8.7 MG/DL (ref 8.6–10.2)
CHLORIDE SERPL-SCNC: 107 MMOL/L (ref 98–107)
CO2 SERPL-SCNC: 26 MMOL/L (ref 22–29)
CREAT SERPL-MCNC: 0.8 MG/DL (ref 0.5–1)
D-DIMER QUANTITATIVE: <200 NG/ML DDU (ref 0–230)
EOSINOPHIL # BLD: 0.12 K/UL (ref 0.05–0.5)
EOSINOPHILS RELATIVE PERCENT: 2 % (ref 0–6)
ERYTHROCYTE [DISTWIDTH] IN BLOOD BY AUTOMATED COUNT: 12.4 % (ref 11.5–15)
GFR, ESTIMATED: 85 ML/MIN/1.73M2
GLUCOSE SERPL-MCNC: 104 MG/DL (ref 74–99)
HCT VFR BLD AUTO: 38.6 % (ref 34–48)
HGB BLD-MCNC: 12.7 G/DL (ref 11.5–15.5)
IMM GRANULOCYTES # BLD AUTO: <0.03 K/UL (ref 0–0.58)
IMM GRANULOCYTES NFR BLD: 0 % (ref 0–5)
LYMPHOCYTES NFR BLD: 1.31 K/UL (ref 1.5–4)
LYMPHOCYTES RELATIVE PERCENT: 20 % (ref 20–42)
MAGNESIUM SERPL-MCNC: 2.1 MG/DL (ref 1.6–2.6)
MCH RBC QN AUTO: 29 PG (ref 26–35)
MCHC RBC AUTO-ENTMCNC: 32.9 G/DL (ref 32–34.5)
MCV RBC AUTO: 88.1 FL (ref 80–99.9)
MONOCYTES NFR BLD: 0.49 K/UL (ref 0.1–0.95)
MONOCYTES NFR BLD: 7 % (ref 2–12)
NEUTROPHILS NFR BLD: 70 % (ref 43–80)
NEUTS SEG NFR BLD: 4.7 K/UL (ref 1.8–7.3)
PLATELET # BLD AUTO: 253 K/UL (ref 130–450)
PMV BLD AUTO: 9.4 FL (ref 7–12)
POTASSIUM SERPL-SCNC: 3.7 MMOL/L (ref 3.5–5)
PROT SERPL-MCNC: 7.1 G/DL (ref 6.4–8.3)
RBC # BLD AUTO: 4.38 M/UL (ref 3.5–5.5)
SODIUM SERPL-SCNC: 143 MMOL/L (ref 132–146)
TROPONIN I SERPL HS-MCNC: <6 NG/L (ref 0–9)
TROPONIN I SERPL HS-MCNC: <6 NG/L (ref 0–9)
TSH SERPL DL<=0.05 MIU/L-ACNC: 1.4 UIU/ML (ref 0.27–4.2)
WBC OTHER # BLD: 6.7 K/UL (ref 4.5–11.5)

## 2024-08-13 PROCEDURE — 70450 CT HEAD/BRAIN W/O DYE: CPT

## 2024-08-13 PROCEDURE — 71045 X-RAY EXAM CHEST 1 VIEW: CPT

## 2024-08-13 PROCEDURE — 84443 ASSAY THYROID STIM HORMONE: CPT

## 2024-08-13 PROCEDURE — 85379 FIBRIN DEGRADATION QUANT: CPT

## 2024-08-13 PROCEDURE — 2580000003 HC RX 258

## 2024-08-13 PROCEDURE — 83735 ASSAY OF MAGNESIUM: CPT

## 2024-08-13 PROCEDURE — 80053 COMPREHEN METABOLIC PANEL: CPT

## 2024-08-13 PROCEDURE — 99285 EMERGENCY DEPT VISIT HI MDM: CPT

## 2024-08-13 PROCEDURE — 84484 ASSAY OF TROPONIN QUANT: CPT

## 2024-08-13 PROCEDURE — 93005 ELECTROCARDIOGRAM TRACING: CPT

## 2024-08-13 PROCEDURE — 85025 COMPLETE CBC W/AUTO DIFF WBC: CPT

## 2024-08-13 RX ORDER — 0.9 % SODIUM CHLORIDE 0.9 %
1000 INTRAVENOUS SOLUTION INTRAVENOUS ONCE
Status: COMPLETED | OUTPATIENT
Start: 2024-08-13 | End: 2024-08-13

## 2024-08-13 RX ADMIN — SODIUM CHLORIDE 1000 ML: 9 INJECTION, SOLUTION INTRAVENOUS at 07:03

## 2024-08-13 ASSESSMENT — ENCOUNTER SYMPTOMS
VOICE CHANGE: 0
DIARRHEA: 0
ABDOMINAL PAIN: 0
VOMITING: 0
TROUBLE SWALLOWING: 0
WHEEZING: 0
PHOTOPHOBIA: 0
COUGH: 0
NAUSEA: 0

## 2024-08-13 ASSESSMENT — LIFESTYLE VARIABLES
HOW MANY STANDARD DRINKS CONTAINING ALCOHOL DO YOU HAVE ON A TYPICAL DAY: PATIENT DOES NOT DRINK
HOW OFTEN DO YOU HAVE A DRINK CONTAINING ALCOHOL: NEVER

## 2024-08-13 ASSESSMENT — PAIN - FUNCTIONAL ASSESSMENT: PAIN_FUNCTIONAL_ASSESSMENT: NONE - DENIES PAIN

## 2024-08-13 NOTE — TELEPHONE ENCOUNTER
Patient states that she started having extreme dizziness early this morning,  called the EMTs, vitals were taken and  heart rate was 132 -150 and bp  256/101,patient was then taken to the ER. She was just d/c from the ER and vitas are normal, she was  instructed to contact our office,please advise

## 2024-08-14 ENCOUNTER — TELEPHONE (OUTPATIENT)
Dept: FAMILY MEDICINE CLINIC | Age: 59
End: 2024-08-14

## 2024-08-14 LAB
EKG ATRIAL RATE: 75 BPM
EKG P AXIS: 37 DEGREES
EKG P-R INTERVAL: 208 MS
EKG Q-T INTERVAL: 400 MS
EKG QRS DURATION: 80 MS
EKG QTC CALCULATION (BAZETT): 446 MS
EKG R AXIS: 38 DEGREES
EKG T AXIS: 79 DEGREES
EKG VENTRICULAR RATE: 75 BPM

## 2024-08-14 PROCEDURE — 93010 ELECTROCARDIOGRAM REPORT: CPT | Performed by: INTERNAL MEDICINE

## 2024-08-14 NOTE — TELEPHONE ENCOUNTER
Mago was in the ER yesterday because of her blood pressure and heart rate being very high and they wanted her to follow up with her PCP and they also wanted her to see Dr Villegas. This morning when she woke up her blood pressure was still running high, when do you want to see her?

## 2024-08-14 NOTE — TELEPHONE ENCOUNTER
Attempted to contact patient by phone. Left message on voicemail with request to call office back.

## 2024-08-14 NOTE — ED PROVIDER NOTES
[facility] EMERGENCY DEPARTMENT  EMERGENCY DEPARTMENT ENCOUNTER      Pt Name: Mago Nichole  MRN: [id number]  Birthdate 1965  Date of evaluation: 8/13/2024  Provider: Jesus Knight DO  PCP: Kat Villarreal MD    HPI: 52-year-old female present emerged part complaints of episode of palpitations.  Patient reports that she was at home when symptoms onset.  Reports that symptoms lasted for greater than 50 minutes.  Patient with improvement on initial exam with heart rate less than 100.  Patient reports she is on metoprolol and takes every evening.  Follows with Dr. Abigail Villegas with patient seen yesterday.  Patient denies any fevers chills nausea vomiting numbness weakness.  ANO x 4.  In no acute distress.  Denies any cardiac chest pain.    Chief Complaint   Patient presents with    Hypertension     /No history of htn, 190/100 for ems    Dizziness              Review of Systems   Constitutional:  Negative for chills, fatigue and fever.   HENT:  Negative for congestion, trouble swallowing and voice change.    Eyes:  Negative for photophobia and visual disturbance.   Respiratory:  Negative for cough and wheezing.    Cardiovascular:  Positive for palpitations. Negative for chest pain.   Gastrointestinal:  Negative for abdominal pain, diarrhea, nausea and vomiting.   Genitourinary:  Positive for frequency. Negative for dysuria, flank pain and urgency.   Musculoskeletal:  Negative for arthralgias, neck pain and neck stiffness.   Skin:  Negative for rash and wound.   Neurological:  Negative for dizziness, syncope, weakness, light-headedness, numbness and headaches.   Psychiatric/Behavioral:  Negative for behavioral problems and confusion.         Physical Exam  Vitals reviewed.   Constitutional:       General: She is not in acute distress.     Appearance: Normal appearance.   HENT:      Head: Normocephalic.      Right Ear: External ear normal.      Left Ear: External ear normal.

## 2024-08-14 NOTE — TELEPHONE ENCOUNTER
Try making toprol 25 mg twice a day.     Abigail Villegas D.O.  Cardiologist  Cardiology, [facility]

## 2024-08-20 ENCOUNTER — APPOINTMENT (OUTPATIENT)
Dept: GENERAL RADIOLOGY | Age: 59
End: 2024-08-20
Payer: COMMERCIAL

## 2024-08-20 ENCOUNTER — HOSPITAL ENCOUNTER (EMERGENCY)
Age: 59
Discharge: HOME | End: 2024-08-20
Attending: EMERGENCY MEDICINE
Payer: COMMERCIAL

## 2024-08-20 VITALS
RESPIRATION RATE: 16 BRPM | DIASTOLIC BLOOD PRESSURE: 86 MMHG | TEMPERATURE: 97.9 F | SYSTOLIC BLOOD PRESSURE: 116 MMHG | BODY MASS INDEX: 38.57 KG/M2 | OXYGEN SATURATION: 98 % | WEIGHT: 240 LBS | HEIGHT: 66 IN | HEART RATE: 89 BPM

## 2024-08-20 DIAGNOSIS — R42 LIGHTHEADEDNESS: Primary | ICD-10-CM

## 2024-08-20 LAB
ALBUMIN SERPL-MCNC: 4.3 G/DL (ref 3.5–5.2)
ALP SERPL-CCNC: 72 U/L (ref 35–104)
ALT SERPL-CCNC: 19 U/L (ref 0–32)
ANION GAP SERPL CALCULATED.3IONS-SCNC: 10 MMOL/L (ref 7–16)
AST SERPL-CCNC: 18 U/L (ref 0–31)
BASOPHILS # BLD: 0.06 K/UL (ref 0–0.2)
BASOPHILS NFR BLD: 1 % (ref 0–2)
BILIRUB SERPL-MCNC: 0.5 MG/DL (ref 0–1.2)
BNP SERPL-MCNC: <36 PG/ML (ref 0–125)
BUN SERPL-MCNC: 18 MG/DL (ref 6–20)
CALCIUM SERPL-MCNC: 9.4 MG/DL (ref 8.6–10.2)
CHLORIDE SERPL-SCNC: 103 MMOL/L (ref 98–107)
CO2 SERPL-SCNC: 26 MMOL/L (ref 22–29)
CREAT SERPL-MCNC: 0.8 MG/DL (ref 0.5–1)
D-DIMER QUANTITATIVE: <200 NG/ML DDU (ref 0–230)
EOSINOPHIL # BLD: 0.02 K/UL (ref 0.05–0.5)
EOSINOPHILS RELATIVE PERCENT: 0 % (ref 0–6)
ERYTHROCYTE [DISTWIDTH] IN BLOOD BY AUTOMATED COUNT: 12.3 % (ref 11.5–15)
GFR, ESTIMATED: 82 ML/MIN/1.73M2
GLUCOSE SERPL-MCNC: 111 MG/DL (ref 74–99)
HCT VFR BLD AUTO: 39.4 % (ref 34–48)
HGB BLD-MCNC: 13.2 G/DL (ref 11.5–15.5)
IMM GRANULOCYTES # BLD AUTO: <0.03 K/UL (ref 0–0.58)
IMM GRANULOCYTES NFR BLD: 0 % (ref 0–5)
LYMPHOCYTES NFR BLD: 1.31 K/UL (ref 1.5–4)
LYMPHOCYTES RELATIVE PERCENT: 17 % (ref 20–42)
MAGNESIUM SERPL-MCNC: 2.2 MG/DL (ref 1.6–2.6)
MCH RBC QN AUTO: 28.8 PG (ref 26–35)
MCHC RBC AUTO-ENTMCNC: 33.5 G/DL (ref 32–34.5)
MCV RBC AUTO: 85.8 FL (ref 80–99.9)
MONOCYTES NFR BLD: 0.43 K/UL (ref 0.1–0.95)
MONOCYTES NFR BLD: 6 % (ref 2–12)
NEUTROPHILS NFR BLD: 76 % (ref 43–80)
NEUTS SEG NFR BLD: 5.71 K/UL (ref 1.8–7.3)
PLATELET # BLD AUTO: 298 K/UL (ref 130–450)
PMV BLD AUTO: 9.6 FL (ref 7–12)
POTASSIUM SERPL-SCNC: 3.9 MMOL/L (ref 3.5–5)
PROT SERPL-MCNC: 7.6 G/DL (ref 6.4–8.3)
RBC # BLD AUTO: 4.59 M/UL (ref 3.5–5.5)
SODIUM SERPL-SCNC: 139 MMOL/L (ref 132–146)
TROPONIN I SERPL HS-MCNC: <6 NG/L (ref 0–9)
TSH SERPL DL<=0.05 MIU/L-ACNC: 1.07 UIU/ML (ref 0.27–4.2)
WBC OTHER # BLD: 7.5 K/UL (ref 4.5–11.5)

## 2024-08-20 PROCEDURE — 2580000003 HC RX 258: Performed by: EMERGENCY MEDICINE

## 2024-08-20 PROCEDURE — 84484 ASSAY OF TROPONIN QUANT: CPT

## 2024-08-20 PROCEDURE — 71045 X-RAY EXAM CHEST 1 VIEW: CPT

## 2024-08-20 PROCEDURE — 99285 EMERGENCY DEPT VISIT HI MDM: CPT

## 2024-08-20 PROCEDURE — 85025 COMPLETE CBC W/AUTO DIFF WBC: CPT

## 2024-08-20 PROCEDURE — 83880 ASSAY OF NATRIURETIC PEPTIDE: CPT

## 2024-08-20 PROCEDURE — 83735 ASSAY OF MAGNESIUM: CPT

## 2024-08-20 PROCEDURE — 80053 COMPREHEN METABOLIC PANEL: CPT

## 2024-08-20 PROCEDURE — 84443 ASSAY THYROID STIM HORMONE: CPT

## 2024-08-20 PROCEDURE — 85379 FIBRIN DEGRADATION QUANT: CPT

## 2024-08-20 PROCEDURE — 93005 ELECTROCARDIOGRAM TRACING: CPT | Performed by: EMERGENCY MEDICINE

## 2024-08-20 RX ORDER — 0.9 % SODIUM CHLORIDE 0.9 %
1000 INTRAVENOUS SOLUTION INTRAVENOUS ONCE
Status: COMPLETED | OUTPATIENT
Start: 2024-08-20 | End: 2024-08-20

## 2024-08-20 RX ADMIN — SODIUM CHLORIDE 1000 ML: 9 INJECTION, SOLUTION INTRAVENOUS at 20:03

## 2024-08-20 ASSESSMENT — PAIN SCALES - GENERAL: PAINLEVEL_OUTOF10: 0

## 2024-08-20 ASSESSMENT — PAIN - FUNCTIONAL ASSESSMENT: PAIN_FUNCTIONAL_ASSESSMENT: 0-10

## 2024-08-20 NOTE — ED PROVIDER NOTES
[facility] EMERGENCY DEPARTMENT  EMERGENCY DEPARTMENT ENCOUNTER        Pt Name: Mago Nichole  MRN: [id number]  Birthdate 1965  Date of evaluation: 8/20/2024  Provider: Pretty Anderson DO  PCP: Kat Villarreal MD  Note Started: 6:56 PM EDT 8/20/24    CHIEF COMPLAINT       Chief Complaint   Patient presents with    Dizziness     Was seen here last week for same thing, saw dr chandler who increased metoprolol due to rapid heart rate, had another episode today and was told to come in       HISTORY OF PRESENT ILLNESS: 1 or more Elements   History From: Patient    Limitations to history : None    Mago Nichole is a 58 y.o. female who presents with concern for palpitations.  She notes that she had an ablation between 20 and 30 years ago and she is not sure what it was for.  She said that about a week ago she felt as though her heart was racing and came into the emergency department for evaluation.  Everything had been within normal limits and she had been discharged, had followed up with her cardiologist and they doubled her metoprolol.  She had been doing well but today she was sitting there and her heart rate started becoming more rapid, whenever this happened she feels lightheaded.  No chest pain, no numbness, weak, tingling, she says she feels off but feels improved.  No other associated complaints.      EXTERNAL NOTE REVIEW:      On chart review last saw cardiology for follow-up on SVT on 8/12/2024    REVIEW OF SYSTEMS :      Positives and Pertinent negatives as per HPI.     SURGICAL HISTORY     Past Surgical History:   Procedure Laterality Date    ABLATION OF DYSRHYTHMIC FOCUS  1990    BREAST SURGERY      cysts removed    CHOLECYSTECTOMY      COLONOSCOPY      COLONOSCOPY N/A 12/8/2023    COLONOSCOPY WITH BIOPSY performed by Jocelin Dailey MD at [facility] ENDOSCOPY    ENDOMETRIAL ABLATION  2012    GALLBLADDER SURGERY  07/2020    removal     HYSTERECTOMY,

## 2024-08-21 ENCOUNTER — TELEPHONE (OUTPATIENT)
Dept: FAMILY MEDICINE CLINIC | Age: 59
End: 2024-08-21

## 2024-08-21 ENCOUNTER — TELEPHONE (OUTPATIENT)
Dept: ENDOCRINOLOGY | Age: 59
End: 2024-08-21

## 2024-08-21 ENCOUNTER — TELEPHONE (OUTPATIENT)
Dept: CARDIOLOGY CLINIC | Age: 59
End: 2024-08-21

## 2024-08-21 ENCOUNTER — NURSE ONLY (OUTPATIENT)
Dept: CARDIOLOGY CLINIC | Age: 59
End: 2024-08-21

## 2024-08-21 DIAGNOSIS — R00.2 PALPITATIONS: Primary | ICD-10-CM

## 2024-08-21 LAB
EKG ATRIAL RATE: 91 BPM
EKG P AXIS: 19 DEGREES
EKG P-R INTERVAL: 192 MS
EKG Q-T INTERVAL: 378 MS
EKG QRS DURATION: 78 MS
EKG QTC CALCULATION (BAZETT): 464 MS
EKG R AXIS: -2 DEGREES
EKG T AXIS: 32 DEGREES
EKG VENTRICULAR RATE: 91 BPM

## 2024-08-21 PROCEDURE — 93010 ELECTROCARDIOGRAM REPORT: CPT | Performed by: INTERNAL MEDICINE

## 2024-08-21 RX ORDER — METOPROLOL SUCCINATE 25 MG/1
25 TABLET, EXTENDED RELEASE ORAL 2 TIMES DAILY
Qty: 60 TABLET | Refills: 5 | Status: SHIPPED | OUTPATIENT
Start: 2024-08-21

## 2024-08-21 NOTE — TELEPHONE ENCOUNTER
Please place a 14 day Zio XT Dx palpitations.     Continue the twice a day toprol. Take extra toprol if BP and  HR go up.       Abigail Villegas D.O.  Cardiologist  Cardiology, [facility]

## 2024-08-21 NOTE — TELEPHONE ENCOUNTER
Pt called and said she was in the ER for really high blood pressure and heart palp. She can not get into her family  and thought we could do something. She has an appt on the day that Dr MENDOZA gets back.

## 2024-08-21 NOTE — TELEPHONE ENCOUNTER
Pt called and states went to ER again last night d/t high bp and High heart rate, shaking uncontrollably and back pain.  Pt also c/o very dizzy, face is burning and hot. They told her d/t back pain could be adrenal issue and to follow up with pcp. She also did call Cardiologist, Dr. Villegas and is waiting to hear back from them. Please advise.

## 2024-08-21 NOTE — TELEPHONE ENCOUNTER
Patient states that she had to go to the ER again yesterday, bp was 204/111 and heart rate 126, she was dizzy and had palpitations. She was told it could be her adrenal glands and was instructed to f/u with PCP and Endocrinology, please advise

## 2024-08-21 NOTE — PROGRESS NOTES
Patient was seen today and a 14 day monitor was placed. Monitor was ordered by Dr. Villegas. The monitor was applied. Instructions were given to the patient. Patient stated understanding and gave verbalize feed back.     Monitor company [employer]    Serial number [id number]          Ny-Benita Villegas MA

## 2024-08-22 LAB
CHOLEST SERPL-MCNC: 176 MG/DL
CRP SERPL HS-MCNC: <3 MG/L (ref 0–5)
HDLC SERPL-MCNC: 49 MG/DL
LDLC SERPL CALC-MCNC: 114 MG/DL
TRIGL SERPL-MCNC: 65 MG/DL
VLDLC SERPL CALC-MCNC: 13 MG/DL

## 2024-08-23 ENCOUNTER — OFFICE VISIT (OUTPATIENT)
Dept: FAMILY MEDICINE CLINIC | Age: 59
End: 2024-08-23
Payer: COMMERCIAL

## 2024-08-23 VITALS
HEART RATE: 78 BPM | DIASTOLIC BLOOD PRESSURE: 100 MMHG | RESPIRATION RATE: 18 BRPM | SYSTOLIC BLOOD PRESSURE: 150 MMHG | HEIGHT: 66 IN | OXYGEN SATURATION: 98 % | TEMPERATURE: 97.5 F | BODY MASS INDEX: 38.73 KG/M2 | WEIGHT: 241 LBS

## 2024-08-23 DIAGNOSIS — I10 SEVERE UNCONTROLLED HYPERTENSION: Primary | ICD-10-CM

## 2024-08-23 DIAGNOSIS — I10 SEVERE UNCONTROLLED HYPERTENSION: ICD-10-CM

## 2024-08-23 LAB — ERYTHROCYTE [SEDIMENTATION RATE] IN BLOOD BY WESTERGREN METHOD: 7 MM/HR (ref 0–20)

## 2024-08-23 PROCEDURE — 3017F COLORECTAL CA SCREEN DOC REV: CPT | Performed by: FAMILY MEDICINE

## 2024-08-23 PROCEDURE — 3080F DIAST BP >= 90 MM HG: CPT | Performed by: FAMILY MEDICINE

## 2024-08-23 PROCEDURE — G8427 DOCREV CUR MEDS BY ELIG CLIN: HCPCS | Performed by: FAMILY MEDICINE

## 2024-08-23 PROCEDURE — 1036F TOBACCO NON-USER: CPT | Performed by: FAMILY MEDICINE

## 2024-08-23 PROCEDURE — G8417 CALC BMI ABV UP PARAM F/U: HCPCS | Performed by: FAMILY MEDICINE

## 2024-08-23 PROCEDURE — 3077F SYST BP >= 140 MM HG: CPT | Performed by: FAMILY MEDICINE

## 2024-08-23 PROCEDURE — 99215 OFFICE O/P EST HI 40 MIN: CPT | Performed by: FAMILY MEDICINE

## 2024-08-23 SDOH — ECONOMIC STABILITY: INCOME INSECURITY: HOW HARD IS IT FOR YOU TO PAY FOR THE VERY BASICS LIKE FOOD, HOUSING, MEDICAL CARE, AND HEATING?: NOT HARD AT ALL

## 2024-08-23 SDOH — ECONOMIC STABILITY: FOOD INSECURITY: WITHIN THE PAST 12 MONTHS, YOU WORRIED THAT YOUR FOOD WOULD RUN OUT BEFORE YOU GOT MONEY TO BUY MORE.: NEVER TRUE

## 2024-08-23 SDOH — ECONOMIC STABILITY: FOOD INSECURITY: WITHIN THE PAST 12 MONTHS, THE FOOD YOU BOUGHT JUST DIDN'T LAST AND YOU DIDN'T HAVE MONEY TO GET MORE.: NEVER TRUE

## 2024-08-23 NOTE — PROGRESS NOTES
Hypertension:  Patient is here for follow up chronic hypertension.  This is not generally controlled on current medication regimen. Takes meds as directed and tolerates them well.  Most recent labs reviewed with patient and are not remarkable.  No symptoms from htn standpoint per ROS.  Patient is  compliant with lifestyle modifications.  Patient does not smoke.  Comorbid conditions include obesity.  Dr. Villegas increased her Toprol to 50 mg bid and a zio patch was placed. BP at home was 225/20. Pulse 136 when she went to ER. She has had increased urination when BP is high.     Patient's past medical, surgical, social and/or family history reviewed, updated in chart, and are non-contributory (unless otherwise stated).  Medications and allergies also reviewed and updated in chart.        Review of Systems:  Constitutional:  No fever, no fatigue, no chills, no headaches, no weight change  Dermatology:  No rash, no mole, no dry or sensitive skin  ENT:  No cough, no sore throat, no sinus pain, no runny nose, no ear pain  Cardiology:  No chest pain, no palpitations, no leg edema, no shortness of breath, no PND  Gastroenterology:  No dysphagia, no abdominal pain, no nausea, no vomiting, no constipation, no diarrhea, no heartburn  Musculoskeletal:  No joint pain, no leg cramps, no back pain, no muscle aches  Respiratory:  No shortness of breath, no orthopnea, no wheezing, no ARAYA, no hemoptysis  Urology:  No blood in the urine, no urinary frequency, no urinary incontinence, no urinary urgency, no nocturia, no dysuria  Vitals:    08/23/24 1412 08/23/24 1419   BP: (!) 140/98 (!) 150/100   Site: Left Upper Arm Right Upper Arm   Position: Sitting Sitting   Pulse: 78    Resp: 18    Temp: 97.5 °F (36.4 °C)    SpO2: 98%    Weight: 109.3 kg (241 lb)    Height: 1.676 m (5' 6\")        General:  Patient alert and oriented x 3, NAD, pleasant  HEENT:  Atraumatic, normocephalic, PERRLA, EOMI, clear conjunctiva, TMs clear, nose-clear,  throat - no erythema  Neck:  Supple, no goiter, no carotid bruits, no lymphadenopathy  Lungs:  CTA B  Heart:  RRR, no murmurs, gallops or rubs  Abdomen:  Soft/nt/nd, + bowel sounds  Extremities:  No clubbing, cyanosis or edema  Skin: unremarkable    Assessment/Plan:      Mago was seen today for hypertension and tachycardia.    Diagnoses and all orders for this visit:    Severe uncontrolled hypertension  -     Metanephrines Urine; Future  -     Catecholamines, Plasma Fractionated; Future  -     Catecholamines Free Urine; Future  -     Metanephrines Plasma Free; Future  -     CT ABDOMEN PELVIS W IV CONTRAST Additional Contrast? None; Future    Over 45 mins spent with the patient and > 50% was spent on counseling and care coordination.    As above.  Call or go to ED immediately if symptoms worsen or persist.  No follow-ups on file., or sooner if necessary.      Educational materials and/or home exercises printed for patient's review and were included in patient instructions on his/her After Visit Summary and given to patient at the end of visit.      Counseled regarding above diagnosis, including possible risks and complications,  especially if left uncontrolled.    Counseled regarding the possible side effects, risks, benefits and alternatives to treatment; patient and/or guardian verbalizes understanding, agrees, feels comfortable with and wishes to proceed with above treatment plan.    Advised patient to call with any new medication issues, and read all Rx info from pharmacy to assure aware of all possible risks and side effects of medication before taking.    Reviewed age and gender appropriate health screening exams and vaccinations.  Advised patient regarding importance of keeping up with recommended health maintenance and to schedule as soon as possible if overdue, as this is important in assessing for undiagnosed pathology, especially cancer, as well as protecting against potentially harmful/life threatening

## 2024-08-26 ENCOUNTER — TELEPHONE (OUTPATIENT)
Dept: FAMILY MEDICINE CLINIC | Age: 59
End: 2024-08-26

## 2024-08-27 DIAGNOSIS — R10.9 ABDOMINAL PAIN, UNSPECIFIED ABDOMINAL LOCATION: ICD-10-CM

## 2024-08-27 DIAGNOSIS — R10.13 EPIGASTRIC ABDOMINAL PAIN: Primary | ICD-10-CM

## 2024-08-28 ENCOUNTER — NURSE ONLY (OUTPATIENT)
Dept: FAMILY MEDICINE CLINIC | Age: 59
End: 2024-08-28
Payer: COMMERCIAL

## 2024-08-28 DIAGNOSIS — R39.9 UTI SYMPTOMS: Primary | ICD-10-CM

## 2024-08-28 DIAGNOSIS — I10 SEVERE UNCONTROLLED HYPERTENSION: ICD-10-CM

## 2024-08-28 DIAGNOSIS — R31.9 HEMATURIA, UNSPECIFIED TYPE: ICD-10-CM

## 2024-08-28 LAB
BILIRUBIN, POC: NEGATIVE
BLOOD URINE, POC: NORMAL
CLARITY, POC: CLEAR
COLOR, POC: YELLOW
GLUCOSE URINE, POC: NEGATIVE
KETONES, POC: NORMAL
LEUKOCYTE EST, POC: NEGATIVE
NITRITE, POC: NEGATIVE
PH, POC: 5
PROTEIN, POC: NORMAL
SPECIFIC GRAVITY, POC: 1.01
UROBILINOGEN, POC: 0.2

## 2024-08-28 PROCEDURE — 81002 URINALYSIS NONAUTO W/O SCOPE: CPT | Performed by: FAMILY MEDICINE

## 2024-08-30 LAB
CATECHOLAMINE INTERPRETATION, PLASMA: NORMAL
CULTURE: NORMAL
CULTURE: NORMAL
DOPAMINE: <130 PMOL/L
EPINEPHRINE PLASMA: 157 PMOL/L
METANEPH/PLASMA INTERP: NORMAL
METANEPHRINE: 0.15 NMOL/L (ref 0–0.49)
NOREPINEPHRINE: 1264 PMOL/L (ref 1050–4800)
NORMETANEPHRINE PLASMA: 0.38 NMOL/L (ref 0–0.89)
SPECIMEN DESCRIPTION: NORMAL

## 2024-09-02 LAB
CATECHOL/URINE INTERP: NORMAL
CREATININE URINE /24 HR: 1378 MG/D (ref 500–1400)
CREATININE URINE /24 HR: 1378 MG/D (ref 500–1400)
CREATININE URINE /VOLUME: 106 MG/DL
CREATININE URINE /VOLUME: 106 MG/DL
DOPAMINE 24 HOUR URINE: 196 UG/D (ref 71–485)
DOPAMINE, URINE, PER VOLUME: 151 UG/L
DOPAMINE, URINE, RATIO TO CREATININE: 142 UG/G CRT (ref 0–250)
EPINEPHRINE 24 HOUR URINE: 6 UG/D (ref 1–14)
EPINEPHRINE, URINE, PER VOLUME: 5 UG/L
EPINEPHRINE, URINE, RATIO TO CREATININE: 5 UG/G CRT (ref 0–20)
HOURS COLLECTED: NORMAL
HOURS COLLECTED: NORMAL
METANEPHRINE UF INTERPRETATION: NORMAL
METANEPHRINE UG/G CRE: 59 UG/G CRT (ref 0–300)
METANEPHRINES 24 HOUR URINE: 82 UG/D (ref 36–229)
METANEPHRINES, URINE (UMOL/L): 63 UG/L
NOREPINEPHRINE 24 HOUR URINE: 34 UG/D (ref 14–120)
NOREPINEPHRINE CREAT RATIO: 25 UG/G CRT (ref 0–45)
NOREPINEPHRINE, URINE, PER VOLUME: 26 UG/L
NORMETANEPHRINE, (G/CRT): 135 UG/G CRT (ref 0–400)
NORMETANEPHRINE, (NMOL/DAY): 186 UG/D (ref 95–650)
NORMETANEPHRINES, NMOL/L: 143 UG/L
URINE VOLUME: NORMAL
URINE VOLUME: NORMAL

## 2024-09-03 ENCOUNTER — OFFICE VISIT (OUTPATIENT)
Dept: ENDOCRINOLOGY | Age: 59
End: 2024-09-03
Payer: COMMERCIAL

## 2024-09-03 VITALS
RESPIRATION RATE: 18 BRPM | DIASTOLIC BLOOD PRESSURE: 83 MMHG | WEIGHT: 235 LBS | HEIGHT: 65 IN | HEART RATE: 67 BPM | SYSTOLIC BLOOD PRESSURE: 129 MMHG | OXYGEN SATURATION: 98 % | BODY MASS INDEX: 39.15 KG/M2

## 2024-09-03 DIAGNOSIS — I10 HYPERTENSION, UNSPECIFIED TYPE: Primary | ICD-10-CM

## 2024-09-03 DIAGNOSIS — C73 THYROID CANCER (HCC): ICD-10-CM

## 2024-09-03 DIAGNOSIS — R00.2 PALPITATION: ICD-10-CM

## 2024-09-03 DIAGNOSIS — E66.01 MORBID OBESITY (HCC): ICD-10-CM

## 2024-09-03 PROCEDURE — 1036F TOBACCO NON-USER: CPT | Performed by: INTERNAL MEDICINE

## 2024-09-03 PROCEDURE — G8427 DOCREV CUR MEDS BY ELIG CLIN: HCPCS | Performed by: INTERNAL MEDICINE

## 2024-09-03 PROCEDURE — 99214 OFFICE O/P EST MOD 30 MIN: CPT | Performed by: INTERNAL MEDICINE

## 2024-09-03 PROCEDURE — 3017F COLORECTAL CA SCREEN DOC REV: CPT | Performed by: INTERNAL MEDICINE

## 2024-09-03 PROCEDURE — 3079F DIAST BP 80-89 MM HG: CPT | Performed by: INTERNAL MEDICINE

## 2024-09-03 PROCEDURE — 3074F SYST BP LT 130 MM HG: CPT | Performed by: INTERNAL MEDICINE

## 2024-09-03 PROCEDURE — G8417 CALC BMI ABV UP PARAM F/U: HCPCS | Performed by: INTERNAL MEDICINE

## 2024-09-03 RX ORDER — LEVOTHYROXINE SODIUM 100 MCG
100 TABLET ORAL DAILY
Qty: 90 TABLET | Refills: 3 | Status: SHIPPED | OUTPATIENT
Start: 2024-09-03

## 2024-09-03 RX ORDER — DEXAMETHASONE 1 MG
1 TABLET ORAL ONCE
Qty: 1 TABLET | Refills: 0 | Status: SHIPPED | OUTPATIENT
Start: 2024-09-03 | End: 2024-09-03

## 2024-09-03 NOTE — PROGRESS NOTES
[employer] [facility] Department of Endocrinology Diabetes and Metabolism   [address] [place] [id number]   Phone: [phone]  Fax: [phone]    Date of Service: 9/3/2024  Primary Care Physician: Kat Villarreal MD  Provider: Ismael Cordero MD     Reason for the visit:  Papillary thyroid cancer, postsurgical hypothyroidism     History of Present Illness:  The history is provided by the patient. No  was used. Accuracy of the patient data is excellent.    Mago Nichole is a very pleasant 58 y.o. female seen today for management of thyroid cancer     Pt s/p total thyroidectomy in 1/2017   unifocal 4 mm papillary carcinoma, encapsulated. Margins uninvolved by carcinoma. Angioinvasion (vascular invasion)  not identified . Lymphatic Invasion  not identified. Perineural Invasion  not identified   Extrathyroidal Extension  is not identified  Pathologic Staging: pT 1a pNx pM x     PTC Stage I, C1oF3P4,     Excellent response to therapy (total thyroidectomy, 100mCi BERG)  09/04/18 US thyroid: no change in the adenopathy that was seen on the 5/17/18 US.  02/11/20 US thyroid: no change in the appearance of the lymph nodes   05/28/19 Tg <0.1, Tg Ab <0.9  07/22/20 Tg <0.1, Tg Ab <0.9  7/2021 - Tg level <0.1, Tg-Ab negative   September 13, 2023 TSH 1.56, free T41.6     The patient is currently on Synthroid 100 mcg daily. Patient takes Synthroid in the morning at empty stomach, wait one hour before eating , avoid multivitamins containing calcium  or iron with it.       Lab Results   Component Value Date/Time    TSH 1.07 08/20/2024 08:00 PM    T4FREE 1.5 06/03/2024 03:11 PM    FT3 2.3 05/11/2017 02:00 PM    O8JXDGP 79.34 (L) 06/22/2021 10:40 AM    TPOABS 0.4 05/28/2019 12:38 PM       Mago Nichole denies any new lumps, bumps in the neck, voice change or shortness of breath. No family history of thyroid cancer. No prior history of radiation to head or neck region.    Pt

## 2024-09-13 ENCOUNTER — TELEPHONE (OUTPATIENT)
Dept: FAMILY MEDICINE CLINIC | Age: 59
End: 2024-09-13

## 2024-09-13 NOTE — TELEPHONE ENCOUNTER
Mago called and wanted to know if a hiatal hernia showed up on her CT scan she just had done and if so if it was larger in size in comparison from CT scan in 2023-patient is experiencing pain and thinks her hernia has gotten bigger-please advise

## 2024-09-16 ENCOUNTER — TELEPHONE (OUTPATIENT)
Dept: CARDIOLOGY CLINIC | Age: 59
End: 2024-09-16

## 2024-09-16 DIAGNOSIS — R00.2 PALPITATIONS: ICD-10-CM

## 2024-09-17 ENCOUNTER — TELEPHONE (OUTPATIENT)
Dept: CARDIOLOGY CLINIC | Age: 59
End: 2024-09-17

## 2024-09-20 DIAGNOSIS — R10.13 EPIGASTRIC ABDOMINAL PAIN: ICD-10-CM

## 2024-09-20 DIAGNOSIS — R10.9 ABDOMINAL PAIN, UNSPECIFIED ABDOMINAL LOCATION: ICD-10-CM

## 2024-09-21 LAB
CATECHOL/URINE INTERP: NORMAL
CREATININE URINE /24 HR: 1378 MG/D (ref 500–1400)
CREATININE URINE /24 HR: 1378 MG/D (ref 500–1400)
CREATININE URINE /VOLUME: 106 MG/DL
CREATININE URINE /VOLUME: 106 MG/DL
DOPAMINE 24 HOUR URINE: 196 UG/D (ref 71–485)
DOPAMINE, URINE, PER VOLUME: 151 UG/L
DOPAMINE, URINE, RATIO TO CREATININE: 142 UG/G CRT (ref 0–250)
EPINEPHRINE 24 HOUR URINE: 6 UG/D (ref 1–14)
EPINEPHRINE, URINE, PER VOLUME: 5 UG/L
EPINEPHRINE, URINE, RATIO TO CREATININE: 5 UG/G CRT (ref 0–20)
HOURS COLLECTED: NORMAL
HOURS COLLECTED: NORMAL
METANEPHRINE UF INTERPRETATION: NORMAL
METANEPHRINE UG/G CRE: 59 UG/G CRT (ref 0–300)
METANEPHRINES 24 HOUR URINE: 82 UG/D (ref 36–229)
METANEPHRINES, URINE (UMOL/L): 63 UG/L
NOREPINEPHRINE 24 HOUR URINE: 34 UG/D (ref 14–120)
NOREPINEPHRINE CREAT RATIO: 25 UG/G CRT (ref 0–45)
NOREPINEPHRINE, URINE, PER VOLUME: 26 UG/L
NORMETANEPHRINE, (G/CRT): 135 UG/G CRT (ref 0–400)
NORMETANEPHRINE, (NMOL/DAY): 186 UG/D (ref 95–650)
NORMETANEPHRINES, NMOL/L: 143 UG/L

## 2024-09-23 ENCOUNTER — TELEPHONE (OUTPATIENT)
Dept: CARDIOLOGY CLINIC | Age: 59
End: 2024-09-23

## 2024-09-23 DIAGNOSIS — R00.2 PALPITATIONS: Primary | ICD-10-CM

## 2024-09-23 DIAGNOSIS — I47.10 SVT (SUPRAVENTRICULAR TACHYCARDIA) (HCC): Primary | ICD-10-CM

## 2024-09-23 DIAGNOSIS — R00.2 PALPITATIONS: ICD-10-CM

## 2024-09-23 DIAGNOSIS — I10 ESSENTIAL HYPERTENSION: ICD-10-CM

## 2024-09-24 ENCOUNTER — OFFICE VISIT (OUTPATIENT)
Dept: NON INVASIVE DIAGNOSTICS | Age: 59
End: 2024-09-24
Payer: COMMERCIAL

## 2024-09-24 ENCOUNTER — TELEPHONE (OUTPATIENT)
Dept: NON INVASIVE DIAGNOSTICS | Age: 59
End: 2024-09-24

## 2024-09-24 VITALS
SYSTOLIC BLOOD PRESSURE: 132 MMHG | BODY MASS INDEX: 38.44 KG/M2 | HEART RATE: 64 BPM | HEIGHT: 66 IN | WEIGHT: 239.2 LBS | DIASTOLIC BLOOD PRESSURE: 92 MMHG | RESPIRATION RATE: 16 BRPM

## 2024-09-24 DIAGNOSIS — I10 ESSENTIAL HYPERTENSION: ICD-10-CM

## 2024-09-24 DIAGNOSIS — E66.9 OBESITY WITH BODY MASS INDEX (BMI) OF 30.0 TO 39.9: ICD-10-CM

## 2024-09-24 DIAGNOSIS — E07.9 THYROID DISEASE: ICD-10-CM

## 2024-09-24 DIAGNOSIS — R00.2 PALPITATIONS: Primary | ICD-10-CM

## 2024-09-24 DIAGNOSIS — R42 DIZZINESS: ICD-10-CM

## 2024-09-24 PROCEDURE — 3017F COLORECTAL CA SCREEN DOC REV: CPT | Performed by: NURSE PRACTITIONER

## 2024-09-24 PROCEDURE — 3075F SYST BP GE 130 - 139MM HG: CPT | Performed by: NURSE PRACTITIONER

## 2024-09-24 PROCEDURE — 3080F DIAST BP >= 90 MM HG: CPT | Performed by: NURSE PRACTITIONER

## 2024-09-24 PROCEDURE — G8427 DOCREV CUR MEDS BY ELIG CLIN: HCPCS | Performed by: NURSE PRACTITIONER

## 2024-09-24 PROCEDURE — 1036F TOBACCO NON-USER: CPT | Performed by: NURSE PRACTITIONER

## 2024-09-24 PROCEDURE — G8417 CALC BMI ABV UP PARAM F/U: HCPCS | Performed by: NURSE PRACTITIONER

## 2024-09-24 PROCEDURE — 93000 ELECTROCARDIOGRAM COMPLETE: CPT | Performed by: INTERNAL MEDICINE

## 2024-09-24 PROCEDURE — 99215 OFFICE O/P EST HI 40 MIN: CPT | Performed by: NURSE PRACTITIONER

## 2024-10-01 ENCOUNTER — OFFICE VISIT (OUTPATIENT)
Dept: FAMILY MEDICINE CLINIC | Age: 59
End: 2024-10-01
Payer: COMMERCIAL

## 2024-10-01 VITALS
HEIGHT: 66 IN | SYSTOLIC BLOOD PRESSURE: 130 MMHG | TEMPERATURE: 97.2 F | HEART RATE: 72 BPM | BODY MASS INDEX: 38.39 KG/M2 | WEIGHT: 238.9 LBS | RESPIRATION RATE: 16 BRPM | DIASTOLIC BLOOD PRESSURE: 84 MMHG | OXYGEN SATURATION: 98 %

## 2024-10-01 DIAGNOSIS — I10 SEVERE UNCONTROLLED HYPERTENSION: Primary | ICD-10-CM

## 2024-10-01 DIAGNOSIS — T88.7XXA MEDICATION SIDE EFFECT: ICD-10-CM

## 2024-10-01 PROCEDURE — 1036F TOBACCO NON-USER: CPT | Performed by: FAMILY MEDICINE

## 2024-10-01 PROCEDURE — 3017F COLORECTAL CA SCREEN DOC REV: CPT | Performed by: FAMILY MEDICINE

## 2024-10-01 PROCEDURE — G8427 DOCREV CUR MEDS BY ELIG CLIN: HCPCS | Performed by: FAMILY MEDICINE

## 2024-10-01 PROCEDURE — 99214 OFFICE O/P EST MOD 30 MIN: CPT | Performed by: FAMILY MEDICINE

## 2024-10-01 PROCEDURE — G8484 FLU IMMUNIZE NO ADMIN: HCPCS | Performed by: FAMILY MEDICINE

## 2024-10-01 PROCEDURE — G8417 CALC BMI ABV UP PARAM F/U: HCPCS | Performed by: FAMILY MEDICINE

## 2024-10-01 PROCEDURE — 3079F DIAST BP 80-89 MM HG: CPT | Performed by: FAMILY MEDICINE

## 2024-10-01 PROCEDURE — 3075F SYST BP GE 130 - 139MM HG: CPT | Performed by: FAMILY MEDICINE

## 2024-10-01 RX ORDER — METOPROLOL SUCCINATE 25 MG/1
25 TABLET, EXTENDED RELEASE ORAL 3 TIMES DAILY
Qty: 90 TABLET | Refills: 5 | Status: SHIPPED | OUTPATIENT
Start: 2024-10-01

## 2024-10-01 RX ORDER — ESCITALOPRAM OXALATE 10 MG
10 TABLET ORAL DAILY
COMMUNITY
Start: 2024-07-28

## 2024-10-01 NOTE — PROGRESS NOTES
Chief Complaint   Patient presents with    Hypertension       HPI:  Patient is here for follow-up of Hypertension. She is taking Toprol every 8-9 hours. She c/o side effects of fatigue. She has tried 8 different BP meds and stopped due to side effects.     Patient's past medical, surgical, social and/or family history reviewed, updated in chart, and are non-contributory (unless otherwise stated).  Medications and allergies also reviewed and updated in chart.    Review of Systems:  Constitutional:  No fever, no fatigue, no chills, no headaches, no weight change  Dermatology:  No rash, no mole, no dry or sensitive skin  ENT:  No cough, no sore throat, no sinus pain, no runny nose, no ear pain  Cardiology:  No chest pain, no palpitations, no leg edema, no shortness of breath, no PND  Gastroenterology:  No dysphagia, no abdominal pain, no nausea, no vomiting, no constipation, no diarrhea, no heartburn  Musculoskeletal:  No joint pain, no leg cramps, no back pain, no muscle aches  Respiratory:  No shortness of breath, no orthopnea, no wheezing, no ARAYA, no hemoptysis  Urology:  No blood in the urine, no urinary frequency, no urinary incontinence, no urinary urgency, no nocturia, no dysuria  Vitals:    10/01/24 1212 10/01/24 1247   BP: (!) 152/94 130/84   Pulse: 72    Resp: 16    Temp: 97.2 °F (36.2 °C)    TempSrc: Temporal    SpO2: 98%    Weight: 108.4 kg (238 lb 14.4 oz)    Height: 1.676 m (5' 6\")        General:  Patient alert and oriented x 3, NAD, pleasant  HEENT:  Atraumatic, normocephalic, PERRLA, EOMI, clear conjunctiva, TMs clear, nose-clear, throat - no erythema  Neck:  Supple, no goiter, no carotid bruits, no lymphadenopathy  Lungs:  CTA B  Heart:  RRR, no murmurs, gallops or rubs  Abdomen:  Soft/nt/nd, + bowel sounds  Extremities:  No clubbing, cyanosis or edema  Skin: unremarkable    Assessment/Plan:      Mago was seen today for hypertension.    Diagnoses and all orders for this visit:    Severe

## 2024-10-04 DIAGNOSIS — C73 THYROID CANCER (HCC): ICD-10-CM

## 2024-10-04 RX ORDER — LEVOTHYROXINE SODIUM 100 MCG
100 TABLET ORAL DAILY
Qty: 30 TABLET | Refills: 5 | Status: SHIPPED | OUTPATIENT
Start: 2024-10-04

## 2024-10-31 RX ORDER — METOPROLOL SUCCINATE 25 MG/1
25 TABLET, EXTENDED RELEASE ORAL 3 TIMES DAILY
Qty: 270 TABLET | Refills: 3 | Status: SHIPPED | OUTPATIENT
Start: 2024-10-31 | End: 2025-10-26

## 2024-12-09 DIAGNOSIS — C73 THYROID CANCER (HCC): ICD-10-CM

## 2024-12-10 DIAGNOSIS — R92.343 EXTREMELY DENSE TISSUE OF BOTH BREASTS ON MAMMOGRAPHY: ICD-10-CM

## 2024-12-10 DIAGNOSIS — Z12.31 ENCOUNTER FOR SCREENING MAMMOGRAM FOR MALIGNANT NEOPLASM OF BREAST: Primary | ICD-10-CM

## 2024-12-19 DIAGNOSIS — C73 THYROID CANCER (HCC): ICD-10-CM

## 2024-12-23 DIAGNOSIS — R92.343 EXTREMELY DENSE TISSUE OF BOTH BREASTS ON MAMMOGRAPHY: ICD-10-CM

## 2024-12-23 DIAGNOSIS — Z12.31 ENCOUNTER FOR SCREENING MAMMOGRAM FOR MALIGNANT NEOPLASM OF BREAST: ICD-10-CM

## 2024-12-29 ENCOUNTER — TELEPHONE (OUTPATIENT)
Dept: ENDOCRINOLOGY | Age: 59
End: 2024-12-29

## 2024-12-31 ENCOUNTER — TELEPHONE (OUTPATIENT)
Dept: CARDIOLOGY CLINIC | Age: 59
End: 2024-12-31

## 2024-12-31 NOTE — TELEPHONE ENCOUNTER
Patient states that she was not feeling well last night,heart pounding and bp was 157/87, this morning she has a headache and her bp is 179/101, she is taking toprol 25mg TID, please advise

## 2024-12-31 NOTE — TELEPHONE ENCOUNTER
Okay to double up the toprol doses to 50 mguntil her BP and heart pounding improves.     Abigail Villegas D.O.  Cardiologist  Cardiology, [facility]

## 2024-12-31 NOTE — TELEPHONE ENCOUNTER
Patient is asking why she is having these symptoms even though she is taking her medication, she is asking if she needs an echo or other cardiac testing, please advise

## 2024-12-31 NOTE — TELEPHONE ENCOUNTER
Sounds like she may be coming down with something, maybe virus or infection... have her keep an eye out. No need for further tests at this point.

## 2025-01-02 ENCOUNTER — HOSPITAL ENCOUNTER (EMERGENCY)
Age: 60
Discharge: HOME | End: 2025-01-03
Attending: STUDENT IN AN ORGANIZED HEALTH CARE EDUCATION/TRAINING PROGRAM
Payer: COMMERCIAL

## 2025-01-02 ENCOUNTER — OFFICE VISIT (OUTPATIENT)
Dept: FAMILY MEDICINE CLINIC | Age: 60
End: 2025-01-02
Payer: COMMERCIAL

## 2025-01-02 VITALS
HEIGHT: 66 IN | OXYGEN SATURATION: 98 % | TEMPERATURE: 98 F | DIASTOLIC BLOOD PRESSURE: 90 MMHG | SYSTOLIC BLOOD PRESSURE: 152 MMHG | WEIGHT: 245.5 LBS | HEART RATE: 75 BPM | BODY MASS INDEX: 39.46 KG/M2

## 2025-01-02 DIAGNOSIS — E55.9 VITAMIN D DEFICIENCY: ICD-10-CM

## 2025-01-02 DIAGNOSIS — E87.6 HYPOKALEMIA: ICD-10-CM

## 2025-01-02 DIAGNOSIS — E53.8 B12 DEFICIENCY: ICD-10-CM

## 2025-01-02 DIAGNOSIS — I10 ESSENTIAL HYPERTENSION: Primary | ICD-10-CM

## 2025-01-02 DIAGNOSIS — E86.0 MILD DEHYDRATION: ICD-10-CM

## 2025-01-02 DIAGNOSIS — R11.0 NAUSEA: ICD-10-CM

## 2025-01-02 DIAGNOSIS — F41.1 ANXIETY STATE: ICD-10-CM

## 2025-01-02 DIAGNOSIS — J10.1 INFLUENZA A: Primary | ICD-10-CM

## 2025-01-02 PROCEDURE — 6370000000 HC RX 637 (ALT 250 FOR IP): Performed by: STUDENT IN AN ORGANIZED HEALTH CARE EDUCATION/TRAINING PROGRAM

## 2025-01-02 PROCEDURE — 3080F DIAST BP >= 90 MM HG: CPT | Performed by: FAMILY MEDICINE

## 2025-01-02 PROCEDURE — 99285 EMERGENCY DEPT VISIT HI MDM: CPT

## 2025-01-02 PROCEDURE — 83735 ASSAY OF MAGNESIUM: CPT

## 2025-01-02 PROCEDURE — 99214 OFFICE O/P EST MOD 30 MIN: CPT | Performed by: FAMILY MEDICINE

## 2025-01-02 PROCEDURE — 3017F COLORECTAL CA SCREEN DOC REV: CPT | Performed by: FAMILY MEDICINE

## 2025-01-02 PROCEDURE — 1036F TOBACCO NON-USER: CPT | Performed by: FAMILY MEDICINE

## 2025-01-02 PROCEDURE — 96374 THER/PROPH/DIAG INJ IV PUSH: CPT

## 2025-01-02 PROCEDURE — 6360000002 HC RX W HCPCS: Performed by: STUDENT IN AN ORGANIZED HEALTH CARE EDUCATION/TRAINING PROGRAM

## 2025-01-02 PROCEDURE — 80053 COMPREHEN METABOLIC PANEL: CPT

## 2025-01-02 PROCEDURE — G8417 CALC BMI ABV UP PARAM F/U: HCPCS | Performed by: FAMILY MEDICINE

## 2025-01-02 PROCEDURE — 96361 HYDRATE IV INFUSION ADD-ON: CPT

## 2025-01-02 PROCEDURE — G8427 DOCREV CUR MEDS BY ELIG CLIN: HCPCS | Performed by: FAMILY MEDICINE

## 2025-01-02 PROCEDURE — 96375 TX/PRO/DX INJ NEW DRUG ADDON: CPT

## 2025-01-02 PROCEDURE — 85025 COMPLETE CBC W/AUTO DIFF WBC: CPT

## 2025-01-02 PROCEDURE — 3077F SYST BP >= 140 MM HG: CPT | Performed by: FAMILY MEDICINE

## 2025-01-02 PROCEDURE — 2580000003 HC RX 258: Performed by: STUDENT IN AN ORGANIZED HEALTH CARE EDUCATION/TRAINING PROGRAM

## 2025-01-02 RX ORDER — ONDANSETRON 2 MG/ML
4 INJECTION INTRAMUSCULAR; INTRAVENOUS ONCE
Status: COMPLETED | OUTPATIENT
Start: 2025-01-02 | End: 2025-01-02

## 2025-01-02 RX ORDER — 0.9 % SODIUM CHLORIDE 0.9 %
1000 INTRAVENOUS SOLUTION INTRAVENOUS ONCE
Status: COMPLETED | OUTPATIENT
Start: 2025-01-02 | End: 2025-01-03

## 2025-01-02 RX ORDER — HYDROXYZINE PAMOATE 25 MG/1
50 CAPSULE ORAL ONCE
Status: COMPLETED | OUTPATIENT
Start: 2025-01-02 | End: 2025-01-02

## 2025-01-02 RX ORDER — KETOROLAC TROMETHAMINE 15 MG/ML
15 INJECTION, SOLUTION INTRAMUSCULAR; INTRAVENOUS ONCE
Status: COMPLETED | OUTPATIENT
Start: 2025-01-02 | End: 2025-01-02

## 2025-01-02 RX ADMIN — SODIUM CHLORIDE 1000 ML: 9 INJECTION, SOLUTION INTRAVENOUS at 23:57

## 2025-01-02 RX ADMIN — ONDANSETRON 4 MG: 2 INJECTION, SOLUTION INTRAMUSCULAR; INTRAVENOUS at 23:56

## 2025-01-02 RX ADMIN — KETOROLAC TROMETHAMINE 15 MG: 15 INJECTION, SOLUTION INTRAMUSCULAR; INTRAVENOUS at 23:56

## 2025-01-02 RX ADMIN — HYDROXYZINE PAMOATE 50 MG: 25 CAPSULE ORAL at 23:56

## 2025-01-02 ASSESSMENT — PATIENT HEALTH QUESTIONNAIRE - PHQ9
SUM OF ALL RESPONSES TO PHQ QUESTIONS 1-9: 0
2. FEELING DOWN, DEPRESSED OR HOPELESS: NOT AT ALL
SUM OF ALL RESPONSES TO PHQ QUESTIONS 1-9: 0
SUM OF ALL RESPONSES TO PHQ QUESTIONS 1-9: 0
SUM OF ALL RESPONSES TO PHQ9 QUESTIONS 1 & 2: 0
SUM OF ALL RESPONSES TO PHQ QUESTIONS 1-9: 0
1. LITTLE INTEREST OR PLEASURE IN DOING THINGS: NOT AT ALL

## 2025-01-02 NOTE — PROGRESS NOTES
Mago Nichole (:  1965) is a 59 y.o. female, Established patient, here for evaluation of the following chief complaint(s):  Hypertension (Patient states her blood pressure is still running high. )         Assessment & Plan  1. Hypertension.  Her blood pressure remains elevated despite being on metoprolol 25 mg three times daily. She was advised to increase the dosage to 50 mg if her systolic blood pressure exceeds 160 mmHg. She took metoprolol at 5:30 AM today. She is instructed to monitor her blood pressure throughout the day. A recheck of her blood pressure was performed during the visit, showing 152/90 mmHg.    2. Cough.  She started coughing this morning and reports feeling tightness in her lungs. She does not have a fever, chills, or body aches. The cough is likely due to the flu, causing wheezing. An albuterol inhaler will be prescribed for use as needed to alleviate the wheezing and facilitate easier breathing. She is also advised to use cough syrup to aid in rest, maintain hydration, and ensure adequate rest.    3. Health Maintenance.  Orders for blood work have been placed to check her vitamin levels.     Results    Mago was seen today for hypertension.    Diagnoses and all orders for this visit:    Essential hypertension  -     Comprehensive Metabolic Panel; Future  -     CBC; Future    Vitamin D deficiency  -     Vitamin D 25 Hydroxy; Future    B12 deficiency  -     Vitamin B12 & Folate; Future      1. Essential hypertension  -     Comprehensive Metabolic Panel; Future  -     CBC; Future  2. Vitamin D deficiency  -     Vitamin D 25 Hydroxy; Future  3. B12 deficiency  -     Vitamin B12 & Folate; Future    No follow-ups on file.         Subjective   History of Present Illness  The patient is a 59-year-old female who presents today for follow-up of hypertension.    She reports that her heart rate frequently drops to 50 beats per minute. She has been experiencing elevated blood pressure,

## 2025-01-03 ENCOUNTER — APPOINTMENT (OUTPATIENT)
Dept: GENERAL RADIOLOGY | Age: 60
End: 2025-01-03
Payer: COMMERCIAL

## 2025-01-03 VITALS
HEIGHT: 64 IN | TEMPERATURE: 98.4 F | BODY MASS INDEX: 40.12 KG/M2 | DIASTOLIC BLOOD PRESSURE: 81 MMHG | OXYGEN SATURATION: 98 % | WEIGHT: 235 LBS | SYSTOLIC BLOOD PRESSURE: 147 MMHG | HEART RATE: 92 BPM | RESPIRATION RATE: 22 BRPM

## 2025-01-03 LAB
ALBUMIN SERPL-MCNC: 4.2 G/DL (ref 3.5–5.2)
ALP SERPL-CCNC: 71 U/L (ref 35–104)
ALT SERPL-CCNC: 20 U/L (ref 0–32)
ANION GAP SERPL CALCULATED.3IONS-SCNC: 17 MMOL/L (ref 7–16)
AST SERPL-CCNC: 20 U/L (ref 0–31)
BASOPHILS # BLD: 0.03 K/UL (ref 0–0.2)
BASOPHILS NFR BLD: 0 % (ref 0–2)
BILIRUB SERPL-MCNC: 1.2 MG/DL (ref 0–1.2)
BUN SERPL-MCNC: 13 MG/DL (ref 6–20)
CALCIUM SERPL-MCNC: 9 MG/DL (ref 8.6–10.2)
CHLORIDE SERPL-SCNC: 100 MMOL/L (ref 98–107)
CO2 SERPL-SCNC: 19 MMOL/L (ref 22–29)
CREAT SERPL-MCNC: 0.7 MG/DL (ref 0.5–1)
EKG ATRIAL RATE: 98 BPM
EKG P AXIS: 25 DEGREES
EKG P-R INTERVAL: 212 MS
EKG Q-T INTERVAL: 542 MS
EKG QRS DURATION: 72 MS
EKG QTC CALCULATION (BAZETT): 691 MS
EKG R AXIS: 12 DEGREES
EKG T AXIS: 32 DEGREES
EKG VENTRICULAR RATE: 98 BPM
EOSINOPHIL # BLD: 0 K/UL (ref 0.05–0.5)
EOSINOPHILS RELATIVE PERCENT: 0 % (ref 0–6)
ERYTHROCYTE [DISTWIDTH] IN BLOOD BY AUTOMATED COUNT: 12.5 % (ref 11.5–15)
GFR, ESTIMATED: >90 ML/MIN/1.73M2
GLUCOSE SERPL-MCNC: 126 MG/DL (ref 74–99)
HCT VFR BLD AUTO: 38 % (ref 34–48)
HGB BLD-MCNC: 13.2 G/DL (ref 11.5–15.5)
IMM GRANULOCYTES # BLD AUTO: 0.06 K/UL (ref 0–0.58)
IMM GRANULOCYTES NFR BLD: 1 % (ref 0–5)
LYMPHOCYTES NFR BLD: 0.42 K/UL (ref 1.5–4)
LYMPHOCYTES RELATIVE PERCENT: 5 % (ref 20–42)
MAGNESIUM SERPL-MCNC: 1.7 MG/DL (ref 1.6–2.6)
MCH RBC QN AUTO: 29.2 PG (ref 26–35)
MCHC RBC AUTO-ENTMCNC: 34.7 G/DL (ref 32–34.5)
MCV RBC AUTO: 84.1 FL (ref 80–99.9)
MONOCYTES NFR BLD: 0.73 K/UL (ref 0.1–0.95)
MONOCYTES NFR BLD: 8 % (ref 2–12)
NEUTROPHILS NFR BLD: 86 % (ref 43–80)
NEUTS SEG NFR BLD: 7.63 K/UL (ref 1.8–7.3)
PLATELET # BLD AUTO: 234 K/UL (ref 130–450)
PMV BLD AUTO: 9.5 FL (ref 7–12)
POTASSIUM SERPL-SCNC: 3.4 MMOL/L (ref 3.5–5)
PROT SERPL-MCNC: 7.6 G/DL (ref 6.4–8.3)
RBC # BLD AUTO: 4.52 M/UL (ref 3.5–5.5)
RBC # BLD: NORMAL 10*6/UL
SODIUM SERPL-SCNC: 136 MMOL/L (ref 132–146)
WBC OTHER # BLD: 8.9 K/UL (ref 4.5–11.5)

## 2025-01-03 PROCEDURE — 71046 X-RAY EXAM CHEST 2 VIEWS: CPT

## 2025-01-03 PROCEDURE — 93010 ELECTROCARDIOGRAM REPORT: CPT | Performed by: INTERNAL MEDICINE

## 2025-01-03 PROCEDURE — 6370000000 HC RX 637 (ALT 250 FOR IP): Performed by: STUDENT IN AN ORGANIZED HEALTH CARE EDUCATION/TRAINING PROGRAM

## 2025-01-03 PROCEDURE — 93005 ELECTROCARDIOGRAM TRACING: CPT | Performed by: STUDENT IN AN ORGANIZED HEALTH CARE EDUCATION/TRAINING PROGRAM

## 2025-01-03 RX ORDER — POTASSIUM CHLORIDE 750 MG/1
10 TABLET, EXTENDED RELEASE ORAL ONCE
Status: COMPLETED | OUTPATIENT
Start: 2025-01-03 | End: 2025-01-03

## 2025-01-03 RX ORDER — HYDROXYZINE PAMOATE 25 MG/1
25 CAPSULE ORAL EVERY 8 HOURS PRN
Qty: 15 CAPSULE | Refills: 0 | Status: SHIPPED | OUTPATIENT
Start: 2025-01-03

## 2025-01-03 RX ORDER — ONDANSETRON 4 MG/1
4 TABLET, ORALLY DISINTEGRATING ORAL EVERY 8 HOURS PRN
Qty: 15 TABLET | Refills: 0 | Status: SHIPPED | OUTPATIENT
Start: 2025-01-03

## 2025-01-03 RX ORDER — OSELTAMIVIR PHOSPHATE 75 MG/1
75 CAPSULE ORAL 2 TIMES DAILY
Qty: 10 CAPSULE | Refills: 0 | Status: SHIPPED | OUTPATIENT
Start: 2025-01-03 | End: 2025-01-08

## 2025-01-03 RX ADMIN — POTASSIUM CHLORIDE 10 MEQ: 750 TABLET, EXTENDED RELEASE ORAL at 02:02

## 2025-01-03 NOTE — ED PROVIDER NOTES
[facility] EMERGENCY DEPARTMENT  EMERGENCY DEPARTMENT ENCOUNTER        Pt Name: Mago Nichole  MRN: [id number]  Birthdate 1965  Date of evaluation: 1/2/2025  Provider: Bernadette Meehan DO  PCP: Kat Villarreal MD  Note Started: 1:11 AM EST 1/3/25    CHIEF COMPLAINT       Chief Complaint   Patient presents with    Influenza     Dx with Flu A today at pcp. Body aches, fever. PCP told her to take motrin for fever already... and she did.     Anxiety       HISTORY OF PRESENT ILLNESS: 1 or more Elements     Limitations to history : None    Mago Nichole is a 59 y.o. female who presents to the emergency department for evaluation of fatigue, body aches, cough and nasal congestion, subjective fever at home, nausea, abdominal cramping.  Patient states that she was diagnosed with influenza today by her PCP.  Today at home  says that her fever spikes, says it was 101 °F and the patient started saying repeatedly \"I can't!  I can't!  I can't!\"  Patient remembers doing this.  Says that she does have a history of anxiety.   became very concerned.  They state her doctor told her to take Motrin for fever which she did but she was too anxious about her symptoms so they came in to be evaluated.  She called an ambulance to bring her to the hospital.  Additionally, the patient thinks maybe this episode was caused by taking an extra dose of metoprolol; she saw her PCP today and has been having high blood pressures at home and they told her to take an extra dose of metoprolol if her blood pressure was over 160 systolic.  She says about an hour after taking the metoprolol her fever spiked and she got very anxious.  She also reports nausea, no emesis.  Reports mild upper abdominal cramping.  No diarrhea, constipation, black or bloody stools or abnormal urinary symptoms.  No headaches dizziness numbness or weakness anywhere.          Nursing Notes were all reviewed  accuracy; however, inadvertent computerized transcription errors may be present             Bernadette Meehan DO  01/04/25 6700

## 2025-01-03 NOTE — DISCHARGE INSTRUCTIONS
Please return to the ER for any new or worsening symptoms  If prescribed, please be sure to  your prescriptions from the pharmacy  Please follow-up with Primary care provider as instructed    XR CHEST (2 VW)   Final Result   No acute process.

## 2025-01-04 ASSESSMENT — ENCOUNTER SYMPTOMS
VOMITING: 0
NAUSEA: 1
CONSTIPATION: 0
ABDOMINAL PAIN: 1
DIARRHEA: 0
COUGH: 1
SHORTNESS OF BREATH: 0

## 2025-01-06 ENCOUNTER — OFFICE VISIT (OUTPATIENT)
Dept: FAMILY MEDICINE CLINIC | Age: 60
End: 2025-01-06

## 2025-01-06 VITALS
TEMPERATURE: 97.4 F | OXYGEN SATURATION: 97 % | HEART RATE: 84 BPM | SYSTOLIC BLOOD PRESSURE: 134 MMHG | BODY MASS INDEX: 40.85 KG/M2 | RESPIRATION RATE: 20 BRPM | WEIGHT: 238 LBS | DIASTOLIC BLOOD PRESSURE: 86 MMHG

## 2025-01-06 DIAGNOSIS — R05.9 COUGH, UNSPECIFIED TYPE: ICD-10-CM

## 2025-01-06 DIAGNOSIS — J10.1 INFLUENZA A: Primary | ICD-10-CM

## 2025-01-06 PROCEDURE — 3079F DIAST BP 80-89 MM HG: CPT

## 2025-01-06 PROCEDURE — 99214 OFFICE O/P EST MOD 30 MIN: CPT

## 2025-01-06 PROCEDURE — 3075F SYST BP GE 130 - 139MM HG: CPT

## 2025-01-06 RX ORDER — BENZONATATE 100 MG/1
100 CAPSULE ORAL 3 TIMES DAILY PRN
Qty: 21 CAPSULE | Refills: 0 | Status: SHIPPED | OUTPATIENT
Start: 2025-01-06 | End: 2025-01-13

## 2025-01-06 NOTE — PROGRESS NOTES
cervical adenopathy.  Lungs: CTAB without wheezes, rales, or rhonchi  Heart:  Regular rate and rhythm, normal heart sounds, without pathological murmurs, ectopy, gallops, or rubs.  Skin:  Normal turgor.  Warm, dry, without visible rash.  Neurological:  Alert and oriented.  Motor functions intact.  Responds to verbal commands.     Lab / Imaging Results   (All laboratory and radiology results have been personally reviewed by myself)  Labs:  No results found for this visit on 01/06/25.    Imaging:  All Radiology results interpreted by Radiologist unless otherwise noted.      Assessment / Plan     Impression(s):  Mago was seen today for congestion and cough.    Diagnoses and all orders for this visit:    Influenza A    Cough, unspecified type  -     benzonatate (TESSALON) 100 MG capsule; Take 1 capsule by mouth 3 times daily as needed for Cough      Disposition:  Disposition: Discharge to home.    Vital signs, past medical history, medications, and allergies reviewed at visit.    ER visit and testing reviewed in detail.    Patient had comprehensive workup on 1/2/2024 for influenza positivity.  No signs of worsening infection today.  Vital stable.  No acute distress.  Will continue to treat symptomatically.  Increase fluids and rest. Additional symptomatic relief discussed.    Script written for Tessalon Perles, side effects discussed.      Follow up PCP no improvement after 7-10 days of symptoms. ED sooner if symptoms worsen or change. Red flag symptoms discussed. Pt is in agreement with this care plan. All questions answered.    MARGOT Leroy    **This report was transcribed using voice recognition software. The patient (or guardian, if applicable) and other individuals in attendance with the patient were advised that if Artificial Intelligence would be utilized during this visit to record and process the conversation to generate a clinical note they would be informed prior to start of the visit. The patient

## 2025-01-14 ENCOUNTER — TELEPHONE (OUTPATIENT)
Dept: FAMILY MEDICINE CLINIC | Age: 60
End: 2025-01-14

## 2025-01-14 RX ORDER — AMOXICILLIN 500 MG/1
2000 TABLET, FILM COATED ORAL ONCE
Qty: 4 TABLET | Refills: 1 | Status: SHIPPED
Start: 2025-01-14 | End: 2025-01-17 | Stop reason: SDUPTHER

## 2025-01-14 NOTE — TELEPHONE ENCOUNTER
PT called in with complaints of getting over a cold/flu and is complaining of ear pain. No appointments for a week so I recommended she go to a walk in.

## 2025-01-15 RX ORDER — AMOXICILLIN 500 MG/1
CAPSULE ORAL
Qty: 4 CAPSULE | Refills: 1 | OUTPATIENT
Start: 2025-01-15

## 2025-01-15 RX ORDER — METOPROLOL SUCCINATE 25 MG/1
25 TABLET, EXTENDED RELEASE ORAL 3 TIMES DAILY
Qty: 270 TABLET | Refills: 3 | Status: SHIPPED | OUTPATIENT
Start: 2025-01-15 | End: 2026-01-10

## 2025-01-17 ENCOUNTER — OFFICE VISIT (OUTPATIENT)
Dept: FAMILY MEDICINE CLINIC | Age: 60
End: 2025-01-17

## 2025-01-17 VITALS
WEIGHT: 240 LBS | SYSTOLIC BLOOD PRESSURE: 110 MMHG | HEART RATE: 73 BPM | OXYGEN SATURATION: 98 % | DIASTOLIC BLOOD PRESSURE: 84 MMHG | RESPIRATION RATE: 18 BRPM | BODY MASS INDEX: 41.2 KG/M2 | TEMPERATURE: 97.1 F

## 2025-01-17 DIAGNOSIS — J01.40 ACUTE NON-RECURRENT PANSINUSITIS: ICD-10-CM

## 2025-01-17 DIAGNOSIS — H69.93 DYSFUNCTION OF BOTH EUSTACHIAN TUBES: Primary | ICD-10-CM

## 2025-01-17 PROCEDURE — 3074F SYST BP LT 130 MM HG: CPT | Performed by: FAMILY MEDICINE

## 2025-01-17 PROCEDURE — 3079F DIAST BP 80-89 MM HG: CPT | Performed by: FAMILY MEDICINE

## 2025-01-17 PROCEDURE — 99213 OFFICE O/P EST LOW 20 MIN: CPT | Performed by: FAMILY MEDICINE

## 2025-01-17 RX ORDER — AMOXICILLIN 500 MG/1
500 TABLET, FILM COATED ORAL 3 TIMES DAILY
Qty: 30 TABLET | Refills: 0 | Status: SHIPPED | OUTPATIENT
Start: 2025-01-17

## 2025-01-17 SDOH — ECONOMIC STABILITY: FOOD INSECURITY: WITHIN THE PAST 12 MONTHS, YOU WORRIED THAT YOUR FOOD WOULD RUN OUT BEFORE YOU GOT MONEY TO BUY MORE.: NEVER TRUE

## 2025-01-17 SDOH — ECONOMIC STABILITY: FOOD INSECURITY: WITHIN THE PAST 12 MONTHS, THE FOOD YOU BOUGHT JUST DIDN'T LAST AND YOU DIDN'T HAVE MONEY TO GET MORE.: NEVER TRUE

## 2025-01-17 NOTE — PROGRESS NOTES
comfortable with and wishes to proceed with above treatment plan.    Advised patient to call with any new medication issues, and read all Rx info from pharmacy to assure aware of all possible risks and side effects of medication before taking.    Reviewed age and gender appropriate health screening exams and vaccinations.  Advised patient regarding importance of keeping up with recommended health maintenance and to schedule as soon as possible if overdue, as this is important in assessing for undiagnosed pathology, especially cancer, as well as protecting against potentially harmful/life threatening disease.        Patient and/or guardian verbalizes understanding and agrees with above counseling, assessment and plan.    All questions answered.      I have personally reviewed and updated the chief complaint, HPI, Past Medical, Family and Social History, as well as the above Review of Systems.     The patient (or guardian, if applicable) and other individuals in attendance with the patient were advised that Artificial Intelligence will be utilized during this visit to record, process the conversation to generate a clinical note and to support improvement of the AI technology. The patient (or guardian, if applicable) and other individuals in attendance at the appointment consented to the use of AI, including the recording.      An electronic signature was used to authenticate this note.    --Kat Villarreal MD

## 2025-01-29 DIAGNOSIS — I10 ESSENTIAL HYPERTENSION: ICD-10-CM

## 2025-01-29 DIAGNOSIS — E55.9 VITAMIN D DEFICIENCY: ICD-10-CM

## 2025-01-29 DIAGNOSIS — E53.8 B12 DEFICIENCY: ICD-10-CM

## 2025-01-29 LAB
ALBUMIN: 4.2 G/DL (ref 3.5–5.2)
ALP BLD-CCNC: 68 U/L (ref 35–104)
ALT SERPL-CCNC: 21 U/L (ref 0–32)
ANION GAP SERPL CALCULATED.3IONS-SCNC: 15 MMOL/L (ref 7–16)
AST SERPL-CCNC: 26 U/L (ref 0–31)
BILIRUB SERPL-MCNC: 0.7 MG/DL (ref 0–1.2)
BUN BLDV-MCNC: 15 MG/DL (ref 6–20)
CALCIUM SERPL-MCNC: 9.1 MG/DL (ref 8.6–10.2)
CHLORIDE BLD-SCNC: 106 MMOL/L (ref 98–107)
CO2: 23 MMOL/L (ref 22–29)
CREAT SERPL-MCNC: 0.7 MG/DL (ref 0.5–1)
FOLATE: 16.6 NG/ML (ref 4.8–24.2)
GFR, ESTIMATED: >90 ML/MIN/1.73M2
GLUCOSE BLD-MCNC: 99 MG/DL (ref 74–99)
HCT VFR BLD CALC: 41.6 % (ref 34–48)
HEMOGLOBIN: 13.1 G/DL (ref 11.5–15.5)
MCH RBC QN AUTO: 28.4 PG (ref 26–35)
MCHC RBC AUTO-ENTMCNC: 31.5 G/DL (ref 32–34.5)
MCV RBC AUTO: 90.2 FL (ref 80–99.9)
PDW BLD-RTO: 12.7 % (ref 11.5–15)
PLATELET # BLD: 271 K/UL (ref 130–450)
PMV BLD AUTO: 10.4 FL (ref 7–12)
POTASSIUM SERPL-SCNC: 5.4 MMOL/L (ref 3.5–5)
RBC # BLD: 4.61 M/UL (ref 3.5–5.5)
SODIUM BLD-SCNC: 144 MMOL/L (ref 132–146)
TOTAL PROTEIN: 7.1 G/DL (ref 6.4–8.3)
VITAMIN B-12: 328 PG/ML (ref 211–946)
VITAMIN D 25-HYDROXY: 15.7 NG/ML (ref 30–100)
WBC # BLD: 5.6 K/UL (ref 4.5–11.5)

## 2025-01-30 RX ORDER — ERGOCALCIFEROL 1.25 MG/1
50000 CAPSULE, LIQUID FILLED ORAL WEEKLY
Qty: 12 CAPSULE | Refills: 0 | Status: SHIPPED | OUTPATIENT
Start: 2025-01-30 | End: 2025-04-30

## 2025-01-30 RX ORDER — ERGOCALCIFEROL 1.25 MG/1
50000 CAPSULE, LIQUID FILLED ORAL WEEKLY
Qty: 12 CAPSULE | Refills: 1 | Status: SHIPPED | OUTPATIENT
Start: 2025-01-30

## 2025-02-18 ENCOUNTER — OFFICE VISIT (OUTPATIENT)
Dept: FAMILY MEDICINE CLINIC | Age: 60
End: 2025-02-18
Payer: COMMERCIAL

## 2025-02-18 VITALS
DIASTOLIC BLOOD PRESSURE: 92 MMHG | HEART RATE: 85 BPM | RESPIRATION RATE: 18 BRPM | SYSTOLIC BLOOD PRESSURE: 146 MMHG | OXYGEN SATURATION: 97 % | WEIGHT: 240 LBS | TEMPERATURE: 97.5 F | BODY MASS INDEX: 41.2 KG/M2

## 2025-02-18 DIAGNOSIS — M54.31 SCIATICA, RIGHT SIDE: Primary | ICD-10-CM

## 2025-02-18 PROCEDURE — 3080F DIAST BP >= 90 MM HG: CPT | Performed by: PHYSICIAN ASSISTANT

## 2025-02-18 PROCEDURE — 99214 OFFICE O/P EST MOD 30 MIN: CPT | Performed by: PHYSICIAN ASSISTANT

## 2025-02-18 PROCEDURE — 96372 THER/PROPH/DIAG INJ SC/IM: CPT | Performed by: PHYSICIAN ASSISTANT

## 2025-02-18 PROCEDURE — 3077F SYST BP >= 140 MM HG: CPT | Performed by: PHYSICIAN ASSISTANT

## 2025-02-18 RX ORDER — METHYLPREDNISOLONE ACETATE 40 MG/ML
40 INJECTION, SUSPENSION INTRA-ARTICULAR; INTRALESIONAL; INTRAMUSCULAR; SOFT TISSUE ONCE
Status: COMPLETED | OUTPATIENT
Start: 2025-02-18 | End: 2025-02-18

## 2025-02-18 RX ORDER — TIZANIDINE 2 MG/1
2 TABLET ORAL 4 TIMES DAILY PRN
Qty: 12 TABLET | Refills: 0 | Status: SHIPPED
Start: 2025-02-18 | End: 2025-02-22

## 2025-02-18 RX ADMIN — METHYLPREDNISOLONE ACETATE 40 MG: 40 INJECTION, SUSPENSION INTRA-ARTICULAR; INTRALESIONAL; INTRAMUSCULAR; SOFT TISSUE at 14:51

## 2025-02-18 NOTE — PROGRESS NOTES
25  Mago Nichole : 1965 Sex: female  Age 59 y.o.      Subjective:  Chief Complaint   Patient presents with    Lower Back Pain     Right side - radiating down into leg         HPI:     History of Present Illness  59-year-old female presents to [facility] care for evaluation of right-sided back pain.  The patient has had this right-sided back pain ongoing for about a week and a half now.  The patient denies any falls, injury, no trauma.  The patient states that there are certain movements that aggravate the pain the pain does radiate down the right leg.  The patient is not any fevers, chills.  The patient is not having numbness, tingling.  No bladder or bowel incontinence, urinary tension or saddle anesthesia.  No dysuria, hematuria or abdominal pain.  No fevers.          ROS:   Unless otherwise stated in this report the patient's positive and negative responses for review of systems for constitutional, eyes, ENT, cardiovascular, respiratory, gastrointestinal, neurological, , musculoskeletal, and integument systems and related systems to the presenting problem are either stated in the history of present illness or were not pertinent or were negative for the symptoms and/or complaints related to the presenting medical problem.  Positives and pertinent negatives as per HPI.  All others reviewed and are negative.      PMH:     Past Medical History:   Diagnosis Date    Allergic rhinitis     Anxiety     Atrial fibrillation (HCC)     Depression     H. 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

## 2025-02-19 ENCOUNTER — OFFICE VISIT (OUTPATIENT)
Dept: FAMILY MEDICINE CLINIC | Age: 60
End: 2025-02-19
Payer: COMMERCIAL

## 2025-02-19 ENCOUNTER — HOSPITAL ENCOUNTER (OUTPATIENT)
Dept: CT IMAGING | Age: 60
Discharge: HOME | End: 2025-02-21
Attending: FAMILY MEDICINE
Payer: COMMERCIAL

## 2025-02-19 VITALS
TEMPERATURE: 97.6 F | WEIGHT: 235 LBS | BODY MASS INDEX: 40.12 KG/M2 | OXYGEN SATURATION: 98 % | SYSTOLIC BLOOD PRESSURE: 132 MMHG | HEART RATE: 81 BPM | HEIGHT: 64 IN | DIASTOLIC BLOOD PRESSURE: 74 MMHG

## 2025-02-19 DIAGNOSIS — M54.31 SCIATICA, RIGHT SIDE: ICD-10-CM

## 2025-02-19 DIAGNOSIS — R10.31 RLQ ABDOMINAL PAIN: Primary | ICD-10-CM

## 2025-02-19 DIAGNOSIS — R10.31 RLQ ABDOMINAL PAIN: ICD-10-CM

## 2025-02-19 PROBLEM — C73 THYROID CANCER (HCC): Status: RESOLVED | Noted: 2018-01-17 | Resolved: 2025-02-19

## 2025-02-19 PROCEDURE — 99214 OFFICE O/P EST MOD 30 MIN: CPT | Performed by: FAMILY MEDICINE

## 2025-02-19 PROCEDURE — 6360000004 HC RX CONTRAST MEDICATION: Performed by: RADIOLOGY

## 2025-02-19 PROCEDURE — 74177 CT ABD & PELVIS W/CONTRAST: CPT

## 2025-02-19 PROCEDURE — 3078F DIAST BP <80 MM HG: CPT | Performed by: FAMILY MEDICINE

## 2025-02-19 PROCEDURE — 3075F SYST BP GE 130 - 139MM HG: CPT | Performed by: FAMILY MEDICINE

## 2025-02-19 RX ORDER — IOPAMIDOL 755 MG/ML
75 INJECTION, SOLUTION INTRAVASCULAR
Status: COMPLETED | OUTPATIENT
Start: 2025-02-19 | End: 2025-02-19

## 2025-02-19 RX ORDER — IOPAMIDOL 755 MG/ML
18 INJECTION, SOLUTION INTRAVASCULAR
Status: COMPLETED | OUTPATIENT
Start: 2025-02-19 | End: 2025-02-19

## 2025-02-19 RX ADMIN — IOPAMIDOL 75 ML: 755 INJECTION, SOLUTION INTRAVENOUS at 13:36

## 2025-02-19 RX ADMIN — IOPAMIDOL 18 ML: 755 INJECTION, SOLUTION INTRAVENOUS at 13:36

## 2025-02-19 NOTE — PROGRESS NOTES
Mago Nichole (:  1965) is a 59 y.o. female, Established patient, here for evaluation of the following chief complaint(s):  Back Pain (Seen thru walk in for sciatic - feels her bowels are hurting now)         Assessment & Plan  1. Abdominal pain.  The patient reports severe abdominal pain that started last night, described as a sharp ache and soreness. The pain is primarily on the right side but also present on the left. She received a cortisone shot for back pain, which did not alleviate her symptoms and may have exacerbated them. There is no fever, vomiting, or significant changes in bowel movements, although she feels slightly constipated. Physical examination reveals tenderness in the right lower quadrant and some tenderness on the left. Differential diagnoses include appendicitis and diverticulitis. A stat CT scan of the abdomen will be ordered to rule out appendicitis and diverticulitis.    PROCEDURE  The patient received a cortisone injection at the urgent care facility yesterday.    Results    Mago was seen today for back pain.    Diagnoses and all orders for this visit:    RLQ abdominal pain  -     CT ABDOMEN PELVIS W IV CONTRAST Additional Contrast? Oral; Future    Sciatica, right side      1. RLQ abdominal pain  -     CT ABDOMEN PELVIS W IV CONTRAST Additional Contrast? Oral; Future  2. Sciatica, right side    No follow-ups on file.         Subjective   History of Present Illness  The patient is a 59-year-old female who presents today to follow up on an urgent care visit.    She sought medical attention at an urgent care facility yesterday due to persistent back pain, which she attributes to an incident that occurred approximately 1.5 weeks ago. The pain has been severe enough to disrupt her sleep, confining her to a single position. She describes the sensation as akin to being kicked in the groin and punched in the buttock, with the pain radiating from the front to the back. The onset

## 2025-02-20 ENCOUNTER — TELEPHONE (OUTPATIENT)
Dept: FAMILY MEDICINE CLINIC | Age: 60
End: 2025-02-20

## 2025-02-20 NOTE — TELEPHONE ENCOUNTER
Mago called and is still in a lot of pain in her bowels and wanted to know if you could send something in for her

## 2025-02-20 NOTE — TELEPHONE ENCOUNTER
Mago called the office and seen Dr. BRYAN ayon and pt would like to only have 10 mg instead of 20 sent to her pharmacy.

## 2025-02-21 RX ORDER — DICYCLOMINE HYDROCHLORIDE 10 MG/1
10 CAPSULE ORAL
Qty: 120 CAPSULE | Refills: 0 | Status: SHIPPED | OUTPATIENT
Start: 2025-02-21

## 2025-02-22 ENCOUNTER — HOSPITAL ENCOUNTER (EMERGENCY)
Age: 60
Discharge: HOME | End: 2025-02-22
Attending: EMERGENCY MEDICINE
Payer: COMMERCIAL

## 2025-02-22 ENCOUNTER — APPOINTMENT (OUTPATIENT)
Dept: CT IMAGING | Age: 60
End: 2025-02-22
Payer: COMMERCIAL

## 2025-02-22 VITALS
DIASTOLIC BLOOD PRESSURE: 75 MMHG | TEMPERATURE: 97.3 F | HEART RATE: 70 BPM | OXYGEN SATURATION: 99 % | SYSTOLIC BLOOD PRESSURE: 176 MMHG | RESPIRATION RATE: 16 BRPM

## 2025-02-22 DIAGNOSIS — K76.0 FATTY LIVER: ICD-10-CM

## 2025-02-22 DIAGNOSIS — R31.9 URINARY TRACT INFECTION WITH HEMATURIA, SITE UNSPECIFIED: ICD-10-CM

## 2025-02-22 DIAGNOSIS — N39.0 URINARY TRACT INFECTION WITH HEMATURIA, SITE UNSPECIFIED: ICD-10-CM

## 2025-02-22 DIAGNOSIS — R10.30 LOWER ABDOMINAL PAIN: Primary | ICD-10-CM

## 2025-02-22 LAB
ALBUMIN SERPL-MCNC: 4.5 G/DL (ref 3.5–5.2)
ALP SERPL-CCNC: 70 U/L (ref 35–104)
ALT SERPL-CCNC: 23 U/L (ref 0–32)
ANION GAP SERPL CALCULATED.3IONS-SCNC: 11 MMOL/L (ref 7–16)
AST SERPL-CCNC: 20 U/L (ref 0–31)
BACTERIA URNS QL MICRO: ABNORMAL
BASOPHILS # BLD: 0.06 K/UL (ref 0–0.2)
BASOPHILS NFR BLD: 1 % (ref 0–2)
BILIRUB SERPL-MCNC: 0.9 MG/DL (ref 0–1.2)
BILIRUB UR QL STRIP: NEGATIVE
BUN SERPL-MCNC: 20 MG/DL (ref 6–20)
CALCIUM SERPL-MCNC: 9.5 MG/DL (ref 8.6–10.2)
CHLORIDE SERPL-SCNC: 103 MMOL/L (ref 98–107)
CLARITY UR: CLEAR
CO2 SERPL-SCNC: 26 MMOL/L (ref 22–29)
COLOR UR: YELLOW
CREAT SERPL-MCNC: 0.7 MG/DL (ref 0.5–1)
EOSINOPHIL # BLD: 0.08 K/UL (ref 0.05–0.5)
EOSINOPHILS RELATIVE PERCENT: 1 % (ref 0–6)
ERYTHROCYTE [DISTWIDTH] IN BLOOD BY AUTOMATED COUNT: 12.3 % (ref 11.5–15)
GFR, ESTIMATED: >90 ML/MIN/1.73M2
GLUCOSE SERPL-MCNC: 101 MG/DL (ref 74–99)
GLUCOSE UR STRIP-MCNC: NEGATIVE MG/DL
HCT VFR BLD AUTO: 40.5 % (ref 34–48)
HGB BLD-MCNC: 13.9 G/DL (ref 11.5–15.5)
HGB UR QL STRIP.AUTO: ABNORMAL
IMM GRANULOCYTES # BLD AUTO: <0.03 K/UL (ref 0–0.58)
IMM GRANULOCYTES NFR BLD: 0 % (ref 0–5)
KETONES UR STRIP-MCNC: NEGATIVE MG/DL
LACTATE BLDV-SCNC: 1.1 MMOL/L (ref 0.5–2.2)
LEUKOCYTE ESTERASE UR QL STRIP: ABNORMAL
LIPASE SERPL-CCNC: 17 U/L (ref 13–60)
LYMPHOCYTES NFR BLD: 2.01 K/UL (ref 1.5–4)
LYMPHOCYTES RELATIVE PERCENT: 26 % (ref 20–42)
MCH RBC QN AUTO: 29.3 PG (ref 26–35)
MCHC RBC AUTO-ENTMCNC: 34.3 G/DL (ref 32–34.5)
MCV RBC AUTO: 85.4 FL (ref 80–99.9)
MONOCYTES NFR BLD: 0.55 K/UL (ref 0.1–0.95)
MONOCYTES NFR BLD: 7 % (ref 2–12)
NEUTROPHILS NFR BLD: 64 % (ref 43–80)
NEUTS SEG NFR BLD: 4.92 K/UL (ref 1.8–7.3)
NITRITE UR QL STRIP: NEGATIVE
PH UR STRIP: 5.5 [PH] (ref 5–8)
PLATELET # BLD AUTO: 307 K/UL (ref 130–450)
PMV BLD AUTO: 9.7 FL (ref 7–12)
POTASSIUM SERPL-SCNC: 4.7 MMOL/L (ref 3.5–5)
PROT SERPL-MCNC: 7.6 G/DL (ref 6.4–8.3)
PROT UR STRIP-MCNC: NEGATIVE MG/DL
RBC # BLD AUTO: 4.74 M/UL (ref 3.5–5.5)
RBC #/AREA URNS HPF: ABNORMAL /HPF
SODIUM SERPL-SCNC: 140 MMOL/L (ref 132–146)
SP GR UR STRIP: 1.02 (ref 1–1.03)
UROBILINOGEN UR STRIP-ACNC: 0.2 EU/DL (ref 0–1)
WBC #/AREA URNS HPF: ABNORMAL /HPF
WBC OTHER # BLD: 7.6 K/UL (ref 4.5–11.5)

## 2025-02-22 PROCEDURE — 87086 URINE CULTURE/COLONY COUNT: CPT

## 2025-02-22 PROCEDURE — 74176 CT ABD & PELVIS W/O CONTRAST: CPT

## 2025-02-22 PROCEDURE — 80053 COMPREHEN METABOLIC PANEL: CPT

## 2025-02-22 PROCEDURE — 81001 URINALYSIS AUTO W/SCOPE: CPT

## 2025-02-22 PROCEDURE — 83605 ASSAY OF LACTIC ACID: CPT

## 2025-02-22 PROCEDURE — 83690 ASSAY OF LIPASE: CPT

## 2025-02-22 PROCEDURE — 99284 EMERGENCY DEPT VISIT MOD MDM: CPT

## 2025-02-22 PROCEDURE — 85025 COMPLETE CBC W/AUTO DIFF WBC: CPT

## 2025-02-22 RX ORDER — CIPROFLOXACIN 500 MG/1
500 TABLET, FILM COATED ORAL 2 TIMES DAILY
Qty: 20 TABLET | Refills: 0 | Status: SHIPPED | OUTPATIENT
Start: 2025-02-22 | End: 2025-03-04

## 2025-02-22 RX ORDER — METRONIDAZOLE 500 MG/1
500 TABLET ORAL 3 TIMES DAILY
Qty: 30 TABLET | Refills: 0 | Status: SHIPPED | OUTPATIENT
Start: 2025-02-22 | End: 2025-03-04

## 2025-02-22 RX ORDER — CEFDINIR 300 MG/1
300 CAPSULE ORAL 2 TIMES DAILY
Qty: 20 CAPSULE | Refills: 0 | Status: SHIPPED | OUTPATIENT
Start: 2025-02-22 | End: 2025-02-22 | Stop reason: ALTCHOICE

## 2025-02-22 ASSESSMENT — PAIN SCALES - GENERAL
PAINLEVEL_OUTOF10: 6
PAINLEVEL_OUTOF10: 6

## 2025-02-22 ASSESSMENT — LIFESTYLE VARIABLES: HOW OFTEN DO YOU HAVE A DRINK CONTAINING ALCOHOL: NEVER

## 2025-02-22 ASSESSMENT — PAIN DESCRIPTION - LOCATION: LOCATION: ABDOMEN

## 2025-02-22 ASSESSMENT — PAIN DESCRIPTION - DESCRIPTORS: DESCRIPTORS: BURNING

## 2025-02-22 ASSESSMENT — PAIN DESCRIPTION - ORIENTATION: ORIENTATION: LOWER

## 2025-02-22 ASSESSMENT — PAIN - FUNCTIONAL ASSESSMENT
PAIN_FUNCTIONAL_ASSESSMENT: 0-10
PAIN_FUNCTIONAL_ASSESSMENT: 0-10

## 2025-02-22 NOTE — ED PROVIDER NOTES
Handicap Placard [facility] Starting Tue 10/1/2024, Disp-1 each, R-0, PrintCannot walk 200 ft. Without stopping to rest Duration: Lifetime             ALLERGIES     Fentanyl and Prednisone    FAMILYHISTORY       Family History   Problem Relation Age of Onset    High Cholesterol Mother         low blood pressure    Arrhythmia Mother     High Blood Pressure Father     Cancer Father         leukemia, lung cancer +smoker, brain cancer ataxia    Other Sister         nephrectomy due to a benign tumor    Breast Cancer Paternal Grandmother     Other Paternal Grandmother         bowel    Other Daughter         IGG immunodeficiency    Cancer Maternal Grandmother         lung, cervical    Heart Disease Maternal Grandfather     Atrial Fibrillation Paternal Grandfather         SOCIAL HISTORY       Social History     Tobacco Use    Smoking status: Never    Smokeless tobacco: Never   Vaping Use    Vaping status: Never Used   Substance Use Topics    Alcohol use: No    Drug use: No       SCREENINGS        Kd Coma Scale  Eye Opening: Spontaneous  Best Verbal Response: Oriented  Best Motor Response: Obeys commands  Kd Coma Scale Score: 15                CIWA Assessment  BP: (!) 176/75  Pulse: 70           PHYSICAL EXAM  1 or more Elements     ED Triage Vitals [02/22/25 1137]   BP Systolic BP Percentile Diastolic BP Percentile Temp Temp Source Pulse Respirations SpO2   (!) 149/67 -- -- 97.3 °F (36.3 °C) Oral 76 20 97 %      Height Weight         -- --                  ----------------------------------------PHYSICAL EXAM--------------------------------------  Constitutional:  Well developed, obese, no acute distress, non-toxic appearance   Eyes:  PERRL, conjunctiva normal, EOMI  HENT:  Atraumatic, external ears normal, nose normal, oropharynx moist. Neck- normal range of motion, no nuchal rigidity   Respiratory:  No respiratory distress, normal breath sounds, no rales, no wheezing   Cardiovascular:  Normal rate, normal rhythm,  Comments:   Reason for Stopping:         hydrOXYzine pamoate (VISTARIL) 25 MG capsule Comments:   Reason for Stopping:                      (Please note that portions of this note were completed with a voice recognition program.  Efforts were made to edit the dictations but occasionally words are mis-transcribed.)    Lisa Turpin DO (electronically signed)            Lisa Turpin DO  02/22/25 1504

## 2025-02-23 LAB
MICROORGANISM SPEC CULT: NO GROWTH
SERVICE CMNT-IMP: NORMAL
SPECIMEN DESCRIPTION: NORMAL

## 2025-03-12 ENCOUNTER — OFFICE VISIT (OUTPATIENT)
Dept: FAMILY MEDICINE CLINIC | Age: 60
End: 2025-03-12
Payer: COMMERCIAL

## 2025-03-12 VITALS
DIASTOLIC BLOOD PRESSURE: 84 MMHG | TEMPERATURE: 98.7 F | RESPIRATION RATE: 18 BRPM | WEIGHT: 232 LBS | SYSTOLIC BLOOD PRESSURE: 136 MMHG | HEART RATE: 80 BPM | OXYGEN SATURATION: 98 % | BODY MASS INDEX: 39.61 KG/M2 | HEIGHT: 64 IN

## 2025-03-12 DIAGNOSIS — R10.31 ABDOMINAL PAIN, ACUTE, RIGHT LOWER QUADRANT: Primary | ICD-10-CM

## 2025-03-12 LAB
BILIRUBIN, POC: ABNORMAL
BLOOD URINE, POC: ABNORMAL
CLARITY, POC: CLEAR
COLOR, POC: YELLOW
GLUCOSE URINE, POC: ABNORMAL MG/DL
KETONES, POC: ABNORMAL MG/DL
LEUKOCYTE EST, POC: ABNORMAL
NITRITE, POC: ABNORMAL
PH, POC: 5.5
PROTEIN, POC: ABNORMAL MG/DL
SPECIFIC GRAVITY, POC: 1.02
UROBILINOGEN, POC: 0.2 MG/DL

## 2025-03-12 PROCEDURE — 3075F SYST BP GE 130 - 139MM HG: CPT | Performed by: PHYSICIAN ASSISTANT

## 2025-03-12 PROCEDURE — 3079F DIAST BP 80-89 MM HG: CPT | Performed by: PHYSICIAN ASSISTANT

## 2025-03-12 PROCEDURE — 99215 OFFICE O/P EST HI 40 MIN: CPT | Performed by: PHYSICIAN ASSISTANT

## 2025-03-12 PROCEDURE — 81002 URINALYSIS NONAUTO W/O SCOPE: CPT | Performed by: PHYSICIAN ASSISTANT

## 2025-03-12 NOTE — PROGRESS NOTES
today for abdominal pain, diarrhea and nausea.    Diagnoses and all orders for this visit:    Abdominal pain, acute, right lower quadrant  -     POCT Urinalysis no Micro  -     Culture, Urine; Future  -     Culture, Urine        The patient is to call for any concerns or return if any of the signs or symptoms worsen. The patient is to follow-up with PCP in the next 2-3 days for repeat evaluation repeat assessment or go directly to the emergency department.     SIGNATURE: Mario Hudson III, PA-C    This document may have been prepared at least partially through the use of voice recognition software. Although effort is taken to assure the accuracy ofthis document, it is possible that grammatical, syntax, or spelling errors may occur.     **This report was transcribed using voice recognition software. The patient (or guardian, if applicable) and other individuals in attendance with the patient were advised that if Artificial Intelligence would be utilized during this visit to record and process the conversation to generate a clinical note they would be informed prior to start of the visit. The patient (or guardian, if applicable) and other individuals in attendance at the appointment consented to the use of AI if was utilized, including the recording.  Every effort was made to ensure accuracy; however, inadvertent computerized transcription errors may be present.

## 2025-03-13 ENCOUNTER — OFFICE VISIT (OUTPATIENT)
Dept: FAMILY MEDICINE CLINIC | Age: 60
End: 2025-03-13
Payer: COMMERCIAL

## 2025-03-13 VITALS
OXYGEN SATURATION: 98 % | RESPIRATION RATE: 18 BRPM | HEART RATE: 75 BPM | BODY MASS INDEX: 40.67 KG/M2 | TEMPERATURE: 97.8 F | WEIGHT: 238.2 LBS | HEIGHT: 64 IN | SYSTOLIC BLOOD PRESSURE: 140 MMHG | DIASTOLIC BLOOD PRESSURE: 82 MMHG

## 2025-03-13 DIAGNOSIS — R10.31 RLQ ABDOMINAL PAIN: Primary | ICD-10-CM

## 2025-03-13 LAB
CULTURE: NORMAL
CULTURE: NORMAL
SPECIMEN DESCRIPTION: NORMAL

## 2025-03-13 PROCEDURE — 3077F SYST BP >= 140 MM HG: CPT | Performed by: FAMILY MEDICINE

## 2025-03-13 PROCEDURE — 3079F DIAST BP 80-89 MM HG: CPT | Performed by: FAMILY MEDICINE

## 2025-03-13 PROCEDURE — 99213 OFFICE O/P EST LOW 20 MIN: CPT | Performed by: FAMILY MEDICINE

## 2025-03-13 RX ORDER — METRONIDAZOLE 500 MG/1
500 TABLET ORAL 3 TIMES DAILY
Qty: 30 TABLET | Refills: 0 | Status: SHIPPED | OUTPATIENT
Start: 2025-03-13 | End: 2025-03-23

## 2025-03-13 RX ORDER — CIPROFLOXACIN 500 MG/1
500 TABLET, FILM COATED ORAL 2 TIMES DAILY
Qty: 20 TABLET | Refills: 0 | Status: SHIPPED | OUTPATIENT
Start: 2025-03-13 | End: 2025-03-23

## 2025-03-13 NOTE — PROGRESS NOTES
Mago Nichole (:  1965) is a 59 y.o. female, Established patient, here for evaluation of the following chief complaint(s):  GI Problem (Went to UC yesterday due to gastric pain, had CT scans recently.)         Assessment & Plan  1. Abdominal pain.  The patient's abdominal pain recurred 24 hours after completing a 10-day course of Cipro and Flagyl, which initially alleviated the symptoms by the third day. The pain is associated with bowel movements and diarrhea, which started yesterday. She reports not passing gas, which could indicate a blockage. She also experienced chills starting yesterday. A referral to Dr. Remi Goodrich for further evaluation and potential colonoscopy has been recommended. If the pain recurs after completing the antibiotics, she will need to see a surgeon.    2. Suspected urinary tract infection (UTI).  The patient was previously diagnosed with a UTI, which was treated with antibiotics. However, the pain returned after completing the antibiotic course.    Results    Mago was seen today for gi problem.    Diagnoses and all orders for this visit:    RLQ abdominal pain    Other orders  -     ciprofloxacin (CIPRO) 500 MG tablet; Take 1 tablet by mouth 2 times daily for 10 days  -     metroNIDAZOLE (FLAGYL) 500 MG tablet; Take 1 tablet by mouth 3 times daily for 10 days      1. RLQ abdominal pain    No follow-ups on file.         Subjective   History of Present Illness  The patient is a 59-year-old female who presents for abdominal pain.    She reports experiencing persistent abdominal discomfort, which intensifies during meals. The pain is localized to a specific area and is not constant but occurs intermittently. She has been managing her symptoms with a diet primarily consisting of homemade yogurt and liquids. She describes the pain as severe, akin to spasms, particularly when her bowels are in motion. She experienced diarrhea yesterday, which was accompanied by an increase

## 2025-03-14 ENCOUNTER — RESULTS FOLLOW-UP (OUTPATIENT)
Dept: FAMILY MEDICINE CLINIC | Age: 60
End: 2025-03-14

## 2025-03-26 ENCOUNTER — TELEPHONE (OUTPATIENT)
Dept: CARDIOLOGY CLINIC | Age: 60
End: 2025-03-26

## 2025-03-26 ENCOUNTER — TELEPHONE (OUTPATIENT)
Dept: ADMINISTRATIVE | Age: 60
End: 2025-03-26

## 2025-03-26 NOTE — TELEPHONE ENCOUNTER
Patient states that her bp has been elevated since yesterday:    153/93   159/92  163/93 this morning  Heart rate around 65. She is taking toprol 25mg TID, please advise

## 2025-03-26 NOTE — TELEPHONE ENCOUNTER
Pt called to schedule 6 month f/u, was due in February.  No availability until May.  Pt said she would like to be seen sooner.  She is still having bp issues, her bp is 153/93.  Please contact pt.

## 2025-03-26 NOTE — TELEPHONE ENCOUNTER
Try making toprol 50 mg twice a day.    Abigail Villegas D.O.  Cardiologist  Cardiology, [facility]

## 2025-04-01 ENCOUNTER — OFFICE VISIT (OUTPATIENT)
Dept: CARDIOLOGY CLINIC | Age: 60
End: 2025-04-01
Payer: COMMERCIAL

## 2025-04-01 VITALS
DIASTOLIC BLOOD PRESSURE: 84 MMHG | TEMPERATURE: 97.1 F | HEART RATE: 67 BPM | HEIGHT: 65 IN | OXYGEN SATURATION: 96 % | RESPIRATION RATE: 18 BRPM | WEIGHT: 233.2 LBS | BODY MASS INDEX: 38.85 KG/M2 | SYSTOLIC BLOOD PRESSURE: 126 MMHG

## 2025-04-01 DIAGNOSIS — R06.02 SOB (SHORTNESS OF BREATH): ICD-10-CM

## 2025-04-01 DIAGNOSIS — I10 ESSENTIAL HYPERTENSION: Primary | ICD-10-CM

## 2025-04-01 DIAGNOSIS — R07.2 PRECORDIAL PAIN: ICD-10-CM

## 2025-04-01 PROCEDURE — 3079F DIAST BP 80-89 MM HG: CPT | Performed by: INTERNAL MEDICINE

## 2025-04-01 PROCEDURE — 93000 ELECTROCARDIOGRAM COMPLETE: CPT | Performed by: INTERNAL MEDICINE

## 2025-04-01 PROCEDURE — 99214 OFFICE O/P EST MOD 30 MIN: CPT | Performed by: INTERNAL MEDICINE

## 2025-04-01 PROCEDURE — G2211 COMPLEX E/M VISIT ADD ON: HCPCS | Performed by: INTERNAL MEDICINE

## 2025-04-01 PROCEDURE — 3074F SYST BP LT 130 MM HG: CPT | Performed by: INTERNAL MEDICINE

## 2025-04-01 RX ORDER — CLONIDINE HYDROCHLORIDE 0.1 MG/1
0.1 TABLET ORAL AS NEEDED
Qty: 30 TABLET | Refills: 3 | Status: SHIPPED | OUTPATIENT
Start: 2025-04-01

## 2025-04-01 RX ORDER — METOPROLOL SUCCINATE 25 MG/1
50 TABLET, EXTENDED RELEASE ORAL 2 TIMES DAILY
Qty: 180 TABLET | Refills: 3 | Status: SHIPPED | OUTPATIENT
Start: 2025-04-01

## 2025-04-01 NOTE — PROGRESS NOTES
CHIEF COMPLAINT: CP/ARAYA/Palpitations/HTN    HISTORY OF PRESENT ILLNESS: Patient is a 59 y.o. female seen at the request of Kat Villarreal MD    Patient seen in follow up.    No CP or SOB.     Rare palpitations.      Past Medical History:   Diagnosis Date    Allergic rhinitis     Anxiety     Atrial fibrillation (HCC)     Depression     H. pylori infection     Headache     Hypertension     Hypothyroidism     IBS (irritable bowel syndrome)     NAFLD (nonalcoholic fatty liver disease)     By CT    Obesity with body mass index (BMI) of 30.0 to 39.9 06/28/2020    Osteoarthritis     Sleep apnea     does not use C-Pap    Spinal stenosis, lumbar 01/14/2020    Severe central stenosis by CT of abdomen, L1-L2    Thyroid disease     PTC microcarcinoma (0.5 cm) treated by thyroidectomy and BERG 100 mci       Patient Active Problem List   Diagnosis    BPPV (benign paroxysmal positional vertigo)    Anxiety    Dyspnea on exertion    Intermittent palpitations    Essential hypertension    Morbid obesity due to excess calories    Palpitations    Chest pain    Weight gain    Dizziness    Biliary colic    Obesity with body mass index (BMI) of 30.0 to 39.9    Spinal stenosis, lumbar    NAFLD (nonalcoholic fatty liver disease)    Abdominal pain    Hypothyroidism    Thyroid disease    Status post placement of implantable loop recorder    Acute bacterial sinusitis     Allergies   Allergen Reactions    Fentanyl Anaphylaxis     Other reaction(s): needed narcan after choly, Unknown    Prednisone Anxiety       Current Outpatient Medications   Medication Sig Dispense Refill    Vitamin D3 125 MCG (5000 UT) TABS tablet Take 1 tablet by mouth daily      metoprolol succinate (TOPROL XL) 25 MG extended release tablet Take 1 tablet by mouth 3 times daily for 1080 doses (Patient taking differently: Take 2 tablets by mouth in the morning and at bedtime) 270 tablet 3    SYNTHROID 100 MCG tablet TAKE ONE TABLET BY MOUTH ONCE DAILY 30 tablet 5

## 2025-04-02 DIAGNOSIS — R06.02 SOB (SHORTNESS OF BREATH): ICD-10-CM

## 2025-04-02 DIAGNOSIS — I10 ESSENTIAL HYPERTENSION: ICD-10-CM

## 2025-04-02 DIAGNOSIS — C73 THYROID CANCER (HCC): ICD-10-CM

## 2025-04-02 DIAGNOSIS — R07.2 PRECORDIAL PAIN: ICD-10-CM

## 2025-04-02 LAB
ALBUMIN: 4.2 G/DL (ref 3.5–5.2)
ALP BLD-CCNC: 75 U/L (ref 35–104)
ALT SERPL-CCNC: 22 U/L (ref 0–32)
ANION GAP SERPL CALCULATED.3IONS-SCNC: 18 MMOL/L (ref 7–16)
AST SERPL-CCNC: 22 U/L (ref 0–31)
BASOPHILS ABSOLUTE: 0.06 K/UL (ref 0–0.2)
BASOPHILS RELATIVE PERCENT: 1 % (ref 0–2)
BILIRUB SERPL-MCNC: 0.5 MG/DL (ref 0–1.2)
BUN BLDV-MCNC: 19 MG/DL (ref 6–20)
CALCIUM SERPL-MCNC: 9.1 MG/DL (ref 8.6–10.2)
CHLORIDE BLD-SCNC: 103 MMOL/L (ref 98–107)
CHOLESTEROL, TOTAL: 194 MG/DL
CO2: 19 MMOL/L (ref 22–29)
CREAT SERPL-MCNC: 0.8 MG/DL (ref 0.5–1)
EOSINOPHILS ABSOLUTE: 0.11 K/UL (ref 0.05–0.5)
EOSINOPHILS RELATIVE PERCENT: 2 % (ref 0–6)
GFR, ESTIMATED: 89 ML/MIN/1.73M2
GLUCOSE BLD-MCNC: 97 MG/DL (ref 74–99)
HCT VFR BLD CALC: 42.5 % (ref 34–48)
HDLC SERPL-MCNC: 55 MG/DL
HEMOGLOBIN: 13.6 G/DL (ref 11.5–15.5)
HIGH SENSITIVE C-REACTIVE PROTEIN: 1.1 MG/L (ref 0–3)
HOMOCYSTEINE: 12.2 UMOL/L (ref 0–15)
IMMATURE GRANULOCYTES %: 0 % (ref 0–5)
IMMATURE GRANULOCYTES ABSOLUTE: <0.03 K/UL (ref 0–0.58)
LDL CHOLESTEROL: 117 MG/DL
LYMPHOCYTES ABSOLUTE: 2.03 K/UL (ref 1.5–4)
LYMPHOCYTES RELATIVE PERCENT: 29 % (ref 20–42)
MCH RBC QN AUTO: 29.1 PG (ref 26–35)
MCHC RBC AUTO-ENTMCNC: 32 G/DL (ref 32–34.5)
MCV RBC AUTO: 90.8 FL (ref 80–99.9)
MONOCYTES ABSOLUTE: 0.48 K/UL (ref 0.1–0.95)
MONOCYTES RELATIVE PERCENT: 7 % (ref 2–12)
NEUTROPHILS ABSOLUTE: 4.41 K/UL (ref 1.8–7.3)
NEUTROPHILS RELATIVE PERCENT: 62 % (ref 43–80)
PDW BLD-RTO: 12.8 % (ref 11.5–15)
PLATELET # BLD: 341 K/UL (ref 130–450)
PMV BLD AUTO: 10.6 FL (ref 7–12)
POTASSIUM SERPL-SCNC: 5.1 MMOL/L (ref 3.5–5)
RBC # BLD: 4.68 M/UL (ref 3.5–5.5)
SODIUM BLD-SCNC: 140 MMOL/L (ref 132–146)
TOTAL PROTEIN: 7.4 G/DL (ref 6.4–8.3)
TRIGL SERPL-MCNC: 111 MG/DL
VLDLC SERPL CALC-MCNC: 22 MG/DL
WBC # BLD: 7.1 K/UL (ref 4.5–11.5)

## 2025-04-02 RX ORDER — LEVOTHYROXINE SODIUM 100 MCG
100 TABLET ORAL DAILY
Qty: 30 TABLET | Refills: 0 | Status: SHIPPED | OUTPATIENT
Start: 2025-04-02

## 2025-04-02 NOTE — TELEPHONE ENCOUNTER
9/30/24: At goal, we will continue Synthroid 100 mcg daily    Spoke with patient and transferred call to get her follow up scheduled

## 2025-04-03 LAB — SED RATE, AUTOMATED: 20 MM/HR (ref 0–20)

## 2025-04-09 ENCOUNTER — OFFICE VISIT (OUTPATIENT)
Dept: ENDOCRINOLOGY | Age: 60
End: 2025-04-09
Payer: COMMERCIAL

## 2025-04-09 VITALS
BODY MASS INDEX: 38.82 KG/M2 | HEIGHT: 65 IN | DIASTOLIC BLOOD PRESSURE: 82 MMHG | HEART RATE: 75 BPM | OXYGEN SATURATION: 100 % | WEIGHT: 233 LBS | SYSTOLIC BLOOD PRESSURE: 128 MMHG

## 2025-04-09 DIAGNOSIS — R00.2 PALPITATION: ICD-10-CM

## 2025-04-09 DIAGNOSIS — C73 THYROID CANCER (HCC): Primary | ICD-10-CM

## 2025-04-09 DIAGNOSIS — E03.9 HYPOTHYROIDISM (ACQUIRED): ICD-10-CM

## 2025-04-09 DIAGNOSIS — E66.01 MORBID OBESITY: ICD-10-CM

## 2025-04-09 DIAGNOSIS — I10 HYPERTENSION, UNSPECIFIED TYPE: ICD-10-CM

## 2025-04-09 PROCEDURE — 3074F SYST BP LT 130 MM HG: CPT | Performed by: CLINICAL NURSE SPECIALIST

## 2025-04-09 PROCEDURE — 3079F DIAST BP 80-89 MM HG: CPT | Performed by: CLINICAL NURSE SPECIALIST

## 2025-04-09 PROCEDURE — 99214 OFFICE O/P EST MOD 30 MIN: CPT | Performed by: CLINICAL NURSE SPECIALIST

## 2025-04-09 PROCEDURE — G2211 COMPLEX E/M VISIT ADD ON: HCPCS | Performed by: CLINICAL NURSE SPECIALIST

## 2025-04-09 RX ORDER — LEVOTHYROXINE SODIUM 100 MCG
100 TABLET ORAL DAILY
Qty: 30 TABLET | Refills: 11 | Status: SHIPPED | OUTPATIENT
Start: 2025-04-09

## 2025-04-09 NOTE — PROGRESS NOTES
tachycardia.    High blood pressure on palpitation  The patient recently visited the emergency room with a high blood pressure and palpitation.  Now BP controlled on on metoprolol and clonidine as needed  Patient never obtain labs  We will obtain plasma metanephrines, aldosterone renin ratio and cortisol    I personally spent 30 minutes  reviewed external notes from PCP and other patient's care team providers, and personally interpreted labs associated with the above diagnosis. I also ordered labs to further assess and manage the above addressed medical conditions.     Return in about 3 months (around 7/9/2025).    The above issues were reviewed with the patient who understood and agreed with the plan.    Thank you for allowing us to participate in the care of this patient. Please do not hesitate to contact us with any additional questions.    Diagnosis Orders   1. Thyroid cancer (HCC)  Thyroglobulin    SYNTHROID 100 MCG tablet      2. Hypothyroidism (acquired)  T4, Free    TSH      3. Morbid obesity        4. Hypertension, unspecified type  Metanephrines Plasma Free    Aldosterone & Renin, Direct with Ratio    Cortisol Total      5. Palpitation  Metanephrines Plasma Free    Aldosterone & Renin, Direct with Ratio    Cortisol Total              BEATRICE Platt   [address], [address], [place], [address]   Phone: [phone]  Fax: [phone]  ----------------------------  An electronic signature was used to authenticate this note. BEATRICE Platt on 4/9/2025 at 9:06 AM

## 2025-04-18 ENCOUNTER — TELEPHONE (OUTPATIENT)
Dept: FAMILY MEDICINE CLINIC | Age: 60
End: 2025-04-18

## 2025-05-29 DIAGNOSIS — R00.2 PALPITATION: ICD-10-CM

## 2025-05-29 DIAGNOSIS — E03.9 HYPOTHYROIDISM (ACQUIRED): ICD-10-CM

## 2025-05-29 DIAGNOSIS — I10 HYPERTENSION, UNSPECIFIED TYPE: ICD-10-CM

## 2025-05-29 DIAGNOSIS — C73 THYROID CANCER (HCC): ICD-10-CM

## 2025-05-29 LAB
CORTISOL COLLECTION INFO: 0
CORTISOL: 13.6 UG/DL (ref 2.7–18.4)
T4 FREE: 1.2 NG/DL (ref 0.9–1.7)
TSH SERPL DL<=0.05 MIU/L-ACNC: 0.69 UIU/ML (ref 0.27–4.2)

## 2025-05-30 DIAGNOSIS — R00.2 PALPITATION: ICD-10-CM

## 2025-05-30 DIAGNOSIS — E66.01 MORBID OBESITY (HCC): Primary | ICD-10-CM

## 2025-05-30 DIAGNOSIS — I10 HYPERTENSION, UNSPECIFIED TYPE: ICD-10-CM

## 2025-06-01 ENCOUNTER — RESULTS FOLLOW-UP (OUTPATIENT)
Dept: ENDOCRINOLOGY | Age: 60
End: 2025-06-01

## 2025-06-01 DIAGNOSIS — C73 THYROID CANCER (HCC): ICD-10-CM

## 2025-06-01 DIAGNOSIS — I10 HYPERTENSION, UNSPECIFIED TYPE: Primary | ICD-10-CM

## 2025-06-02 LAB
METANEPH/PLASMA INTERP: NORMAL
METANEPHRINE: NORMAL NMOL/L (ref 0–0.49)
NORMETANEPHRINE PLASMA: NORMAL NMOL/L (ref 0–0.89)

## 2025-06-19 LAB
METANEPH/PLASMA INTERP: NORMAL
METANEPHRINE: 0.12 NMOL/L (ref 0–0.49)
NORMETANEPHRINE PLASMA: 0.52 NMOL/L (ref 0–0.89)

## 2025-06-29 RX ORDER — CLONIDINE HYDROCHLORIDE 0.1 MG/1
0.1 TABLET ORAL AS NEEDED
Qty: 30 TABLET | Refills: 3 | Status: SHIPPED | OUTPATIENT
Start: 2025-06-29

## 2025-06-30 ENCOUNTER — TELEPHONE (OUTPATIENT)
Dept: CARDIOLOGY CLINIC | Age: 60
End: 2025-06-30

## 2025-06-30 NOTE — TELEPHONE ENCOUNTER
Patient states that since Friday night her bp has been elevated:    169/93  160/96  173/97    Patient is taking Toprol 25mg TID, please advise

## 2025-07-01 ENCOUNTER — HOSPITAL ENCOUNTER (EMERGENCY)
Age: 60
Discharge: HOME | End: 2025-07-01
Attending: EMERGENCY MEDICINE
Payer: COMMERCIAL

## 2025-07-01 VITALS
HEART RATE: 63 BPM | DIASTOLIC BLOOD PRESSURE: 88 MMHG | SYSTOLIC BLOOD PRESSURE: 148 MMHG | HEIGHT: 65 IN | WEIGHT: 220 LBS | TEMPERATURE: 98.3 F | RESPIRATION RATE: 19 BRPM | OXYGEN SATURATION: 96 % | BODY MASS INDEX: 36.65 KG/M2

## 2025-07-01 DIAGNOSIS — I10 HYPERTENSION, UNSPECIFIED TYPE: Primary | ICD-10-CM

## 2025-07-01 DIAGNOSIS — R42 DIZZINESS: ICD-10-CM

## 2025-07-01 DIAGNOSIS — N30.00 ACUTE CYSTITIS WITHOUT HEMATURIA: ICD-10-CM

## 2025-07-01 LAB
ALBUMIN SERPL-MCNC: 4.2 G/DL (ref 3.5–5.2)
ALP SERPL-CCNC: 83 U/L (ref 35–104)
ALT SERPL-CCNC: 17 U/L (ref 0–35)
ANION GAP SERPL CALCULATED.3IONS-SCNC: 15 MMOL/L (ref 7–16)
AST SERPL-CCNC: 20 U/L (ref 0–35)
BACTERIA URNS QL MICRO: ABNORMAL
BASOPHILS # BLD: 0.05 K/UL (ref 0–0.2)
BASOPHILS NFR BLD: 1 % (ref 0–2)
BILIRUB SERPL-MCNC: 0.9 MG/DL (ref 0–1.2)
BILIRUB UR QL STRIP: NEGATIVE
BUN SERPL-MCNC: 17 MG/DL (ref 6–20)
CALCIUM SERPL-MCNC: 9.2 MG/DL (ref 8.6–10)
CHLORIDE SERPL-SCNC: 106 MMOL/L (ref 98–107)
CLARITY UR: CLEAR
CO2 SERPL-SCNC: 22 MMOL/L (ref 22–29)
COLOR UR: YELLOW
CREAT SERPL-MCNC: 0.7 MG/DL (ref 0.5–1)
EKG ATRIAL RATE: 71 BPM
EKG P AXIS: 36 DEGREES
EKG P-R INTERVAL: 194 MS
EKG Q-T INTERVAL: 418 MS
EKG QTC CALCULATION (BAZETT): 454 MS
EKG R AXIS: 34 DEGREES
EKG T AXIS: 77 DEGREES
EKG VENTRICULAR RATE: 71 BPM
EOSINOPHIL # BLD: 0.06 K/UL (ref 0.05–0.5)
EOSINOPHILS RELATIVE PERCENT: 1 % (ref 0–6)
ERYTHROCYTE [DISTWIDTH] IN BLOOD BY AUTOMATED COUNT: 12.7 % (ref 11.5–15)
GFR, ESTIMATED: >90 ML/MIN/1.73M2
GLUCOSE SERPL-MCNC: 110 MG/DL (ref 74–99)
HCT VFR BLD AUTO: 40.5 % (ref 34–48)
HGB BLD-MCNC: 14.2 G/DL (ref 11.5–15.5)
HGB UR QL STRIP.AUTO: ABNORMAL
IMM GRANULOCYTES # BLD AUTO: 0.03 K/UL (ref 0–0.58)
IMM GRANULOCYTES NFR BLD: 0 % (ref 0–5)
KETONES UR STRIP-MCNC: 15 MG/DL
LEUKOCYTE ESTERASE UR QL STRIP: ABNORMAL
LYMPHOCYTES NFR BLD: 1.58 K/UL (ref 1.5–4)
MCH RBC QN AUTO: 29.5 PG (ref 26–35)
MCHC RBC AUTO-ENTMCNC: 35.1 G/DL (ref 32–34.5)
MCV RBC AUTO: 84 FL (ref 80–99.9)
MONOCYTES NFR BLD: 0.54 K/UL (ref 0.1–0.95)
MONOCYTES NFR BLD: 7 % (ref 2–12)
NEUTROPHILS NFR BLD: 72 % (ref 43–80)
NEUTS SEG NFR BLD: 5.72 K/UL (ref 1.8–7.3)
NITRITE UR QL STRIP: NEGATIVE
PH UR STRIP: 6 [PH] (ref 5–8)
PLATELET # BLD AUTO: 285 K/UL (ref 130–450)
PROT UR STRIP-MCNC: NEGATIVE MG/DL
RBC # BLD AUTO: 4.82 M/UL (ref 3.5–5.5)
RBC #/AREA URNS HPF: ABNORMAL /HPF
SODIUM SERPL-SCNC: 143 MMOL/L (ref 136–145)
SP GR UR STRIP: 1.01 (ref 1–1.03)
TROPONIN I SERPL HS-MCNC: <6 NG/L (ref 0–14)
UROBILINOGEN UR STRIP-ACNC: 0.2 EU/DL (ref 0–1)
WBC #/AREA URNS HPF: ABNORMAL /HPF

## 2025-07-01 PROCEDURE — 87086 URINE CULTURE/COLONY COUNT: CPT

## 2025-07-01 PROCEDURE — 93010 ELECTROCARDIOGRAM REPORT: CPT | Performed by: INTERNAL MEDICINE

## 2025-07-01 PROCEDURE — 80053 COMPREHEN METABOLIC PANEL: CPT

## 2025-07-01 PROCEDURE — 93005 ELECTROCARDIOGRAM TRACING: CPT | Performed by: EMERGENCY MEDICINE

## 2025-07-01 PROCEDURE — 84484 ASSAY OF TROPONIN QUANT: CPT

## 2025-07-01 PROCEDURE — 81001 URINALYSIS AUTO W/SCOPE: CPT

## 2025-07-01 PROCEDURE — 99284 EMERGENCY DEPT VISIT MOD MDM: CPT

## 2025-07-01 PROCEDURE — 85025 COMPLETE CBC W/AUTO DIFF WBC: CPT

## 2025-07-01 RX ORDER — AMLODIPINE BESYLATE 5 MG/1
5 TABLET ORAL DAILY
COMMUNITY
End: 2025-07-01 | Stop reason: SDUPTHER

## 2025-07-01 RX ORDER — AMLODIPINE BESYLATE 5 MG/1
5 TABLET ORAL DAILY
Qty: 30 TABLET | Refills: 5 | Status: SHIPPED | OUTPATIENT
Start: 2025-07-01

## 2025-07-01 RX ORDER — CEFDINIR 300 MG/1
300 CAPSULE ORAL 2 TIMES DAILY
Qty: 20 CAPSULE | Refills: 0 | Status: SHIPPED | OUTPATIENT
Start: 2025-07-01 | End: 2025-07-11

## 2025-07-01 ASSESSMENT — ENCOUNTER SYMPTOMS
NAUSEA: 0
VOMITING: 0
EYE REDNESS: 0
ABDOMINAL PAIN: 0
SHORTNESS OF BREATH: 0

## 2025-07-01 ASSESSMENT — PAIN - FUNCTIONAL ASSESSMENT: PAIN_FUNCTIONAL_ASSESSMENT: NONE - DENIES PAIN

## 2025-07-01 NOTE — ED TRIAGE NOTES
Headache Onset 3 days ago  Head pressure   Hypertension Checks at home 189/106 average  EMS 200s/100s

## 2025-07-01 NOTE — ED PROVIDER NOTES
[facility] EMERGENCY DEPARTMENT  EMERGENCY DEPARTMENT ENCOUNTER        Pt Name: Mago Nichole  MRN: [id number]  Birthdate 1965  Date of evaluation: 7/1/2025  Provider: Fatuma Black DO  PCP: Kat Villarreal MD  Note Started: 11:19 AM EDT 7/1/25    CHIEF COMPLAINT       Chief Complaint   Patient presents with    Headache     Onset 3 days ago  Head pressure     Hypertension     Checks at home 189/106 average  EMS 200s/100s       HISTORY OF PRESENT ILLNESS: 1 or more Elements       Mago Nichole is a 59 y.o. female who presents to the emergency department the chief complaint of headache, dizziness and elevated blood pressure.  The history is obtained from patient as well as the patient's medical record.  The patient reports over the last 3 days she has had hypertension.  The patient reports her blood pressure was 210/110 at home.  States that she does have a throbbing headache when this happens.  She denies any headache at the current time.  She states that she is currently on metoprolol 25 mg 3 times daily and clonidine as needed.  The patient states that she took her medications today.  States she does follow with Dr. Abigail Villegas who is following her for her hypertension.  She states that she has been on multi antihypertensives and she has not been able to tolerate them leading to her current regimen of metoprolol as well as clonidine.  She denies any fevers, chills, chest pain, shortness of breath, diarrhea or constipation.  Of note she does admit to 3 days of urinary frequency and urgency which she states she feels is secondary to hypertension.    Nursing Notes were all reviewed and agreed with or any disagreements were addressed in the HPI.    REVIEW OF SYSTEMS :      Review of Systems   Constitutional:  Negative for chills and fatigue.   HENT:  Negative for congestion.    Eyes:  Negative for redness.   Respiratory:  Negative for shortness of breath.

## 2025-07-01 NOTE — TELEPHONE ENCOUNTER
Patient called again today stating her bp was elevated this am, she was seen at the ER and discharged, please advise

## 2025-07-02 LAB
MICROORGANISM SPEC CULT: ABNORMAL
MICROORGANISM SPEC CULT: ABNORMAL
SERVICE CMNT-IMP: ABNORMAL

## 2025-07-07 ENCOUNTER — TELEPHONE (OUTPATIENT)
Dept: FAMILY MEDICINE CLINIC | Age: 60
End: 2025-07-07

## 2025-07-07 RX ORDER — AMOXICILLIN 875 MG/1
875 TABLET, COATED ORAL 2 TIMES DAILY
Qty: 10 TABLET | Refills: 0 | Status: SHIPPED | OUTPATIENT
Start: 2025-07-07 | End: 2025-07-12

## 2025-07-07 NOTE — TELEPHONE ENCOUNTER
Mago has a bad UTI and the cefdinir is making her sick and dizzy she wanted to know if something like ampicillin or amoxicillin could be sent in instead?

## 2025-07-10 ENCOUNTER — TELEPHONE (OUTPATIENT)
Dept: ENDOCRINOLOGY | Age: 60
End: 2025-07-10

## 2025-07-10 NOTE — TELEPHONE ENCOUNTER
A prior [facility] [employer] for the pt's BRAND Synthroid 100mg. I'm awaiting the question response from her insurance. I did put aside samples for the pt and she is aware.

## 2025-07-21 ENCOUNTER — TELEPHONE (OUTPATIENT)
Dept: FAMILY MEDICINE CLINIC | Age: 60
End: 2025-07-21

## 2025-07-21 NOTE — TELEPHONE ENCOUNTER
Pt has been struggling with lower right  abdominal pain for over a month. Has been to Urgent Care and ER. Has had 2 or 3 CT scans that are inconclusive. Pain is worsening and more consistent. Is scheduled for 8/29/25 appointment and wants to know if she can be squeezed in sooner to be referred for other testing if necessary. Please advise

## 2025-07-28 ENCOUNTER — HOSPITAL ENCOUNTER (EMERGENCY)
Age: 60
Discharge: HOME | End: 2025-07-28
Attending: STUDENT IN AN ORGANIZED HEALTH CARE EDUCATION/TRAINING PROGRAM
Payer: COMMERCIAL

## 2025-07-28 ENCOUNTER — APPOINTMENT (OUTPATIENT)
Dept: CT IMAGING | Age: 60
End: 2025-07-28
Payer: COMMERCIAL

## 2025-07-28 VITALS
DIASTOLIC BLOOD PRESSURE: 80 MMHG | SYSTOLIC BLOOD PRESSURE: 137 MMHG | RESPIRATION RATE: 17 BRPM | OXYGEN SATURATION: 97 % | BODY MASS INDEX: 36.65 KG/M2 | WEIGHT: 220 LBS | HEART RATE: 84 BPM | TEMPERATURE: 98.1 F | HEIGHT: 65 IN

## 2025-07-28 DIAGNOSIS — R30.0 DYSURIA: ICD-10-CM

## 2025-07-28 DIAGNOSIS — R10.33 PERIUMBILICAL ABDOMINAL PAIN: ICD-10-CM

## 2025-07-28 DIAGNOSIS — N30.00 ACUTE CYSTITIS WITHOUT HEMATURIA: Primary | ICD-10-CM

## 2025-07-28 DIAGNOSIS — R35.0 URINARY FREQUENCY: ICD-10-CM

## 2025-07-28 DIAGNOSIS — K59.00 CONSTIPATION, UNSPECIFIED CONSTIPATION TYPE: ICD-10-CM

## 2025-07-28 DIAGNOSIS — N26.1 RENAL ATROPHY, RIGHT: ICD-10-CM

## 2025-07-28 LAB
ALBUMIN SERPL-MCNC: 4.1 G/DL (ref 3.5–5.2)
ALP SERPL-CCNC: 87 U/L (ref 35–104)
ALT SERPL-CCNC: 22 U/L (ref 0–35)
ANION GAP SERPL CALCULATED.3IONS-SCNC: 13 MMOL/L (ref 7–16)
AST SERPL-CCNC: 19 U/L (ref 0–35)
BACTERIA URNS QL MICRO: ABNORMAL
BASOPHILS # BLD: 0.07 K/UL (ref 0–0.2)
BASOPHILS NFR BLD: 1 % (ref 0–2)
BILIRUB SERPL-MCNC: 0.3 MG/DL (ref 0–1.2)
BILIRUB UR QL STRIP: NEGATIVE
BUN SERPL-MCNC: 20 MG/DL (ref 6–20)
CALCIUM SERPL-MCNC: 9.4 MG/DL (ref 8.6–10)
CHLORIDE SERPL-SCNC: 101 MMOL/L (ref 98–107)
CLARITY UR: CLEAR
CO2 SERPL-SCNC: 25 MMOL/L (ref 22–29)
COLOR UR: YELLOW
CREAT SERPL-MCNC: 0.7 MG/DL (ref 0.5–1)
EOSINOPHIL # BLD: 0.08 K/UL (ref 0.05–0.5)
EOSINOPHILS RELATIVE PERCENT: 1 % (ref 0–6)
ERYTHROCYTE [DISTWIDTH] IN BLOOD BY AUTOMATED COUNT: 12.9 % (ref 11.5–15)
GFR, ESTIMATED: >90 ML/MIN/1.73M2
GLUCOSE SERPL-MCNC: 122 MG/DL (ref 74–99)
GLUCOSE UR STRIP-MCNC: NEGATIVE MG/DL
HCT VFR BLD AUTO: 40.7 % (ref 34–48)
HGB BLD-MCNC: 13.7 G/DL (ref 11.5–15.5)
HGB UR QL STRIP.AUTO: ABNORMAL
IMM GRANULOCYTES # BLD AUTO: 0.05 K/UL (ref 0–0.58)
IMM GRANULOCYTES NFR BLD: 0 % (ref 0–5)
KETONES UR STRIP-MCNC: NEGATIVE MG/DL
LACTATE BLDV-SCNC: 2.2 MMOL/L (ref 0.5–2.2)
LEUKOCYTE ESTERASE UR QL STRIP: ABNORMAL
LIPASE SERPL-CCNC: 33 U/L (ref 13–60)
LYMPHOCYTES NFR BLD: 2.32 K/UL (ref 1.5–4)
LYMPHOCYTES RELATIVE PERCENT: 19 % (ref 20–42)
MCH RBC QN AUTO: 29.5 PG (ref 26–35)
MCHC RBC AUTO-ENTMCNC: 33.7 G/DL (ref 32–34.5)
MCV RBC AUTO: 87.7 FL (ref 80–99.9)
MONOCYTES NFR BLD: 0.82 K/UL (ref 0.1–0.95)
MONOCYTES NFR BLD: 7 % (ref 2–12)
NEUTROPHILS NFR BLD: 73 % (ref 43–80)
NEUTS SEG NFR BLD: 8.96 K/UL (ref 1.8–7.3)
NITRITE UR QL STRIP: NEGATIVE
PH UR STRIP: 6 [PH] (ref 5–8)
PLATELET # BLD AUTO: 318 K/UL (ref 130–450)
PMV BLD AUTO: 9.1 FL (ref 7–12)
POTASSIUM SERPL-SCNC: 4.2 MMOL/L (ref 3.5–5.1)
PROT SERPL-MCNC: 7.4 G/DL (ref 6.4–8.3)
PROT UR STRIP-MCNC: NEGATIVE MG/DL
RBC # BLD AUTO: 4.64 M/UL (ref 3.5–5.5)
RBC #/AREA URNS HPF: ABNORMAL /HPF
SODIUM SERPL-SCNC: 140 MMOL/L (ref 136–145)
SP GR UR STRIP: <1.005 (ref 1–1.03)
UROBILINOGEN UR STRIP-ACNC: 0.2 EU/DL (ref 0–1)
WBC #/AREA URNS HPF: ABNORMAL /HPF
WBC OTHER # BLD: 12.3 K/UL (ref 4.5–11.5)

## 2025-07-28 PROCEDURE — 99285 EMERGENCY DEPT VISIT HI MDM: CPT

## 2025-07-28 PROCEDURE — 83605 ASSAY OF LACTIC ACID: CPT

## 2025-07-28 PROCEDURE — 85025 COMPLETE CBC W/AUTO DIFF WBC: CPT

## 2025-07-28 PROCEDURE — 74177 CT ABD & PELVIS W/CONTRAST: CPT

## 2025-07-28 PROCEDURE — 81001 URINALYSIS AUTO W/SCOPE: CPT

## 2025-07-28 PROCEDURE — 6360000002 HC RX W HCPCS: Performed by: NURSE PRACTITIONER

## 2025-07-28 PROCEDURE — 96374 THER/PROPH/DIAG INJ IV PUSH: CPT

## 2025-07-28 PROCEDURE — 80053 COMPREHEN METABOLIC PANEL: CPT

## 2025-07-28 PROCEDURE — 6360000004 HC RX CONTRAST MEDICATION: Performed by: RADIOLOGY

## 2025-07-28 PROCEDURE — 83690 ASSAY OF LIPASE: CPT

## 2025-07-28 PROCEDURE — 87086 URINE CULTURE/COLONY COUNT: CPT

## 2025-07-28 PROCEDURE — 2500000003 HC RX 250 WO HCPCS: Performed by: NURSE PRACTITIONER

## 2025-07-28 PROCEDURE — 6370000000 HC RX 637 (ALT 250 FOR IP): Performed by: NURSE PRACTITIONER

## 2025-07-28 PROCEDURE — 2580000003 HC RX 258: Performed by: NURSE PRACTITIONER

## 2025-07-28 RX ORDER — PHENAZOPYRIDINE HYDROCHLORIDE 100 MG/1
200 TABLET, FILM COATED ORAL ONCE
Status: COMPLETED | OUTPATIENT
Start: 2025-07-28 | End: 2025-07-28

## 2025-07-28 RX ORDER — CEPHALEXIN 500 MG/1
500 CAPSULE ORAL 4 TIMES DAILY
Qty: 28 CAPSULE | Refills: 0 | Status: SHIPPED | OUTPATIENT
Start: 2025-07-28 | End: 2025-08-04

## 2025-07-28 RX ORDER — IOPAMIDOL 755 MG/ML
75 INJECTION, SOLUTION INTRAVASCULAR
Status: COMPLETED | OUTPATIENT
Start: 2025-07-28 | End: 2025-07-28

## 2025-07-28 RX ORDER — KETOROLAC TROMETHAMINE 15 MG/ML
15 INJECTION, SOLUTION INTRAMUSCULAR; INTRAVENOUS ONCE
Status: DISCONTINUED | OUTPATIENT
Start: 2025-07-28 | End: 2025-07-29 | Stop reason: HOSPADM

## 2025-07-28 RX ORDER — POLYETHYLENE GLYCOL 3350 17 G/17G
POWDER, FOR SOLUTION ORAL
Qty: 238 G | Refills: 0 | Status: SHIPPED | OUTPATIENT
Start: 2025-07-28 | End: 2025-08-11

## 2025-07-28 RX ORDER — PHENAZOPYRIDINE HYDROCHLORIDE 100 MG/1
100 TABLET, FILM COATED ORAL 3 TIMES DAILY PRN
Qty: 9 TABLET | Refills: 0 | Status: SHIPPED | OUTPATIENT
Start: 2025-07-28

## 2025-07-28 RX ORDER — 0.9 % SODIUM CHLORIDE 0.9 %
1000 INTRAVENOUS SOLUTION INTRAVENOUS ONCE
Status: COMPLETED | OUTPATIENT
Start: 2025-07-28 | End: 2025-07-28

## 2025-07-28 RX ADMIN — MAJOR MAGNESIUM CITRATE ORAL SOLUTION - LEMON 296 ML: 1.75 LIQUID ORAL at 23:19

## 2025-07-28 RX ADMIN — WATER 2000 MG: 1 INJECTION INTRAMUSCULAR; INTRAVENOUS; SUBCUTANEOUS at 22:55

## 2025-07-28 RX ADMIN — PHENAZOPYRIDINE 200 MG: 100 TABLET ORAL at 22:55

## 2025-07-28 RX ADMIN — SODIUM CHLORIDE 1000 ML: 0.9 INJECTION, SOLUTION INTRAVENOUS at 18:46

## 2025-07-28 RX ADMIN — IOPAMIDOL 75 ML: 755 INJECTION, SOLUTION INTRAVENOUS at 20:57

## 2025-07-28 ASSESSMENT — PAIN - FUNCTIONAL ASSESSMENT: PAIN_FUNCTIONAL_ASSESSMENT: NONE - DENIES PAIN

## 2025-07-28 NOTE — ED NOTES
Department of Emergency Medicine  FIRST PROVIDER TRIAGE NOTE             Independent MLP           7/28/25  5:30 PM EDT    Date of Encounter: 7/28/25   MRN: [id number]      HPI: Mago Nichole is a 59 y.o. female who presents to the ED for Abdominal Pain and Urinary Frequency       ROS: Negative for cp, sob, or fever.    PE: Gen Appearance/Constitutional: alert  CV: regular rate  Pulm: CTA bilat     Initial Plan of Care: All treatment areas with department are currently occupied. Plan to order/Initiate the following while awaiting opening in ED: labs.  Initiate Treatment-Testing, Proceed toTreatment Area When Bed Available for ED Attending/MLP to Continue Care    Electronically signed by BEATRICE Ashraf CNP   DD: 7/28/25       Pilar Oconnor APRN - CNP  07/28/25 1730

## 2025-07-28 NOTE — ED PROVIDER NOTES
Shared KIRAN-ED Attending Visit.  CC: No          [facility]  Department of Emergency Medicine   ED  Encounter Note  Admit Date/RoomTime: 2025  5:51 PM  ED Room: [facility]/[place] [address]    NAME: Mago Nichole  : 1965  MRN: [id number]     Chief Complaint:  Abdominal Pain and Urinary Frequency    History of Present Illness       Mago Nichole is a 59 y.o. old female who presents to the emergency department by private vehicle with spouse, for gradual onset bloating pain in the periumbilical area without radiation which began 2 month(s) prior to arrival.   There has been similar episodes in the past and reports she has seen Dr. Michael Scott and he does not feel her pain is gynecology related.  She reports that she continues to have urinary frequency and burning on urination.  She was on cefdinir and they switched her to amoxicillin because of intolerance to the cefdinir giving her headaches and she is on day 9 and only has 1 more day of antibiotic.  She reports that she feels she is dehydrated because she is urinating 5 or 6 times during the night.  Since onset the symptoms have been remaining constant and persistent.  The pain is associated with diarrhea approximately 3 times per day when she eats.  The pain is aggravated by urinating and relieved by none and nothing.  There has been NO back pain, chest pain, shortness of breath, fever, chills, sweating, nausea, vomiting, anorexia, weight loss, constipation, dark/black stools, blood in stool, blood in emesis, cloudy urine, hematuria, urinary urgency, urinary incontinence, vaginal discharge, vaginal itching, vaginal spotting, or vaginal bleeding. Patient's last menstrual period was 2013..  G1, P1.  Patient reports she has had a colonoscopy and an upper endoscopy approximately 1.5 years ago.  Patient had a complete hysterectomy.  She denies any alcohol use.      ROS   Pertinent positives and negatives are stated within HPI, all other

## 2025-07-29 DIAGNOSIS — R10.9 ABDOMINAL PAIN, UNSPECIFIED ABDOMINAL LOCATION: Primary | ICD-10-CM

## 2025-07-29 DIAGNOSIS — R30.9 PAINFUL URINATION: ICD-10-CM

## 2025-07-29 LAB
MICROORGANISM SPEC CULT: NO GROWTH
SERVICE CMNT-IMP: NORMAL
SPECIMEN DESCRIPTION: NORMAL

## 2025-07-29 NOTE — DISCHARGE INSTRUCTIONS
CT ABDOMEN PELVIS W IV CONTRAST Additional Contrast? None   Final Result   No urinary tract stones or hydronephrosis.  Normal urinary bladder.      Colonic fecal retention.          Stop taking amoxicillin and start keflex.

## 2025-08-27 DIAGNOSIS — R10.9 ABDOMINAL PAIN, UNSPECIFIED ABDOMINAL LOCATION: ICD-10-CM

## 2025-08-27 DIAGNOSIS — R30.9 PAINFUL URINATION: ICD-10-CM

## 2025-08-29 ENCOUNTER — OFFICE VISIT (OUTPATIENT)
Dept: FAMILY MEDICINE CLINIC | Age: 60
End: 2025-08-29
Payer: COMMERCIAL

## 2025-08-29 VITALS
WEIGHT: 242.1 LBS | TEMPERATURE: 98.5 F | HEART RATE: 71 BPM | DIASTOLIC BLOOD PRESSURE: 82 MMHG | BODY MASS INDEX: 40.33 KG/M2 | SYSTOLIC BLOOD PRESSURE: 132 MMHG | OXYGEN SATURATION: 97 % | HEIGHT: 65 IN

## 2025-08-29 DIAGNOSIS — R10.31 RLQ ABDOMINAL PAIN: Primary | ICD-10-CM

## 2025-08-29 DIAGNOSIS — R23.3 EASY BRUISING: ICD-10-CM

## 2025-08-29 DIAGNOSIS — K59.09 CHRONIC CONSTIPATION: ICD-10-CM

## 2025-08-29 PROCEDURE — 1036F TOBACCO NON-USER: CPT | Performed by: FAMILY MEDICINE

## 2025-08-29 PROCEDURE — G8427 DOCREV CUR MEDS BY ELIG CLIN: HCPCS | Performed by: FAMILY MEDICINE

## 2025-08-29 PROCEDURE — G8417 CALC BMI ABV UP PARAM F/U: HCPCS | Performed by: FAMILY MEDICINE

## 2025-08-29 PROCEDURE — 3075F SYST BP GE 130 - 139MM HG: CPT | Performed by: FAMILY MEDICINE

## 2025-08-29 PROCEDURE — 99213 OFFICE O/P EST LOW 20 MIN: CPT | Performed by: FAMILY MEDICINE

## 2025-08-29 PROCEDURE — 3017F COLORECTAL CA SCREEN DOC REV: CPT | Performed by: FAMILY MEDICINE

## 2025-08-29 PROCEDURE — 3079F DIAST BP 80-89 MM HG: CPT | Performed by: FAMILY MEDICINE

## (undated) DEVICE — DEFENDO AIR WATER SUCTION AND BIOPSY VALVE KIT FOR  OLYMPUS: Brand: DEFENDO AIR/WATER/SUCTION AND BIOPSY VALVE

## (undated) DEVICE — CONNECTOR IRRIGATION AUXILIARY H2O JET W/ PRT MTL THRD HYDR

## (undated) DEVICE — CONTAINER SPEC 60ML PH 7NEUTRAL BUFF FRMLN RDY TO USE

## (undated) DEVICE — FORCEPS BX L240CM JAW DIA2.4MM WRK CHN 2.8MM ORNG L CAP W/

## (undated) DEVICE — GAUZE,SPONGE,4"X4",8PLY,STRL,LF,10/TRAY: Brand: MEDLINE

## (undated) DEVICE — BITEBLOCK 54FR W/ DENT RIM BLOX

## (undated) DEVICE — FORCEPS BX L160CM JAW DIA2.4MM YEL L CAP W/ NDL DISP RAD